# Patient Record
Sex: MALE | Race: WHITE | Employment: OTHER | URBAN - NONMETROPOLITAN AREA
[De-identification: names, ages, dates, MRNs, and addresses within clinical notes are randomized per-mention and may not be internally consistent; named-entity substitution may affect disease eponyms.]

---

## 2024-08-28 ENCOUNTER — OFFICE VISIT (OUTPATIENT)
Age: 74
End: 2024-08-28
Payer: COMMERCIAL

## 2024-08-28 VITALS
HEART RATE: 54 BPM | DIASTOLIC BLOOD PRESSURE: 70 MMHG | SYSTOLIC BLOOD PRESSURE: 130 MMHG | BODY MASS INDEX: 31.64 KG/M2 | WEIGHT: 226 LBS | HEIGHT: 71 IN

## 2024-08-28 DIAGNOSIS — Z79.4 TYPE 2 DIABETES MELLITUS WITH PROLIFERATIVE RETINOPATHY, WITH LONG-TERM CURRENT USE OF INSULIN, MACULAR EDEMA PRESENCE UNSPECIFIED, UNSPECIFIED LATERALITY, UNSPECIFIED PROLIFERATIVE RETINOPATHY* (HCC): Primary | ICD-10-CM

## 2024-08-28 DIAGNOSIS — E11.3599 TYPE 2 DIABETES MELLITUS WITH PROLIFERATIVE RETINOPATHY, WITH LONG-TERM CURRENT USE OF INSULIN, MACULAR EDEMA PRESENCE UNSPECIFIED, UNSPECIFIED LATERALITY, UNSPECIFIED PROLIFERATIVE RETINOPATHY* (HCC): Primary | ICD-10-CM

## 2024-08-28 PROCEDURE — 95251 CONT GLUC MNTR ANALYSIS I&R: CPT | Performed by: INTERNAL MEDICINE

## 2024-08-28 PROCEDURE — 1123F ACP DISCUSS/DSCN MKR DOCD: CPT | Performed by: INTERNAL MEDICINE

## 2024-08-28 PROCEDURE — 99204 OFFICE O/P NEW MOD 45 MIN: CPT | Performed by: INTERNAL MEDICINE

## 2024-08-28 RX ORDER — BUMETANIDE 2 MG/1
2 TABLET ORAL DAILY
COMMUNITY

## 2024-08-28 RX ORDER — HEPARIN SODIUM 1000 [USP'U]/ML
1000 INJECTION, SOLUTION INTRAVENOUS; SUBCUTANEOUS EVERY OTHER DAY
COMMUNITY

## 2024-08-28 RX ORDER — AMLODIPINE BESYLATE 5 MG/1
5 TABLET ORAL DAILY
COMMUNITY

## 2024-08-28 RX ORDER — INSULIN GLARGINE 100 [IU]/ML
INJECTION, SOLUTION SUBCUTANEOUS
Qty: 4 ADJUSTABLE DOSE PRE-FILLED PEN SYRINGE | Refills: 3 | Status: SHIPPED | OUTPATIENT
Start: 2024-08-28

## 2024-08-28 RX ORDER — CYCLOBENZAPRINE HCL 10 MG
10 TABLET ORAL 3 TIMES DAILY PRN
COMMUNITY

## 2024-08-28 RX ORDER — INSULIN ASPART INJECTION 100 [IU]/ML
INJECTION, SOLUTION SUBCUTANEOUS
Qty: 4 ADJUSTABLE DOSE PRE-FILLED PEN SYRINGE | Refills: 3 | Status: SHIPPED | OUTPATIENT
Start: 2024-08-28

## 2024-08-28 NOTE — PATIENT INSTRUCTIONS
Take BASAGLAR 10 units in am + 15 units in the PM.     Take mealtime Fiasp as follows:  Breakfast: 6 units  Lunch: 6 Units  Dinner: 6 units    Plus sliding scale Fiasp as follows:  Below 70, treat for hypoglycemia  , no additional insulin  151-200, 1 units  201-250, 2 units  251-300, 3 units  301-350, 4 units  351-400, 5 units  Above 400, call MD

## 2024-08-28 NOTE — PROGRESS NOTES
status, speech normal, alert and oriented x3, CLAUDIA, cranial nerves 2-12 intact, muscle tone and strength normal and symmetric.  Lymph nodes: There is no cervical, supraclavicular or submental adenopathy.  Musculoskeletal: No scoliosis.  There is no spinal tenderness.  No joint swelling or deformity.  Psychiatry: Alert and oriented ×3.  Making good eye contact.  Affect is normal.  He has some appropriate insight.    Foot Exam:  Abnormal  Decreased pulse senation BL.   Ulcer on left ankle. Neuropathy noted extending to knee.   Right big toe small ulcer noted.      Assessment/Plan:    IDDM2 w/ neuropathy, retinopathy, nephropathy:   A1c 8/7/24: 9.1%.   Current therapy includes Basaglar 25 units AM + Fiasp 5 + 5+ 5+ 10 units.   On Honey CGM.    - Insulin regimen as below.   - Will check BMP.   - Will refer to podiatry.  - In the future, can add GLP1 agonist.      Take BASAGLAR 10 units in am + 15 units in the PM.     Take mealtime  Fiasp  as follows:  Breakfast: 6 units  Lunch: 6 Units  Dinner: 6 units    Plus sliding scale  Fiasp  as follows:  Below 70, treat for hypoglycemia  , no additional insulin  151-200, 1 units  201-250, 2 units  251-300, 3 units  301-350, 4 units  351-400, 5 units  Above 400, call MD      1. Type 2 diabetes mellitus with proliferative retinopathy, with long-term current use of insulin, macular edema presence unspecified, unspecified laterality, unspecified proliferative retinopathy* (HCC)      Orders Placed This Encounter   Procedures    Basic Metabolic Panel     Standing Status:   Future     Standing Expiration Date:   8/28/2025    Hemoglobin A1C     Standing Status:   Future     Standing Expiration Date:   8/28/2025    [facility]     Referral Priority:   Routine     Referral Type:   Eval and Treat     Referral Reason:   Specialty Services Required     Number of Visits Requested:   1       The risks and benefits of my recommendations, as well as other treatment  options were discussed with the patient and daughter today. Questions were answered.  Follow up: 3 months and as needed.         Electronically signed by Reian Hoyos MD 8/28/2024 3:24 PM     **This report has been created using voice recognition software. It may   contain minor errors which are inherent in voice recognition technology.**

## 2024-09-03 ENCOUNTER — TELEPHONE (OUTPATIENT)
Age: 74
End: 2024-09-03

## 2024-09-03 DIAGNOSIS — Z79.4 TYPE 2 DIABETES MELLITUS WITH PROLIFERATIVE RETINOPATHY, WITH LONG-TERM CURRENT USE OF INSULIN, MACULAR EDEMA PRESENCE UNSPECIFIED, UNSPECIFIED LATERALITY, UNSPECIFIED PROLIFERATIVE RETINOPATHY* (HCC): Primary | ICD-10-CM

## 2024-09-03 DIAGNOSIS — E11.3599 TYPE 2 DIABETES MELLITUS WITH PROLIFERATIVE RETINOPATHY, WITH LONG-TERM CURRENT USE OF INSULIN, MACULAR EDEMA PRESENCE UNSPECIFIED, UNSPECIFIED LATERALITY, UNSPECIFIED PROLIFERATIVE RETINOPATHY* (HCC): Primary | ICD-10-CM

## 2024-09-03 RX ORDER — INSULIN GLARGINE 100 [IU]/ML
INJECTION, SOLUTION SUBCUTANEOUS
Qty: 5 ADJUSTABLE DOSE PRE-FILLED PEN SYRINGE | Refills: 3 | Status: SHIPPED | OUTPATIENT
Start: 2024-09-03 | End: 2024-09-03 | Stop reason: SDUPTHER

## 2024-09-03 RX ORDER — INSULIN GLARGINE 100 [IU]/ML
INJECTION, SOLUTION SUBCUTANEOUS
Qty: 5 ADJUSTABLE DOSE PRE-FILLED PEN SYRINGE | Refills: 3 | Status: SHIPPED | OUTPATIENT
Start: 2024-09-03

## 2024-09-03 RX ORDER — INSULIN ASPART INJECTION 100 [IU]/ML
INJECTION, SOLUTION SUBCUTANEOUS
Qty: 5 ADJUSTABLE DOSE PRE-FILLED PEN SYRINGE | Refills: 3 | Status: SHIPPED | OUTPATIENT
Start: 2024-09-03 | End: 2024-09-03 | Stop reason: SDUPTHER

## 2024-09-03 RX ORDER — INSULIN ASPART INJECTION 100 [IU]/ML
INJECTION, SOLUTION SUBCUTANEOUS
Qty: 5 ADJUSTABLE DOSE PRE-FILLED PEN SYRINGE | Refills: 3 | Status: SHIPPED | OUTPATIENT
Start: 2024-09-03

## 2024-09-04 DIAGNOSIS — Z79.4 TYPE 2 DIABETES MELLITUS WITHOUT COMPLICATION, WITH LONG-TERM CURRENT USE OF INSULIN (HCC): Primary | ICD-10-CM

## 2024-09-04 DIAGNOSIS — E11.9 TYPE 2 DIABETES MELLITUS WITHOUT COMPLICATION, WITH LONG-TERM CURRENT USE OF INSULIN (HCC): Primary | ICD-10-CM

## 2024-10-15 ENCOUNTER — OFFICE VISIT (OUTPATIENT)
Dept: INTERNAL MEDICINE CLINIC | Age: 74
End: 2024-10-15
Payer: COMMERCIAL

## 2024-10-15 DIAGNOSIS — Z79.4 TYPE 2 DIABETES MELLITUS WITH OTHER SPECIFIED COMPLICATION, WITH LONG-TERM CURRENT USE OF INSULIN (HCC): Primary | ICD-10-CM

## 2024-10-15 DIAGNOSIS — E11.69 TYPE 2 DIABETES MELLITUS WITH OTHER SPECIFIED COMPLICATION, WITH LONG-TERM CURRENT USE OF INSULIN (HCC): Primary | ICD-10-CM

## 2024-10-15 PROCEDURE — G0109 DIAB MANAGE TRN IND/GROUP: HCPCS | Performed by: REGISTERED NURSE

## 2024-10-15 PROCEDURE — NBSRV NON-BILLABLE SERVICE: Performed by: REGISTERED NURSE

## 2024-10-15 NOTE — PROGRESS NOTES
Patient presented for the [facility]  education class. The content was presented via PowerPoint, lecture and case-based format covering the following concepts: 60mins education.    What is Diabetes?  Define glycosolation  Interpretation of the A1C and goal.  Pancreatic function  Target organs and macro and microvascular complications  Goals for SGM BP goals  Meal clearance   S&S hyper/hypoglycemia and tx   Insulin physiology-basal/bolus  Navigating sick days stress  Relationship between diet, exercise, meds and stress   Utilizing maverick care system at appropriate time  Assuming responsibility in self management  Troubleshooting patterns     Post test score  16 out of 16

## 2024-10-21 ENCOUNTER — HOSPITAL ENCOUNTER
Dept: HOSPITAL 101 - PM | Age: 74
Discharge: HOME | End: 2024-10-21
Payer: COMMERCIAL

## 2024-10-21 DIAGNOSIS — M47.816: ICD-10-CM

## 2024-10-21 DIAGNOSIS — M48.062: Primary | ICD-10-CM

## 2024-10-21 DIAGNOSIS — M53.3: ICD-10-CM

## 2024-10-21 PROCEDURE — G0463 HOSPITAL OUTPT CLINIC VISIT: HCPCS

## 2024-10-21 NOTE — PM.CN
Consult Note: HPI
Data of Consult
Patient: new to practice
Consult date: 10/21/24
Requesting Physician: 
Janette Borja MD

Primary Care Provider: 
Pepper Mendoza NP

Consult Narrative
Reason for consult: low back, bilateral lower extremity pain
Narrative: 
74yom who presents for evaluation. longstanding low back pain history with radiation into bilateral lower extremities. imaging shows multilevel severe stenosis, worst at l3-4. also multilevel facet arthropathy noted. recently evaluated by spine 
surgeon, who did not recommend surgery due to myriad other health issues. has continued in a series of provider directed home exercises >6 weeks, without benefit. has used flexeril with some benefit. denies adverse med side effects.
cc:: 
CC: Janette Borja MD


Review of Systems
ROS  
 Status of ROS 10 or more systems reviewed and unremarkable except as noted in history and below   

[facility]
Medical History (Updated 10/21/24 @ 18:09 by Janette Borja MD)

Osteoarthritis
 ?M19.90 - Unspecified osteoarthritis, unspecified site (ICD-10)
Fistula
 ?L98.8 - Other specified disorders of the skin and subcutaneous tissue (ICD-10)
Trigger finger
 ?M65.30 - Trigger finger, unspecified finger (ICD-10)
Blind
 ?H54.7 - Unspecified visual loss (ICD-10)
Anxiety
 ?F41.9 - Anxiety disorder, unspecified (ICD-10)
Acid reflux
 ?K21.9 - Gastro-esophageal reflux disease without esophagitis (ICD-10)
Diabetes
 ?E11.9 - Type 2 diabetes mellitus without complications (ICD-10)
Kidney stone
 ?N20.0 - Calculus of kidney (ICD-10)
Cirrhosis of liver
 ?K74.60 - Unspecified cirrhosis of liver (ICD-10)
Requires peritoneal dialysis
 ?Z99.2 - Dependence on renal dialysis (ICD-10)
Kidney failure
 ?N19 - Unspecified kidney failure (ICD-10)
COPD (chronic obstructive pulmonary disease)
 ?J44.9 - Chronic obstructive pulmonary disease, unspecified (ICD-10)
Asthma
 ?J45.909 - Unspecified asthma, uncomplicated (ICD-10)
HTN (hypertension)
 ?I10 - Essential (primary) hypertension (ICD-10)
Palpitations
 ?R00.2 - Palpitations (ICD-10)
Heart attack
 ?I21.9 - Acute myocardial infarction, unspecified (ICD-10)
High cholesterol
 ?E78.00 - Pure hypercholesterolemia, unspecified (ICD-10)


Surgical History (Reviewed 10/21/24 @ 18:07 by Janette Borja MD)

Hx of cholecystectomy
 ?Z90.49 - Acquired absence of other specified parts of digestive tract (ICD-10)
History of rhinoplasty
 ?Z98.890 - Other specified postprocedural states (ICD-10)
History of cardiac cath
 ?Z98.890 - Other specified postprocedural states (ICD-10)
History of arthroscopic knee surgery
 ?Z98.890 - Other specified postprocedural states (ICD-10)
History of vasectomy
 ?Z98.52 - Vasectomy status (ICD-10)



Meds
Home Medications and Allergies
Home Medications

?Medication ?Instructions ?Recorded ?Confirmed ?Type
acetaminophen 650 mg 650 mg PO Q12H PRN fever 10/21/24 10/21/24 History
tablet,extended release (Tylenol    
Arthritis Pain)    
amlodipine 5 mg tablet 5 mg PO DAILY 10/21/24 10/21/24 History
bumetanide 2 mg tablet 2 mg PO DAILY 10/21/24 10/21/24 History
cyclobenzaprine 10 mg tablet 10 mg PO Q12H 10/21/24 10/21/24 History
hydroxyzine pamoate 25 mg capsule 25 mg PO TID 10/21/24 10/21/24 History
insulin aspart 6 unit subcut TID 10/21/24 10/21/24 History
(niacinamide)(U-100) 100 unit/mL(3    
mL) subcutaneous pen (Fiasp    
FlexTouch U-100 Insulin)    
insulin glargine 100 unit/mL (3 10 unit subcut QAM 10/21/24 10/21/24 History
mL) subcutaneous pen (Basaglar    
KwikPen U-100 Insulin)    
magnesium oxide 400 mg PO DAILY 10/21/24 10/21/24 History
melatonin 10 mg capsule 10 mg PO DAILY 10/21/24 10/21/24 History
omeprazole 20 mg capsule,delayed 20 mg PO BID 10/21/24 10/21/24 History
release    
sevelamer carbonate 800 mg tablet 800 mg PO TID 10/21/24 10/21/24 History
(Renvela)    


Allergies

Allergy/AdvReac Type Severity Reaction Status Date / Time
thiopental (From Pentothal) Allergy  Agitated Verified 10/21/24 14:54



Exam
Narrative
Exam Narrative: 
Psych-alert and oriented x 3. Attentive and appropriate, constitutionally normal, displays normal mood and affect per situation. There are no obvious deficits in memory, reasoning, or intellect.?
Skin-no obvious rashes, bruising, erythema noted to the patient's area of pain.?
Extremities- extremities are warm with minimal edema and palpable pulses.
Lumbar-tenderness to palpation noted in the lumbar spine and paraspinal musculature. Pain is elicited with flexion, extension, and lateral rotation of the lumbar spine. Range of motion is diminished with these motions. Facet loading maneuvers are 
positive.?
Strength-noted to be unremarkable with the exception of decreased strength rated at 4 out of 5 in bilateral quadriceps femoris, anterior tibialis.
Sensory-no notable sensory deficits in the bilateral lower extremities to touch or pinprick in all dermatomal distributions with the exception to decreased sensation to the bilateral L3, 4, 5 dermatomal distribution
Sacroiliac - bilateral tenderness at PSIS. Positive Steve's bilaterally. Positive thigh thrust bilaterally.
Coordination remains intact.?
Gait remains non-antalgic

Assessment and Plan
Assessment and Plan
(1) Lumbar stenosis with neurogenic claudication: 
(2) Lumbar spondylosis: 
(3) Sacroiliac joint dysfunction of both sides: 

Plan
74yom who presents for evaluation. failed conservative measures, as noted. imaging reviewed, as noted. given symptoms and imaging, prudent to attempt bilateral l3-4 tfesi under fluoroscopic guidance. may even benefit from bilateral sacroiliac joint 
injection under fluoroscopic guidance. he is in agreement. meds reviewed, no changes. follow up after procedure.

## 2024-10-21 NOTE — P.CN_ITS
Consult Note: HPI    
Data of Consult    
Patient: new to practice    
Consult date: 10/21/24    
Requesting Physician:     
Janette Borja MD    
    
Primary Care Provider:     
Pepper Mendoza NP    
    
Consult Narrative    
Reason for consult: low back, bilateral lower extremity pain    
Narrative:     
74yom who presents for evaluation. longstanding low back pain history with   
radiation into bilateral lower extremities. imaging shows multilevel severe   
stenosis, worst at l3-4. also multilevel facet arthropathy noted. recently   
evaluated by spine surgeon, who did not recommend surgery due to myriad other   
health issues. has continued in a series of provider directed home exercises >6   
weeks, without benefit. has used flexeril with some benefit. denies adverse med   
side effects.    
cc::     
CC: Janette Borja MD    
    
    
Review of Systems    
    
    
ROS      
    
 Status of ROS                          10 or more systems reviewed and unremark    
able except as noted in     
history and below       
    
    
[facility]    
Medical History (Updated 10/21/24 @ 18:09 by Janette Borja MD)    
    
Osteoarthritis    
   ?M19.90 - Unspecified osteoarthritis, unspecified site (ICD-10)    
Fistula    
   ?L98.8 - Other specified disorders of the skin and subcutaneous tissue (ICD-  
   10)    
Trigger finger    
   ?M65.30 - Trigger finger, unspecified finger (ICD-10)    
Blind    
   ?H54.7 - Unspecified visual loss (ICD-10)    
Anxiety    
   ?F41.9 - Anxiety disorder, unspecified (ICD-10)    
Acid reflux    
   ?K21.9 - Gastro-esophageal reflux disease without esophagitis (ICD-10)    
Diabetes    
   ?E11.9 - Type 2 diabetes mellitus without complications (ICD-10)    
Kidney stone    
   ?N20.0 - Calculus of kidney (ICD-10)    
Cirrhosis of liver    
   ?K74.60 - Unspecified cirrhosis of liver (ICD-10)    
Requires peritoneal dialysis    
   ?Z99.2 - Dependence on renal dialysis (ICD-10)    
Kidney failure    
   ?N19 - Unspecified kidney failure (ICD-10)    
COPD (chronic obstructive pulmonary disease)    
   ?J44.9 - Chronic obstructive pulmonary disease, unspecified (ICD-10)    
Asthma    
   ?J45.909 - Unspecified asthma, uncomplicated (ICD-10)    
HTN (hypertension)    
   ?I10 - Essential (primary) hypertension (ICD-10)    
Palpitations    
   ?R00.2 - Palpitations (ICD-10)    
Heart attack    
   ?I21.9 - Acute myocardial infarction, unspecified (ICD-10)    
High cholesterol    
   ?E78.00 - Pure hypercholesterolemia, unspecified (ICD-10)    
    
    
Surgical History (Reviewed 10/21/24 @ 18:07 by Janette Borja MD)    
    
Hx of cholecystectomy    
   ?Z90.49 - Acquired absence of other specified parts of digestive tract (ICD-  
   10)    
History of rhinoplasty    
   ?Z98.890 - Other specified postprocedural states (ICD-10)    
History of cardiac cath    
   ?Z98.890 - Other specified postprocedural states (ICD-10)    
History of arthroscopic knee surgery    
   ?Z98.890 - Other specified postprocedural states (ICD-10)    
History of vasectomy    
   ?Z98.52 - Vasectomy status (ICD-10)    
    
    
    
Meds    
Home Medications and Allergies    
                                Home Medications    
    
    
    
?Medication ?Instructions ?Recorded ?Confirmed ?Type    
     
acetaminophen 650 mg 650 mg PO Q12H PRN fever 10/21/24 10/21/24 History    
    
tablet,extended release (Tylenol        
    
Arthritis Pain)        
     
amlodipine 5 mg tablet 5 mg PO DAILY 10/21/24 10/21/24 History    
     
bumetanide 2 mg tablet 2 mg PO DAILY 10/21/24 10/21/24 History    
     
cyclobenzaprine 10 mg tablet 10 mg PO Q12H 10/21/24 10/21/24 History    
     
hydroxyzine pamoate 25 mg capsule 25 mg PO TID 10/21/24 10/21/24 History    
     
insulin aspart 6 unit subcut TID 10/21/24 10/21/24 History    
    
(niacinamide)(U-100) 100 unit/mL(3        
    
mL) subcutaneous pen (Fiasp        
    
FlexTouch U-100 Insulin)        
     
insulin glargine 100 unit/mL (3 10 unit subcut QAM 10/21/24 10/21/24 History    
    
mL) subcutaneous pen (Basaglar        
    
KwikPen U-100 Insulin)        
     
magnesium oxide 400 mg PO DAILY 10/21/24 10/21/24 History    
     
melatonin 10 mg capsule 10 mg PO DAILY 10/21/24 10/21/24 History    
     
omeprazole 20 mg capsule,delayed 20 mg PO BID 10/21/24 10/21/24 History    
    
release        
     
sevelamer carbonate 800 mg tablet 800 mg PO TID 10/21/24 10/21/24 History    
    
(Renvela)        
    
    
    
                                    Allergies    
    
    
    
Allergy/AdvReac Type Severity Reaction Status Date / Time    
     
thiopental (From Pentothal) Allergy  Agitated Verified 10/21/24 14:54    
    
    
    
    
Exam    
Narrative    
Exam Narrative:     
Psych-alert and oriented x 3. Attentive and appropriate, constitutionally   
normal, displays normal mood and affect per situation. There are no obvious   
deficits in memory, reasoning, or intellect.?    
Skin-no obvious rashes, bruising, erythema noted to the patient's area of pain.?    
Extremities- extremities are warm with minimal edema and palpable pulses.    
Lumbar-tenderness to palpation noted in the lumbar spine and paraspinal   
musculature. Pain is elicited with flexion, extension, and lateral rotation of   
the lumbar spine. Range of motion is diminished with these motions. Facet   
loading maneuvers are positive.?    
Strength-noted to be unremarkable with the exception of decreased strength rated  
at 4 out of 5 in bilateral quadriceps femoris, anterior tibialis.    
Sensory-no notable sensory deficits in the bilateral lower extremities to touch   
or pinprick in all dermatomal distributions with the exception to decreased   
sensation to the bilateral L3, 4, 5 dermatomal distribution    
Sacroiliac - bilateral tenderness at PSIS. Positive Steve's bilaterally.   
Positive thigh thrust bilaterally.    
Coordination remains intact.?    
Gait remains non-antalgic    
    
Assessment and Plan    
Assessment and Plan    
(1) Lumbar stenosis with neurogenic claudication:     
(2) Lumbar spondylosis:     
(3) Sacroiliac joint dysfunction of both sides:     
    
Plan    
74yom who presents for evaluation. failed conservative measures, as noted.   
imaging reviewed, as noted. given symptoms and imaging, prudent to attempt   
bilateral l3-4 tfesi under fluoroscopic guidance. may even benefit from   
bilateral sacroiliac joint injection under fluoroscopic guidance. he is in   
agreement. meds reviewed, no changes. follow up after procedure.

## 2024-10-28 ENCOUNTER — HOSPITAL ENCOUNTER (OUTPATIENT)
Dept: HOSPITAL 101 - SURGOUT | Age: 74
Discharge: HOME | End: 2024-10-28
Payer: COMMERCIAL

## 2024-10-28 VITALS
DIASTOLIC BLOOD PRESSURE: 81 MMHG | OXYGEN SATURATION: 100 % | TEMPERATURE: 97.3 F | HEART RATE: 101 BPM | SYSTOLIC BLOOD PRESSURE: 157 MMHG

## 2024-10-28 VITALS
DIASTOLIC BLOOD PRESSURE: 74 MMHG | SYSTOLIC BLOOD PRESSURE: 160 MMHG | OXYGEN SATURATION: 96 % | HEART RATE: 85 BPM | TEMPERATURE: 97.34 F

## 2024-10-28 VITALS
DIASTOLIC BLOOD PRESSURE: 77 MMHG | TEMPERATURE: 97.3 F | SYSTOLIC BLOOD PRESSURE: 169 MMHG | OXYGEN SATURATION: 97 % | HEART RATE: 83 BPM

## 2024-10-28 DIAGNOSIS — M48.062: Primary | ICD-10-CM

## 2024-10-28 PROCEDURE — 64483 NJX AA&/STRD TFRM EPI L/S 1: CPT

## 2024-10-28 RX ADMIN — SODIUM CHLORIDE 1 ML: 9 INJECTION, SOLUTION INTRAMUSCULAR; INTRAVENOUS; SUBCUTANEOUS at 08:41

## 2024-10-28 RX ADMIN — SODIUM CHLORIDE 50 ML: 9 INJECTION, SOLUTION INTRAVENOUS at 07:39

## 2024-10-28 RX ADMIN — BUPIVACAINE HYDROCHLORIDE 1 ML: 2.5 INJECTION, SOLUTION EPIDURAL; INFILTRATION; INTRACAUDAL; PERINEURAL at 08:41

## 2024-10-28 RX ADMIN — IOHEXOL 12 MG: 240 INJECTION, SOLUTION INTRATHECAL; INTRAVASCULAR; INTRAVENOUS; ORAL at 08:42

## 2024-10-28 RX ADMIN — DEXAMETHASONE SODIUM PHOSPHATE 10 MG: 10 INJECTION INTRAMUSCULAR; INTRAVENOUS at 08:41

## 2024-10-28 RX ADMIN — LIDOCAINE HYDROCHLORIDE 1 ML: 20 INJECTION, SOLUTION INFILTRATION; PERINEURAL at 08:42

## 2024-10-28 NOTE — P.ON_ITS
Date of procedure: 10/28/24    
Pre-op diagnosis: Pain due to lumbar stenosis with neurogenic claudication    
Post-op diagnosis: same as pre-op    
Procedure:     
Procedure: Bilateral L3-4 transforaminal epidural steroid injection    
Medications: Bupivacaine 0.25% 2cc, lidocaine 2% 1cc, dexamethasone 10mg    
    
The patient was seen and examined in the preoperative holding area.? Informed   
consent was obtained and placed on the chart.? Patient was brought to the   
medical procedure unit and placed in the prone position where a timeout was   
completed verifying the correct patient, procedure site, position, and planned   
special equipment using sterile aseptic technique.? Under direct fluoroscopic   
visualization a 25-gauge Quincke tipped spinal needle was advanced at level left  
L3-4 to the designated neural foramen where contrast dye was injected to show   
adequate spread.? There was no evidence of vascular or adverse uptake.? Epidural  
spread was appreciated.? The above-mentioned injectate was then placed in a 1.5   
mL aliquot preceded by negative aspiration.? The needle was removed. The same   
procedure, at the same level, was completed on the opposite side. ? Patient was   
taken to the postprocedural recovery area and monitored for an appropriate   
length of time before found suitable for discharge in the accompaniment of a   
responsible adult.     
Anesthesia: MAC    
Surgeon: Janette Borja    
Pathology: none sent    
Condition: stable    
Disposition: no change

## 2024-10-28 NOTE — XMS_ITS
Comprehensive CCD (C-CDA v2.1)  
  
                          Created on: 2024  
  
  
Arun Moon  
External Reference #: CDR,PersonID:[id number]  
: 1950  
Sex: Male  
  
Demographics  
  
  
                                        Address             [address] [place]  [address]  
   
                                        Home Phone          [phone]  
   
                                        Preferred Language  en  
   
                                        Marital Status        
   
                                        Taoist Affiliation Unknown  
   
                                        Race                White  
   
                                        Ethnic Group        Not  or Lati  
no  
  
  
Author  
  
  
                                        Organization        [facility]  
  
  
Care Team Providers  
  
  
                                Care Team Member Name Role            Phone  
   
                                Unavailable     Primary Care Provider Unavailabl  
e  
   
                                [facility] [place] [facility] Primary Care Provider [phone]  
[phone]  
   
                                MD Ronald Sánchez Attending Provider [phone]  
[phone]  
   
                                MD Charlie Matos Attending Provider [phone]  
   
                                [facility] [place] [facility] Primary Care Provider [phone]  
[phone]  
   
                                MD Ronald Sánchez Attending Provider [phone]  
[phone]  
   
                                Ronald Sánchez Unavailable     [phone]  
[phone]  
   
                                Carolee Cuevas Unavailable     [phone]  
   
                                [facility] [place] [facility] Primary Care Provider [phone]  
[phone]  
   
                                MD Ronald Sánchez Attending Provider [phone]  
[phone]  
   
                                LAN Cuevas Attending Provider [phone]  
[phone]  
   
                                [facility] [place] [facility] Primary Care Provider [phone]  
[phone]  
   
                                MD Ronald Sánchez Attending Provider [phone]  
[phone]  
   
                                [facility] [place] [facility] Primary Care Provider [phone]  
[phone]  
   
                                MD Ronald Sánchez Attending Provider [phone]  
[phone]  
   
                                Pepper Mendoza Primary Care Provider [phone]  
   
                                Pepper Mendoza Primary Care Provider [phone]  
   
                                MD Guicho Bronson Attending Provider [phone]  
   
                                Pepper Jhaveri Primary Care Provider   
[phone]  
   
                                BASIA SOMMER  Admitting       Unavailable  
   
                                BASIA SOMMER  Attending       Unavailable  
   
                                FRANCISCO CALABRESE Referring       Unavailab  
le  
   
                                AICHHOLZ, PEPPER J Primary Care    Unavailable  
   
                                ROXANNA SALINAS Consulting      Unavaila  
NELLY Colmenares    Consulting      Unavailable  
   
                                BEVERLY YUSUF Attending       Unavailable  
   
                                AICHHOLZ, PEPPER J Referring       Unavailable  
   
                                AICHHOLZ, PEPPER J Primary Care    Unavailable  
   
                                Aichholz, Pepper J Primary Care Provider [phone]  
   
                                MD Yordy Lewis  Attending Provider [phone]  
   
                                JOSEF CASANOVA Attending       Unavailable  
   
                                AICHHOLZ, PEPPER J Referring       Unavailable  
   
                                AICHHOLZ, PEPPER J Primary Care    Unavailable  
   
                                AICHHOLZ, PEPPER J Referring       Unavailable  
   
                                AICHHOLZ, PEPPER J Primary Care    Unavailable  
   
                                AICHHOLZ, PEPPER J Primary Care    Unavailable  
   
                                FRANCISCO CALABRESE Attending       UnavailFRANCISCO Harley Attending       Unavaila  
FRANCISCO Anderson Referring       Unavaila  
ble  
   
                                AICHHOLZ, PEPPER J Primary Care    Unavailable  
   
                                VALERIA JORDAN  Referring       Unavailable  
   
                                AICHHOLZ, PEPPER J Primary Care    Unavailable  
   
                                AICHHOLZ, PEPPER J Referring       Unavailable  
   
                                AICHHOLZ, PEPPER J Primary Care    Unavailable  
   
                                AICHHOLZ, PEPPER J Referring       Unavailable  
   
                                AICHHOLZ, PEPPER J Primary Care    Unavailable  
   
                                AKOSUA BAIG Attending       Unavailable  
   
                                PALMERHHOLZ, PEPPER J Referring       Unavailable  
   
                                AICHHOLZ, PPEPER J Primary Care    Unavailable  
   
                                AKOSUA BAIG Attending       Unavailable  
   
                                AKOSUA BAIG Referring       Unavailable  
   
                                AICHHOLZ, PEPPER J Primary Care    Unavailable  
   
                                EDWARDO, SHABANA G Referring       Unavailable  
   
                                AICHHOLZ, PEPPER J Primary Care    Unavailable  
   
                                EDWARDO, SHABANA G Referring       Unavailable  
   
                                AICHHOLZ, PEPPER J Primary Care    Unavailable  
   
                                EDWARDO, SHABANA G Referring       Unavailable  
   
                                AICHHOLZ, PEPPER J Primary Care    Unavailable  
   
                                EDWARDO, SHABANA G Admitting       Unavailable  
   
                                EDWARDO, SHABANA G Attending       Unavailable  
   
                                AICHHOLZ, PEPPER J Primary Care    Unavailable  
   
                                CYRIL LEARY Attending       Unavailable  
   
                                AICHHOLZ, PEPPER J Primary Care    Unavailable  
   
                                EDWARDO, SHABANA G Attending       Unavailable  
   
                                EDWARDO, SHABANA G Referring       Unavailable  
   
                                AICHHOLZ, PEPPER J Primary Care    Unavailable  
   
                                YORDY LEWIS     Referring       Unavailable  
   
                                AICHHOLZ, PEPPER J Primary Care    Unavailable  
   
                                AICHHOLZ, PEPPER J Referring       Unavailable  
   
                                AICHHOLZ, PEPPER J Primary Care    Unavailable  
   
                                Aichholz, Pepper J Primary Care Provider [phone]  
   
                                MD Radha Imeliana  Attending Provider [phone]  
   
                                Asaad, Imad     Attending       Unavailable  
   
                                Asaad, Imad     Admitting       Unavailable  
   
                                Pepper Mendoza Primary Care    Unavailable  
   
                                Asaad, Imad     Attending       Unavailable  
   
                                Asaad, Imad     Admitting       Unavailable  
   
                                Pepper Mendoza Primary Care    Unavailable  
   
                                Aichholmanish NP, Pepper Unavailable     [phone]  
   
                                Shoaib Matos MD Unavailable     [phone]  
   
                                Shoaib Matos MD Primary Care Provider [phone]  
   
                                Aicjanusz NP, Pepper Unavailable     [phone]  
   
                                PEPPER MENDOZA. Referring       Unavailable  
   
                                No Family, Physician Primary Care    Unavailable  
   
                                ARCHIE VELA Referring       Unavailable  
   
                                No Family, Physician Primary Care    Unavailable  
   
                                AICHLUCEROZ, PEPPER  Attending       Unavailable  
   
                                RUSHER, NANCY A Attending       Unavailable  
   
                                AICHHOLZ, PEPPER  Attending       Unavailable  
   
                                AICHHOLZ, PEPPER  Attending       Unavailable  
   
                                RUSHER, NANCY A Attending       Unavailable  
   
                                AICHHOLZ, PEPPER  Attending       Unavailable  
   
                                RUSHER, NANCY A Attending       Unavailable  
   
                                RUSHER, NANCY A Attending       Unavailable  
   
                                AICHHOLZ, PEPPER  Attending       Unavailable  
   
                                RUSHER, NANCY A Attending       Unavailable  
   
                                JIM GRIGSBY Attending       Unavailable  
   
                                AICHHOLZ, PEPPER  Referring       Unavailable  
   
                                AICHHOLZ, PEPPER  Attending       Unavailable  
   
                                RUSHER, NANCY A Attending       Unavailable  
   
                                Janette Borja MD Attending         
Unavailable  
  
  
  
Allergies  
  
  
                                                    Allergy   
Classification                          Reported   
Allergen(s)               Allergy Type              Date of   
Onset                     Reaction(s)               Facility  
   
                                                    PHENobarbital  
(1 source)          PHENobarbital       Drug Allergy          
4                                       Unknown   
Reaction,   
violent                                 [facility]  
   
                                                      
(17 sources)                            PHENobarbital;   
Translations:   
[PHENOBARBITAL]           Drug Allergy                
2                                       violent,   
Unknown   
Reaction                                [facility]  
   
                                                      
(4 sources)               sodium pentothal          Propensity to   
adverse   
reactions                                 
2                                       becomes   
violent                                 [facility]  
   
                                                      
(6 sources)                             Thiopental;   
Translations:   
[THIOPENTAL   
SODIUM]                   Drug Allergy                
0                                       Other (See   
Comments)                               [facility]  
   
                                                      
(5 sources)         Thiopental          Drug Allergy          
3                                                   [facility]   
[facility]  
  
  
  
Medications  
Current Medications  
  
  
  
                      Medication Drug Class(es) Dates      Sig (Normalized) Sig   
(Original)  
   
                                                    8 hr acetaminophen   
650 mg extended   
release oral tablet  
(11 sources)                                        Start:   
2023                              take 1950 mg by   
mouth every eight   
hours                                   Acetaminophen   
Active 1950 MG PO   
Q8H 2023 12:00am  
  
  
  
                                Start: 2023                 Acetaminophen   
(Tylenol Arthritis) 650   
mg Tablet Extended Release Active   
1300 MG PO Q8H 2023   
12:00am  
   
                                                    Start: 10-  
End: 2023                                     Acetaminophen (Tylenol) 325   
mg Tablet   
Discontinued 650 MG PO As Directed   
2022 12:00am 2023 11:44am  
   
                                                            take 1 tablet by rayne  
th every   
eight hours as needed                   acetaminophen (TYLENOL ARTHRITIS) 650   
mg 8 hr tablet Take 1 tablet (650 mg   
total) by mouth every 8 (eight) hours   
as needed. 0 Active  
  
  
  
                                        Comment on above:   Take 650 mg by mouth  
 every 8 hours as needed.   
   
                                                    [id number] 200   
actuat albuterol   
0.09 mg/actuat   
metered dose   
inhaler  
(5 sources)                             beta2-Adrenergic   
Agonist                                 Start:   
20                                      take 2 puff(s) by   
inhalation every   
four hours for   
wheezing                                albuterol HFA 90   
mcg/act inhaler   
Inhale 2 puffs   
every 4 (four)   
hours if needed   
for shortness of   
breath or wheezing   
2023 Active  
   
                                                    amLODIPine 10 mg   
oral tablet  
(20 sources)                            Dihydropyridine Calcium   
Channel Blocker                         Start:   
20                                      take 5 mg by mouth   
once daily at   
bedtime                                 Amlodipine Active   
5 MG PO Daily at   
bedtime 2022 1:00am  
  
  
  
                                        Start: 2022   take 1 tablet by rayne  
th in the   
morning                                 amLODIPine (NORVASC) 10 mg tablet   
Indications: Essential hypertension   
Take 1 tablet (10 mg total) by mouth   
in the morning. 90 tablet 3   
2022 Active  
   
                                                            take 1 tablet by rayne  
th in the   
morning                                 amLODIPine (Norvasc) 5 MG tablet   
Take 5 mg by mouth in the morning.   
Active  
   
                                                                amLODIPine Besyl  
ate Active  
  
  
  
                                                    atorvastatin 40 mg   
oral tablet  
(20 sources)                            HMG-CoA   
Reductase   
Inhibitor                               Start: 2021  
End: 2024                         take 1 tablet   
by mouth in   
the morning                             atorvastatin   
(LIPITOR) 40 mg   
tablet Take 1   
tablet (40 mg   
total) by mouth in   
the morning. 0   
2024 Active  
  
  
  
                                                                Atorvastatin Garland  
cium Active  
  
  
  
                                        Comment on above:   Take 1 tablet by rayne  
th.   
   
                                                    B Complex-C-Zn-Folic   
Acid   
(Dialyvite/Zinc)   
tablet  
(5 sources)                                         Start:   
2024                              take 1 tablet by   
mouth at bedtime                        B Complex-C-Zn-Folic   
Acid   
(Dialyvite/Zinc)   
tablet Take 1 tablet   
by mouth at bedtime   
2024 Active  
   
                                                    bumetanide 2 mg oral   
tablet  
(9 sources)               Loop Diuretic             Start:   
2024                              take 1 tablet by   
mouth once daily                        bumetanide (Bumex) 2   
MG tablet Take 2 mg   
by mouth Daily   
2024 Active  
  
  
  
                                        Start: 10-   take 1 tablet by rayne  
th once   
daily                                   bumetanide (BUMEX) 2 mg tablet Take   
1 tablet (2 mg total) by mouth   
daily. 0 10/10/2023 Active  
  
  
  
                                                    cefdinir 300 mg oral   
capsule  
(5 sources)                             Cephalosporin   
Antibacterial                                               cefdinir (Omnicef) 3  
00   
MG capsule Take 300 mg   
by mouth Active  
   
                                                    cetirizine   
hydrochloride 10 mg   
oral tablet  
(5 sources)                             Histamine-1 Receptor   
Antagonist                                          take 1 tablet   
by mouth in   
the morning                             cetirizine (ZyrTEC) 10   
MG tablet Take 10 mg   
by mouth in the   
morning. Active  
   
                                                    cholecalciferol 0.125   
mg oral tablet  
(16 sources)              Vitamin D                 Start:   
  
022                                     take 1 tablet   
by mouth once   
daily                                   Cholecalciferol   
(Vitamin D3) (Vitamin   
D3) 125 mcg (5,000   
unit) Tablet Active   
61530 UNIT PO Daily   
2022   
1:00am  
  
  
  
                                                take 1 capsule by mouth in the m  
orning cholecalciferol (Vitamin D-3) 125 MCG   
(5000   
UT) capsule Take 5,000 Units by mouth in the   
morning. Active  
   
                                                take 99847 mg by mouth once daniela  
y cholecalciferol, vitamin D3, 10 mcg/drop (400  
  
unit/drop) drops Take 10,000 mg by mouth   
daily. 0 Active  
  
  
  
                                                    Continuous Blood Gluc   
 (FreeStyle   
Yudi 2 [place]) device  
(5 sources)                                         Start:   
2023                                          Continuous Blood Gluc   
 (FreeStyle   
Yudi 2 [employer])   
device 1 Device Daily   
as needed 2023   
Active  
   
                                                    Continuous Glucose   
Sensor (FreeStyle   
Yudi 2 Sensor) misc  
(5 sources)                                         Start:   
2024                                          Continuous Glucose   
Sensor (FreeStyle   
Yudi 2 Sensor) misc   
1 each by Other route   
Every 10 (ten) days   
2024 Active  
   
                                                    cyclobenzaprine   
hydrochloride 10 mg   
oral tablet  
(8 sources)                             Muscle   
Relaxant                                Start:   
2024  
End: 2024                         take 1   
tablet by   
mouth once                              cyclobenzaprine   
(Flexeril) 10 MG   
tablet Indications:   
Muscle spasm , Spinal   
stenosis of lumbar   
region with   
neurogenic   
claudication Take 1   
tablet (10 mg) by   
mouth every 12   
(twelve) hours 60   
tablet 2 10/10/2024   
2024 Active  
  
  
  
                                        Start: 2024   take 10 mg by mouth   
every   
twelve hours                            Cyclobenzaprine Active 10 MG PO Every   
12 hours May 14th, 2024 12:00am  
  
  
  
                                                    Dailyvite  
(3 sources)                             Start: 2023   take 800 mg   
by mouth once   
daily at   
bedtime                                 Dailyvite Active   
800 MG PO Daily   
at bedtime 2023   
12:00am with zinc  
   
                                                    Flash Glucose   
Sensor (Freestyle   
Yudi 2 Sensor)   
kit  
(1 source)                      Start: 2024                 Flash Glucose   
Sensor (Freestyle   
Yudi 2 Sensor)   
kit Active EACH   
.ROUTE .MEDSUPPLY   
 12:00am As   
directed  
   
                                                    FREESTYLE YUDI 2   
SENSOR kit  
(2 sources)                     Start: 2023                 FREESTYLE LIBR  
E 2   
SENSOR kit  
   
                                                    furosemide 40 mg   
oral tablet  
(5 sources)         Loop Diuretic                           take 1 tablet   
by mouth once   
daily                                   furosemide   
(Lasix) 40 MG   
tablet Take 1   
tablet by mouth   
Daily Active  
   
                                                    gentamicin 0.001   
mg/mg topical   
ointment  
(6 sources)                     Start: 2024                 gentamicin   
(Garamycin) 0.1 %   
ointment Apply 1   
Application   
topically in the   
morning.   
2024 Active  
   
                                                    1 ml heparin   
sodium, porcine   
1000 unt/ml   
injection  
(6 sources)                             Unfractionated   
Heparin,   
Anti-coagulant                                              heparin 1000   
units/mL   
injection Infuse   
1,000 Units/hr   
into a venous   
catheter every   
other day Active  
  
  
  
                                                                heparin sodium,p  
orcine (heparin, porcine,) 1,000 unit/mL infusion for dialysis  
  
Infuse 1,000 Units/hr into a venous catheter every other day. 0 Active  
  
  
  
                                                    hydrOXYzine pamoate   
25 mg oral capsule  
(20 sources)        Antihistamine       Start: 2024   take 1 capsule   
by mouth every   
eight hours as   
needed for   
anxiety                                 hydrOXYzine pamoate   
(Vistaril) 25 MG   
capsule Take 25 mg   
by mouth every 8   
(eight) hours if   
needed for anxiety   
or itching   
2024 Active  
  
  
  
                                        Start: 2024   take 1 tablet by rayne  
th three   
times daily as needed                   Hydroxyzine Hcl Active 1 TAB PO   
Three times daily May 14th, 2024   
12:00am FreeTextSi tablet as   
needed Orally Once a day; Note:   
Source Status: Taking; Provider:   
Princess Cardenas (NPI:   
[id number])  
   
                                        Start: 2024   take 1 tablet by rayne  
th once   
daily as needed                         Hydroxyzine Hcl Active 1 TAB PO   
Daily May 14th, 2024 12:00am   
FreeTextSi tablet as needed   
Orally Once a day; Note: Source   
Status: Taking; Provider: Princess Cardenas (NPI: [id number])  
   
                                Start: 2023                 hydrOXYzine (V  
ISTARIL) 25 mg capsule   
take 1 capsule by mouth every   
morning then 1 capsule AT NOON then   
1 capsule every evening if needed   
for anxiety up to 90 DAYS 270   
capsule 0 2023 Active  
   
                                                    Start: 2022  
End: 2024                         take 25 mg by mouth three times   
daily                                   Hydroxyzine Pamoate Discontinued 25   
MG PO Three times daily 2022 12:00am May 14th, 2024 11:58am  
   
                                                            take 1 tablet by rayne  
th every   
twenty-four hours                       hydrOXYzine HCl 25 MG 1 tablet as   
needed Orally Once a day Active  
   
                                                                hydrOXYzine HCl   
Active  
  
  
  
                                                    3 ml insulin   
aspart, human   
100 unt/ml pen   
injector  
(6 sources)         Insulin Analog      Start: 2024   inject 6 [IU] by   
subcutaneous   
injection three   
times daily                             Insulin Aspart   
(Niacinamide) (Fiasp   
Flextouch U-100   
Insulin) 100 unit/mL   
(3 mL) insulin pen   
Active 6 UNIT SUBCUT   
Three times daily   
2024   
12:00am  
  
  
  
                                Start: 2024                 insulin aspart  
, with niacinamide, (Fiasp FlexTouch) 100   
UNIT/ML   
injection Indications: Type 2 diabetes mellitus without   
complication, with long-term current use of insulin (CMS/[facility]) 5   
units before meals and 8-10 units at bedtime. For total max dose   
of 25 units daily 23 mL 3 2024 Active  
  
  
  
                                                    3 ml insulin glargine   
100 unt/ml pen injector  
(7 sources)     Insulin Analog  Start: 2024                 insulin glargi  
ne (Basaglar   
KwikPen) 100 UNIT/ML pen   
Indications: Type 2   
diabetes mellitus without   
complication, with   
long-term current use of   
insulin (CMS/[facility] [facility]) 25 units   
daily 27 mL 1 2024   
Active  
  
  
  
                                        Start: 2024   inject 10 [IU] by metcalf  
bcutaneous   
injection once daily at bedtime         Insulin Glargine (Basaglar   
Kwikpen U-100 Insulin) 100   
unit/mL (3 mL) insulin pen   
Active 10 UNIT SUBCUT every day   
in the morning and at bedtime   
May 14th, 2024 12:00am  
  
  
  
                                                    insulin   
glargine,hum.rec.anlog   
(BASAGLAR KWIKPEN U-100   
INSULIN SUBQ)  
(1 source)                                                  inject 10 [IU] by   
subcutaneous   
injection in the   
morning                                 insulin   
glargine,hum.rec.anlog   
(BASAGLAR KWIKPEN U-100   
INSULIN SUBQ) Inject 10   
Units under the skin in the   
morning. 0 Active  
   
                                                    loratadine 10 mg oral tablet  
(7 sources)                                                     loratadine (Clar  
itin) 10 MG   
tablet Take 10 mg by mouth   
if needed for allergies   
Active  
   
                                                    magnesium oxide 400 mg oral   
tablet  
(8 sources)                                         Start:   
10-13-2  
023                                                 magnesium oxide (Mag-Ox) 400  
   
(240 Mg) MG tablet Take 400   
mg by mouth. 10/13/2023   
Active  
   
                                                    melatonin 10 mg oral tablet  
(19 sources)                                        Start:   
  
024                                     take 1 tablet by   
mouth once daily at   
bedtime                                 Melatonin Active 1 TAB PO   
Daily at bedtime May 14th,   
2024 12:00am [employer]   
tablet at bedtime as needed   
Orally Once a day; Note:   
Source Status: Taking;   
Provider: Princess Cardenas (NPI: [id number])  
  
  
  
                                                            take 1 tablet under   
the tongue at   
bedtime                                 Melatonin 1 MG sublingual tablet Place 1  
 mg   
under the tongue at bedtime Active  
   
                                                            take 1 tablet by rayne  
th once daily at   
bedtime as needed                       Melatonin 10 MG 1 tablet at bedtime as   
needed Orally Once a day Active  
   
                                                                Melatonin Active  
  
  
  
                                                    omeprazole 20 mg   
delayed release   
oral tablet  
(20 sources)                            Proton Pump   
Inhibitor                 Start: 2023         take 20 mg by   
mouth twice   
daily                                   Omeprazole Active   
20 MG PO Twice   
daily 2023 12:00am  
  
  
  
                                        Start: 04-   take 20 mg by mouth   
once daily   
at bedtime                              Omeprazole Active 20 MG PO Daily at   
bedtime 2023 12:00am  
   
                                                            take 1 capsule by mo  
uth in the   
morning                                 omeprazole (PriLOSEC) 20 MG DR   
capsule Take 20 mg by mouth in the   
morning. Active  
   
                                                                Omeprazole Activ  
e  
  
  
  
                                        Comment on above:   Take 20 mg by mouth   
once daily.   
   
                                                    ONETOUCH ULTRA2 METER   
misc  
(2 sources)                                         Start:   
  
1                                                   ONETOUCH ULTRA2 METER   
misc  
   
                                                    polyethylene glycol   
3350 [id number] mg powder   
for oral solution  
(1 source)      Osmotic Laxative                                 polyethylene gl  
ycol   
(GLYCOLAX) 17   
gram/dose powder Take   
17 g by mouth in the   
morning. 0 Active  
   
                                                    0.25 mg, 0.5 mg dose   
1.5 ml semaglutide   
1.34 mg/ml pen   
injector  
(2 sources)                                                     semaglutide (OZE  
MPIC)   
0.25 mg or 0.5 mg(2   
mg/1.5 mL) pen   
injector Inject under   
the skin. 0 Active  
   
                                                    sodium bicarb  
(10 sources)                                                    sodium bicarb Ac  
tive  
   
                                                    sodium zirconium   
cyclosilicate 5000 mg   
powder for oral   
suspension  
(5 sources)                                                     Lokelma 5 g pack  
et 5   
g Active  
   
                                                    Super B Complex  
(12 sources)                                                    Super B Complex   
Active  
   
                                                    UNABLE TO FIND  
(2 sources)                                                 take 1 tablet by   
mouth in the   
morning                                 UNABLE TO FIND Take 1   
tablet by mouth in   
the morning.   
DailyVite with Zinc.   
0 Active  
   
                                                    Vitamin B Complex  
(9 sources)                                         Start:   
  
2                                       take 1 tablet by   
mouth once daily                        Vitamin B Complex   
Active 1 TAB PO Daily   
2022   
2:58pm  
  
  
  
                                                    Start: 2022  
End: 2023                         take 1 tablet by mouth once   
daily                                   Vitamin B Complex Discontinued 1 TAB   
PO Daily 2022 1:00am   
2023 11:46am  
   
                                        Start: 2022   take 1 tablet by rayne  
th once   
daily                                   Vitamin B Complex Active 1 TAB PO   
Daily 2022 12:00am  
   
                                        Start: 2022   take 1 tablet by rayne  
th once   
daily                                   Vitamin B Complex Active 1 TAB PO   
Daily 2022 1:00am  
  
  
  
                                                    Vitamin D3  
(12 sources)                                                    Vitamin D3 Activ  
e  
  
  
  
Completed/Discontinued Medications  
  
  
  
                      Medication Drug Class(es) Dates      Sig (Normalized) Sig   
(Original)  
   
                                                    acetaminophen 325 mg   
/ HYDROcodone   
bitartrate 5 mg oral   
tablet  
(3 sources)               Opioid Agonist            Start:   
2023  
End:   
2024                              take 1 tablet by   
mouth every six   
hours                                   Hydrocodone-Acetam  
inophen   
Discontinued 1 TAB   
PO Q6H 28 7 May   
2nd, 2023 May   
14th, 2024 11:58am  
   
                                                    aspirin 81 mg   
delayed release oral   
tablet  
(20 sources)                            Platelet   
Aggregation   
Inhibitor,   
Nonsteroidal   
Anti-inflammatory   
Drug                                    Start:   
2022  
End:   
10-                              take 1 tablet by   
mouth once daily                        Aspirin (Aspirin   
Low-Strength) 81   
mg Tablet,Delayed   
Release (Dr/Ec)   
Discontinued 81 MG   
PO Daily 2022 1:00am   
May 14th, 2024   
11:57am  
  
  
  
                                                                Baby Aspirin Act  
randi  
  
  
  
                                        Comment on above:   [facility]   
   
                                                    benzonatate 200 mg oral   
capsule  
(8 sources)                             Non-narcotic   
Antitussive                             Start:   
2024  
End:   
2024                                          Benzonatate Discontinued   
MG PO May 14th, 2024   
12:00am 2024   
1:48pm  
  
  
  
                                        Start: 2024   take 1 capsule by mo  
uth three   
times daily as needed                   benzonatate (Tessalon) 200 MG   
capsule Take 200 mg by mouth 3   
(three) times a day as needed   
2024 Active  
  
  
  
                                                    chlorthalidone 25 mg   
oral tablet  
(11 sources)                            Thiazide-like   
Diuretic                                Start:   
2021  
End: 2023                         take 25 mg   
by mouth   
once daily                              Chlorthalidone   
Discontinued 25 MG   
PO Daily 2022 1:00am   
2023   
11:45am  
   
                                                    cholecalciferol,   
vitamin D3, (VITAMIN   
D3 ORAL)  
(2 sources)                                                 take 38731   
mg by mouth   
once daily                              cholecalciferol,   
vitamin D3, (VITAMIN   
D3 ORAL) Take 10,000   
mg by mouth once   
daily. 0 Active  
   
                                        Comment on above:   Take 10,000 mg by mo  
uth once daily.   
   
                                                    diphenhydrAMINE   
hydrochloride 50 mg   
oral tablet  
(3 sources)                             Histamine-1   
Receptor   
Antagonist                              Start:   
2023  
End: 2024                         take 50 mg   
by mouth   
once daily   
at bedtime                              Diphenhydramine Hcl   
Discontinued 50 MG   
PO Daily at bedtime   
2023   
12:00am May 14th,   
2024 11:57am  
   
                                                    0.5 ml dulaglutide 3   
mg/ml auto-injector  
(16 sources)                            GLP-1 Receptor   
Agonist                                 Start:   
2022  
End: 2023                                     Dulaglutide   
(Trulicity) 1.5   
mg/0.5 mL pen   
injector   
Discontinued 1.5 MG   
SUBCUT every week   
2022   
1:00am 2023 11:45am takes   
on Sat.  
  
  
  
                                                                Trulicity Active  
  
  
  
                                        Comment on above:   as directed.   
   
                                                    [id number]  
l oil (METANX, ALGAL   
OIL,) 3 mg-35 mg-2 mg   
-90.314 mg cap  
(2 sources)                                         Start:   
2022                                          [id number]-algal   
oil (METANX, ALGAL OIL,)   
3 mg-35 mg-2 mg -90.314   
mg cap See Admin   
Instructions. 0   
2022 Active  
   
                                        Comment on above:   See Admin Instructio  
ns.   
   
                                                    lidocaine 25 mg/ml /   
prilocaine 25 mg/ml   
topical cream  
(3 sources)                             Antiarrhythmic, Amide   
Local Anesthetic                        Start:   
2023  
End:   
2024                                          Lidocaine-Prilocaine   
Discontinued 1 APPLIC   
TOPICAL 3 Times a week   
2023 12:00am   
May 14th, 2024 11:58am   
 with   
dialysis  
   
                                                    Ozempic  
(4 sources)                                                     Ozempic Not-Taki  
ng  
  
  
  
                                                                Ozempic Active  
  
  
  
                                                    Semaglutide  
(3 sources)                                         Start: 2023  
End: 2024                                     Semaglutide (Ozempic) 0.25 m  
g or 0.5 mg (2 mg/3   
mL) pen injector Discontinued 0.5 MG SUBCUT every   
week 2023 12:00am May 14th, 2024   
11:59am   
  
  
  
                                Start: 2023                 Semaglutide (O  
zempic) 0.25 mg or 0.5 mg (2 mg/3 mL) pen   
injector   
Active 0.5 MG SUBCUT every week 2023 12:00am   
  
  
  
  
                                                    sevelamer   
carbonate 800 mg   
oral tablet  
(20 sources)              Phosphate Binder          Start: 2022  
End: 2024                         take 1 tablet   
by mouth three   
times daily at   
mealtime                                Sevelamer Carbonate   
(Renvela) 800 mg   
tablet Discontinued   
800 MG PO Three   
times daily May   
14th, 2024 12:00am   
2024 10:19am must   
administer with a   
meal/food  
  
  
  
                                        Start: 2022   take 2 tablets by mo  
uth three   
times daily                             Sevelamer Carbonate (Renvela) 800   
mg Tablet Active 1600 MG PO Three   
times daily 2022 1:00am  
   
                                                                Renvela Active  
  
  
  
                                        Comment on above:   1 tablet with meals   
   
                                                    sodium bicarbonate 325   
mg oral tablet  
(13 sources)                                        Start:   
2022  
End:   
2023                              take 325 mg by   
mouth twice   
daily                                   Sodium Bicarbonate   
Discontinued 325 MG PO   
Twice daily 2022 1:00am   
2023   
11:46am  
  
  
  
                                                                Sodium Bicarbona  
te 4.2 % as directed Intravenous Active  
  
  
  
                                                    tiZANidine 4 mg   
oral tablet  
(20 sources)                            Central alpha-2   
Adrenergic Agonist                      Start: 2022  
End: 2024                         take 4 mg by   
mouth twice   
daily                                   Tizanidine   
Discontinued 4 MG   
PO Twice daily   
2022   
1:00am May 14th,   
2024 11:59am  
  
  
  
                                                                tiZANidine HCl A  
ctive  
  
  
  
                                        Comment on above:   [facility]   
  
  
  
Problems  
Active Problems  
  
  
                                                    Problem   
Classification  Problem         Date            Documented Date Episodic/Chronic  
   
                                                    Acquired foot   
deformities  
(14 sources)                            Acquired hallux   
valgus; Translations:   
[Hallux valgus   
(acquired),   
unspecified foot]                       Onset:   
2022                Chronic  
   
                                                    Acute and unspecified   
renal failure  
(4 sources)                             Renal failure   
syndrome;   
Translations:   
[Unspecified kidney   
failure]                                10-          Chronic  
   
                                                    Acute myocardial   
infarction  
(5 sources)                             Myocardial   
infarction;   
Translations: [Acute   
myocardial   
infarction,   
unspecified]                            Onset:   
2024                Chronic  
   
                                                    Administrative/social   
admission  
(7 sources)                             Reduced mobility;   
Translations: [Other   
reduced mobility]                       Onset:   
2020                Episodic  
   
                                                    Anxiety disorders  
(5 sources)                             Generalized anxiety   
disorder;   
Translations:   
[Generalized anxiety   
disorder]                               Onset:   
2024                Chronic  
   
                                                    Asthma  
(5 sources)                             Asthma; Translations:   
[Unspecified asthma,   
uncomplicated]                          Onset:   
2024                Chronic  
   
                                                    Blindness and vision   
defects  
(6 sources)                             Legal blindness;   
Translations: [Legal   
blindness, as defined   
in [place]]                                 Onset:   
2024                Chronic  
   
                                                    Chronic kidney   
disease  
(20 sources)                            Chronic kidney   
disease;   
Translations:   
[Chronic kidney   
disease, unspecified]                   Onset:   
2021  
Resolved:   
2022                                          Chronic  
   
                                                    Chronic obstructive   
pulmonary disease and   
bronchiectasis  
(6 sources)                             Chronic obstructive   
pulmonary disease,   
unspecified;   
Translations:   
[Chronic obstructive   
lung disease]                           Onset:   
2024                Chronic  
   
                                                    Complication of   
device; implant or   
graft  
(8 sources)                             Arteriovenous fistula   
thrombosis;   
Translations:   
[Thrombosis due to   
vascular prosthetic   
devices, implants and   
grafts, initial   
encounter]                              Onset:   
2022  
Resolved:   
2022                                          Chronic  
   
                                                    Complication of   
device; implant or   
graft  
(5 sources)                             Other mechanical   
complication of   
surgically created   
arteriovenous   
fistula, initial   
encounter;   
Translations:   
[Mechanical   
complication of   
arteriovenous   
surgical fistula]                       Onset:   
2022  
Resolved:   
2022                                          Episodic  
   
                                                    Coronary   
atherosclerosis and   
other heart disease  
(3 sources)                             History of myocardial   
infarction;   
Translations: [Old   
myocardial   
infarction]                             Onset:   
2021                Chronic  
   
                                                    Diabetes mellitus   
with complications  
(20 sources)                            Secondary diabetes   
mellitus;   
Translations:   
[Diabetes mellitus   
due to underlying   
condition with   
unspecified diabetic   
retinopathy with   
macular edema]                          Onset:   
2020                Chronic  
   
                                                    Diabetes mellitus   
without complication  
(9 sources)                             Insulin treated type   
2 diabetes mellitus;   
Translations: [Type 2   
diabetes mellitus   
without   
complications]                          Onset:   
2024                Chronic  
   
                                                    Disorders of lipid   
metabolism  
(8 sources)                             Mixed hyperlipidemia;   
Translations: [Mixed   
hyperlipidemia]                         Onset:   
01-                10-                Chronic  
   
                                                    Diverticulosis and   
diverticulitis  
(5 sources)                             Diverticular disease;   
Translations:   
[Diverticulosis of   
intestine, part   
unspecified, without   
perforation or   
abscess without   
bleeding]                               Onset:   
2024                Chronic  
   
                                                    Essential   
hypertension  
(10 sources)                            Essential   
hypertension;   
Translations:   
[Essential (primary)   
hypertension]                           Onset:   
2020                Chronic  
   
                                                    Headache; including   
migraine  
(5 sources)                             Migraine;   
Translations:   
[Migraine,   
unspecified, not   
intractable, without   
status migrainosus]                     Onset:   
2024                Chronic  
   
                                                    Immunity disorders  
(5 sources)                             Secondary immune   
deficiency disorder;   
Translations:   
[Immunodeficiency due   
to conditions   
classified elsewhere]                   Onset:   
2024                Chronic  
   
                                                    Mycoses  
(6 sources)                             Onychomycosis due to   
dermatophyte ;   
Translations: [Tinea   
unguium]                                Onset:   
2023                Episodic  
   
                                                    Osteoarthritis  
(9 sources)                             Arthropathy of left   
hip joint;   
Translations:   
[Unilateral primary   
osteoarthritis, left   
hip]                                    Onset:   
2024                Chronic  
   
                                                    Other aftercare  
(1 source)                              Post-discharge   
follow-up;   
Translations:   
[Encounter for   
follow-up examination   
after completed   
treatment for   
conditions other than   
malignant neoplasm]                     2024          Episodic  
   
                                                    Other aftercare  
(7 sources)                             Marijuana user;   
Translations: [Other   
long term (current)   
drug therapy]                           Onset:   
2024                Episodic  
   
                                                    Other aftercare  
(1 source)                              Other long term   
(current) drug   
therapy;   
Translations: [Other   
long term (current)   
drug therapy]                           Onset:   
2024                                          Episodic  
   
                                                    Other aftercare  
(2 sources)                             Long term (current)   
use of insulin;   
Translations: [Long   
term (current) use of   
insulin]                                Onset:   
2024                                          Episodic  
   
                                                    Other aftercare  
(1 source)                              Long-term current use   
of insulin;   
Translations: [Long   
term (current) use of   
insulin]                                10-          Episodic  
   
                                                    Other and ill-defined   
heart disease  
(5 sources)                             Heart disease;   
Translations: [Heart   
disease, unspecified]                   Onset:   
2024                Chronic  
   
                                                    Other circulatory   
disease  
(6 sources)                             Arteriovenous   
fistula;   
Translations:   
[Arteriovenous   
fistula, acquired]                                          Chronic  
   
                                                    Other circulatory   
disease  
(4 sources)                             Arteriovenous   
fistula, acquired                       Onset:   
2022  
Resolved:   
2022                                          Chronic  
   
                                                    Other circulatory   
disease  
(1 source)                              Arteriovenous fistula   
of right upper   
extremity;   
Translations:   
[Arteriovenous   
fistula, acquired]                      2024          Chronic  
   
                                                    Other connective   
tissue disease  
(5 sources)                             Myofascial pain;   
Translations:   
[Myalgia, other site]                   Onset:   
2024                Episodic  
   
                                                    Other liver diseases  
(6 sources)                             Steatosis of liver;   
Translations: [Fatty   
(change of) liver,   
not elsewhere   
classified]                             Onset:   
2024                Chronic  
   
                                                    Other liver diseases  
(9 sources)                             Hepatic fibrosis;   
Translations:   
[Hepatic fibrosis]                      Onset:   
2024                Chronic  
   
                                                    Other liver diseases  
(3 sources)                             Fatty (change of)   
liver, not elsewhere   
classified;   
Translations: [Other   
chronic nonalcoholic   
liver disease]                          Onset:   
2024                Chronic  
   
                                                    Other liver diseases  
(2 sources)                             Elevated liver   
enzymes level;   
Translations:   
[Abnormal levels of   
other serum enzymes]                     2024          Episodic  
   
                                                    Other lower   
respiratory disease  
(1 source)                              H/O: pneumonia;   
Translations:   
[Personal history of   
pneumonia   
(recurrent)]                            2024          Episodic  
   
                                                    Other nervous system   
disorders  
(2 sources)                             Mononeuropathy of   
lower limb;   
Translations:   
[Unspecified   
mononeuropathy of   
unspecified lower   
limb]                                   Onset:   
2022                Chronic  
   
                                                    Other nervous system   
disorders  
(1 source)                              Other chronic pain;   
Translations: [Other   
chronic pain]                           Onset:   
2024                                          Chronic  
   
                                                    Other nervous system   
disorders  
(5 sources)                             Chronic pain   
syndrome;   
Translations:   
[Chronic pain   
syndrome]                               Onset:   
2024                Chronic  
   
                                                    Other nervous system   
disorders  
(3 sources)                             Acute postoperative   
pain; Translations:   
[Other acute   
postprocedural pain]                     2023          Episodic  
   
                                                    Other non-traumatic   
joint disorders  
(7 sources)                             Hip pain;   
Translations: [Pain   
in right hip]                           Onset:   
10-                10-                Episodic  
   
                                                    Other nutritional;   
endocrine; and   
metabolic disorders  
(2 sources)                             Body mass index 30+ -   
obesity;   
Translations:   
[Obesity,   
unspecified]                            Onset:   
2021                Chronic  
   
                                                    Other skin disorders  
(6 sources)                             Dystrophia unguium;   
Translations: [Nail   
dystrophy]                              Onset:   
2023                Episodic  
   
                                                    Peripheral and   
visceral   
atherosclerosis  
(5 sources)                             Atherosclerosis of   
aorta; Translations:   
[Atherosclerosis of   
aorta]                                  Onset:   
2024                Chronic  
   
                                                    Residual codes;   
unclassified  
(6 sources)                             Past history of   
procedure;   
Translations: [Other   
specified   
postprocedural   
states]                                 Onset:   
2024                Episodic  
   
                                                    Spondylosis;   
intervertebral disc   
disorders; other back   
problems  
(7 sources)                             Degeneration of   
lumbar intervertebral   
disc; Translations:   
[Degeneration of   
intervertebral disc   
of lumbar region,   
unspecified whether   
pain present]                           Onset:   
10-                10-                Chronic  
   
                                                    Spondylosis;   
intervertebral disc   
disorders; other back   
problems  
(20 sources)                            Radiculopathy,   
thoracic region;   
Translations:   
[Radiculopathy,   
lumbar region]                          Onset:   
2024                Episodic  
   
                                                    Substance-related   
disorders  
(1 source)                              Light cigarette   
smoker; Translations:   
[Nicotine dependence,   
cigarettes,   
uncomplicated]                          2024          Chronic  
   
                                                    Unclassified  
(2 sources)                             Elevation of levels   
of liver transaminase   
levels; Translations:   
[Elevation of levels   
of liver transaminase   
levels]                                 Onset:   
2024                                            
   
                                                    Unclassified  
(1 source)                Rapid Heart Rate          Onset:   
03-                                            
   
                                                    Unclassified  
(1 source)                              rapid heart rate,   
vomiting, flank pain                    Onset:   
03-                                            
   
                                                    Unclassified  
(1 source)                              Hepatic fibrosis,   
unspecified;   
Translations:   
[Hepatic fibrosis,   
unspecified]                            Onset:   
2024                                            
  
  
Past or Other Problems  
  
  
                                                    Problem   
Classification  Problem         Date            Documented Date Episodic/Chronic  
   
                                                    Biliary tract disease  
(5 sources)                             Gallstone;   
Translations:   
[Calculus of   
gallbladder without   
cholecystitis   
without obstruction]                    Onset:   
2024  
Resolved:   
2024                Episodic  
   
                                                    Calculus of urinary   
tract  
(2 sources)                             Kidney stone;   
Translations:   
[Calculus of kidney]                    Onset:   
2021                Episodic  
   
                                                    Cardiac dysrhythmias  
(1 source)                              Tachycardia,   
unspecified;   
Translations:   
[Tachycardia,   
unspecified]                            Onset:   
03-                                          Episodic  
   
                                                    Fluid and electrolyte   
disorders  
(2 sources)                             Hyperkalemia;   
Translations:   
[Hyperkalemia]                          Onset:   
2022                Episodic  
   
                                                    Genitourinary   
symptoms and   
ill-defined   
conditions  
(16 sources)                            Albuminuria ;   
Translations:   
[Proteinuria,   
unspecified]                            Onset:   
2022  
Resolved:   
2024                Episodic  
   
                                                    Malaise and fatigue  
(5 sources)                             Asthenia;   
Translations:   
[Weakness]                              Onset:   
2024                Episodic  
   
                                                    Mood disorders  
(7 sources)               Mood disorders            Onset:   
2022  
Resolved:   
2024                  
   
                                                    Other and unspecified   
benign neoplasm  
(5 sources)                             Polyp of colon;   
Translations: [Polyp   
of colon]                               Onset:   
2024                Episodic  
   
                                                    Other connective   
tissue disease  
(5 sources)                             Pain in toe;   
Translations: [Pain   
in unspecified   
toe(s)]                                 Onset:   
2023                Episodic  
   
                                                    Other connective   
tissue disease  
(6 sources)                             Spasm; Translations:   
[Other muscle spasm]                    Onset:   
2023                Episodic  
   
                                                    Other liver diseases  
(4 sources)                             Abnormal levels of   
other serum enzymes;   
Translations: [Other   
nonspecific abnormal   
serum enzyme levels]                    Onset:   
2024                Episodic  
   
                                                    Other liver diseases  
(5 sources)                             Alkaline phosphatase   
raised;   
Translations:   
[Abnormal levels of   
other serum enzymes]                    Onset:   
2024                Episodic  
   
                                                    Other lower   
respiratory disease  
(1 source)                              Shortness of breath;   
Translations:   
[Shortness of   
breath]                                 Onset:   
03-                                          Episodic  
   
                                                    Other nutritional;   
endocrine; and   
metabolic disorders  
(5 sources)                             Overweight in   
adulthood with body   
mass index of 25 or   
more but less than   
30; Translations:   
[Body mass index   
(BMI) 29.0-29.9,   
adult]                                  Onset:   
2024                Episodic  
   
                                                    Other screening for   
suspected conditions   
(not mental disorders   
or infectious   
disease)  
(11 sources)                            Encounter for   
screening for   
malignant neoplasm   
of prostate;   
Translations:   
[Patient encounter   
status]                                 Onset:   
2024                Episodic  
   
                                                    Pneumonia (except   
that caused by   
tuberculosis or   
sexually transmitted   
disease)  
(9 sources)                             Pneumonia,   
unspecified   
organism;   
Translations:   
[Pneumonia]                             Onset:   
03-                03-                Episodic  
   
                                                    Residual codes;   
unclassified  
(7 sources)                             Tobacco user;   
Translations:   
[Tobacco use]                           Onset:   
03-                10-                Episodic  
  
  
  
Results  
  
  
                          Test Name    Value        Interpretation Reference   
Range                                   Facility  
   
                                                    Activated partial thrombopla  
stin time (aPTT) in platelet poor plasma by   
coagulation   
aOrdered By: Yordy Lewis on 2024   
   
                      aPTT Coag (PPP) [Time] 31.5 s                25.1-36.5  [facility]  
   
                                        Comment on above:   A hematocrit value g  
reater than 55% may lead to inaccurate   
results in coagulation testing. Patients having hematocrit   
values >55% require a special collection tube for coagulation   
studies. Please contact the laboratory at [phone] for   
redraw instructions.   
   
                                                    Coagulation Profileon 2024   
   
                      aPTT Coag (Bld) [Time] 31.5 s     Normal     25.1-36.5  Th  
e   
[facility]   
Physician   
Group  
   
                                        Comment on above:   Order Comment: STAT   
FOR BX   
   
                                                            Result Comment: A he  
matocrit value greater than 55% may lead to   
inaccurate  
results in coagulation testing. Patients having hematocrit  
values >55% require a special collection tube for  
coagulation studies.  
Please contact the laboratory at [phone] for redraw  
instructions.  
PERFORMED BY:  
[place] [id number] [phone]  
PATHOLOGIST MEDICAL DIRECTOR  
PAUL RODRIGUEZ M.D.   
   
                                                            Performed By: #### P  
LT, PP ####  
[facility]  
[address] [place], [place] [id number] [facility]   
   
                                                    INR in Platelet poor plasma   
by Coagulation assayOrdered By: Yordy Lewis on   
2024   
   
                                                    INR Coag (PPP)   
[Relative time] 0.9 {INR}       Normal                          [facility]  
   
                                        Comment on above:   INR Therapeutic Rang  
e A) Pre- and Peroperative OAT started two  
  
weeks before surgery. NOT HIP SURGERY: 1.5 - 2.5 HIP SURGERY: 2   
- 3B) Primary and secondary prevention of venous THROMBOSIS: 2   
- 3C) Active venous thrombosis, pulmonary embolismand   
prevention of recurrent venous thrombosis: 2 - 3D) Prevention   
of arterial thromboembolismincluding patients with mechanical   
heart valves: 3 - 4.5   
   
                                                            Order Comment: STAT   
FOR BX   
   
                                                            Result Comment: INR   
Therapeutic Range  
A) Pre- and Peroperative OAT started two weeks before  
surgery. NOT HIP SURGERY: 1.5 - 2.5  
HIP SURGERY: 2 - 3  
B) Primary and secondary prevention of venous  
THROMBOSIS: 2 - 3  
C) Active venous thrombosis, pulmonary embolism  
and prevention of recurrent venous thrombosis: 2 - 3  
D) Prevention of arterial thromboembolism  
including patients with mechanical heart valves: 3 - 4.5   
   
                                                            Performed By: #### P  
LT, PP ####  
[facility]  
[address] [place], [place] [address] [place]   
   
                                                    Julio 2024   
   
                                        L                   Specimen: [id number]   
Received:    
Status: NIRAJ Leah Num:   
[id number]  
Spec Type: Surgical Subm   
Dr: Charlie Bermudez DO  
  
Tissues: A Liver - Needle   
Biopsy (RANDOM LIVER BX)  
Procedures: HE/2,   
Gross/Micro L5  
  
  
Age/  
Patient Birth Sex Location   
Account Attending   
Physician  
  
  
Arun Moon 74/M    
[id number] Yordy Lewis MD  
  
  
  
SPEC NUM: [id number] RECD:   
 STATUS: NIRAJ BALES NUM: [id number]  
[place]:  [facility]   
DR: Charlie Bermudez DO  
  
ENTERED:    
[facility] DR: Yordy Lewis MD  
  
SPEC TYPE: Surgical DEPT:   
S  
ENTERED BY: [id number] RECV   
BY: [id number]  
  
ORDERED: HE/2, Gross/Micro   
L5  
ORDERED: HE/2, Gross/Micro   
L5  
  
Pathological Diagnosis  
  
Random liver biopsies:  
  
-3 tan hepatic core   
segments for gross   
processing only. Pending   
further consultation  
services in reference   
laboratory.  
  
Clinical Information  
  
Elevated liver enzymes,   
hepatic fibrosis, fatty   
liver, hepatic steatosis  
  
Gross Description  
  
The specimen was received   
in formalin with the   
patient's name and  random   
liver biopsy  and  
consists of 3 tan needle   
core biopsies ranging in   
length from 1.3 to 1.6 cm   
each with an  
average diameter of 0.1   
cm. The specimen is   
entirely submitted in   
cassette A1.  
  
CPT Codes  
  
67177  
--------------------------  
--------------  
  
  
  
  
  
  
--------------------------  
--------------  
Specimen: [id number]   
Received:    
Status: NIRAJ Bales Num:   
[id number]  
Spec Type: Surgical Subm   
Dr: Charlie Bermudez DO  
  
Tissues: A Liver - Needle   
Biopsy (RANDOM LIVER BX)  
Procedures: HE/2,   
Gross/Micro L5  
--------------------------  
--------------  
Patient: Arun Moon   
[id number] (Continued)  
--------------------------  
--------------  
  
  
Signed   
__________(signature on   
file)___________ Chin-Moses Medrano MD 10/11/24 1032 Normal                                  The   
[facility]   
Physician   
Group  
   
                                                    Platelets [#/volume] in [place]  
d by Automated countOrdered By: Yordy Lewis on   
2024   
   
                                                    Platelets (Bld)   
[#/Vol]         243 10*3/uL     Normal          150-450         [facility]  
   
                                        Comment on above:   Order Comment: STAT   
FOR BX   
   
                                                            Result Comment: PERF  
ORMED BY:  
[place] [address]  
[phone]  
PATHOLOGIST MEDICAL DIRECTOR  
PAUL RODRIGUEZ M.D.   
   
                                                            Performed By: #### P  
LT, PP ####  
[address]   
   
                                                    Prothrombin time (PT)Ordered  
 By: Imad Radha on 2024   
   
                      PT Coag (PPP) [Time] 10.8 s     Normal     9.0-12.9   [facility]  
   
                                        Comment on above:   A hematocrit value g  
reater than 55% may lead to inaccurate   
results in coagulation testing. Patients having hematocrit   
values >55% require a special collection tube for coagulation   
studies. Please contact the laboratory at [phone] for   
redraw instructions.   
   
                                                            Order Comment: STAT   
FOR BX   
   
                                                            Result Comment: A he  
matocrit value greater than 55% may lead to   
inaccurate  
results in coagulation testing. Patients having hematocrit  
values >55% require a special collection tube for  
coagulation studies.  
Please contact the laboratory at [phone] for redraw  
instructions.   
   
                                                            Performed By: #### P  
LT, PP ####  
[place] [id number] [facility]   
   
                                                    US guide needle placementon   
2024   
   
                                                    US guide needle   
placement                               [facility] [place]  
[address] [place] [address]  
  
Ultrasound Report  
Signed  
  
Patient: Arun Moon   
MR#: [id number]  
[id number]  
: 1950   
Acct:[id number]  
  
Age/Sex: 74 / M ADM Date:   
24  
  
Loc:  Room: Type: [facility]  
Attending Dr: Yordy Lewis MD  
  
Ordering Provider: Yordy Lewis MD  
Date of Service: 24  
Accession #: ([id number])   
US/US needle biopsy:   
K74.00 - Hepatic fibrosis,   
unspecified  
([id number]) US/US guide   
needle placement: ,  
  
  
Copies to: Yordy Lewis MD  
  
  
Ultrasound-guided Random   
Liver Biopsy  
  
HISTORY: Hepatic steatosis  
  
The biopsy device:   
18-gauge by 10 cm Bard   
biopsy gun utilized.  
  
Core samples: 3 core   
samples were obtained.  
  
Informed consent was   
obtained discussing the   
procedure and risks.   
Patient agreed. The skin   
entry  
site was localized with   
ultrasound. Skin entry   
prepped and draped in   
sterile fashion with local  
lidocaine administered.   
The biopsy device was   
administered into the   
liver with ultrasound   
guidance.  
Adequate core biopsy   
samples obtained. Samples   
sent to pathology for   
further assessment.   
Patient  
states no immediate   
complications. No active   
bleeding identified with   
ultrasound.  
  
  
  
ORDER #: [id number] US/US   
needle biopsy  
IMPRESSION: Successful   
ultrasound-guided random   
liver biopsy.  
  
  
Ultrasound the liver   
obtained 1 hour post   
biopsy.  
  
No hematoma or active   
bleeding identified.  
  
IMPRESSION: No active   
bleeding or hematoma.  
  
Impression dictated by:   
Charlie Bermudez M.D.2024 2:48 PM  
  
  
Dictation Location:   
[place] [address]  
  
Tech: Gabriela Deniz  
  
Transcribed By: MICHELLE   
24 1448  
Dictated By: Charlie Bermudez DO 24  
  
Signed By:  
24 1448       Normal                                  The   
[facility]   
Physician   
Group  
   
                                                    GLUCOSEon 2024   
   
                      Glucose [Mass/Vol] 185 mg/dL  High       65-99      [facility]  
[facility]  
   
                                        Comment on above:   Performed By: #### C  
MARY, 2857-1, CMP, 12426-8 ####  
[facility] ([id number])  
[address]  
[place] [address]   
   
                                                    HGB A1C (GLYCO-HGB)on 2024   
   
                      Glucose [Mass/Vol] 214 mg/dL  Normal                [facility]  
   
                                        Comment on above:   Performed By: #### RAPHAEL HERNANDEZ, 2857-1, CMP, 59776-4 ####  
[facility] ([id number])  
[address]  
[place] [address]   
   
                                                    HbA1c (Bld) [Mass   
fraction]       9.1 %           High            4.4-5.6         [facility]  
   
                                        Comment on above:   Result Comment: NOTE  
ADA Guidelines  
Result HgbA1c  
------------ ---------------  
Normal : less than 5.7 %  
Prediabetes : 5.7 % to 6.4 %  
Diabetes : > 6.4 %  
Use with caution in patients with abnormal hemoglobin variants   
as  
the half-life of red blood cells and in vivo glycation rates   
are  
affected.   
   
                                                            Performed By: #### RAPHAEL HERNANDEZ, 2857-1, CMP, 32042-1 ####  
[facility] ([id number])  
[address], [address]  
[place] [address]   
   
                                                    Lipid 1996 panelon   
4   
   
                      Cholesterol [Mass/Vol] 269 mg/dL  High       150-200    Pr  
[facility]  
   
                                        Comment on above:   Performed By: #### RAPHAEL HERNANDEZ, 2857-1, CMP, 47250-2 ####  
[facility] ([id number])  
[address], [address]  
[place] [address]   
   
                                                    Cholesterol in HDL   
[Mass/Vol]      47 mg/dL        Normal          >39             [facility]  
   
                                        Comment on above:   Result Comment:  
HDL <40 mg/dL - High Risk  
HDL > or = 40mg/dL- Desirable  
HDL >60 mg/dL - Negative Risk  
---------------------------------------------   
   
                                                            Performed By: #### C  
MARY, 2857-1, CMP, 67336-8 ####  
[facility] ([id number])  
[address], [address]  
[place] [address]   
   
                                                    Cholesterol in LDL   
[Mass/Vol]      148 mg/dL       High            <130            [facility]  
   
                                        Comment on above:   Result Comment:  
LDL <100 mg/dL - Desirable  
LDL >160 mg/dL - High Risk  
---------------------------------------------   
   
                                                            Performed By: #### C  
BCA, 2857-1, CMP, 40253-9 ####  
[facility] ([id number])  
[address], [address]  
[place] [address]   
   
                                                    Cholesterol in VLDL   
[Mass/Vol]      74 mg/dL        High            0-30            [facility]  
   
                                        Comment on above:   Performed By: #### C  
MARY, 2857-1, CMP, 07630-3 ####  
[facility] ([id number])  
[address], [address]  
[place] [address]   
   
                      CHOLESTEROL:HDL 5.7        High       1.0-5.0    [facility]  
   
                                        Comment on above:   Performed By: #### RAPHAEL HERNANDEZ, 2857-1, CMP, 35419-6 ####  
[facility] ([id number])  
[address], [address]  
[place] [address]   
   
                                                    Triglyceride   
[Mass/Vol]      368 mg/dL       High                      [facility]  
   
                                        Comment on above:   Performed By: #### RAPHAEL  
BCA, 2857-1, CMP, 04576-8 ####  
[facility] ([id number])  
[address], [address]  
[place] [address]   
   
                                                    POTASSIUMon 2024   
   
                      Potassium [Moles/Vol] 3.6 mmol/L Normal     3.5-5.0    [facility]  
   
                                        Comment on above:   Performed By: #### C  
BCA, 2857-1, CMP, 05892-0 ####  
[facility] ([id number])  
[address], [address]  
[place] [address]   
   
                                                    BASIC METABOLIC PANLon    
   
                      Anion gap [Moles/Vol] 16 mmol/L  High       5-15       [facility]  
   
                                        Comment on above:   Performed By: #### C  
BCA, 2857-1, CMP, 64836-3 ####  
[facility] ([id number])  
[address], [address]  
[place] [address]   
   
                      Calcium [Mass/Vol] 9.5 mg/dL  Normal     8.5-10.5   [facility]  
   
                                        Comment on above:   Performed By: #### C  
BCA, 2857-1, CMP, 73270-1 ####  
[facility] ([id number])  
[address], [address]  
[place] [address]   
   
                      Chloride [Moles/Vol] 99 mmol/L  Normal          [facility]  
   
                                        Comment on above:   Performed By: #### C  
BCA, 2857-1, CMP, 69427-8 ####  
[facility] ([id number])  
[address], [address]  
[place] [address]   
   
                      CO2 [Moles/Vol] 24 mmol/L  Normal     22-32      [facility]  
   
                                        Comment on above:   Performed By: #### C  
BCA, 2857-1, CMP, 83934-5 ####  
[facility] ([id number])  
[address], [address]  
[place] [address]   
   
                      Creatinine [Mass/Vol] 5.56 mg/dL High       0.60-1.30  [facility]  
   
                                        Comment on above:   Result Comment: METH  
OD TRACEABLE TO IDMS STANDARD   
   
                                                            Performed By: #### C  
BCA, 2857-1, CMP, 94703-4 ####  
[facility] ([id number])  
[address], [address]  
[place] [address]   
   
                                                    GFR/1.73 sq   
M.predicted among   
non-blacks MDRD   
(S/P/Bld) [Vol   
rate/Area]      10 mL/min/{1.73_m2} Low             >59             [facility]  
   
                                        Comment on above:   Result Comment:  
Reported eGFR is based on the  
CKD-EPI  equation that does  
not use a race coefficient.   
   
                                                            Performed By: #### C  
BCA, 2857-1, CMP, 13076-3 ####  
[facility] ([id number])  
[address], [facility] [address]  
[place] [address]   
   
                      Glucose [Mass/Vol] 175 mg/dL  High       65-99      [facility]  
   
                                        Comment on above:   Performed By: #### RAPHAEL  
BCA, 2857-1, CMP, 24483-4 ####  
[facility] ([id number])  
[address] [address] [facility] [address]  
[place] [address]   
   
                      Potassium [Moles/Vol] 3.6 mmol/L Normal     3.5-5.0    [facility]  
   
                                        Comment on above:   Performed By: #### RAPHAEL  
BCA, 2857-1, CMP, 67151-7 ####  
[facility] ([id number])  
[address] [address] [facility] [address]  
[place] [address]   
   
                      Sodium [Moles/Vol] 139 mmol/L Normal     134-146    [facility]  
   
                                        Comment on above:   Performed By: #### RAPHAEL  
BCA, 2857-1, CMP, 51957-9 ####  
[facility] ([id number])  
[address] [address] [facility] [address]  
[place] [address]   
   
                                                    Urea nitrogen   
[Mass/Vol]      55 mg/dL        High            5-27            [facility]  
   
                                        Comment on above:   Performed By: #### RAPHAEL  
BCA, 2857-1, CMP, 71388-8 ####  
[facility] ([id number])  
[address] [address] [facility] [address]  
[place] [address]   
   
                                                    CBC AND AUTO DIFFon 20  
24   
   
                      ABSOLUTE BASOPHIL 0.2 X10E9/L Normal     0.0-0.2    [facility]  
   
                                        Comment on above:   Performed By: #### C  
BCA, 2857-1, CMP, 66204-0 ####  
[facility] ([id number])  
[address], [address]  
[place] [address]   
   
                      ABSOLUTE NEUTROPHIL 7.3 X10E9/L High       1.5-6.6    [facility]  
   
                                        Comment on above:   Performed By: #### RAPHAEL  
BCA, 2857-1, CMP, 94877-0 ####  
[facility] ([id number])  
[address], [address]  
[place] [address]   
   
                                                    Basophils/100 WBC   
(Bld)           2.2 %           Normal                          [facility]  
   
                                        Comment on above:   Performed By: #### C  
BCA, 2857-1, CMP, 43922-2 ####  
[facility] ([id number])  
[address], [address]  
[place] [address]   
   
                                                    Eosinophils (Bld)   
[#/Vol]         1.3 10*3/uL     High            0.0-0.4         [facility]  
   
                                        Comment on above:   Performed By: #### C  
BCA, 2857-1, CMP, 96992-6 ####  
[facility] ([id number])  
[address], [address]  
[place] [address]   
   
                                                    Eosinophils/100 WBC   
(Bld)           12.1 %          Normal                          [facility]  
   
                                        Comment on above:   Performed By: #### RAPHAEL  
BCA, 2857-1, CMP, 78374-0 ####  
[facility] ([id number])  
[address], [address]  
[place] [address]   
   
                                                    Erythrocyte   
distribution width   
(RBC) [Ratio]   13.6 %          Normal          11.5-15.0       [facility]  
   
                                        Comment on above:   Performed By: #### C  
BCA, 2857-1, CMP, 85443-6 ####  
[facility] ([id number])  
[address], [address]  
[place] [address]   
   
                                                    Hematocrit (Bld)   
[Volume fraction] 35.6 %          Low             39-49           [facility]  
   
                                        Comment on above:   Performed By: #### C  
BCA, 2857-1, CMP, 79679-7 ####  
[facility] ([id number])  
[address], [address]  
[place] [address]   
   
                                                    Hemoglobin (Bld)   
[Mass/Vol]      12.1 g/dL       Low             13.0-17.0       [facility]  
   
                                        Comment on above:   Performed By: #### RAPHAEL  
BCA, 2857-1, CMP, 29103-6 ####  
[facility] ([id number])  
[address], [address]  
[place] [address]   
   
                                                    Lymphocytes (Bld)   
[#/Vol]         1.3 10*3/uL     Normal          1.0-3.5         [facility]  
   
                                        Comment on above:   Performed By: #### RAPHAEL  
BCA, 2857-1, CMP, 01322-5 ####  
[facility] ([id number])  
[address], [address]  
[place] [address]   
   
                                                    Lymphocytes/100 WBC   
(Bld)           11.9 %          Normal                          [facility]  
   
                                        Comment on above:   Performed By: #### RAPHAEL  
BCA, 2857-1, CMP, 02991-5 ####  
[facility] ([id number])  
[address], [address]  
[place] [address]   
   
                                                    MCH (RBC) [Entitic   
mass]           32.4 pg         Normal          27-34           [facility]  
   
                                        Comment on above:   Performed By: #### C  
BCA, 2857-1, CMP, 52767-2 ####  
[facility] ([id number])  
[address], [address]  
[place] [address]   
   
                      MCHC (RBC) [Mass/Vol] 33.9 g/dL  Normal     32-36      [facility]  
   
                                        Comment on above:   Performed By: #### C  
BCA, 2857-1, CMP, 49951-1 ####  
[facility] ([id number])  
[address], [address]  
[place] [address]   
   
                                                    MCV (RBC) [Entitic   
vol]            95 fL           Normal                    [facility]  
   
                                        Comment on above:   Performed By: #### RAPHAEL  
BCA, 2857-1, CMP, 12731-6 ####  
[facility] ([id number])  
[address], [address]  
[place] [address]   
   
                                                    Monocytes (Bld)   
[#/Vol]         0.7 10*3/uL     Normal          0-0.9           [facility]  
   
                                        Comment on above:   Performed By: #### RAPHAEL  
BCA, 2857-1, CMP, 88551-6 ####  
[facility] ([id number])  
[address], [facility] [address]  
[place] [address]   
   
                                                    Monocytes/100 WBC   
(Bld)           6.2 %           Normal                          [facility]  
   
                                        Comment on above:   Performed By: #### RAPHAEL  
BCA, 2857-1, CMP, 37831-8 ####  
[facility] ([id number])  
[address], [address]  
[place] [address]   
   
                                                    Neutrophils/100 WBC   
(Bld)           67.6 %          Normal                          [facility]  
   
                                        Comment on above:   Performed By: #### RAPHAEL  
BCA, 2857-1, CMP, 58310-1 ####  
[facility] ([id number])  
[address], [address]  
[place] [address]   
   
                                                    Platelet mean volume   
(Bld) [Entitic vol] 9.7 fL          Normal          7-12            [facility]  
   
                                        Comment on above:   Performed By: #### RAPHAEL  
BCA, 2857-1, CMP, 24327-0 ####  
[facility] ([id number])  
[address], [address]  
[place] [address]   
   
                                                    Platelets (Bld)   
[#/Vol]         229 10*3/uL     Normal          150-450         [facility]  
   
                                        Comment on above:   Performed By: #### RAPHAEL  
BCA, 2857-1, CMP, 88582-6 ####  
[facility] ([id number])  
[address], [address]  
[place] [address]   
   
                      RBC COUNT  3.73 X10E12/L Low        4.10-5.70  [facility]  
   
                                        Comment on above:   Performed By: #### C  
BCA, 2857-1, CMP, 75003-4 ####  
[facility] ([id number])  
[address], [address]  
[place] [address]   
   
                      WBC (Bld) [#/Vol] 10.8 10*3/uL Normal     4.0-11.0   [facility]  
   
                                        Comment on above:   Performed By: #### C  
BCA, 2857-1, CMP, 90897-8 ####  
[facility] ([id number])  
[address], [facility] [address]  
[place] [address]   
   
                                                    LIVER PANELon 2024   
   
                      Albumin [Mass/Vol] 4.1 g/dL   Normal     3.2-5.3    [facility]  
   
                                        Comment on above:   Performed By: #### C  
BCA, 2857-1, CMP, 76089-6 ####  
[facility] ([id number])  
[address], [address]  
[place] [address]   
   
                                                    ALP [Catalytic   
activity/Vol]   161 U/L         High                      [facility]  
   
                                        Comment on above:   Performed By: #### C  
BCA, 2857-1, CMP, 16203-3 ####  
[facility] ([id number])  
[address], [address]  
[place] [address]   
   
                                                    ALT [Catalytic   
activity/Vol]   68 U/L          High            0-40            [facility]  
   
                                        Comment on above:   Performed By: #### C  
BCA, 2857-1, CMP, 00865-6 ####  
[facility] ([id number])  
[address], [address]  
[place] [address]   
   
                                                    AST [Catalytic   
activity/Vol]   46 U/L          High            0-41            [facility]  
   
                                        Comment on above:   Performed By: #### C  
BCA, 2857-1, CMP, 83378-4 ####  
[facility] ([id number])  
[address], [address]  
[place] [address]   
   
                      Bilirubin [Mass/Vol] 0.5 mg/dL  Normal     0.3-1.2    [facility]  
   
                                        Comment on above:   Performed By: #### C  
MARY, 2857-1, CMP, 77900-6 ####  
[facility] ([id number])  
[address], [address]  
[place] [address]   
   
                                                    Bilirubin.direct   
[Mass/Vol]      0.2 mg/dL       Normal          0.0-0.4         [facility]  
   
                                        Comment on above:   Performed By: #### C  
MARY, 2857-1, CMP, 47123-1 ####  
[facility] ([id number])  
[address], [address]  
[place] [address]   
   
                      Protein [Mass/Vol] 7.5 g/dL   Normal     6.0-8.0    [facility]  
   
                                        Comment on above:   Performed By: #### RPAHAEL HERNANDEZ, 2857-1, CMP, 68123-6 ####  
[facility] ([id number])  
[address], [address]  
[place] [address]   
   
                                                    PROTIME AND INRon 2024  
   
   
                                                    INR Coag (PPP)   
[Relative time] 0.9 {INR}       Normal          0.8-1.1         [facility]  
   
                                        Comment on above:   Performed By: #### C  
MARY, 2857-1, CMP, 61293-1 ####  
[facility] ([id number])  
[address], [address]  
[place] [address]   
   
                      PT Coag (PPP) [Time] 10.4 s     Normal     9.8-13.2   [facility]  
   
                                        Comment on above:   Result Comment: NEW   
REFERENCE RANGE   
   
                                                            Performed By: #### C  
MARY, 2857-1, CMP, 38950-8 ####  
[facility] ([id number])  
[address], [address]  
[place] [address]   
   
                                                    URINALYSISon 2024   
   
                      Bilirubin Ql (U) Negative   Normal     NEG        [facility]  
   
                                        Comment on above:   Performed By: #### C  
BCA, 2857-1, CMP, 54006-7 ####  
[facility] ([id number])  
[address], [address]  
[place] [address]   
   
                      BLOOD/HGB  Small      Abnormal   NEG        [facility]  
   
                                        Comment on above:   Performed By: #### C  
BCA, 2857-1, CMP, 21202-7 ####  
[facility] ([id number])  
[address], [address]  
[place] [address]   
   
                      Color (U)  YELLOW     Normal     YELLOW     [facility]  
   
                                        Comment on above:   Performed By: #### C  
BCA, 2857-1, CMP, 58382-2 ####  
[facility] ([id number])  
[address], [address]  
[place] [address]   
   
                      Glucose Ql (U) 500 mg/dL  Abnormal   NEG        [facility]  
   
                                        Comment on above:   Performed By: #### RAPHAEL  
BCA, 2857-1, CMP, 18273-2 ####  
[facility] ([id number])  
[address], [address]  
[place] [address]   
   
                                                    Hyaline casts LM Ql   
(Urine sed)     4 /lpf          High            0-2             [facility]  
   
                                        Comment on above:   Performed By: #### RAPHAEL  
BCA, 2857-1, CMP, 77065-6 ####  
[facility] ([id number])  
[address], [address]  
[place] [address]   
   
                      Ketones Ql (U) Negative   Normal     NEG        [facility]  
   
                                        Comment on above:   Performed By: #### C  
BCA, 2857-1, CMP, 89053-0 ####  
[facility] ([id number])  
[address], [address]  
[place] [address]   
   
                                                    Leukocyte esterase   
Test strip Ql (U) Negative        Normal          NEG             [facility]  
   
                                        Comment on above:   Performed By: #### C  
BCA, 2857-1, CMP, 95667-8 ####  
[facility] ([id number])  
[address], [address]  
[place] [address]   
   
                      MUCOUS     PRESENT    Abnormal   NONE       [facility]  
   
                                        Comment on above:   Performed By: #### C  
MARY, 2857-1, CMP, 67668-4 ####  
[facility] ([id number])  
[address], [address]  
[place] [address]   
   
                      Nitrite Ql (U) Negative   Normal     NEG        [facility]  
   
                                        Comment on above:   Performed By: #### C  
MARY, 2857-1, CMP, 18302-8 ####  
[facility] ([id number])  
[address], [address]  
[place] [address]   
   
                      pH (U)     6.0 [pH]   Normal     5.0-8.5    [facility]  
   
                                        Comment on above:   Performed By: #### C  
MARY, 2857-1, CMP, 04682-2 ####  
[facility] ([id number])  
[address], [address]  
[place] [address]   
   
                      Protein Ql (U) 300 mg/dL  Abnormal   NEG        [facility]  
   
                                        Comment on above:   Performed By: #### RAPHAEL HERNANDEZ, 2857-1, CMP, 91634-9 ####  
[facility] ([id number])  
[address], [address]  
[place] [address]   
   
                      R.B.CELLS  <1         Normal     0-5        [facility]  
   
                                        Comment on above:   Performed By: #### C  
MARY, 2857-1, CMP, 45763-4 ####  
[facility] ([id number])  
[address], [address]  
[place] [address]   
   
                                                    Specific gravity (U)   
[Rel density]   1.022           Normal          1.003-1.035     [facility]  
   
                                        Comment on above:   Performed By: #### C  
MARY, 2857-1, CMP, 80436-9 ####  
[facility] ([id number])  
[address]  
[place] [address]   
   
                      SQUAMOUS EPITHELIUM <1         Normal     0-5        [facility]  
   
                                        Comment on above:   Performed By: #### C  
BCA, 2857-1, CMP, 46420-3 ####  
[facility] ([id number])  
[address] [address]   
   
                      TURBIDITY  CLEAR      Normal     CLEAR      [facility]  
   
                                        Comment on above:   Performed By: #### C  
BCA, 2857-1, CMP, 95594-1 ####  
[facility] ([id number])  
[address] [address]   
   
                                                    Urobilinogen (U)   
[Mass/Vol]      mg/dL           Normal          <1.1            [facility]  
   
                                        Comment on above:   Performed By: #### RAPHAEL  
BCA, 2857-1, CMP, 41202-7 ####  
[facility] ([id number])  
[address] [address]   
   
                      W.B.CELLS  4 /hpf     Normal     0-5        [facility]  
   
                                        Comment on above:   Performed By: #### RAPHAEL  
BCA, 2857-1, CMP, 80763-7 ####  
[facility] ([id number])  
[address] [address]   
   
                                                    URINE CULTUREon 2024   
   
                                                    Bacteria identified Cx   
Nom (U)                                 CULTURE RESULTS  
NO GROWTH AT <1000 CFU/mL Normal                                  [facility]  
   
                                        Comment on above:   Performed By: #### RAPHAEL  
BCA, 2857-1, CMP, 48215-8 ####  
[facility] ([id number])  
[address] [address]   
   
                                                    XR CHEST 2 VWSon 2024   
   
                                        XR CHEST 2 VWS      XR CHEST 2 VWS  
Chest 2 views  
History: Anesthesia   
clearance. Preadmission   
testing. Preop   
examination; Hypertension,   
unspecified type; Type 2   
diabetes mellitus with   
other specified   
complication, with   
long-term current use of   
insulin (CMS-HCC);   
Personal history of MI   
(myocardial infarction);   
Stage 4 chronic kidney   
disease (CMS-HCC)  
Comparison: 3/15/2024  
Findings:  
Chest 2 views.  
Stable cardiomediastinal   
silhouette. No focal   
opacity, effusion or   
pneumothorax. Degenerative   
changes of the thoracic   
spine.  
Impression:  
No evident acute   
cardiopulmonary process.  
Workstation:[id number]  
Finalized by Francisco Verma MD on 2024 3:52   
PM                  Normal                                  [facility]  
   
                                                    aPTT Coag (PPP) [Time]on    
   
                      aPTT Coag (Bld) [Time] 37 s       Normal     26-37      Pr  
[facility]  
   
                                        Comment on above:   Result Comment: NEW   
REFERENCE RANGE   
   
                                                            Performed By: #### C  
BCA, 2857-1, CMP, 89221-9 ####  
[facility] LAB ([id number])  
[address], [address]  
[place] [address]   
   
                                                    CT ABDOMEN AND PELVIS WO CON  
Ton 2024   
   
                                                    CT ABDOMEN AND PELVIS   
WO CONT                                 CT ABDOMEN AND PELVIS WO   
CONT  
*ADDENDUM*Addendum: This   
addendum is made as per   
referring service request.   
Small lesion lower pole   
left kidney shows simple   
attenuation and is   
unchanged in size since   
2023 consistent with   
small benign cyst. Small   
upper pole lesion also   
noted unchanged with   
simple attenuation. No   
further follow-up   
warranted.  
Workstation:[id number]  
Finalized by Zee Sanchez MD on 2024   
2:08 PM             Normal                                  [facility]  
   
                                                    MR LUMBAR SPINE WO CONTon    
   
                                                    MR LUMBAR SPINE WO   
CONT                                    MR LUMBAR SPINE WO CONT  
History: Lumbar   
radiculopathy  
Exam/Technique:   
Multiplanar multisequence   
images of the lumbar spine   
obtained without IV dye.  
Comparison: CT abdomen   
pelvis 3/15/2024  
Findings: There is focal   
upper endplate defect at   
the posterior aspect of L3   
most consistent with small   
Schmorl's node with   
minimal edema. The   
remaining vertebral body   
heights are well-preserved   
with no gross acute   
osseous injury. There is   
subtle grade 1   
anterolisthesis of L4 on   
L5  
Conus medullaris is of   
adequate configuration   
with the tip at L1-L2   
level.  
At T11-T12, there is mild   
degenerative disc disease   
with mild loss of disc   
height and diffuse disc   
bulge. There is mild   
spinal canal stenosis with   
mild effacement of the   
ventral nerve roots.   
Neuroforamina are patent   
bilaterally.  
At T12-L1, there is   
moderate degenerative disc   
disease with moderate loss   
of disc height and diffuse   
disc bulge. There is no   
significant spinal canal   
stenosis or neural   
foraminal narrowing.  
At L1-L2, intervertebral   
disc is well-preserved.   
There is no spinal canal   
stenosis or neural   
foraminal narrowing.  
At L2-L3, there is mild   
diffuse disc bulge with   
mild bilateral neural   
foraminal narrowing.  
There is moderate   
bilateral facet joint   
disease with mild joint   
effusion.  
At L3-L4, there is severe   
spinal canal stenosis   
secondary to diffuse disc   
bulge with posterior   
annular tear. There is   
severe ligamentum flavum   
hypertrophy and bilateral   
facet joint disease with   
bilateral effusion. There   
is moderate bilateral   
neural foraminal narrowing   
with effacement of the   
exiting nerve roots.  
At L4-L5, there is grade 1   
anterolisthesis of L4 on   
L5.  
There is moderate spinal   
canal stenosis secondary   
to diffuse disc bulge with   
moderate loss of disc   
height and severe   
left-sided neural   
foraminal narrowing. There   
is moderate right-sided   
neural foraminal   
narrowing.  
There are severe bilateral   
facet joint disease left   
worse than right with   
bilateral pleural   
effusion.  
At L5-S1, there is left   
subarticular disc   
protrusion effacing the   
left S1 nerve roots.   
Neuroforamina are grossly   
patent. There is mild   
bilateral facet joint   
disease.  
IMPRESSION: There are   
multilevel disc and facet   
joint disease. Findings   
are worse at L3-L4 with   
severe spinal canal   
stenosis and posterior   
annular tear.  
At L4-L5 there is severe   
left-sided neural   
foraminal narrowing and   
moderate right-sided   
neural foraminal   
narrowing.  
At L5-S1 there is left   
subarticular disc   
protrusion with   
significant effacement of   
the left S1 nerve root.  
There are multilevel facet   
joint disease with joint   
effusion worse at L3-L4   
and L4-L5.  
Workstation:[id number]  
Finalized by Sherry Alexander MD on 2024   
10:11 AM            Normal                                  [facility]   
[facility]  
   
                                                    JAMIE Antinuclear Antibodieson  
 2024   
   
                                Antinuclear Abs, IFA Positive        Critically   
abnormal                  .                         The   
[facility]   
Physician   
Group  
   
                                        Comment on above:   Result Comment: Nega  
tive <1:80  
Borderline 1:80  
Positive >1:80   
   
                                                            Performed By: #### C  
ERULOP, HAAB, L-K MICRO, HBCAB, HCBIGM,   
JAMIE, SMAB, ALPHA PHEN, MITOM2, HBSAG, HEMOCHROM, HAABT, IGG,   
HBSAB, HCV RX PCR ####[facility] Acct#[id number],#### RALPH   
####[facility] [address] [place]   
[address] [place]   
   
                      Midbody Pattern 1:640      High       .          The   
[facility]   
Physician   
Group  
   
                                        Comment on above:   Result Comment: ICAP  
 nomenclature: [id number]   
   
                                                            Performed By: #### C  
ERULOP, HAAB, L-K MICRO, HBCAB, HCBIGM,   
JAMIE, SMAB, ALPHA PHEN, MITOM2, HBSAG, HEMOCHROM, HAABT, IGG,   
HBSAB, HCV RX PCR ####[facility] Acct#[id number],#### RALPH   
####[facility] [id number]   
   
                      Note 1                Normal     .          The   
[facility]   
Physician   
Group  
   
                                        Comment on above:   Result Comment: Cici joyce Potential Disease Association  
------------- ---------------------------------------------  
Homogeneous Systemic Lupus Erythematosus, Drug Induced  
Systemic Lupus Erythematosus, Chronic  
Autoimmune hepatitis, Juvenile Idiopathic  
Arthritis  
------------- ---------------------------------------------  
Speckled Sjogren Syndrome, Systemic Lupus  
Erythematosus, Subacute Cutaneous Lupus,  
 Lupus, Congenital Heart Block,  
Mixed Connective Tissue Disease,  
Scleroderma-diffuse, Scleroderma-Autoimmune  
Myositis Overlap Syndrome, Systemic Lupus  
Lkjxqdxfeugtm-Yzuftxeyzcm-Cmiiwhvjyc  
Myositis Overlap Syndrome, Systemic  
Autoimmune Rheumatic Disease,  
Undifferentiated Connective Tissue Disease  
------------- ---------------------------------------------  
Nucleolar Systemic Sclerosis, Scleroderma-Autoimmune  
Myositis Overlap Syndrome, Sjogren  
Syndrome, Raynaud phenomenon, Pulmonary  
Arterial Hypertension, Systemic Autoimmune  
Rheumatic Disease, Cancer  
------------- ---------------------------------------------  
Centromere Scleroderma-CREST, Limited Cutaneous SSc,  
Raynaud's Phenomenon, Primary Biliary  
Cholangitis  
------------- ---------------------------------------------  
Nuclear Dot Primary Biliary Cholangitis  
------------- ---------------------------------------------  
Nuclear Primary Biliary Cholangitis, Autoimmune  
Membrane Hepatitis/Liver disease, Systemic Autoimmune  
Rheumatic Disease, Autoimmune Cytopenias,  
Linear Scleroderma, Antiphospholipid Syndrome  
------------- ---------------------------------------------  
Performed at: [facility] [address] [id number]  
: Corky Newsome PhD, Phone: [phone]   
   
                                                            Performed By: #### C  
ERULOP, HAAB, L-K MICRO, HBCAB, HCBIGM,   
JAMIE, SMAB, ALPHA PHEN, MITOM2, HBSAG, HEMOCHROM, HAABT, IGG,   
HBSAB, HCV RX PCR ####[facility] Acct#[id number],#### RALPH   
####[address]   
   
                      Speckled Pattern 1:160      High       .          The   
[facility]   
Physician   
Group  
   
                                        Comment on above:   Result Comment: ICAP  
 nomenclature: [id number],[id number]   
   
                                                            Performed By: #### C  
ERULOP, HAAB, L-K MICRO, HBCAB, HCBIGM,   
JAMIE, SMAB, ALPHA PHEN, MITOM2, HBSAG, HEMOCHROM, HAABT, IGG,   
HBSAB, HCV RX PCR ####[facility] Acct#[id number],#### RALPH   
####[address]   
   
                                                    Spindle Apparatus   
Pattern         1:640           High            .               The   
[facility]   
Physician   
Group  
   
                                        Comment on above:   Result Comment: ICAP  
 nomenclature: [id number]   
   
                                                            Performed By: #### C  
ERULOP, HAAB, L-K MICRO, HBCAB, HCBIGM,   
JAMIE, SMAB, ALPHA PHEN, MITOM2, HBSAG, HEMOCHROM, HAABT, IGG,   
HBSAB, HCV RX PCR ####[facility] Acct#[id number],#### RALPH   
####[place] [address] [address]   
   
                                                    Alpha-1-Antitrypsin Phenotyp  
acacia 2024   
   
                      Alpha 1 Anti-Trypsin 163 mg/dL  Normal     101-187    The   
[facility]   
Physician   
Group  
   
                                        Comment on above:   Performed By: #### C  
ERULOP, HAAB, L-K MICRO, HBCAB, HCBIGM,   
JAMIE, SMAB, ALPHA PHEN, MITOM2, HBSAG, HEMOCHROM, HAABT, IGG,   
HBSAB, HCV RX PCR ####[facility] Acct#[id number],#### RALPH   
####[address]   
   
                      Phenotype (P1) MM         Normal     .          The   
[facility]   
Physician   
Group  
   
                                        Comment on above:   Result Comment: Phen  
otype Population A-1-AT Concentration*  
Incidence % % of MM (Typical Range)  
MM 86.5% 100% (96 - 189)  
MS 8.0% 86% (83 - 161)  
MZ 3.9% 61% (60 - 111)  
FM 0.4% 100% (93 - 191)  
SZ 0.3% 41% (42 - 75)  
SS 0.1% 64% (62 - 119)  
ZZ 0.05% 19% (16 - 38)  
FS 0.05% 70% (70 - 128)  
FZ Unknown 46% (44 - 88)  
FF Unknown Unknown  
*A-1-AT concentration in the homozygous MM phenotype  
is taken as the reference normal. Percent deficiency  
in each phenotype is reported relative to this  
reference. Ranges used to confirm phenotype.  
Performed at: [address] [id number]  
: Corky Newsome PhD, Phone: [phone]  
Performed at: [address] [id number]  
: Rossana Thompson MD, Phone: [phone]   
   
                                                            Performed By: #### C  
ERULOP, HAAB, L-K MICRO, HBCAB, HCBIGM,   
JAMIE, SMAB, ALPHA PHEN, MITOM2, HBSAG, HEMOCHROM, HAABT, IGG,   
HBSAB, HCV RX PCR ####[facility] Acct#[id number],#### RALPH   
####[facility]   
   
                                                    Ceruloplasminon 2024   
   
                      Ceruloplasmin 23.1 mg/dL Normal     16.0-31.0  The   
[facility]   
Physician   
Group  
   
                                        Comment on above:   Result Comment: Perf  
ormed at: [facility] [id number]  
: Corky Newsome PhD, Phone: [phone]  
PERFORMED BY:  
[place] [address]  
[phone]  
PATHOLOGIST MEDICAL DIRECTOR  
PAUL RODRIGUEZ M.D.   
   
                                                            Performed By: #### C  
ERULOP, HAAB, L-K MICRO, HBCAB, HCBIGM,   
JAMIE, SMAB, ALPHA PHEN, MITOM2, HBSAG, HEMOCHROM, HAABT, IGG,   
HBSAB, HCV RX PCR ####  
[facility] Acct#[id number]  
,  
#### RALPH ####  
[address]   
   
                                                    Ferritin [Mass/volume] in Se  
rum or PlasmaOrdered By: Yordy Lewis on 2024   
   
                      Ferritin [Mass/Vol] 572.1 ng/mL High       23.9-336.2 [facility]  
   
                                        Comment on above:   Result Comment: PERF  
ORMED BY:  
[place] [address]  
[phone]  
PATHOLOGIST MEDICAL DIRECTOR  
PAUL RODRIGUEZ M.D.   
   
                                                            Performed By: #### C  
ERULOP, HAAB, L-K MICRO, HBCAB, HCBIGM,   
JAMIE, SMAB, ALPHA PHEN, MITOM2, HBSAG, HEMOCHROM, HAABT, IGG,   
HBSAB, HCV RX PCR ####  
[facility] Acct#[id number]  
,  
#### RALPH ####  
[address]   
   
                                                    Hep C Ab wRfx to Qnt PCRon 0  
2024   
   
                                                    Hepatitis C Virus   
Antibody            Non-Reactive        Normal              Non   
Reactive                                The   
[facility]   
Physician   
Group  
   
                                        Comment on above:   Performed By: #### C  
ERULOP, HAAB, L-K MICRO, HBCAB, HCBIGM,   
JAMIE, SMAB, ALPHA PHEN, MITOM2, HBSAG, HEMOCHROM, HAABT, IGG,   
HBSAB, HCV RX PCR ####  
[facility] Acct#[id number]  
,  
#### RALPH ####  
[address]   
   
                                                    Interpretation   
Hepatitis C                     Normal          .               The   
[facility]   
Physician   
Group  
   
                                        Comment on above:   Result Comment: Not   
infected with HCV unless early or acute   
infection is  
suspected (which may be delayed in an immunocompromised  
individual), or other evidence exists to indicate HCV  
infection.   
   
                                                            Performed By: #### C  
ERULOP, HAAB, L-K MICRO, HBCAB, HCBIGM,   
JAMIE, SMAB, ALPHA PHEN, MITOM2, HBSAG, HEMOCHROM, HAABT, IGG,   
HBSAB, HCV RX PCR ####  
[facility] Acct#[id number]  
,  
#### RALPH ####  
[address]   
   
                                                    Hepatitis A Antibody IgMon 0  
2024   
   
                                                    Hepatitis A Antibody   
IgM             Negative        Normal          Negative        The   
[facility]   
Physician   
Group  
   
                                        Comment on above:   Performed By: #### C  
ERULOP, HAAB, L-K MICRO, HBCAB, HCBIGM,   
JAMIE, SMAB, ALPHA PHEN, MITOM2, HBSAG, HEMOCHROM, HAABT, IGG,   
HBSAB, HCV RX PCR ####  
[facility] Acct#[id number]  
,  
#### RALPH ####  
[address]   
   
                                                    Hepatitis A Antibody Totalon  
 2024   
   
                                                    Hepatitis A Antibody   
Total           Negative        Normal          Negative        The   
[facility]   
Physician   
Group  
   
                                        Comment on above:   Result Comment: Comm  
ent: The HAV total antibody assay detects   
both IgG and  
IgM but does not differentiate between them. A negative  
result suggests susceptibility to infection. A positive  
result could be due to vaccination, previously resolved  
infection or active infection. Testing for HAV IgM should  
be performed if active HAV infection is suspected. [facility]  
offers profiles that will automatically reflex positive HAV  
total antibody results to IgM (e.g., panel #[id number] HAV  
Antibody w/ Rfx).   
   
                                                            Performed By: #### C  
ERULOP, HAAB, L-K MICRO, HBCAB, HCBIGM,   
JAMIE, SMAB, ALPHA PHEN, MITOM2, HBSAG, HEMOCHROM, HAABT, IGG,   
HBSAB, HCV RX PCR ####[facility] Acct#[id number],#### RALPH   
####[facility]   
   
                                                    Hepatitis B Core Antibodyon   
2024   
   
                                                    Hepatitis B Core   
Antibody        Negative        Normal          Negative        The   
[facility]   
Physician   
Group  
   
                                        Comment on above:   Performed By: #### C  
ERULOP, HAAB, L-K MICRO, HBCAB, HCBIGM,   
JAMIE, SMAB, ALPHA PHEN, MITOM2, HBSAG, HEMOCHROM, HAABT, IGG,   
HBSAB, HCV RX PCR ####[facility] Acct#[id number],#### RALPH   
####[facility] [place] [address]   
   
                                                    Hepatitis B Core Antibody Ig  
Mon 2024   
   
                                                    Hepatitis B Core   
Antibody IgM    Negative        Normal          Negative        The   
[facility]   
Physician   
Group  
   
                                        Comment on above:   Result Comment: Perf  
ormed at:  - Lab[address] [id number]  
: Corky Newsome PhD, Phone: [phone]   
   
                                                            Performed By: #### C  
ERULOP, HAAB, L-K MICRO, HBCAB, HCBIGM,   
JAMIE, SMAB, ALPHA PHEN, MITOM2, HBSAG, HEMOCHROM, HAABT, IGG,   
HBSAB, HCV RX PCR ####  
[facility] Acct#[id number]  
,  
#### RALPH ####  
[address]   
   
                                                    Hepatitis B Surface Antibody  
on 2024   
   
                                                    Hepatitis B Surface   
Antibody        Non-Reactive    Normal          .               The   
[facility]   
Physician   
Group  
   
                                        Comment on above:   Result Comment: Non   
Reactive: Inconsistent with immunity,  
less than 10 mIU/mL  
Reactive: Consistent with immunity,  
greater than 9.9 mIU/mL   
   
                                                            Performed By: #### C  
ERULOP, HAAB, L-K MICRO, HBCAB, HCBIGM,   
JAMIE, SMAB, ALPHA PHEN, MITOM2, HBSAG, HEMOCHROM, HAABT, IGG,   
HBSAB, HCV RX PCR ####[facility] Acct#[id number],#### RALPH   
####[place] [address] [place] [address] [facility]   
   
                                                    Hepatitis B Surface Antigeno  
n 2024   
   
                      HBsAg Screen Negative   Normal     Negative   The   
[facility]   
Physician   
Group  
   
                                        Comment on above:   Result Comment: PERF  
ORMED BY:  
[facility]  
[address]  
[place] [address]  
[phone]  
PATHOLOGIST MEDICAL DIRECTOR  
PAUL RODRIGUEZ M.D.   
   
                                                            Performed By: #### C  
ERULOP, HAAB, L-K MICRO, HBCAB, HCBIGM,   
JAMIE, SMAB, ALPHA PHEN, MITOM2, HBSAG, HEMOCHROM, HAABT, IGG,   
HBSAB, HCV RX PCR ####[facility] Acct#[id number],#### RALPH   
####[address]   
   
                                                    Hereditary Hemochromatosis,D  
NAon 2024   
   
                                                    Hereditary   
Hemochromatosis                 Normal          .               The   
[facility]   
Physician   
Group  
   
                                        Comment on above:   Result Comment: Resu  
lts:  
c.845G>A (p.Tmo620Zlr) - Not Detected  
c.187C>G (p.Yzc52Way) - Detected, heterozygous  
c.193A>T (p.Ykh63Eqo) - Not Detected  
Not associated with increased risk to develop clinical  
symptoms of Hereditary Hemochromatosis. In symptomatic  
individuals, other causes of iron overload should be  
evaluated. See Additional Information and Comments.  
Additional Clinical Information:  
Hereditary hemochromatosis (HFE related) is an autosomal  
recessive iron storage disorder. Patients may have a  
genetic diagnosis of hereditary hemochromatosis and never  
show clinical symptoms. Clinical symptoms typically appear  
between 40 to 60 years in males and after menopause in  
females. Signs and symptoms may include organ damage,  
primarily in the liver, risk for hepatocellular  
carcinoma, diabetes, and heart disease due to iron  
accumulation. Life expectancy may be decreased in  
individuals who develop cirrhosis. Treatment for  
clinically symptomatic individuals may include  
therapeutic phlebotomy. Liver transplant may be used to  
treat end stage liver failure. For preventive care,  
monitoring for iron overload is recommended for patients  
who are homozygous for c.845G>A (p.Yba790Hai) and have yet  
to experience clinical symptoms.  
Comments:  
The most common HFE variants associated with hereditary  
hemochromatosis are c.845G>A (p.Wri784Vka), c.187C>G  
(p.Pfd67Fgg), c.193A>T (p.Dtf04Xiy). While patients  
homozygous for c.845G>A (p.Fac252Xly) are the most likely  
to present clinical symptoms, less than 10% develop  
clinically significant iron overload with tissue and organ  
damage.  
Genetic counseling is recommended to discuss the potential  
clinical implications of positive results, as well as  
recommendations for testing family members.  
Genetic Coordinators are available for health care  
providers to discuss results at [phone]  
Test Details:  
Three variants analyzed:  
c.845G>A (p.Alh587Ckf), commonly referred to as C282Y  
c.187C>G (p.Usp41Qrw), commonly referred to as H63D  
c.193A>T (p.Iws77Btq), commonly referred to as S65C  
Methods/Limitations:  
DNA Analysis of the HFE gene (NM_000410.4) was performed  
by PCR amplification followed by restriction enzyme  
digestion analyses. Results must be combined with clinical  
information for the most accurate interpretation. Molecular-  
based testing is highly accurate, but as in any laboratory  
test, diagnostic errors may occur. False positive or false  
negative results may occur for reasons that include genetic  
variants, blood transfusions, bone marrow transplantation,  
somatic or tissue-specific mosaicism, mislabeled samples,  
or erroneous representation of family relationships.  
This test was developed and its performance  
characteristics determined by [employer]. It has not been  
cleared or approved by the Food and Drug Administration.  
References:  
Charles BR, [place] PC, Nick KV, Deepak LW, Félix AS;  
American Association for the Study of Liver Diseases.  
Diagnosis and management of hemochromatosis: 2011 practice  
guideline by the American Association for the Study of  
Liver Diseases. Hepatology. 2011 Jul;54(1):328-43. doi:  
10.1002/hep.95159. PMID: 25570519; PMCID: BDG3109850.  
Mundo G, Geovani P, Naomi IBRAHIM, Manoj H, Flor O,  
Christiano S, Ab I, Julio M, Isma CHOU. [facility] best practice  
guidelines for the molecular genetic diagnosis of  
hereditary hemochromatosis (HH). Eur J Hum Leighann. 2016  
Apr;24(4):479-19. doi: 10.1038/ejhg.2015.128. Epub 2015. PMID: 21761717; PMCID: PPS9069480.   
   
                                                            Performed By: #### C  
ERULOP, HAAB, L-K MICRO, HBCAB, HCBIGM,   
JAMIE, SMAB, ALPHA PHEN, MITOM2, HBSAG, HEMOCHROM, HAABT, IGG,   
HBSAB, HCV RX PCR ####[facility] Acct#[id number],#### RALHP   
####[place] [address] [address]   
   
                      Reviewed by:            Normal     .          The   
[facility]   
Physician   
Group  
   
                                        Comment on above:   Result Comment: Leslee Pedro, Ph.D., FACMG  
Performed at: [facility] [id number]  
: Zhane Whipple [facility], Phone: [phone]  
PERFORMED BY:  
[place] [address]  
[phone]  
PATHOLOGIST MEDICAL DIRECTOR  
PAUL RODRIGUEZ M.D.   
   
                                                            Performed By: #### C  
ERULOP, HAAB, L-K MICRO, HBCAB, HCBIGM,   
JAMIE, SMAB, ALPHA PHEN, MITOM2, HBSAG, HEMOCHROM, HAABT, IGG,   
HBSAB, HCV RX PCR ####[facility] Acct#[id number],#### RALPH   
####[address]   
   
                                                    Immunoglobulin Bry   
4   
   
                      Immunoglobulin G 838 mg/dL  Normal     [id number]   The   
[facility]   
Physician   
Group  
   
                                        Comment on above:   Result Comment: Perf  
ormed at:  - [facility] [id number]  
: Corky Newsome PhD, Phone: [phone]   
   
                                                            Performed By: #### C  
ERULOP, HAAB, L-K MICRO, HBCAB, HCBIGM,   
JAMIE, SMAB, ALPHA PHEN, MITOM2, HBSAG, HEMOCHROM, HAABT, IGG,   
HBSAB, HCV RX PCR ####[facility]ct#[id number],#### RALPH   
####[address]   
   
                                                    Liver-Kidney Microsomal Abon  
 2024   
   
                                                    Liver-Kidney   
Microsomal Ab   3.5             Normal          0.0-20.0        The   
[facility]   
Physician   
Group  
   
                                        Comment on above:   Result Comment: Nega  
tive 0.0 - 20.0  
Equivocal 20.1 - 24.9  
Positive >24.9  
LKM type 1 antibodies are detected in patients with  
autoimmune hepatitis type 2 and in up to 8% of  
patients with chronic HCV infection.  
Performed at: [address] [id number]  
: Corky Newsome PhD, Phone: [phone]   
   
                                                            Performed By: #### C  
ERULOP, HAAB, L-K MICRO, HBCAB, HCBIGM,   
JAMIE, SMAB, ALPHA PHEN, MITOM2, HBSAG, HEMOCHROM, HAABT, IGG,   
HBSAB, HCV RX PCR ####[facility] Acct#[id number],#### RALPH   
####[address]   
   
                                                    Mitochondrial (M2) Antibodyo  
n 2024   
   
                                                    Mitochondrial (M2)   
Antibody        <20.0           Normal          0.0-20.0        The   
[facility]   
Physician   
Group  
   
                                        Comment on above:   Result Comment: Nega  
tive 0.0 - 20.0  
Equivocal 20.1 - 24.9  
Positive >24.9  
Mitochondrial (M2) Antibodies are found in 90-96% of  
patients with primary biliary cirrhosis.  
Performed at: [address] [id number]  
: Corky Newsome PhD, Phone: [phone]   
   
                                                            Performed By: #### C  
ERULOP, HAAB, L-K MICRO, HBCAB, HCBIGM,   
JAMIE, SMAB, ALPHA PHEN, MITOM2, HBSAG, HEMOCHROM, HAABT, IGG,   
HBSAB, HCV RX PCR ####[facility] Acct#[id number],#### RALPH   
####[place] [address] [place] [id number] [facility]   
   
                                                    Smooth Muscle Antibodyon    
   
                      Smooth Muscle Antibody 24         High       0-19       [facility]  
[facility]   
Physician   
Group  
   
                                        Comment on above:   Result Comment: Nega  
tive 0 - 19  
Weak positive 20 - 30  
Moderate to strong positive >30  
Actin Antibodies are found in 52-85% of patients with  
autoimmune hepatitis or chronic active hepatitis and  
in 22% of patients with primary biliary cirrhosis.   
   
                                                            Performed By: #### C  
ERULOP, HAAB, L-K MICRO, HBCAB, HCBIGM,   
JAMIE, SMAB, ALPHA PHEN, MITOM2, HBSAG, HEMOCHROM, HAABT, IGG,   
HBSAB, HCV RX PCR ####[facility] Acct#[id number],#### RALPH   
####[place] [address] [place] [id number] [facility]   
   
                                                    US liveron 2024   
   
                                         liver            [facility] [place] [address]  
  
Ultrasound Report  
Signed  
  
Patient: Arun Moon   
MR#: [id number]  
[id number]  
: 1950   
Acct:[id number]  
  
Age/Sex: 74 / M ADM Date:   
24  
  
Loc:  Room: Type: [facility]  
Attending Dr: Yordy Lewis MD  
  
Ordering Provider: Yordy Lewis MD  
Date of Service: 24  
Accession #: ([id number])   
US/US liver: R74.8 -   
Abnormal levels of other   
serum enzymes  
  
  
Copies to: Yordy Lewis MD  
  
  
LIMITED ABDOMINAL   
ULTRASOUND:  
  
CLINICAL HISTORY: Elevated   
LFTs.  
  
COMPARISON: None  
  
TECHNIQUE: Grayscale and   
color Doppler images of   
the right upper quadrant   
organs were obtained.  
  
FINDINGS:  
  
Pancreas: Visualized   
portions appear   
unremarkable.  
  
Liver: Fatty infiltration.  
  
Gallbladder: Removed.  
  
CBD: 7 mm.  
  
  
ORDER #: [id number] US/US   
liver  
IMPRESSION:  
  
FATTY INFILTRATION OF THE   
LIVER. NO ACUTE PROCESS IS   
SEEN..  
  
Impression dictated by:   
Jordi Mcdowell Jr.,   
D.O.2024 12:31 PM  
  
  
Dictation Location:   
[place] [address]  
  
Tech: Gabriela Guaman  
  
Transcribed By: MICHELLE   
24 1231  
Dictated By: Jordi Mcdowell Jr, DO 24   
1231  
  
Signed By:  
24 1231       Normal                                  The   
[facility]   
Physician   
Group  
   
                                                    HGB A1C (GLYCO-HGB)on 2024   
   
                      Glucose [Mass/Vol] 186 mg/dL  Normal                [facility]  
   
                                        Comment on above:   Performed By: #### C  
BCA, 2857-1, CMP, 71744-9 ####  
[facility] ([id number])  
[address]  
[place] [address]   
   
                                                    HbA1c (Bld) [Mass   
fraction]       8.1 %           High            4.4-5.6         [facility]  
   
                                        Comment on above:   Result Comment: NOTE  
ADA Guidelines  
Result HgbA1c  
------------ ---------------  
Normal : less than 5.7 %  
Prediabetes : 5.7 % to 6.4 %  
Diabetes : > 6.4 %  
Use with caution in patients with abnormal hemoglobin variants   
as  
the half-life of red blood cells and in vivo glycation rates   
are  
affected.   
   
                                                            Performed By: #### RAPHAEL  
BCA, 2857-1, CMP, 33985-3 ####  
[facility] ([id number])  
[address] [address]   
   
                                                    MICROALBUMIN - ALBUMIN:CREAT  
ININE URINE RATIOon 2024   
   
                      ALB/CREAT RATIO 698.1 mg/g creat High       0.0-30.0   [facility]  
   
                                        Comment on above:   Performed By: #### RAPHAEL  
BCA, 2857-1, CMP, 05205-1 ####  
[facility] ([id number])  
[address]  
[place] [address]   
   
                                                    Albumin DL <= 20 mg/L   
(U) [Mass/Vol]  94.1 mg/dL      High            0.0-1.9         [facility]  
   
                                        Comment on above:   Performed By: #### RAPHAEL  
BCA, 2857-1, CMP, 94334-2 ####  
[facility] ([id number])  
[address]  
[place] [address]   
   
                      URINE CREAT 134.79 mg/dL Normal                [facility]  
   
                                        Comment on above:   Performed By: #### C  
BCA, 2857-1, CMP, 74911-2 ####  
[facility] ([id number])  
[address], [address]  
[place] [address]   
   
                                                    URINALYSISon 2024   
   
                      Bilirubin Ql (U) Negative   Normal     NEG        [facility]  
   
                                        Comment on above:   Performed By: #### C  
BCA, 2857-1, CMP, 72800-3 ####  
[facility] ([id number])  
[address], [address]  
[place] [address]   
   
                      BLOOD/HGB  Small      Abnormal   NEG        [facility]  
   
                                        Comment on above:   Performed By: #### RAPHAEL HERNANDEZ, 2857-1, CMP, 07115-9 ####  
[facility] ([id number])  
[address], [address]  
[place] [address]   
   
                      Color (U)  YELLOW     Normal     YELLOW     [facility]  
   
                                        Comment on above:   Performed By: #### RAPHAEL  
BCA, 2857-1, CMP, 02693-2 ####  
[facility] ([id number])  
[address], [address]  
[place] [address]   
   
                      Glucose Ql (U) 300 mg/dL  Abnormal   NEG        [facility]  
   
                                        Comment on above:   Performed By: #### RAPHAEL  
BCA, 2857-1, CMP, 94442-8 ####  
[facility] ([id number])  
[address], [address]  
[place] [address]   
   
                      GRANULAR CASTS 1 /lpf     High       0          [facility]  
   
                                        Comment on above:   Performed By: #### RAPHAEL  
BCA, 2857-1, CMP, 52030-8 ####  
[facility] ([id number])  
[address], [address]  
[place] [address]   
   
                                                    Hyaline casts LM Ql   
(Urine sed)     9 /lpf          High            0-2             [facility]  
   
                                        Comment on above:   Performed By: #### RAPHAEL  
BCA, 2857-1, CMP, 19976-0 ####  
[facility] ([id number])  
[address], [address]  
[place] [address]   
   
                      Ketones Ql (U) Negative   Normal     NEG        [facility]  
   
                                        Comment on above:   Performed By: #### C  
BCA, 2857-1, CMP, 73888-0 ####  
[facility] ([id number])  
[address], [address]  
[place] [address]   
   
                                                    Leukocyte esterase   
Test strip Ql (U) Negative        Normal          NEG             [facility]  
   
                                        Comment on above:   Performed By: #### C  
BCA, 2857-1, CMP, 21449-2 ####  
[facility] ([id number])  
[address], [address]  
[place] [address]   
   
                      MUCOUS     PRESENT    Abnormal   NONE       [facility]  
   
                                        Comment on above:   Performed By: #### C  
BCA, 2857-1, CMP, 65300-6 ####  
[facility] ([id number])  
[address], [address]  
[place] [address]   
   
                      Nitrite Ql (U) Negative   Normal     NEG        [facility]  
   
                                        Comment on above:   Performed By: #### C  
BCA, 2857-1, CMP, 98321-5 ####  
[facility] ([id number])  
[address], [address]  
[place] [address]   
   
                      pH (U)     6.0 [pH]   Normal     5.0-8.5    [facility]  
   
                                        Comment on above:   Performed By: #### C  
BCA, 2857-1, CMP, 44929-9 ####  
[facility] ([id number])  
[address], [address]  
[place] [address]   
   
                      Protein Ql (U) 300 mg/dL  Abnormal   NEG        [facility]  
   
                                        Comment on above:   Performed By: #### C  
BCA, 2857-1, CMP, 11353-1 ####  
[facility] ([id number])  
[address], [address]  
[place] [address]   
   
                      R.B.CELLS  25 /hpf    High       0-5        [facility]  
   
                                        Comment on above:   Performed By: #### RAPHAEL HERNANDEZ, 2857-1, CMP, 15361-1 ####  
[facility] ([id number])  
[address], [address]  
[place] [address]   
   
                                                    Specific gravity (U)   
[Rel density]   1.021           Normal          1.003-1.035     [facility]  
   
                                        Comment on above:   Performed By: #### RAPHAEL HERNANDEZ, 2857-1, CMP, 87948-6 ####  
[facility] ([id number])  
[address], [address]  
[place] [address]   
   
                      SQUAMOUS EPITHELIUM 1 /hpf     Normal     0-5        [facility]  
   
                                        Comment on above:   Performed By: #### RAPHAEL HERNANDEZ, 2857-1, CMP, 34248-1 ####  
[facility] ([id number])  
[address], [address]  
[place] [address]   
   
                      TURBIDITY  CLEAR      Normal     CLEAR      [facility]  
   
                                        Comment on above:   Performed By: #### RAPHAEL HERNANDEZ, 2857-1, CMP, 12365-5 ####  
[facility] ([id number])  
[address], [address]  
[place] [address]   
   
                                                    Urobilinogen (U)   
[Mass/Vol]      mg/dL           Normal          <1.1            [facility]  
   
                                        Comment on above:   Performed By: #### RAPHAEL HERNANDEZ, 2857-1, CMP, 30668-4 ####  
[facility] ([id number])  
[address], [address]  
[place] [address]   
   
                      W.B.CELLS  1 /hpf     Normal     0-5        [facility]  
   
                                        Comment on above:   Performed By: #### RAPHAEL HERNANDEZ, 2857-1, CMP, 06469-1 ####  
[facility] ([id number])  
[address], [address]  
[place] [address]   
   
                                                    US ABDOMEN LMTDon 2024  
   
   
                                        US ABDOMEN LMTD     US ABDOMEN LMTD  
HISTORY: A 74-year-old   
male with the history of   
the elevated serum   
alkaline phosphatase.  
TECHNIQUE: Multiple   
real-time images of the   
right upper abdomen are   
obtained. The color   
Doppler study is   
performed.  
COMPARISON: Comparison is   
made with the CT scan of   
the abdomen and pelvis of   
3/15/2024 and the upper   
abdominal ultrasound   
examination of 2023.  
FINDINGS: Liver appears   
irregular in contour.   
There is increase   
echogenicity in liver   
suggestive of fatty   
infiltration. No focal   
mass is identified. Liver   
measures 17.6 cm in   
vertical length.  
There is history of   
cholecystectomy.. The   
common bile duct is normal   
and measures 5.7 mm in   
diameter. No intrahepatic   
biliary ductal dilatation   
is identified. There is a   
negative sonographic   
Casanova's sign.  
The visualized pancreas   
appears normal.  
There is no evidence of   
free fluid in the upper   
abdomen.  
The color Doppler study   
reveals normal hepatopedal   
flow in the main portal   
vein.  
IMPRESSION:  
1. Fatty infiltration of   
the liver without evidence   
of focal mass.  
2. Status post   
cholecystectomy. No   
evidence of biliary ductal   
dilatation.  
3. Visualized pancreas is   
unremarkable.  
4. There is a normal   
directional flow in main   
portal vein.  
Workstation:[id number]  
Finalized by Anthony Chapman MD on 2024 8:54 AM Normal                                  [facility]  
   
                                                    COMPREHENSIVE METABOLIC PANE  
Julio 2024   
   
                      Albumin [Mass/Vol] 3.9 g/dL   Normal     3.2-5.3    [facility]  
   
                                        Comment on above:   Performed By: #### C  
BCA, 2857-1, CMP, 95381-4 ####  
[facility] ([id number])  
[address], [address]  
[place] [address]   
   
                                                    ALP [Catalytic   
activity/Vol]   414 U/L         High                      [facility]  
   
                                        Comment on above:   Performed By: #### C  
BCA, 2857-1, CMP, 67116-5 ####  
[facility] ([id number])  
[address], [address]  
[place] [address]   
   
                                                    ALT [Catalytic   
activity/Vol]   322 U/L         High            0-40            [facility]  
   
                                        Comment on above:   Performed By: #### C  
BCA, 2857-1, CMP, 20613-7 ####  
[facility] ([id number])  
[address], [address]  
[place] [address]   
   
                      Anion gap [Moles/Vol] 15 mmol/L  Normal     5-15       [facility]  
   
                                        Comment on above:   Performed By: #### C  
BCA, 2857-1, CMP, 84865-0 ####  
[facility] ([id number])  
[address], [address]  
[place] [address]   
   
                                                    AST [Catalytic   
activity/Vol]   194 U/L         High            0-41            [facility]  
   
                                        Comment on above:   Performed By: #### C  
BCA, 2857-1, CMP, 22378-9 ####  
[facility] ([id number])  
[address], [address]  
[place] [address]   
   
                      Bilirubin [Mass/Vol] 0.5 mg/dL  Normal     0.3-1.2    [facility]  
   
                                        Comment on above:   Performed By: #### C  
BCA, 2857-1, CMP, 43033-3 ####  
[facility] ([id number])  
[address], [address]  
[place] [address]   
   
                      Calcium [Mass/Vol] 9.2 mg/dL  Normal     8.5-10.5   [facility]  
   
                                        Comment on above:   Performed By: #### C  
BCA, 2857-1, CMP, 06137-4 ####  
[facility] ([id number])  
[address], [address]  
[place] [address]   
   
                      Chloride [Moles/Vol] 96 mmol/L  Low             [facility]  
   
                                        Comment on above:   Performed By: #### C  
BCA, 2857-1, CMP, 97887-4 ####  
[facility] ([id number])  
[address], [address]  
[place] [address]   
   
                      CO2 [Moles/Vol] 25 mmol/L  Normal     22-32      [facility]  
   
                                        Comment on above:   Performed By: #### C  
BCA, 2857-1, CMP, 30844-9 ####  
[facility] ([id number])  
[address]  
[place] [address]   
   
                      Creatinine [Mass/Vol] 5.44 mg/dL High       0.60-1.30  [facility]  
   
                                        Comment on above:   Result Comment: METH  
OD TRACEABLE TO IDMS STANDARD   
   
                                                            Performed By: #### C  
BCA, 2857-1, CMP, 80602-3 ####  
[facility] ([id number])  
[address], [address]  
[place] [address]   
   
                                                    GFR/1.73 sq   
M.predicted among   
non-blacks MDRD   
(S/P/Bld) [Vol   
rate/Area]      10 mL/min/{1.73_m2} Low             >59             [facility]  
   
                                        Comment on above:   Result Comment:  
Reported eGFR is based on the  
CKD-EPI  equation that does  
not use a race coefficient.   
   
                                                            Performed By: #### C  
BCA, 2857-1, CMP, 00904-8 ####  
[facility] ([id number])  
[address]  
[place] [address]   
   
                      Glucose [Mass/Vol] 204 mg/dL  High       65-99      [facility]  
   
                                        Comment on above:   Performed By: #### C  
BCA, 2857-1, CMP, 22188-4 ####  
[facility] ([id number])  
[address]  
[place] [address]   
   
                      Potassium [Moles/Vol] 4.1 mmol/L Normal     3.5-5.0    [facility]  
   
                                        Comment on above:   Performed By: #### C  
BCA, 2857-1, CMP, 34560-6 ####  
[facility] ([id number])  
[address]  
[place] [address]   
   
                      Protein [Mass/Vol] 7.6 g/dL   Normal     6.0-8.0    [facility]  
   
                                        Comment on above:   Performed By: #### C  
BCA, 2857-1, CMP, 41136-0 ####  
[facility] ([id number])  
[address]  
[place] [address]   
   
                      Sodium [Moles/Vol] 136 mmol/L Normal     134-146    [facility]  
   
                                        Comment on above:   Performed By: #### C  
BCA, 2857-1, CMP, 24606-0 ####  
[facility] ([id number])  
[address]  
[place] [address]   
   
                                                    Urea nitrogen   
[Mass/Vol]      69 mg/dL        High            5-27            [facility]  
   
                                        Comment on above:   Performed By: #### C  
BCA, 2857-1, CMP, 56260-1 ####  
[facility] ([id number])  
[address]  
[place] [address]   
   
                                                    CBC AND AUTO DIFFon 20  
24   
   
                      ABSOLUTE BASOPHIL 0.0 X10E9/L Normal     0.0-0.2    [facility]  
   
                                        Comment on above:   Performed By: #### C  
BCA, 13105-3, CMP, 07905-9, 79497-3 ####  
[facility] ([id number])  
[address]  
[place] [address]   
   
                      ABSOLUTE NEUTROPHIL 10.4 X10E9/L High       1.5-6.6    [facility]  
   
                                        Comment on above:   Performed By: #### C  
BCA, 86458-0, CMP, 86511-8, 38222-3 ####  
[facility] ([id number])  
[address]  
[place] [address]   
   
                                                    Basophils/100 WBC   
(Bld)           0.3 %           Normal                          [facility]  
   
                                        Comment on above:   Performed By: #### C  
BCA, 69656-2, CMP, 85020-9, 80391-3 ####  
[facility] ([id number])  
[address] [place] [address]   
   
                                                    Eosinophils (Bld)   
[#/Vol]         0.5 10*3/uL     High            0.0-0.4         [facility]  
   
                                        Comment on above:   Performed By: #### C  
BCA, 38742-2, CMP, 93392-3, 71021-4 ####  
[facility] ([id number])  
[address] [place] [address]   
   
                                                    Eosinophils/100 WBC   
(Bld)           4.2 %           Normal                          [facility]  
   
                                        Comment on above:   Performed By: #### C  
BCA, 99067-9, CMP, 70255-5, 92024-1 ####  
[facility] ([id number])  
[address]  
[place] [address]   
   
                                                    Erythrocyte   
distribution width   
(RBC) [Ratio]   14.7 %          Normal          11.5-15.0       [facility]  
   
                                        Comment on above:   Performed By: #### C  
BCA, 13242-6, CMP, 23516-3, 59397-5 ####  
[facility] ([id number])  
[address]  
[place] [address]   
   
                                                    Hematocrit (Bld)   
[Volume fraction] 25.8 %          Low             39-49           [facility]  
   
                                        Comment on above:   Performed By: #### RAPHAEL  
BCA, 98166-2, CMP, 56345-2, 89630-8 ####  
[facility] ([id number])  
[address]  
[place] [address]   
   
                                                    Hemoglobin (Bld)   
[Mass/Vol]      8.7 g/dL        Low             13.0-17.0       [facility]  
   
                                        Comment on above:   Performed By: #### RAPHAEL  
BCA, 90080-0, CMP, 04723-4, 36447-1 ####  
[facility] ([id number])  
[address]  
[place] [address]   
   
                                                    Lymphocytes (Bld)   
[#/Vol]         1.1 10*3/uL     Normal          1.0-3.5         [facility]  
   
                                        Comment on above:   Performed By: #### C  
BCA, 01265-0, CMP, 27492-7, 93042-4 ####  
[facility] ([id number])  
[address]  
[place] [address]   
   
                                                    Lymphocytes/100 WBC   
(Bld)           8.3 %           Normal                          [facility]  
   
                                        Comment on above:   Performed By: #### RAPHAEL  
BCA, 58845-5, CMP, 04658-1, 45980-8 ####  
[facility] ([id number])  
[address]  
[place] [address]   
   
                                                    MCH (RBC) [Entitic   
mass]           31.6 pg         Normal          27-34           [facility]  
   
                                        Comment on above:   Performed By: #### C  
MARY, 82928-5, CMP, 17253-5, 51809-1 ####  
[facility] ([id number])  
[address]  
[place] [address]   
   
                      MCHC (RBC) [Mass/Vol] 33.9 g/dL  Normal     32-36      [facility]  
   
                                        Comment on above:   Performed By: #### RAPHAEL HERNANDEZ, 85469-1, CMP, 10202-4, 23416-3 ####  
[facility] ([id number])  
[address]  
[place] [address]   
   
                                                    MCV (RBC) [Entitic   
vol]            93 fL           Normal                    [facility]  
   
                                        Comment on above:   Performed By: #### RAPHAEL HERNANDEZ, 93209-7, CMP, 69381-6, 28535-6 ####  
[facility] ([id number])  
[address]  
[place] [address]   
   
                                                    Monocytes (Bld)   
[#/Vol]         0.8 10*3/uL     Normal          0-0.9           [facility]  
   
                                        Comment on above:   Performed By: #### RAPHAEL HERNANDEZ, 93088-0, CMP, 95168-4, 47115-1 ####  
[facility] ([id number])  
[address]  
[place] [address]   
   
                                                    Monocytes/100 WBC   
(Bld)           6.2 %           Normal                          [facility]  
   
                                        Comment on above:   Performed By: #### RAPHAEL HERNANDEZ, 87286-5, CMP, 28025-3, 85260-2 ####  
[facility] ([id number])  
[address]  
[place] [address]   
   
                                                    Neutrophils/100 WBC   
(Bld)           81.0 %          Normal                          [facility]  
   
                                        Comment on above:   Performed By: #### RAPHAEL HERNANDEZ, 69233-0, CMP, 53875-9, 49676-8 ####  
[facility] ([id number])  
[address]  
[place] [address]   
   
                                                    Platelet mean volume   
(Bld) [Entitic vol] 8.6 fL          Normal          7-12            [facility]  
   
                                        Comment on above:   Performed By: #### C  
BCA, 84703-6, CMP, 43177-2, 12772-0 ####  
[facility] ([id number])  
[address]  
[place] [address]   
   
                                                    Platelets (Bld)   
[#/Vol]         186 10*3/uL     Normal          150-450         [facility]  
   
                                        Comment on above:   Performed By: #### C  
BCA, 90407-5, CMP, 63727-1, 40063-6 ####  
[facility] ([id number])  
[address]  
[place] [address]   
   
                      RBC COUNT  2.76 X10E12/L Low        4.10-5.70  [facility]  
   
                                        Comment on above:   Performed By: #### C  
BCA, 41840-8, CMP, 92245-3, 29757-8 ####  
[facility] ([id number])  
[address]  
[place] [address]   
   
                      WBC (Bld) [#/Vol] 12.9 10*3/uL High       4.0-11.0   [facility]  
   
                                        Comment on above:   Performed By: #### C  
BCA, 54589-5, CMP, 23913-0, 99926-6 ####  
[facility] ([id number])  
[address]  
[place] [address]   
   
                                                    COMPREHENSIVE METABOLIC PANE  
Julio 2024   
   
                      Albumin [Mass/Vol] 2.6 g/dL   Low        3.2-5.3    [facility]  
   
                                        Comment on above:   Performed By: #### C  
BCA, 18410-6, CMP, 05658-5, 74537-5 ####  
[facility] ([id number])  
[address]  
[place] [address]   
   
                                                    ALP [Catalytic   
activity/Vol]   222 U/L         High                      [facility]  
   
                                        Comment on above:   Performed By: #### C  
BCA, 08911-6, CMP, 59841-3, 20643-9 ####  
[facility] ([id number])  
[address]  
[place] [address]   
   
                                                    ALT [Catalytic   
activity/Vol]   106 U/L         High            0-40            [facility]  
   
                                        Comment on above:   Performed By: #### C  
BCA, 13225-3, CMP, 23037-4, 70128-7 ####  
[facility] ([id number])  
[address]  
[place] [address]   
   
                      Anion gap [Moles/Vol] 14 mmol/L  Normal     5-15       [facility]  
   
                                        Comment on above:   Performed By: #### C  
BCA, 00204-9, CMP, 12873-0, 02116-3 ####  
[facility] ([id number])  
[address] [place]  
[place] [address]   
   
                                                    AST [Catalytic   
activity/Vol]   77 U/L          High            0-41            [facility]  
   
                                        Comment on above:   Performed By: #### C  
BCA, 62328-0, CMP, 97048-3, 77404-0 ####  
[facility] ([id number])  
[address]  
[place] [address]   
   
                      Bilirubin [Mass/Vol] 0.8 mg/dL  Normal     0.3-1.2    [facility]  
   
                                        Comment on above:   Performed By: #### C  
BCA, 61128-3, CMP, 21072-0, 52608-8 ####  
[facility] ([id number])  
[address]  
[place] [address]   
   
                      Calcium [Mass/Vol] 8.3 mg/dL  Low        8.5-10.5   [facility]  
   
                                        Comment on above:   Performed By: #### C  
BCA, 50557-6, CMP, 95073-1, 28569-7 ####  
[facility] ([id number])  
[address]  
[place] [address]   
   
                      Chloride [Moles/Vol] 99 mmol/L  Normal          [facility]  
   
                                        Comment on above:   Performed By: #### C  
BCA, 76909-0, CMP, 73364-4, 39637-4 ####  
[facility] ([id number])  
[address]  
[place] [address]   
   
                      CO2 [Moles/Vol] 22 mmol/L  Normal     22-32      [facility]  
   
                                        Comment on above:   Performed By: #### C  
BCA, 80760-5, CMP, 10065-8, 53156-5 ####  
[facility] ([id number])  
[address]  
[place] [address]   
   
                      Creatinine [Mass/Vol] 4.55 mg/dL High       0.70-1.20  [facility]  
   
                                        Comment on above:   Result Comment: METH  
OD TRACEABLE TO IDMS STANDARD   
   
                                                            Performed By: #### C  
BCA, 51718-0, CMP, 72774-3, 27909-8 ####  
[facility] ([id number])  
[address]  
[place] [address]   
   
                                                    GFR/1.73 sq   
M.predicted among   
non-blacks MDRD   
(S/P/Bld) [Vol   
rate/Area]      13 mL/min/{1.73_m2} Low             >59             [facility]  
   
                                        Comment on above:   Result Comment:  
Reported eGFR is based on the  
CKD-EPI  equation that does  
not use a race coefficient.   
   
                                                            Performed By: #### C  
BCA, 34457-6, CMP, 78625-5, 44797-6 ####  
[facility] ([id number])  
[address]  
[place] [address]   
   
                      Glucose [Mass/Vol] 194 mg/dL  High       65-99      [facility]  
   
                                        Comment on above:   Performed By: #### C  
BCA, 60898-5, CMP, 59037-4, 45679-1 ####  
[facility] ([id number])  
[address] [place] [address]   
   
                      Potassium [Moles/Vol] 3.6 mmol/L Normal     3.5-5.0    [facility]  
   
                                        Comment on above:   Performed By: #### C  
BCA, 37715-4, CMP, 05585-8, 77041-6 ####  
[facility] ([id number])  
[address] [place] [address]   
   
                      Protein [Mass/Vol] 6.5 g/dL   Normal     6.0-8.0    [facility]  
   
                                        Comment on above:   Performed By: #### C  
BCA, 00163-4, CMP, 80187-9, 54746-4 ####  
[facility] ([id number])  
[address]  
[place] [address]   
   
                      Sodium [Moles/Vol] 135 mmol/L Normal     134-146    [facility]  
   
                                        Comment on above:   Performed By: #### C  
MARY, 64183-4, CMP, 32655-3, 90562-6 ####  
[facility] ([id number])  
[address]  
[place] [address]   
   
                                                    Urea nitrogen   
[Mass/Vol]      65 mg/dL        High            5-27            [facility]  
   
                                        Comment on above:   Performed By: #### C  
MARY, 81966-5, [facility], 15954-4, 55206-1 ####  
[facility] ([id number])  
[address]  
[place] [address]   
   
                                                    Glucose Glucometer (BldC) [M  
ass/Vol]on 2024   
   
                      Glucose [Mass/Vol] 322 mg/dL  High       65-99      [facility]  
   
                      Glucose [Mass/Vol] 177 mg/dL  High       65-99      [facility]  
   
                                                    MAGNESIUMon 2024   
   
                      Magnesium [Mass/Vol] 2.1 mg/dL  Normal     1.8-2.6    [facility]  
   
                                        Comment on above:   Performed By: #### C  
MARY, 23161-6, [facility], , 44451-7 ####  
[facility] ([id number])  
[address]  
[place] [address]   
   
                                                    POTASSIUMon 2024   
   
                      Potassium [Moles/Vol] 3.8 mmol/L Normal     3.5-5.0    [facility]  
   
                                        Comment on above:   Performed By: #### C  
MARY, 23170-8, [facility], 13991-6, 16104-1 ####  
[facility] ([id number])  
[address]  
[place] [address]   
   
                      Potassium [Moles/Vol] 3.3 mmol/L Low        3.5-5.0    [facility]  
   
                                        Comment on above:   Performed By: #### C  
MARY, 88364-6, [facility], 74529-0, 50575-7 ####  
[facility] ([id number])  
[address]  
[place] [address]   
   
                                                    Procalcitonin IA [Mass/Vol]o  
n 2024   
   
                      PROCALCITONIN 1.48 ng/mL High       <0.05      [facility]  
   
                                        Comment on above:   Result Comment: NOTE  
<0.50 ng/mL - Low risk of severe sepsis and/or septic shock.  
<2.00 ng/mL - Recommend retesting within 6-24 hours.  
>2.00 ng/mL - High risk of sepsis and/or septic shock.   
   
                                                            Performed By: #### C  
MARY, 40016-7, CMP, 97347-1, 82673-7 ####  
[facility] ([id number])  
[address]  
[place] [address]   
   
                                                    BF CELL CT AND DIFFon 2024   
   
                                        BODY FLUID COMMENT  ----------Interpreta  
tion--  
------              Normal                                  [facility]  
   
                                        Comment on above:   Result Comment: Refe  
rence values for this fluid type are  
undefined, as fluid accumulation is  
considered abnormal.   
   
                                                            Performed By: #### C  
MARY, 06818-3, CMP, 46934-5, 31418-4 ####  
[facility] ([id number])  
[address]  
[place] [address]   
   
                      FLUID CLARITY CLEAR      Normal                [facility]  
   
                                        Comment on above:   Performed By: #### C  
MARY, 18954-5, [facility], 75692-7, 17296-4 ####  
[facility] ([id number])  
[address] [place] [address]   
   
                      FLUID COLOR COLORLESS  Normal                [facility]  
   
                                        Comment on above:   Performed By: #### C  
MARY, 29358-3, CMP, 64511-6, 74284-3 ####  
[facility] ([id number])  
[address] [place] [address]   
   
                      FLUID LYMPHOCYTE 10 %       Normal                [facility]  
   
                                        Comment on above:   Performed By: #### C  
MARY, 95926-5, CMP, 73150-8, 39003-9 ####  
[facility] ([id number])  
[address] [place] [address]   
   
                      FLUID NEUTROPHILS 59 %       Normal                [facility]  
   
                                        Comment on above:   Performed By: #### C  
BCA, 94434-8, CMP, 29043-7, 60575-0 ####  
[facility] ([id number])  
[address]  
[place] [address]   
   
                      FLUID RBC CT 1 /uL      Normal                [facility]  
   
                                        Comment on above:   Performed By: #### C  
BCA, 80285-8, CMP, 49961-8, 06731-8 ####  
[facility] ([id number])  
[address]  
[place] [address]   
   
                      FLUID SPECIMEN TYPE PERITONEAL FLUID Normal                 
 [facility]  
   
                                        Comment on above:   Performed By: #### C  
BCA, 21253-8, CMP, 33411-6, 00061-4 ####  
[facility] ([id number])  
[address]  
[place] [address]   
   
                      MACROPHAGES 31 %       Normal                [facility]  
   
                                        Comment on above:   Performed By: #### C  
BCA, 24203-8, CMP, 17840-2, 95738-0 ####  
[facility] ([id number])  
[address]  
[place] [address]   
   
                      NUCLEATED CELL CT 28 /uL     Normal                [facility]  
   
                                        Comment on above:   Performed By: #### C  
BCA, 57203-8, CMP, 68676-1, 54448-1 ####  
[facility] ([id number])  
[address]  
[place] [address]   
   
                                                    BLOOD CULTUREon 2024   
   
                                                    Bacteria identified   
Aer cx Nom (Bld)                        CULTURE RESULTS  
NO GROWTH 5 DAYS    Normal                                  [facility]  
   
                                                    Bacteria identified   
Aer cx Nom (Bld)                        CULTURE RESULTS  
NO GROWTH 5 DAYS    Normal                                  [facility]  
   
                                                    CBC AND AUTO DIFFon 20  
24   
   
                      ABSOLUTE BASOPHIL 0.1 X10E9/L Normal     0.0-0.2    [facility]  
   
                                        Comment on above:   Performed By: #### C  
BCA, 98291-9, CMP, 47930-6, 68931-4 ####  
[facility] ([id number])  
[address]  
[place] [address]   
   
                      ABSOLUTE NEUTROPHIL 14.9 X10E9/L High       1.5-6.6    Pro  
[facility]  
   
                                        Comment on above:   Performed By: #### C  
BCA, 58331-0, CMP, 65208-7, 54200-0 ####  
[facility] ([id number])  
[address]  
[place] [address]   
   
                                                    Basophils/100 WBC   
(Bld)           0.4 %           Normal                          [facility]  
   
                                        Comment on above:   Performed By: #### C  
BCA, 74584-9, CMP, 14111-9, 66215-9 ####  
[facility] ([id number])  
[address]  
[place] [address]   
   
                                                    Eosinophils (Bld)   
[#/Vol]         0.4 10*3/uL     Normal          0.0-0.4         [facility]  
   
                                        Comment on above:   Performed By: #### C  
BCA, 84942-2, CMP, 29631-8, 01486-4 ####  
[facility] ([id number])  
[address]  
[place] [address]   
   
                                                    Eosinophils/100 WBC   
(Bld)           2.5 %           Normal                          [facility]  
   
                                        Comment on above:   Performed By: #### C  
BCA, 78260-0, CMP, 67521-7, 32355-4 ####  
[facility] ([id number])  
[address]  
[place] [address]   
   
                                                    Erythrocyte   
distribution width   
(RBC) [Ratio]   14.9 %          Normal          11.5-15.0       [facility]  
   
                                        Comment on above:   Performed By: #### C  
BCA, 31745-4, CMP, 70782-3, 04008-5 ####  
[facility] ([id number])  
[address]  
[place] [address]   
   
                                                    Hematocrit (Bld)   
[Volume fraction] 26.5 %          Low             39-49           [facility]  
   
                                        Comment on above:   Performed By: #### C  
BCA, 91323-2, CMP, 06518-6, 04004-7 ####  
[facility] ([id number])  
[address]  
[place] [address]   
   
                                                    Hemoglobin (Bld)   
[Mass/Vol]      9.0 g/dL        Low             13.0-17.0       [facility]  
   
                                        Comment on above:   Performed By: #### C  
BCA, 20703-0, CMP, 37060-0, 37248-7 ####  
[facility] ([id number])  
[address]  
[place] [address]   
   
                                                    Lymphocytes (Bld)   
[#/Vol]         0.9 10*3/uL     Low             1.0-3.5         [facility]  
   
                                        Comment on above:   Performed By: #### C  
BCA, 86690-1, CMP, 34548-9, 86514-9 ####  
[facility] ([id number])  
[address]  
[place] [address]   
   
                                                    Lymphocytes/100 WBC   
(Bld)           5.3 %           Normal                          [facility]  
   
                                        Comment on above:   Performed By: #### C  
BCA, 17068-4, CMP, 98665-7, 75234-3 ####  
[facility] ([id number])  
[address]  
[place] [address]   
   
                                                    MCH (RBC) [Entitic   
mass]           31.5 pg         Normal          27-34           [facility]  
   
                                        Comment on above:   Performed By: #### C  
BCA, 78272-3, CMP, 67488-2, 80681-7 ####  
[facility] ([id number])  
[address]  
[place] [address]   
   
                      MCHC (RBC) [Mass/Vol] 34.0 g/dL  Normal     32-36      [facility]  
   
                                        Comment on above:   Performed By: #### C  
BCA, 36366-1, CMP, 98603-4, 08903-5 ####  
[facility] ([id number])  
[address]  
[place] [address]   
   
                                                    MCV (RBC) [Entitic   
vol]            93 fL           Normal                    [facility]  
   
                                        Comment on above:   Performed By: #### C  
BCA, 96390-0, CMP, 67551-5, 10606-3 ####  
[facility] ([id number])  
[address]  
[place] [address]   
   
                                                    Monocytes (Bld)   
[#/Vol]         1.1 10*3/uL     High            0-0.9           [facility]  
   
                                        Comment on above:   Performed By: #### C  
BCA, 73472-4, CMP, 77239-2, 17118-1 ####  
[facility] ([id number])  
[address] [place]  
[place] [address]   
   
                                                    Monocytes/100 WBC   
(Bld)           6.2 %           Normal                          [facility]  
   
                                        Comment on above:   Performed By: #### C  
BCA, 68537-5, CMP, 28425-1, 45453-0 ####  
[facility] ([id number])  
[address]  
[place] [address]   
   
                                                    Neutrophils/100 WBC   
(Bld)           85.6 %          Normal                          [facility]  
   
                                        Comment on above:   Performed By: #### C  
BCA, 40461-9, CMP, 51063-5, 53662-4 ####  
[facility] ([id number])  
[address]  
[place] [address]   
   
                                                    Platelet mean volume   
(Bld) [Entitic vol] 8.4 fL          Normal          7-12            [facility]  
   
                                        Comment on above:   Performed By: #### C  
BCA, 10321-9, CMP, 27050-7, 44659-7 ####  
[facility] ([id number])  
[address]  
[place] [address]   
   
                                                    Platelets (Bld)   
[#/Vol]         170 10*3/uL     Normal          150-450         [facility]  
   
                                        Comment on above:   Performed By: #### C  
BCA, 10827-1, CMP, 74524-2, 84576-6 ####  
[facility] ([id number])  
[address]  
[place] [address]   
   
                      RBC COUNT  2.86 X10E12/L Low        4.10-5.70  [facility]  
   
                                        Comment on above:   Performed By: #### C  
BCA, 77828-0, CMP, 65610-6, 69189-9 ####  
[facility] ([id number])  
[address]  
[place] [address]   
   
                      WBC (Bld) [#/Vol] 17.4 10*3/uL High       4.0-11.0   [facility]  
   
                                        Comment on above:   Performed By: #### C  
BCA, 68837-4, CMP, 06102-0, 77185-1 ####  
[facility] ([id number])  
[address]  
[place] [address]   
   
                                                    COMPREHENSIVE METABOLIC PANE  
Julio 2024   
   
                      Albumin [Mass/Vol] 2.7 g/dL   Low        3.2-5.3    [facility]  
   
                                        Comment on above:   Performed By: #### C  
BCA, 73988-6, CMP, 97645-1, 27209-3 ####  
[facility] ([id number])  
[address]  
[place] [address]   
   
                                                    ALP [Catalytic   
activity/Vol]   186 U/L         High                      [facility]  
   
                                        Comment on above:   Performed By: #### C  
BCA, 22933-3, CMP, 01832-9, 74287-8 ####  
[facility] ([id number])  
[address]  
[place] [address]   
   
                                                    ALT [Catalytic   
activity/Vol]   67 U/L          High            0-40            [facility]  
   
                                        Comment on above:   Performed By: #### C  
BCA, 15950-8, CMP, 23070-4, 59642-7 ####  
[facility] ([id number])  
[address]  
[place] [address]   
   
                      Anion gap [Moles/Vol] 13 mmol/L  Normal     5-15       [facility]  
   
                                        Comment on above:   Performed By: #### C  
BCA, 98294-9, CMP, 31129-2, 42494-7 ####  
[facility] ([id number])  
[address]  
[place] [address]   
   
                                                    AST [Catalytic   
activity/Vol]   35 U/L          Normal          0-41            [facility]  
   
                                        Comment on above:   Performed By: #### C  
BCA, 61701-1, CMP, 60440-5, 74615-3 ####  
[facility] ([id number])  
[address] [place] [address]   
   
                      Bilirubin [Mass/Vol] 0.9 mg/dL  Normal     0.3-1.2    [facility]  
   
                                        Comment on above:   Performed By: #### C  
BCA, 67523-0, CMP, 97052-6, 79286-6 ####  
[facility] ([id number])  
[address]  
[place] [address]   
   
                      Calcium [Mass/Vol] 8.4 mg/dL  Low        8.5-10.5   [facility]  
   
                                        Comment on above:   Performed By: #### C  
BCA, 30991-0, CMP, 09636-8, 27908-0 ####  
[facility] ([id number])  
[address] [place] [address]   
   
                      Chloride [Moles/Vol] 96 mmol/L  Low             [facility]  
   
                                        Comment on above:   Performed By: #### C  
BCA, 31699-0, [facility], 17433-7, 08083-8 ####  
[facility] ([id number])  
[address] [place] [address]   
   
                      CO2 [Moles/Vol] 22 mmol/L  Normal     22-32      [facility]  
   
                                        Comment on above:   Performed By: #### C  
BCA, 83622-5, [facility], 34794-7, 22503-5 ####  
[facility] ([id number])  
[address]  
[place] [address]   
   
                      Creatinine [Mass/Vol] 5.22 mg/dL High       0.70-1.20  [facility]  
   
                                        Comment on above:   Result Comment: METH  
OD TRACEABLE TO IDMS STANDARD   
   
                                                            Performed By: #### C  
BCA, 72678-4, [facility], 63646-1, 42646-3 ####  
[facility] ([id number])  
[address]  
[place] [address]   
   
                                                    GFR/1.73 sq   
M.predicted among   
non-blacks MDRD   
(S/P/Bld) [Vol   
rate/Area]      11 mL/min/{1.73_m2} Low             >59             [facility]  
   
                                        Comment on above:   Result Comment:  
Reported eGFR is based on the  
CKD-EPI  equation that does  
not use a race coefficient.   
   
                                                            Performed By: #### C  
BCA, 67203-2, CMP, 45955-8, 68435-3 ####  
[facility] ([id number])  
[address] [place] [address]   
   
                      Glucose [Mass/Vol] 207 mg/dL  High       65-99      [facility]  
   
                                        Comment on above:   Performed By: #### C  
BCA, 96483-8, CMP, 50053-8, 56111-1 ####  
[facility] ([id number])  
[address]  
[place] [address]   
   
                      Potassium [Moles/Vol] 2.9 mmol/L Low        3.5-5.0    [facility]  
   
                                        Comment on above:   Performed By: #### C  
BCA, 65512-8, CMP, 13617-3, 43251-9 ####  
[facility] ([id number])  
[address]  
[place] [address]   
   
                      Protein [Mass/Vol] 6.7 g/dL   Normal     6.0-8.0    [facility]  
   
                                        Comment on above:   Performed By: #### C  
BCA, 80630-2, CMP, 41274-1, 28450-7 ####  
[facility] ([id number])  
[address]  
[place] [address]   
   
                      Sodium [Moles/Vol] 131 mmol/L Low        134-146    [facility]  
   
                                        Comment on above:   Performed By: #### C  
BCA, 40688-9, CMP, 42108-2, 96357-2 ####  
[facility] ([id number])  
[address]  
[place] [address]   
   
                                                    Urea nitrogen   
[Mass/Vol]      69 mg/dL        High            5-27            [facility]  
   
                                        Comment on above:   Performed By: #### C  
BCA, 35589-1, CMP, 54448-8, 16331-8 ####  
[facility] ([id number])  
[address]  
[place] [address]   
   
                                                    Glucose Glucometer (BldC) [M  
ass/Vol]on 2024   
   
                      Glucose [Mass/Vol] 255 mg/dL  High       65-99      [facility]  
[facility]  
   
                      Glucose [Mass/Vol] 272 mg/dL  High       65-99      [facility]  
[facility]  
   
                      Glucose [Mass/Vol] 273 mg/dL  High       65-99      [facility]  
[facility]  
   
                      Glucose [Mass/Vol] 169 mg/dL  High       65-99      [facility]  
[facility]  
   
                                                    MAGNESIUMon 2024   
   
                      Magnesium [Mass/Vol] 2.1 mg/dL  Normal     1.8-2.6    [facility]  
   
                                        Comment on above:   Performed By: #### C  
BCA, 59580-1, CMP, 42241-2, 03715-6 ####  
[facility] ([id number])  
[address]  
[place] [address]   
   
                                                    POTASSIUMon 2024   
   
                      Potassium [Moles/Vol] 3.5 mmol/L Normal     3.5-5.0    [facility]  
[facility]  
   
                                        Comment on above:   Performed By: #### C  
BCA, 55078-7, CMP, 70545-8, 80656-8 ####  
[facility] ([id number])  
[address]  
[place] [address]   
   
                                                    BF CELL CT AND DIFFon 2024   
   
                                        BODY FLUID COMMENT  ----------Interpreta  
tion--  
------              Normal                                  [facility]  
   
                                        Comment on above:   Result Comment: Refe  
rence values for this fluid type are  
undefined, as fluid accumulation is  
considered abnormal.   
   
                                                            Performed By: #### B  
FCT ####  
[facility] ([id number])  
[address]  
[place] [address]  
[facility] ([id number])  
[address]  
[place] [address]   
   
                      FLUID CLARITY CLEAR      Normal                [facility]  
   
                                        Comment on above:   Performed By: #### B  
FCT ####  
[facility] ([id number])  
[address]  
[place] [address]  
[facility] ([id number])  
[address]  
[place] [address]   
   
                      FLUID COLOR LIGHT STRAW Normal                [facility]  
   
                                        Comment on above:   Performed By: #### B  
FCT ####  
[facility] ([id number])  
[address]  
[place] [address]  
[facility] ([id number])  
[address]  
[place] [address]   
   
                      FLUID LYMPHOCYTE 10 %       Normal                [facility]  
   
                                        Comment on above:   Performed By: #### B  
FCT ####  
[facility] ([id number])  
[address]  
[place] [address]  
[facility] ([id number])  
[address]  
[place] [address]   
   
                      FLUID MESOTHELIAL 1 %        Normal                [facility]  
   
                                        Comment on above:   Performed By: #### B  
FCT ####  
[facility] ([id number])  
[address]  
[place] [address]  
[facility] ([id number])  
[address]  
[place] [address]   
   
                      FLUID NEUTROPHILS 52 %       Normal                [facility]  
   
                                        Comment on above:   Performed By: #### B  
FCT ####  
[facility] ([id number])  
[address]  
[place] [address]  
[facility] ([id number])  
[address]  
[place] [address]   
   
                      FLUID RBC CT 9 /uL      Normal                [facility]  
   
                                        Comment on above:   Performed By: #### B  
FCT ####  
[facility] ([id number])  
[address]  
[place] [address]  
[facility] ([id number])  
[address]  
[place] [address]   
   
                      FLUID SPECIMEN TYPE PERITONEAL FLUID Normal                 
 [facility]  
   
                                        Comment on above:   Performed By: #### B  
FCT ####  
[facility] ([id number])  
[address]  
[place] [address]  
[facility] ([id number])  
[address]  
[place] [address]   
   
                      MACROPHAGES 37 %       Normal                [facility]  
   
                                        Comment on above:   Performed By: #### B  
FCT ####  
[facility] ([id number])  
[address]  
[place] [address]  
[facility] ([id number])  
[address]  
[place] [address]   
   
                      NUCLEATED CELL CT 91 /uL     Normal                [facility]  
   
                                        Comment on above:   Performed By: #### B  
FCT ####  
[facility] ([id number])  
[address]  
[place] [address]  
[facility] ([id number])  
[address]  
[place] [address]   
   
                                                    CBC AND AUTO DIFFon 20  
24   
   
                      ABSOLUTE BASOPHIL 0.1 X10E9/L Normal     0.0-0.2    [facility]  
   
                                        Comment on above:   Performed By: #### C  
BCA, 47618-9, CMP, 72621-7, 47029-8 ####  
[facility] ([id number])  
[address]  
[place] [address]   
   
                      ABSOLUTE NEUTROPHIL 16.7 X10E9/L High       1.5-6.6    [facility]  
   
                                        Comment on above:   Performed By: #### RAPHAEL  
BCA, 96108-6, CMP, 85614-7, 12331-4 ####  
[facility] ([id number])  
[address]  
[place] [address]   
   
                                                    Basophils/100 WBC   
(Bld)           0.3 %           Normal                          [facility]  
   
                                        Comment on above:   Performed By: #### RAPHAEL  
BCA, 76959-3, CMP, 54981-6, 63067-3 ####  
[facility] ([id number])  
[address]  
[place] [address]   
   
                                                    Eosinophils (Bld)   
[#/Vol]         0.3 10*3/uL     Normal          0.0-0.4         [facility]  
   
                                        Comment on above:   Performed By: #### RAPHAEL  
BCA, 49981-4, CMP, 53698-8, 49695-8 ####  
[facility] ([id number])  
[address]  
[place] [address]   
   
                                                    Eosinophils/100 WBC   
(Bld)           1.4 %           Normal                          [facility]  
   
                                        Comment on above:   Performed By: #### C  
BCA, 15484-1, CMP, 06571-0, 87815-8 ####  
[facility] ([id number])  
[address]  
[place] [address]   
   
                                                    Erythrocyte   
distribution width   
(RBC) [Ratio]   15.4 %          High            11.5-15.0       [facility]  
   
                                        Comment on above:   Performed By: #### C  
BCA, 72010-0, CMP, 95674-5, 19939-3 ####  
[facility] ([id number])  
[address]  
[place] [address]   
   
                                                    Hematocrit (Bld)   
[Volume fraction] 26.7 %          Low             39-49           [facility]  
   
                                        Comment on above:   Performed By: #### C  
BCA, 42131-1, CMP, 24746-3, 95883-1 ####  
[facility] ([id number])  
[address]  
[place] [address]   
   
                                                    Hemoglobin (Bld)   
[Mass/Vol]      9.3 g/dL        Low             13.0-17.0       [facility]  
   
                                        Comment on above:   Performed By: #### C  
BCA, 94900-4, CMP, 31148-4, 37529-6 ####  
[facility] ([id number])  
[address]  
[place] [address]   
   
                                                    Lymphocytes (Bld)   
[#/Vol]         1.1 10*3/uL     Normal          1.0-3.5         [facility]  
   
                                        Comment on above:   Performed By: #### C  
BCA, 81425-3, CMP, 06741-2, 61519-6 ####  
[facility] ([id number])  
[address]  
[place] [address]   
   
                                                    Lymphocytes/100 WBC   
(Bld)           5.7 %           Normal                          [facility]  
   
                                        Comment on above:   Performed By: #### C  
BCA, 52806-4, CMP, 49064-8, 85784-6 ####  
[facility] ([id number])  
[address]  
[place] [address]   
   
                                                    MCH (RBC) [Entitic   
mass]           32.5 pg         Normal          27-34           [facility]  
   
                                        Comment on above:   Performed By: #### C  
BCA, 50361-8, CMP, 55719-7, 29794-1 ####  
[facility] ([id number])  
[address]  
[place] [address]   
   
                      MCHC (RBC) [Mass/Vol] 35.0 g/dL  Normal     32-36      [facility]  
   
                                        Comment on above:   Performed By: #### C  
BCA, 78948-6, CMP, 84786-8, 68326-2 ####  
[facility] ([id number])  
[address]  
[place] [address]   
   
                                                    MCV (RBC) [Entitic   
vol]            93 fL           Normal                    [facility]  
   
                                        Comment on above:   Performed By: #### C  
BCA, 49735-6, CMP, 33511-2, 75202-4 ####  
[facility] ([id number])  
[address]  
[place] [address]   
   
                                                    Monocytes (Bld)   
[#/Vol]         1.1 10*3/uL     High            0-0.9           [facility]  
   
                                        Comment on above:   Performed By: #### C  
BCA, 33675-8, CMP, 11546-6, 32473-0 ####  
[facility] ([id number])  
[address]  
[place] [address]   
   
                                                    Monocytes/100 WBC   
(Bld)           5.9 %           Normal                          [facility]  
   
                                        Comment on above:   Performed By: #### RAPHAEL  
BCA, 81535-1, CMP, 66024-7, 90587-6 ####  
[facility] ([id number])  
[address]  
[place] [address]   
   
                                                    Neutrophils/100 WBC   
(Bld)           86.7 %          Normal                          [facility]  
   
                                        Comment on above:   Performed By: #### C  
BCA, 28778-2, CMP, 80312-5, 72860-6 ####  
[facility] ([id number])  
[address]  
[place] [address]   
   
                                                    Platelet mean volume   
(Bld) [Entitic vol] 8.4 fL          Normal          7-12            [facility]  
   
                                        Comment on above:   Performed By: #### C  
BCA, 27206-3, CMP, 50812-0, 48287-4 ####  
[facility] ([id number])  
[address] [place] [address]   
   
                                                    Platelets (Bld)   
[#/Vol]         197 10*3/uL     Normal          150-450         [facility]  
   
                                        Comment on above:   Performed By: #### C  
BCA, 14988-7, CMP, 60388-7, 09164-5 ####  
[facility] ([id number])  
[address]  
[place] [address]   
   
                      RBC COUNT  2.87 X10E12/L Low        4.10-5.70  [facility]  
   
                                        Comment on above:   Performed By: #### C  
BCA, 59365-4, CMP, 30111-9, 14758-1 ####  
[facility] ([id number])  
[address]  
[place] [address]   
   
                      WBC (Bld) [#/Vol] 19.3 10*3/uL High       4.0-11.0   [facility]  
   
                                        Comment on above:   Performed By: #### C  
BCA, 13275-6, CMP, 10895-4, 96353-5 ####  
[facility] ([id number])  
[address]  
[place] [address]   
   
                                                    COMPREHENSIVE METABOLIC PANE  
Julio 2024   
   
                      Albumin [Mass/Vol] 3.3 g/dL   Normal     3.2-5.3    [facility]  
   
                                        Comment on above:   Performed By: #### C  
BCA, 14079-9, CMP, 39098-2, 40810-5 ####  
[facility] ([id number])  
[address]  
[place] [address]   
   
                                                    ALP [Catalytic   
activity/Vol]   192 U/L         High                      [facility]  
   
                                        Comment on above:   Performed By: #### C  
BCA, 62934-8, CMP, 93345-3, 27766-7 ####  
[facility] ([id number])  
[address]  
[place] [address]   
   
                                                    ALT [Catalytic   
activity/Vol]   83 U/L          High            0-40            [facility]  
   
                                        Comment on above:   Performed By: #### C  
BCA, 94814-1, CMP, 22254-7, 06656-5 ####  
[facility] ([id number])  
[address]  
[place] [address]   
   
                      Anion gap [Moles/Vol] 14 mmol/L  Normal     5-15       [facility]  
   
                                        Comment on above:   Performed By: #### C  
BCA, 84864-4, CMP, 38801-5, 32814-9 ####  
[facility] ([id number])  
[address]  
[place] [address]   
   
                                                    AST [Catalytic   
activity/Vol]   43 U/L          High            0-41            [facility]  
   
                                        Comment on above:   Performed By: #### C  
BCA, 77349-7, CMP, 64460-1, 86898-4 ####  
[facility] ([id number])  
[address] [place] [address]   
   
                      Bilirubin [Mass/Vol] 1.1 mg/dL  Normal     0.3-1.2    [facility]  
   
                                        Comment on above:   Performed By: #### C  
BCA, 16204-3, CMP, 34096-2, 88411-0 ####  
[facility] ([id number])  
[address]  
[place] [address]   
   
                      Calcium [Mass/Vol] 8.6 mg/dL  Normal     8.5-10.5   [facility]  
   
                                        Comment on above:   Performed By: #### C  
BCA, 55866-7, CMP, 67833-3, 45571-9 ####  
[facility] ([id number])  
[address]  
[place] [address]   
   
                      Chloride [Moles/Vol] 97 mmol/L  Low             [facility]  
   
                                        Comment on above:   Performed By: #### C  
BCA, 21414-2, CMP, 07082-1, 36459-1 ####  
[facility] ([id number])  
[address] [place] [address]   
   
                      CO2 [Moles/Vol] 21 mmol/L  Low        22-32      [facility]  
   
                                        Comment on above:   Performed By: #### C  
BCA, 70827-2, CMP, 70983-3, 26475-7 ####  
[facility] ([id number])  
[address]  
[place] [address]   
   
                      Creatinine [Mass/Vol] 5.64 mg/dL High       0.70-1.20  [facility]  
   
                                        Comment on above:   Result Comment: METH  
OD TRACEABLE TO IDMS STANDARD   
   
                                                            Performed By: #### C  
BCA, 76970-4, CMP, 16927-9, 06885-4 ####  
[facility] ([id number])  
[address] [place] [address]   
   
                                                    GFR/1.73 sq   
M.predicted among   
non-blacks MDRD   
(S/P/Bld) [Vol   
rate/Area]      10 mL/min/{1.73_m2} Low             >59             [facility]  
   
                                        Comment on above:   Result Comment:  
Reported eGFR is based on the  
CKD-EPI  equation that does  
not use a race coefficient.   
   
                                                            Performed By: #### C  
MARY, 36308-4, CMP, 25524-8, 13393-8 ####  
[facility] ([id number])  
[address]  
[place] [address]   
   
                      Glucose [Mass/Vol] 182 mg/dL  High       65-99      [facility]  
   
                                        Comment on above:   Performed By: #### C  
BCA, 27584-1, CMP, 25056-2, 57559-0 ####  
[facility] ([id number])  
[address] [place] [address]   
   
                      Potassium [Moles/Vol] 3.5 mmol/L Normal     3.5-5.0    [facility]  
   
                                        Comment on above:   Performed By: #### C  
MARY, 72569-2, [facility], 32997-1, 98693-6 ####  
[facility] ([id number])  
[address] [place] [address]   
   
                      Protein [Mass/Vol] 7.1 g/dL   Normal     6.0-8.0    [facility]  
   
                                        Comment on above:   Performed By: #### C  
MARY, 67502-7, [facility], 87739-9, 40083-8 ####  
[facility] ([id number])  
[address] [place] [address]   
   
                      Sodium [Moles/Vol] 132 mmol/L Low        134-146    [facility]  
   
                                        Comment on above:   Performed By: #### C  
BCA, 14287-3, CMP, 53406-8, 63626-9 ####  
[facility] ([id number])  
[address] [place] [address]   
   
                                                    Urea nitrogen   
[Mass/Vol]      78 mg/dL        High            5-27            [facility]  
   
                                        Comment on above:   Performed By: #### C  
BCA, 56144-8, [facility], 20448-9, 62575-4 ####  
[facility] ([id number])  
[address]  
[place] [address]   
   
                                                    FLUID CULTUREon 2024   
   
                                                    Bacteria identified   
Aer cx Nom (Body fld)                   GRAM STAIN  
WHITE BLOOD CELLS PRESENT  
NO ORGANISMS SEEN  
ON CONCENTRATED SMEAR  
  
CULTURE RESULTS  
NO GROWTH 5 DAYS    Normal                                  [facility]  
   
                                        Comment on above:   Performed By: #### C  
MARY, 17715-7, [facility], 11945-3, 45537-4 ####  
[facility] ([id number])  
[address]  
[place] [address]   
   
                                                    Glucose Glucometer (BldC) [M  
ass/Vol]on 2024   
   
                      Glucose [Mass/Vol] 225 mg/dL  High       65-99      [facility]  
   
                      Glucose [Mass/Vol] 263 mg/dL  High       65-99      [facility]  
   
                      Glucose [Mass/Vol] 317 mg/dL  High       65-99      [facility]  
   
                      Glucose [Mass/Vol] 198 mg/dL  High       65-99      [facility]  
   
                      Glucose [Mass/Vol] 183 mg/dL  High       65-99      [facility]  
   
                                                    Lactate (P dara) [Moles/Vol]o  
n 2024   
   
                      LACTATE W/REFLEX 1.1 mmol/L Normal     0.4-2.0    [facility]  
   
                                        Comment on above:   Result Comment:  
Result did not trigger repeat Lactate,  
re-order if needed.   
   
                                                            Performed By: #### C  
MARY, 32852-2, [facility], 91057-5, 80524-2 ####  
[facility] ([id number])  
[address] [place] [address]   
   
                                                    MAGNESIUMon 2024   
   
                      Magnesium [Mass/Vol] 2.3 mg/dL  Normal     1.8-2.6    [facility]  
   
                                        Comment on above:   Performed By: #### C  
MARY, 23556-6, [facility], 29249-5, 67492-4 ####  
[facility] ([id number])  
[address] [place] [address]   
   
                                                    Procalcitonin IA [Mass/Vol]o  
n 2024   
   
                      PROCALCITONIN 2.68 ng/mL High       <0.05      [facility]  
   
                                        Comment on above:   Result Comment: NOTE  
<0.50 ng/mL - Low risk of severe sepsis and/or septic shock.  
<2.00 ng/mL - Recommend retesting within 6-24 hours.  
>2.00 ng/mL - High risk of sepsis and/or septic shock.   
   
                                                            Performed By: #### C  
BCA, 85801-0, CMP, 78058-1, 53995-0 ####  
[facility] ([id number])  
[address] [place] [address]   
   
                                                    CBC AND AUTO DIFFon 03-15-20  
24   
   
                      ABSOLUTE BASOPHIL 0.1 X10E9/L Normal     0.0-0.2    [facility]  
   
                                        Comment on above:   Performed By: #### C  
BCA, CMP, 3040-3, 47973-0, 1988, 63903-  
5,   
13067-0, 94486-0, 23887-8 ####  
[facility] ([id number])  
[address] [place] [address]   
   
                      ABSOLUTE NEUTROPHIL 18.6 X10E9/L High       1.5-6.6    [facility]  
   
                                        Comment on above:   Performed By: #### C  
BCA, CMP, 3040-3, 45993-6, 1988, 80949-  
5,   
43264-5, 66975-5, 10797-6 ####  
[facility] ([id number])  
[address] [place] [address]   
   
                                                    Basophils/100 WBC   
(Bld)           0.5 %           Normal                          [facility]  
   
                                        Comment on above:   Performed By: #### C  
BCA, CMP, 3040-3, 45985-6, 1988, 46320-  
5,   
60601-4, 24178-5, 59728-3 ####  
[facility] ([id number])  
[address] [place] [address]   
   
                                                    Eosinophils (Bld)   
[#/Vol]         0.0 10*3/uL     Normal          0.0-0.4         [facility]  
   
                                        Comment on above:   Performed By: #### C  
BCA, CMP, 3040-3, 02359-3, 1988, 54903-  
5,   
02292-6, 80323-9, 35982-4 ####  
[facility] ([id number])  
[address] [place] [address]   
   
                                                    Eosinophils/100 WBC   
(Bld)           0.2 %           Normal                          [facility]  
   
                                        Comment on above:   Performed By: #### C  
BCA, CMP, 3040-3, , 1988, 81844-  
5,   
60154-3, 07307-5, 42517-1 ####  
[facility] ([id number])  
[address] [place] [address]   
   
                                                    Erythrocyte   
distribution width   
(RBC) [Ratio]   14.8 %          Normal          11.5-15.0       [facility]  
   
                                        Comment on above:   Performed By: #### C  
BCA, CMP, 3040-3, , 1988, 45336-  
5,   
48814-1, 88047-5, 22182-1 ####  
[facility] ([id number])  
[address] [place] [address]   
   
                                                    Hematocrit (Bld)   
[Volume fraction] 29.4 %          Low             39-49           [facility]  
   
                                        Comment on above:   Performed By: #### C  
BCA, CMP, 3040-3, , 1988, 38745-  
5,   
47127-3, 76812-0, 47336-4 ####  
[facility] ([id number])  
[address] [place] [address]   
   
                                                    Hemoglobin (Bld)   
[Mass/Vol]      10.2 g/dL       Low             13.0-17.0       [facility]  
   
                                        Comment on above:   Performed By: #### C  
BCA, CMP, 3040-3, , 1988, 43184-  
5,   
83584-6, 37942-0, 07299-7 ####  
[facility] ([id number])  
[address] [place] [address]   
   
                                                    Lymphocytes (Bld)   
[#/Vol]         0.7 10*3/uL     Low             1.0-3.5         [facility]  
   
                                        Comment on above:   Performed By: #### C  
BCA, CMP, 3040-3, , 1988, 75502-  
5,   
91034-6, 46957-8, 48302-0 ####  
[facility] ([id number])  
[address] [place] [address]   
   
                                                    Lymphocytes/100 WBC   
(Bld)           3.3 %           Normal                          [facility]  
   
                                        Comment on above:   Performed By: #### C  
BCA, CMP, 0-3, , 1988, 06814-  
5,   
63230-0, 14219-6, 76898-6 ####  
[facility] ([id number])  
[address] [place] [address]   
   
                                                    MCH (RBC) [Entitic   
mass]           31.8 pg         Normal          27-34           [facility]  
   
                                        Comment on above:   Performed By: #### C  
BCA, CMP, 3040-3, , 1988, -  
5,   
40083-8, 34213-8, 38757-6 ####  
[facility] ([id number])  
[address] [place] [address]   
   
                      MCHC (RBC) [Mass/Vol] 34.6 g/dL  Normal     32-36      [facility]  
   
                                        Comment on above:   Performed By: #### C  
BCA, CMP, 3040-3, , 1988, 31665-  
5,   
03612-9, 28736-5, 65656-3 ####  
[facility] ([id number])  
[address] [place] [address]   
   
                                                    MCV (RBC) [Entitic   
vol]            92 fL           Normal                    [facility]  
   
                                        Comment on above:   Performed By: #### C  
BCA, CMP, 3040-3, , 1988, 44580-  
5,   
20805-2, 97750-5, 64162-9 ####  
[facility] ([id number])  
[address] [place] [address]   
   
                                                    Monocytes (Bld)   
[#/Vol]         1.3 10*3/uL     High            0-0.9           [facility]  
   
                                        Comment on above:   Performed By: #### C  
BCA, CMP, -3, , 1988, -  
,   
48078-8, 35612-6, 41295-6 ####  
[facility] ([id number])  
[address] [place] [address]   
   
                                                    Monocytes/100 WBC   
(Bld)           6.1 %           Normal                          [facility]  
   
                                        Comment on above:   Performed By: #### C  
BCA, [facility], -3, , 1988, -  
,   
87120-4, 55203-3, 46444-9 ####  
[facility] ([id number])  
[address] [place] [address]   
   
                                                    Neutrophils/100 WBC   
(Bld)           89.9 %          Normal                          [facility]  
   
                                        Comment on above:   Performed By: #### C  
BCA, CMP, -3, , 1988, -  
,   
23842-5, 44993-3, 58019-4 ####  
[facility] ([id number])  
[address] [place] [address]   
   
                                                    Platelet mean volume   
(Bld) [Entitic vol] 8.6 fL          Normal          7-12            [facility]  
   
                                        Comment on above:   Performed By: #### C  
BCA, CMP, -3, , 1988, -  
,   
66854-1, 88443-3, 26293-8 ####  
[facility] ([id number])  
[address] [place] [address]   
   
                                                    Platelets (Bld)   
[#/Vol]         257 10*3/uL     Normal          150-450         [facility]  
   
                                        Comment on above:   Performed By: #### C  
BCA, CMP, 3040-3, 42793-2, 1988, 53834-  
5,   
40390-6, 48461-0, 45909-1 ####  
[facility] ([id number])  
[address] [place] [address]   
   
                      RBC COUNT  3.21 X10E12/L Low        4.10-5.70  [facility]  
   
                                        Comment on above:   Performed By: #### C  
BCA, CMP, 3040-3, , 1988, 18343-  
5,   
48700-9, 51376-6, 95981-4 ####  
[facility] ([id number])  
[address] [place] [address]   
   
                      WBC (Bld) [#/Vol] 20.7 10*3/uL High       4.0-11.0   [facility]  
   
                                        Comment on above:   Performed By: #### C  
BCA, CMP, 3040-3, , 1988, 20295-  
5,   
56428-6, 02054-9, 28413-5 ####  
[facility] ([id number])  
[address] [place] [address] [place] [address]   
   
                                                    COMPREHENSIVE METABOLIC PANE  
Julio 03-   
   
                      Albumin [Mass/Vol] 4.0 g/dL   Normal     3.2-5.3    [facility]  
   
                                        Comment on above:   Performed By: #### C  
BCA, CMP, 3040-3, , 1988, 38684-  
5,   
94045-8, 21873-2, 88063-2 ####  
[facility] ([id number])  
[address] [place] [address]   
   
                                                    ALP [Catalytic   
activity/Vol]   233 U/L         High                      [facility]  
   
                                        Comment on above:   Performed By: #### C  
BCA, CMP, 3040-3, , 1988, 14037-  
5,   
76103-4, 96114-5, 96940-0 ####  
[facility] ([id number])  
[address] [place] [address]   
   
                                                    ALT [Catalytic   
activity/Vol]   121 U/L         High            0-40            [facility]  
   
                                        Comment on above:   Performed By: #### C  
BCA, CMP, 3040-3, 31603-5, 1988, 76630-  
5,   
26885-2, 35678-3, 71045-9 ####  
[facility] ([id number])  
[address] [place] [address]   
   
                      Anion gap [Moles/Vol] 13 mmol/L  Normal     5-15       [facility]  
   
                                        Comment on above:   Performed By: #### C  
BCA, CMP, 3040-3, , 1988, -  
5,   
10859-8, 59499-5, 40321-7 ####  
[facility] ([id number])  
[address] [place] [address]   
   
                                                    AST [Catalytic   
activity/Vol]   79 U/L          High            0-41            [facility]  
   
                                        Comment on above:   Performed By: #### C  
BCA, CMP, 3040-3, , 1988, 79188-  
5,   
77470-0, 06519-1, 90662-9 ####  
[facility] ([id number])  
[address] [place] [address]   
   
                      Bilirubin [Mass/Vol] 1.0 mg/dL  Normal     0.3-1.2    [facility]  
   
                                        Comment on above:   Performed By: #### C  
BCA, CMP, 3040-3, , 1988, 46813-  
5,   
73381-7, 74220-7, 78066-7 ####  
[facility] ([id number])  
[address] [place] [address]   
   
                      Calcium [Mass/Vol] 9.0 mg/dL  Normal     8.5-10.5   [facility]  
   
                                        Comment on above:   Performed By: #### C  
BCA, CMP, 3040-3, 17065-7, 1988, 60393-  
5,   
57117-4, 09563-8, 26687-5 ####  
[facility] ([id number])  
[address] [place] [address]   
   
                      Chloride [Moles/Vol] 96 mmol/L  Low             [facility]  
   
                                        Comment on above:   Performed By: #### C  
BCA, CMP, 3040-3, , 1988, 30076-  
5,   
48119-8, 47969-6, 00593-9 ####  
[facility] ([id number])  
[address] [place] [address]   
   
                      CO2 [Moles/Vol] 20 mmol/L  Low        22-32      [facility]  
   
                                        Comment on above:   Performed By: #### C  
BCA, CMP, -3, , 1988, 16736-  
5,   
28610-5, 14588-3, 61337-3 ####  
[facility] ([id number])  
[address] [place] [address]   
   
                      Creatinine [Mass/Vol] 5.58 mg/dL High       0.70-1.20  [facility]  
   
                                        Comment on above:   Result Comment: METH  
OD TRACEABLE TO IDMS STANDARD   
   
                                                            Performed By: #### C  
BCA, CMP, -3, , 1988, 96308-0,   
80536-8, 63332-1, 80846-3 ####  
[facility] ([id number])  
[address] [place] [address]   
   
                                                    GFR/1.73 sq   
M.predicted among   
non-blacks MDRD   
(S/P/Bld) [Vol   
rate/Area]      10 mL/min/{1.73_m2} Low             >59             [facility]  
   
                                        Comment on above:   Result Comment:  
Reported eGFR is based on the  
CKD-EPI  equation that does  
not use a race coefficient.   
   
                                                            Performed By: #### C  
BCA, CMP, 3040-3, , 1988, 57860-8,   
14842-5, 31804-4, 60689-9 ####  
[facility] ([id number])  
[address] [place] [address]   
   
                      Glucose [Mass/Vol] 265 mg/dL  High       65-99      [facility]  
[facility]  
   
                                        Comment on above:   Performed By: #### C  
BCA, CMP, -3, , 1988, 70068-  
5,   
57871-8, 72984-1, 24813-5 ####  
[facility] ([id number])  
[address] [place] [address]   
   
                      Potassium [Moles/Vol] 3.7 mmol/L Normal     3.5-5.0    [facility]  
   
                                        Comment on above:   Performed By: #### C  
BCA, CMP, -3, , 1988, -  
5,   
97644-2, 73781-7, 04466-6 ####  
[facility] ([id number])  
[address] [place] [address]   
   
                      Protein [Mass/Vol] 8.1 g/dL   High       6.0-8.0    [facility]  
   
                                        Comment on above:   Performed By: #### C  
BCA, CMP, -3, , 1988, -  
5,   
47933-8, 92908-5, 69045-7 ####  
[facility] ([id number])  
[address] [place] [address]   
   
                      Sodium [Moles/Vol] 129 mmol/L Low        134-146    [facility]  
   
                                        Comment on above:   Performed By: #### C  
BCA, CMP, 3040-3, , 1988, 08980-  
5,   
61793-9, 88259-9, 06188-3 ####  
[facility] ([id number])  
[address] [place] [address] [place] [address]   
   
                                                    Urea nitrogen   
[Mass/Vol]      75 mg/dL        High            5-27            [facility]  
   
                                        Comment on above:   Performed By: #### C  
MARY, KRISTIAN, 3040-3, 55978-1, -5, 63733-  
5,   
29259-1, 44055-3, 56019-3 ####  
[facility] ([id number])  
[address] [place] [address], FIRST [place]OR  
[place] [address]   
   
                                                    CRP [Mass/Vol]on 03-   
   
                      C REACTIVE PROTEIN 6.4 mg/dL  High       0.000-0.744 [facility]  
   
                                        Comment on above:   Performed By: #### C  
MARY, 285-1, KRISTIAN, 21876-7 ####  
[facility] ([id number])  
[address]  
[place] [address]   
   
                                                    Fibrin D-dimer DDU (PPP) [Ma  
ss/Vol]on 03-   
   
                      D DIMER    252 ng/mL DDU Normal     <255       [facility]  
   
                                        Comment on above:   Result Comment:  
Results <255 ng/mL DDU: The presence of a  
VTE can safely be excluded with a negative  
D-Dimer result and Wells score. A negative  
result doesn't exclude the possibility of DIC.  
The test be repeated along with other  
diagnostic tests if the patient's symptoms  
persist or worsen.  
[email or web address]  
[email or web address]   
   
                                                            Performed By: #### C  
MARY, -1, KRISTIAN, 21934-9 ####  
[facility] ([id number])  
[address], [address]  
[place] [address]   
   
                                                    Glucose Glucometer (BldC) [M  
ass/Vol]on 03-   
   
                      Glucose [Mass/Vol] 280 mg/dL  High       65-99      [facility]  
   
                                                    LIPASEon 03-   
   
                                                    Lipase [Catalytic   
activity/Vol]   66 U/L          High            17-40           [facility]  
   
                                        Comment on above:   Performed By: #### C  
MARY, 2857-1, KRISTIAN, 70884-6 ####  
[facility] ([id number])  
[address], [address]  
[place] [address]   
   
                                                    Lactate (P dara) [Moles/Vol]o  
n 03-   
   
                      Lactate [Moles/Vol] 2.4 mmol/L High       0.4-2.0    [facility]  
   
                                        Comment on above:   Performed By: #### RAPHAEL HERNANDEZ, 2857-1, KRISTIAN, 37738-7 ####  
[facility] ([id number])  
[address], [address]  
[place] [address]   
   
                      LACTATE W/REFLEX 2.7 mmol/L High       0.4-2.0    [facility]  
   
                                        Comment on above:   Performed By: #### RAPHAEL HERNANDEZ, 2857-1, KRISTIAN, 49022-9 ####  
[facility] ([id number])  
[address], [address]  
[place] [address]   
   
                                                    MAGNESIUMon 03-   
   
                      Magnesium [Mass/Vol] 2.1 mg/dL  Normal     1.8-2.6    [facility]  
   
                                        Comment on above:   Performed By: #### RAPHAEL HERNANDEZ, 2857-1, KRISTIAN, 01899-2 ####  
[facility] ([id number])  
[address], [address]  
[place] [address]   
   
                                                    Natriuretic peptide B [Mass/  
Vol]on 03-   
   
                                                    Natriuretic peptide B   
(Bld) [Mass/Vol] 116 pg/mL       High            <100.0          [facility]  
   
                                        Comment on above:   Performed By: #### RAPHAEL HERNANDEZ, 2857-1, KRISTIAN, 82620-4 ####  
[facility] ([id number])  
[address], [address]  
[place] [address]   
   
                                                    Procalcitonin IA [Mass/Vol]o  
n 03-   
   
                      PROCALCITONIN 1.33 ng/mL High       <0.05      [facility]  
   
                                        Comment on above:   Result Comment: NOTE  
<0.50 ng/mL - Low risk of severe sepsis and/or septic shock.  
<2.00 ng/mL - Recommend retesting within 6-24 hours.  
>2.00 ng/mL - High risk of sepsis and/or septic shock.   
   
                                                            Performed By: #### C  
BCA, 2857-1, [facility], 47334-4 ####  
[facility] ([id number])  
[address], [address]  
[place] [address]   
   
                                                    SARS/FLU A+B/RSV by NAAT/Mol  
ecularon 03-   
   
                                                    SARS/FLU A+B/RSV by   
NAAT/Molecular                          FLU A PCR  
Negative (qualifier value)  
FLU B PCR  
Negative (qualifier value)  
RSV by PCR  
Negative (qualifier value)  
SARS CoV 2  
Not detected (qualifier   
value)  
NOTE  
The Xpert Xpress   
SARS-CoV-2/Flu/RSV Plus   
test is a rapid,   
multiplexed  
real-time RT-PCR test   
intended for the   
simultaneous qualitative  
detection and   
differentiation of   
SARS-CoV-2, influenza A,   
influenza B  
and respiratory syncytial   
virus (RSV) viral RNA from   
individuals  
suspected of respiratory   
viral infection consistent   
with COVID-19 by  
their healthcare provider.   
This test has not been   
validated in  
asymptomatic patients. The   
Xpert Xpress SARS-CoV-2   
test is intended  
for use by qualified and   
trained operators who are   
performing tests  
using either Gene[employer] DX   
or [employer]   
systems and is  
limited to laboratories   
that meet the CLIA   
requirements to perform  
high and moderate   
complexity tests.  
The Xpert Xpress   
SARS-CoV-2/Flu/RSV Plus is   
only for use under the  
Food and Drug   
Administration's Emergency   
Use Authorization.  
Results are for the   
simultaneous detection and   
differentiation of  
SARS-CoV-2, influenza A,   
influenza B and RSV   
nucleic acids in  
clinical specimens.   
SARS-CoV-2, influenza A,   
influenza B and RSV RNA  
identified by this test   
are generally detectable   
in upper respiratory  
samples during the acute   
phase of infection.   
Positive results are  
indicative of the presence   
of the identified virus,   
but do not rule  
out bacterial infection or   
co-infection with other   
pathogens not  
detected by this test.  
Clinical correlation with   
patient history and other   
diagnostic  
information is necessary   
to determine patient   
infection status. The  
agent detected may not be   
the definite cause of   
disease.  
Negative results do not   
preclude SARS-CoV-2,   
influenza A, influenza B  
and RSV infection and   
should not be used as the   
sole basis for  
treatment or other patient   
management decisions.   
Negative results  
must be combined with   
clinical observations,   
patient history and  
epidemiological   
information. An Invalid   
result may occur with  
specimen-associated   
inhibition unable to be   
resolved with specimen  
repeat.  
Fact Sheet for Healthcare   
Providers:  
https://www.fda.gov/media/  
007694/download  
Fact Sheet for Patients:  
https://www.fda.gov/media/  
062737/download     Normal                                  [facility]  
   
                                        Comment on above:   Performed By: #### C  
MARY, 2857-1, CMP, 66870-9 ####  
[facility] ([id number])  
[address], [address]  
[place] [address]   
   
                                                    TROPONIN Ion 03-   
   
                                                    Troponin I.cardiac   
[Mass/Vol]      0.03 ng/mL      Normal          0.00-0.04       [facility]  
   
                                        Comment on above:   Performed By: #### RAPHAEL HERNANDEZ, 2857-1, CMP, 80216-1 ####  
[facility] ([id number])  
[address], [address]  
[place] [address]   
   
                                                    Troponin I.cardiac   
[Mass/Vol]      0.05 ng/mL      High            0.00-0.04       [facility]  
   
                                        Comment on above:   Result Comment:  
Concentrations greater than or equal  
to 0.05 ng/ml are considered elevated.  
Elevations of Troponin may be due to causes  
other than myocardial ischemia. Recommend  
serial Troponin testing be performed.  
---------------------------------------------   
   
                                                            Performed By: #### C  
MARY, 2857-1, CMP, 17211-0 ####  
[facility] ([id number])  
[address], [address]  
[place] [address]   
   
                                                    URN MACROSCOPIC NURon 03-15-  
2024   
   
                      BILIRUBIN SARAH Negative   Normal     NEG        [facility]  
   
                                        Comment on above:   Performed By: #### RAPHAEL  
BCA, 2857-1, CMP, 62494-5 ####  
[facility] ([id number])  
[address], [address]  
[place] [address]   
   
                      BLOOD/HGB SARAH MODERATE   Abnormal   NEG        [facility]  
   
                                        Comment on above:   Performed By: #### C  
BCA, 2857-1, CMP, 54464-6 ####  
[facility] ([id number])  
[address], [address]  
[place] [address]   
   
                      GLUCOSE SARAH 500 mg/dL  Abnormal   NEG        [facility]  
   
                                        Comment on above:   Performed By: #### C  
BCA, 2857-1, CMP, 81371-8 ####  
[facility] ([id number])  
[address], [address]  
[place] [address]   
   
                      KETONES SARAH Negative   Normal     NEG        [facility]  
   
                                        Comment on above:   Performed By: #### C  
BCA, 2857-1, CMP, 14790-4 ####  
[facility] ([id number])  
[address], [address]  
[place] [address]   
   
                      LEUKOCYTE ESTERASE SARAH Small      Abnormal   NEG        [facility]  
[facility]   
[facility]  
   
                                        Comment on above:   Performed By: #### C  
BCA, 2857-1, CMP, 45709-6 ####  
[facility] ([id number])  
[address], [address]  
[place] [address]   
   
                      NITRITE SARAH Negative   Normal     NEG        [facility]  
   
                                        Comment on above:   Performed By: #### C  
BCA, 2857-1, CMP, 08403-9 ####  
[facility] ([id number])  
[address], [address]  
[place] [address]   
   
                      PH SARAH     5.5        Normal     5.0-8.5    [facility]  
   
                                        Comment on above:   Performed By: #### C  
BCA, 2857-1, CMP, 78580-3 ####  
[facility] ([id number])  
[address], [address]  
[place] [address]   
   
                      PROTEIN SARAH >=300      Abnormal   NEG        [facility]  
   
                                        Comment on above:   Performed By: #### C  
BCA, 2857-1, CMP, 87092-4 ####  
[facility] ([id number])  
[address], [address]  
[place] [address]   
   
                      SPECIFIC GRAVITY SARAH 1.020      Normal     1.003-1.035 [facility]  
   
                                        Comment on above:   Performed By: #### C  
BCA, 2857-1, CMP, 20671-8 ####  
[facility] ([id number])  
[address], [address]  
[place] [address]   
   
                      UROBILINOGEN SARAH 0.2 eu/dL  Normal     <1.1       [facility]  
   
                                        Comment on above:   Performed By: #### C  
BCA, 2857-1, CMP, 66874-5 ####  
[facility] ([id number])  
[address], [address]  
[place] [address]   
   
                                                    GGTon 2024   
   
                                                    Gamma glutamyl   
transferase [Catalytic   
activity/Vol]   367 U/L         High            9-64            [facility]  
   
                                        Comment on above:   Performed By: #### 2  
324-2, LIVR ####  
[facility] ([id number])  
[address], [address]  
[place] [address]   
   
                                                    LIVER PANELon 2024   
   
                      Albumin [Mass/Vol] 4.1 g/dL   Normal     3.2-5.3    [facility]  
   
                                        Comment on above:   Performed By: #### 2  
324-2, LIVR ####  
[facility] ([id number])  
[address], [address]  
[place] [address]   
   
                                                    ALP [Catalytic   
activity/Vol]   238 U/L         High                      [facility]  
   
                                        Comment on above:   Performed By: #### 2  
324-2, LIVR ####  
[facility] ([id number])  
[address], [address]  
[place] [address]   
   
                                                    ALT [Catalytic   
activity/Vol]   182 U/L         High            0-40            [facility]  
   
                                        Comment on above:   Performed By: #### 2  
324-2, LIVR ####  
[facility] ([id number])  
[address], [address]  
[place] [address]   
   
                                                    AST [Catalytic   
activity/Vol]   106 U/L         High            0-41            [facility]  
   
                                        Comment on above:   Performed By: #### 2  
324-2, LIVR ####  
[facility] ([id number])  
[address], [address]  
[place] [address]   
   
                      Bilirubin [Mass/Vol] 0.4 mg/dL  Normal     0.3-1.2    [facility]  
   
                                        Comment on above:   Performed By: #### 2  
324-2, LIVR ####  
[facility] ([id number])  
[address], [address]  
[place] [address]   
   
                                                    Bilirubin.direct   
[Mass/Vol]      0.1 mg/dL       Normal          0.0-0.4         [facility]  
   
                                        Comment on above:   Performed By: #### 2  
324-2, LIVR ####  
[facility] ([id number])  
[address], [address]  
[place] [address]   
   
                      Protein [Mass/Vol] 7.6 g/dL   Normal     6.0-8.0    [facility]  
   
                                        Comment on above:   Performed By: #### 2  
324-2, LIVR ####  
[facility] ([id number])  
[address], [address]  
[place] [address]   
   
                                                    CBC AND AUTO DIFFon 20  
24   
   
                      ABSOLUTE BASOPHIL 0.1 X10E9/L Normal     0.0-0.2    [facility]  
   
                                        Comment on above:   Performed By: #### C  
MARY, 2857-1, CMP, 80983-4 ####  
[facility] ([id number])  
[address], [address]  
[place] [address]   
   
                      ABSOLUTE NEUTROPHIL 6.5 X10E9/L Normal     1.5-6.6    [facility]  
   
                                        Comment on above:   Performed By: #### C  
BCA, 2857-1, CMP, 85563-1 ####  
[facility] ([id number])  
[address], [address]  
[place] [address]   
   
                                                    Basophils/100 WBC   
(Bld)           0.9 %           Normal                          [facility]  
   
                                        Comment on above:   Performed By: #### RAPHAEL  
BCA, 2857-1, CMP, 76914-5 ####  
[facility] ([id number])  
[address]  
[place] [address]   
   
                                                    Eosinophils (Bld)   
[#/Vol]         1.0 10*3/uL     High            0.0-0.4         [facility]  
   
                                        Comment on above:   Performed By: #### RAPHAEL HERNANDEZ, 2857-1, CMP, 74692-3 ####  
[facility] ([id number])  
[address]  
[place] [address]   
   
                                                    Eosinophils/100 WBC   
(Bld)           9.9 %           Normal                          [facility]  
   
                                        Comment on above:   Performed By: #### RAPHAEL HERNANDEZ, 2857-1, CMP, 31194-3 ####  
[facility] ([id number])  
[address]  
[place] [address]   
   
                                                    Erythrocyte   
distribution width   
(RBC) [Ratio]   13.9 %          Normal          11.5-15.0       [facility]  
   
                                        Comment on above:   Performed By: #### RAPHAEL HERNANDEZ, 2857-1, CMP, 66927-4 ####  
[facility] ([id number])  
[address]  
[place] [address]   
   
                                                    Hematocrit (Bld)   
[Volume fraction] 36.0 %          Low             39-49           [facility]  
   
                                        Comment on above:   Performed By: #### RAPHAEL HERNANDEZ, 2857-1, CMP, 83395-1 ####  
[facility] ([id number])  
[address]  
[place] [address]   
   
                                                    Hemoglobin (Bld)   
[Mass/Vol]      12.4 g/dL       Low             13.0-17.0       [facility]  
   
                                        Comment on above:   Performed By: #### RAPHAEL  
BCA, 2857-1, CMP, 85982-5 ####  
[facility] ([id number])  
[address]  
[place] [address]   
   
                                                    Lymphocytes (Bld)   
[#/Vol]         1.5 10*3/uL     Normal          1.0-3.5         [facility]  
   
                                        Comment on above:   Performed By: #### RAPHAEL HERNANDEZ, 2857-1, CMP, 84292-1 ####  
[facility] ([id number])  
[address] [facility] [address]  
[place] [address]   
   
                                                    Lymphocytes/100 WBC   
(Bld)           16.1 %          Normal                          [facility]  
   
                                        Comment on above:   Performed By: #### RAPHAEL HERNANDEZ, 2857-1, CMP, 27273-0 ####  
[facility] ([id number])  
[address], [address] [facility] [address]  
[place] [address]   
   
                                                    MCH (RBC) [Entitic   
mass]           31.0 pg         Normal          27-34           [facility]  
   
                                        Comment on above:   Performed By: #### RAPHAEL HERNANDEZ, 2857-1, CMP, 74000-2 ####  
[facility] ([id number])  
[address] [facility] [address]  
[place] [address]   
   
                      MCHC (RBC) [Mass/Vol] 34.4 g/dL  Normal     32-36      [facility]  
   
                                        Comment on above:   Performed By: #### RAPHAEL HERNANDEZ, 2857-1, CMP, 98750-7 ####  
[facility] ([id number])  
[address], [address]  
[place] [address]   
   
                                                    MCV (RBC) [Entitic   
vol]            90 fL           Normal                    [facility]  
   
                                        Comment on above:   Performed By: #### RAPHAEL HERNANDEZ, 2857-1, CMP, 53108-2 ####  
[facility] ([id number])  
[address], [address]  
[place] [address]   
   
                                                    Monocytes (Bld)   
[#/Vol]         0.6 10*3/uL     Normal          0-0.9           [facility]  
   
                                        Comment on above:   Performed By: #### RAPHAEL HERNANDEZ, 2857-1, CMP, 27236-4 ####  
[facility] ([id number])  
[address], [address]  
[place] [address]   
   
                                                    Monocytes/100 WBC   
(Bld)           6.0 %           Normal                          [facility]  
   
                                        Comment on above:   Performed By: #### RAPHAEL  
BCA, 2857-1, CMP, 23266-0 ####  
[facility] ([id number])  
[address], [address]  
[place] [address]   
   
                                                    Neutrophils/100 WBC   
(Bld)           67.1 %          Normal                          [facility]  
   
                                        Comment on above:   Performed By: #### RAPHAEL HERNANDEZ, 2857-1, CMP, 25029-3 ####  
[facility] ([id number])  
[address], [address]  
[place] [address]   
   
                                                    Platelet mean volume   
(Bld) [Entitic vol] 9.0 fL          Normal          7-12            [facility]  
   
                                        Comment on above:   Performed By: #### RAPHAEL HERNANDEZ, 2857-1, CMP, 91721-8 ####  
[facility] ([id number])  
[address], [address]  
[place] [address]   
   
                                                    Platelets (Bld)   
[#/Vol]         216 10*3/uL     Normal          150-450         [facility]  
   
                                        Comment on above:   Performed By: #### RAPHAEL  
BCA, 2857-1, CMP, 60529-3 ####  
[facility] ([id number])  
[address], [address]  
[place] [address]   
   
                      RBC COUNT  3.99 X10E12/L Low        4.10-5.70  [facility]  
   
                                        Comment on above:   Performed By: #### RAPHAEL  
BCA, 2857-1, CMP, 66902-5 ####  
[facility] ([id number])  
[address], [address]  
[place] [address]   
   
                      WBC (Bld) [#/Vol] 9.6 10*3/uL Normal     4.0-11.0   [facility]  
   
                                        Comment on above:   Performed By: #### RAPHAEL  
BCA, 2857-1, CMP, 60018-6 ####  
[facility] ([id number])  
[address], [address]  
[place] [address]   
   
                                                    COMPREHENSIVE METABOLIC PANE  
Julio 2024   
   
                      Albumin [Mass/Vol] 4.1 g/dL   Normal     3.2-5.3    [facility]  
   
                                        Comment on above:   Performed By: #### C  
BCA, 2857-1, CMP, 75710-8 ####  
[facility] ([id number])  
[address], [address]  
[place] [address]   
   
                                                    ALP [Catalytic   
activity/Vol]   208 U/L         High                      [facility]  
   
                                        Comment on above:   Performed By: #### C  
BCA, 2857-1, CMP, 44109-6 ####  
[facility] ([id number])  
[address], [address]  
[place] [address]   
   
                                                    ALT [Catalytic   
activity/Vol]   65 U/L          High            0-40            [facility]  
   
                                        Comment on above:   Performed By: #### C  
BCA, 2857-1, CMP, 56217-3 ####  
[facility] ([id number])  
[address], [address]  
[place] [address]   
   
                      Anion gap [Moles/Vol] 16 mmol/L  High       5-15       [facility]  
   
                                        Comment on above:   Performed By: #### C  
BCA, 2857-1, CMP, 17048-0 ####  
[facility] ([id number])  
[address], [address]  
[place] [address]   
   
                                                    AST [Catalytic   
activity/Vol]   36 U/L          Normal          0-41            [facility]  
   
                                        Comment on above:   Performed By: #### C  
BCA, 2857-1, CMP, 52076-4 ####  
[facility] ([id number])  
[address], [address]  
[place] [address]   
   
                      Bilirubin [Mass/Vol] 0.5 mg/dL  Normal     0.3-1.2    [facility]  
   
                                        Comment on above:   Performed By: #### C  
BCA, 2857-1, CMP, 47536-7 ####  
[facility] ([id number])  
[address], [address]  
[place] [address]   
   
                      Calcium [Mass/Vol] 9.1 mg/dL  Normal     8.5-10.5   [facility]  
   
                                        Comment on above:   Performed By: #### C  
BCA, 2857-1, CMP, 02820-7 ####  
[facility] ([id number])  
[address], [facility] [address]  
[place] [address]   
   
                      Chloride [Moles/Vol] 102 mmol/L Normal          [facility]  
   
                                        Comment on above:   Performed By: #### C  
BCA, 2857-1, CMP, 39071-4 ####  
[facility] ([id number])  
[address], [address]  
[place] [address]   
   
                      CO2 [Moles/Vol] 23 mmol/L  Normal     22-32      [facility]  
   
                                        Comment on above:   Performed By: #### C  
BCA, 2857-1, CMP, 65883-0 ####  
[facility] ([id number])  
[address], [address]  
[place] [address]   
   
                      Creatinine [Mass/Vol] 4.47 mg/dL High       0.60-1.30  [facility]  
   
                                        Comment on above:   Result Comment: METH  
OD TRACEABLE TO IDMS STANDARD   
   
                                                            Performed By: #### C  
BCA, 2857-1, CMP, 32582-3 ####  
[facility] ([id number])  
[address], [address]  
[place] [address]   
   
                                                    GFR/1.73 sq   
M.predicted among   
non-blacks MDRD   
(S/P/Bld) [Vol   
rate/Area]      13 mL/min/{1.73_m2} Low             >59             [facility]  
   
                                        Comment on above:   Result Comment:  
Reported eGFR is based on the  
CKD-EPI  equation that does  
not use a race coefficient.   
   
                                                            Performed By: #### C  
BCA, 2857-1, CMP, 72916-4 ####  
[facility] ([id number])  
[address], [address]  
[place] [address]   
   
                      Glucose [Mass/Vol] 140 mg/dL  High       65-99      [facility]  
   
                                        Comment on above:   Performed By: #### C  
BCA, 2857-1, CMP, 14283-5 ####  
[facility] ([id number])  
[address], [address]  
[place] [address]   
   
                      Potassium [Moles/Vol] 4.1 mmol/L Normal     3.5-5.0    [facility]  
   
                                        Comment on above:   Performed By: #### C  
BCA, 2857-1, CMP, 77442-1 ####  
[facility] ([id number])  
[address], [address]  
[place] [address]   
   
                      Protein [Mass/Vol] 7.1 g/dL   Normal     6.0-8.0    [facility]  
   
                                        Comment on above:   Performed By: #### C  
BCA, 2857-1, CMP, 57196-4 ####  
[facility] ([id number])  
[address], [address]  
[place] [address]   
   
                      Sodium [Moles/Vol] 141 mmol/L Normal     134-146    [facility]  
   
                                        Comment on above:   Performed By: #### C  
BCA, 2857-1, CMP, 22515-1 ####  
[facility] ([id number])  
[address], [address]  
[place] [address]   
   
                                                    Urea nitrogen   
[Mass/Vol]      45 mg/dL        High            5-27            [facility]  
   
                                        Comment on above:   Performed By: #### C  
BCA, 2857-1, CMP, 67654-2 ####  
[facility] ([id number])  
[address], [address]  
[place] [address]   
   
                                                    HGB A1C (GLYCO-HGB)on 2024   
   
                      Glucose [Mass/Vol] 197 mg/dL  Normal                [facility]  
   
                                        Comment on above:   Performed By: #### C  
BCA, 2857-1, CMP, 40894-3 ####  
[facility] ([id number])  
[address], [address]  
[place] [address]   
   
                                                    HbA1c (Bld) [Mass   
fraction]       8.5 %           High            4.4-5.6         [facility]  
   
                                        Comment on above:   Result Comment: NOTE  
ADA Guidelines  
Result HgbA1c  
------------ ---------------  
Normal : less than 5.7 %  
Prediabetes : 5.7 % to 6.4 %  
Diabetes : > 6.4 %  
Use with caution in patients with abnormal hemoglobin variants   
as  
the half-life of red blood cells and in vivo glycation rates   
are  
affected.   
   
                                                            Performed By: #### C  
BCA, 2857-1, CMP, 51527-0 ####  
[facility] ([id number])  
[address], [address]  
[place] [address]   
   
                                                    Lipid 1996 panelon   
4   
   
                      Cholesterol [Mass/Vol] 147 mg/dL  Low        150-200    Pr  
[facility]  
   
                                        Comment on above:   Performed By: ###Patrizia LIM  
BCA, 2857-1, CMP, 89560-5 ####  
[facility] ([id number])  
[address], [address]  
[place] [address]   
   
                                                    Cholesterol in HDL   
[Mass/Vol]      43 mg/dL        Normal          >39             [facility]  
   
                                        Comment on above:   Result Comment:  
HDL <40 mg/dL - High Risk  
HDL > or = 40mg/dL- Desirable  
HDL >60 mg/dL - Negative Risk  
---------------------------------------------   
   
                                                            Performed By: #### RAPHAEL  
BCA, 2857-1, CMP, 55560-2 ####  
[facility] ([id number])  
[address], [address]  
[place] [address]   
   
                                                    Cholesterol in LDL   
[Mass/Vol]      48 mg/dL        Normal          <130            [facility]  
   
                                        Comment on above:   Result Comment:  
LDL <100 mg/dL - Desirable  
LDL >160 mg/dL - High Risk  
---------------------------------------------   
   
                                                            Performed By: #### C  
BCA, 2857-1, CMP, 31584-4 ####  
[facility] ([id number])  
[address], [address]  
[place] [address]   
   
                                                    Cholesterol in VLDL   
[Mass/Vol]      56 mg/dL        High            0-30            [facility]  
   
                                        Comment on above:   Performed By: #### C  
BCA, 2857-1, CMP, 53612-2 ####  
[facility] ([id number])  
[address], [address]  
[place] [address]   
   
                      CHOLESTEROL:HDL 3.4        Normal     1.0-5.0    [facility]  
   
                                        Comment on above:   Performed By: #### RAPHAEL  
BCA, 2857-1, CMP, 64958-6 ####  
[facility] ([id number])  
[address], [facility] [address]  
[place] [address]   
   
                                                    Triglyceride   
[Mass/Vol]      278 mg/dL       High                      [facility]  
   
                                        Comment on above:   Performed By: #### C  
BCA, 2857-1, CMP, 35956-0 ####  
[facility] ([id number])  
[address], [address] [address]   
   
                                                    Prostate specific Ag [Mass/V  
ol]on 2024   
   
                      PSA SCREEN 0.98 ng/mL Normal     0.00-4.00  [facility]  
   
                                        Comment on above:   Result Comment:  
The method used for this test is Liliane  
Castaner DXI chemiluminescent immunoassay.  
Values obtained by different assay methods  
cannot be used interchangeably.   
   
                                                            Performed By: #### C  
BCA, 2857-1, CMP, 35920-8 ####  
[facility] ([id number])  
[address], [address] [place] [address]   
   
                                                    Basophils Auto (Bld) [#/Vol]  
Ordered By: Cristian Raymond on 2023   
   
                                                    Basophils (Bld)   
[#/Vol]         0.1 10*3/uL                     0.0-0.2         [facility]  
   
                                                    Basophils/100 WBC Auto (Bld)  
Ordered By: Cristian Raymond on 2023   
   
                                                    Basophils/100 WBC   
(Bld)           0.8 %                           .               [facility]  
   
                                                    Calcium [Mass/volume] in Ser  
um or PlasmaOrdered By: ROXANNA CARR on 2023   
   
                      Calcium [Mass/Vol] 8.9 mg/dL             8.6-10.3   [facility]  
   
                                                    Carbon dioxide, total [Moles  
/volume] in Serum or PlasmaOrdered By: RXOANNA CARR  
 on   
2023   
   
                      CO2 [Moles/Vol] 21.0 mmol/L            21.0-31.0  [facility]  
   
                                                    Chloride [Moles/volume] in S  
israel or PlasmaOrdered By: ROXANNA CARR on   
2023   
   
                      Chloride [Moles/Vol] 107 mmol/L                 [facility]  
   
                                                    Creatinine [Mass/volume] in   
Serum or PlasmaOrdered By: ROXANNA CARR on   
2023   
   
                      Creatinine [Mass/Vol] 4.71 mg/dL            0.70-1.30  [facility]  
   
                                                    Eosinophils Auto (Bld) [#/Vo  
l]Ordered By: Cristian Raymond on 2023   
   
                                                    Eosinophils (Bld)   
[#/Vol]         1.2 10*3/uL                     0.0-0.45        [facility]  
   
                                                    Eosinophils/100 WBC Auto (Bl  
d)Ordered By: Cristian Raymond on 2023   
   
                                                    Eosinophils/100 WBC   
(Bld)           12.5 %                          .               [facility]  
   
                                                    Erythrocyte distribution wid  
th Auto (RBC) [Ratio]Ordered By: Cristian Raymond on   
2023   
   
                                                    Erythrocyte   
distribution width   
(RBC) [Ratio]   13.4 %                          12.0-14.8       [facility]  
   
                                                    Glucose Glucometer (BldC) [M  
ass/Vol]Ordered By: Guicho Bronson on 2023   
   
                      Glucose [Mass/Vol] 143 mg/dL                        [facility]  
   
                                        Comment on above:   Random Glucose Refer  
ence Range is dependent on time and   
content   
of last meal. Glucose of more than 200 mg/dL in a nonstressed,   
ambulatory subject supports the diagnosis of Diabetes Mellitus.   
   
                                                    Glucose [Mass/volume] in Ser  
um or PlasmaOrdered By: ROXANNA CARR on 2023   
   
                      Glucose [Mass/Vol] 130 mg/dL                  [facility]  
   
                                        Comment on above:   ADA recommended refe  
rence rangeRandom Glucose Reference Range   
is dependent on time and content of last meal. Glucose of more   
than 200 mg/dL in a nonstressed, ambulatory subject supports   
the diagnosis of Diabetes Mellitus.   
   
                                                    Hematocrit Auto (Bld) [Volum  
e fraction]Ordered By: Cristian Raymond on 2023  
  
   
                                                    Hematocrit (Bld)   
[Volume fraction] 27.6 %                          38.8-50.0       [facility]  
   
                                                    Hemoglobin [Mass/volume] in   
BloodOrdered By: Cristian Raymond on 2023   
   
                                                    Hemoglobin (Bld)   
[Mass/Vol]      9.4 g/dL                        13.0-17.0       [facility]  
   
                                                    Leukocytes [#/volume] correc  
rafaela for nucleated erythrocytes in Blood by Automated  
   
counOrdered By: Cristian Raymond on 2023   
   
                                                    WBC corrected for nucl   
RBC Auto (Bld) [#/Vol] 9.9 10*3/uL                     4.1-10.5        [facility]  
   
                                                    Lymphocytes Auto (Bld) [#/Vo  
l]Ordered By: Cristian Raymond on 2023   
   
                                                    Lymphocytes (Bld)   
[#/Vol]         1.7 10*3/uL                     1.00-4.8        [facility]  
   
                                                    Lymphocytes/100 WBC Auto (Bl  
d)Ordered By: Cristian Raymond on 2023   
   
                                                    Lymphocytes/100 WBC   
(Bld)           17.6 %                          .               [facility]  
   
                                                    MCH Auto (RBC) [Entitic mass  
]Ordered By: Cristian Raymond on 2023   
   
                                                    MCH (RBC) [Entitic   
mass]           32.6 pg                         27.5-35.2       [facility]  
   
                                                    MCHC Auto (RBC) [Mass/Vol]Or  
dered By: Cristian Raymond on 2023   
   
                      MCHC (RBC) [Mass/Vol] 33.9 g/dL             32.5-35.6  [facility]  
   
                                                    MCV Auto (RBC) [Entitic vol]  
Ordered By: Cristian Raymond on 2023   
   
                                                    MCV (RBC) [Entitic   
vol]            96.1 fL                         83.5-101        [facility]  
   
                                                    Monocytes Auto (Bld) [#/Vol]  
Ordered By: Cristian Raymond on 2023   
   
                                                    Monocytes (Bld)   
[#/Vol]         0.7 10*3/uL                     0.0-0.8         [facility]  
   
                                                    Monocytes/100 WBC Auto (Bld)  
Ordered By: Cristian Raymond on 2023   
   
                                                    Monocytes/100 WBC   
(Bld)           6.8 %                           .               [facility]  
   
                                                    Neutrophils Auto (Bld) [#/Vo  
l]Ordered By: Cristian Raymond on 2023   
   
                                                    Neutrophils (Bld)   
[#/Vol]         6.2 10*3/uL                     1.8-7.7         [facility]  
   
                                                    Neutrophils/100 WBC Auto (Bl  
d)Ordered By: Cristian Raymond on 2023   
   
                                                    Neutrophils/100 WBC   
(Bld)           62.3 %                          .               [facility]  
   
                                                    No Panel InformationOrdered   
By: ROXANNA CARR on 2023   
   
                                                    Estimated GFR   
(CKD-EPI)       12.376 mL/Min                                   [facility]  
   
                                                    Pharmacy Creatinine   
Clearance (Chem 16.59                                           [facility]  
   
                                                    No Panel InformationOrdered   
By: Guicho Bronson on 2023   
   
                                                    Bedside Glucose   
Comment         Glu2: cleaned meter                                 [facility]  
   
                                                    Nucleated erythrocytes [Pres  
ence] in Blood by Automated countOrdered By: Cristian Raymond on 2023   
   
                                                    Nucleated RBC Auto Ql   
(Bld)           0.0 /100{WBC}                   0-0.5           [facility]  
   
                                                    Platelet mean volume Auto (B  
ld) [Entitic vol]Ordered By: Cristian Raymond on   
2023   
   
                                                    Platelet mean volume   
(Bld) [Entitic vol] 8.2 fL                          6.6-10.1        [facility]  
   
                                                    Platelets Auto (Bld) [#/Vol]  
Ordered By: Cristian Raymond on 2023   
   
                                                    Platelets (Bld)   
[#/Vol]         239 10*3/uL                     150-450         [facility]  
   
                                                    Potassium [Moles/volume] in   
Serum or PlasmaOrdered By: ROXANNA CARR on   
2023   
   
                      Potassium [Moles/Vol] 4.2 mmol/L            3.5-5.1    [facility]  
   
                                                    RBC Auto (Bld) [#/Vol]Ordere  
d By: Cristian Raymond on 2023   
   
                      RBC (Bld) [#/Vol] 2.88 10*6/uL            3.90-5.60  [facility]  
   
                                                    Serum or plasma anion gap de  
terminationOrdered By: ROXANNA CARR on 2023   
   
                      Anion gap [Moles/Vol] 16.2 mmol/L            6.0-15.0   [facility]  
   
                                                    Sodium [Moles/volume] in Ser  
um or PlasmaOrdered By: ROXANNA CARR on 2023   
   
                      Sodium [Moles/Vol] 140 mmol/L            136-145    [facility]  
   
                                                    Urea nitrogen [Mass/volume]   
in Serum or PlasmaOrdered By: ROXANNA CARR on   
2023   
   
                                                    Urea nitrogen   
[Mass/Vol]      53 mg/dL                        7-25            [facility]  
   
                                                    WBC Auto (Bld) [#/Vol]Ordere  
d By: Cristian Raymond on 2023   
   
                      WBC (Bld) [#/Vol] 9.9 10*3/uL            4.1-10.5   [facility]  
   
                                                    Basophils Auto (Bld) [#/Vol]  
Ordered By: Cristian Raymond on 2022   
   
                                                    Basophils (Bld)   
[#/Vol]         0.1 10*3/uL                     0.0-0.2         [facility]  
   
                                                    Basophils/100 WBC Auto (Bld)  
Ordered By: Cristian Raymond on 2022   
   
                                                    Basophils/100 WBC   
(Bld)           1.0 %                           .               [facility]  
   
                                                    Blood hemoglobin measurement  
 (mass/volume)Ordered By: Cristian Raymond on   
2022   
   
                                                    Hemoglobin (Bld)   
[Mass/Vol]      10.2 g/dL                       13.0-17.0       [facility]  
   
                                                    Blood leukocytes automated c  
ount (number/volume)Ordered By: Cristian Raymond on   
2022   
   
                      WBC (Bld) [#/Vol] 12.2 10*3/uL            4.5-11.0   [facility]  
   
                                                    Creatinine and Glomerular fi  
ltration rate.predicted panel (S/P/Bld)Ordered By:   
Cristian Raymond on 2022   
   
                      Creatinine [Mass/Vol] 4.36 mg/dL            0.64-1.27  [facility]  
   
                                                    Eosinophils Auto (Bld) [#/Vo  
l]Ordered By: Cristian Raymond on 2022   
   
                                                    Eosinophils (Bld)   
[#/Vol]         0.9 10*3/uL                     0.0-0.45        [facility]  
   
                                                    Eosinophils/100 WBC Auto (Bl  
d)Ordered By: Cristian Raymond on 2022   
   
                                                    Eosinophils/100 WBC   
(Bld)           7.4 %                           .               [facility]  
   
                                                    Erythrocyte distribution wid  
th Auto (RBC) [Ratio]Ordered By: Cristian Raymond on   
2022   
   
                                                    Erythrocyte   
distribution width   
(RBC) [Ratio]   12.3 %                          12.0-14.8       [facility]  
   
                                                    Estimated glomerular filtrat  
ion rate (GFR) non- AmericanOrdered By:   
Cristian Raymond on 2022   
   
                                                    GFR/1.73 sq   
M.predicted among   
non-blacks MDRD   
(S/P/Bld) [Vol   
rate/Area]      13 mL/Min                                       [facility]  
   
                                                    Glucose Glucometer (BldC) [M  
ass/Vol]Ordered By: Ronald Sánchez on 2022  
   
   
                      Glucose [Mass/Vol] 182 mg/dL                        [facility]  
   
                                        Comment on above:   Random Glucose Refer  
ence Range is dependent on time and   
content   
of last meal. Glucose of more than 200 mg/dL in a nonstressed,   
ambulatory subject supports the diagnosis of Diabetes Mellitus.   
   
                                                    Hematocrit Auto (Bld) [Volum  
e fraction]Ordered By: Cristian Raymond on 2022  
  
   
                                                    Hematocrit (Bld)   
[Volume fraction] 29.3 %                          38.8-50.0       [facility]  
   
                                                    Laboratory - Hematology and   
Cell countsOrdered By: Cristian Raymond on 2022  
  
   
                                                    Nucleated RBC/100 WBC   
(Bld) [Ratio]   0.0 %                           0-0.5           [facility]  
   
                                                    Lymphocytes Auto (Bld) [#/Vo  
l]Ordered By: Cristian Raymond on 2022   
   
                                                    Lymphocytes (Bld)   
[#/Vol]         1.7 10*3/uL                     1.00-4.8        [facility]  
   
                                                    Lymphocytes/100 WBC Auto (Bl  
d)Ordered By: Cristian Raymond on 2022   
   
                                                    Lymphocytes/100 WBC   
(Bld)           13.8 %                          .               [facility]  
   
                                                    MCH Auto (RBC) [Entitic mass  
]Ordered By: Cristian Raymond on 2022   
   
                                                    MCH (RBC) [Entitic   
mass]           31.5 pg                         27.5-35.2       [facility] [place] [facility]  
   
                                                    MCHC Auto (RBC) [Mass/Vol]Or  
dered By: Cristian Raymond on 2022   
   
                      MCHC (RBC) [Mass/Vol] 34.7 g/dL             32.5-35.6  [facility]  
   
                                                    MCV Auto (RBC) [Entitic vol]  
Ordered By: Cristian Raymond on 2022   
   
                                                    MCV (RBC) [Entitic   
vol]            90.9 fL                         83.5-101        [facility]  
   
                                                    Monocytes Auto (Bld) [#/Vol]  
Ordered By: Cristian Raymond on 2022   
   
                                                    Monocytes (Bld)   
[#/Vol]         0.7 10*3/uL                     0.0-0.8         [facility] [place] [facility]  
   
                                                    Monocytes/100 WBC Auto (Bld)  
Ordered By: Cristian Raymond on 2022   
   
                                                    Monocytes/100 WBC   
(Bld)           6.1 %                           .               [facility]  
   
                                                    Neutrophils Auto (Bld) [#/Vo  
l]Ordered By: Cristian Raymond on 2022   
   
                                                    Neutrophils (Bld)   
[#/Vol]         8.8 10*3/uL                     1.8-7.7         [facility] [place] [facility]  
   
                                                    Neutrophils/100 WBC Auto (Bl  
d)Ordered By: Cristian Raymond on 2022   
   
                                                    Neutrophils/100 WBC   
(Bld)           71.7 %                          .               [facility]  
   
                                                    No Panel InformationOrdered   
By: Ronald Sánchez on 2022   
   
                                                    Bedside Glucose   
Comment         Glu2: cleaned meter                                 [facility]  
   
                                                    No Panel InformationOrdered   
By: Cristian Raymond on 2022   
   
                                                    Estimated GFR (   
American)       16 mL/Min                                       [facility]  
   
                                        Comment on above:   GFR estimated refere  
nce range: According to KDOQI guidelines,   
<60 ml/min/1.73m2 is sufficient to diagnose a patient with   
chronic kidney disease.   
   
                                                    Pharmacy Creatinine   
Clearance (Chem 18.20                                           [facility]  
   
                                                    Platelet mean volume Auto (B  
ld) [Entitic vol]Ordered By: Cristian Raymond on   
2022   
   
                                                    Platelet mean volume   
(Bld) [Entitic vol] 8.3 fL                          6.6-10.1        [facility]  
   
                                                    Platelets Auto (Bld) [#/Vol]  
Ordered By: Cristian Raymond on 2022   
   
                                                    Platelets (Bld)   
[#/Vol]         316 10*3/uL                     150-450         [facility]  
   
                                                    RBC Auto (Bld) [#/Vol]Ordere  
d By: Cristian Raymond on 2022   
   
                      RBC (Bld) [#/Vol] 3.22 10*6/uL            3.90-5.60  [facility]  
   
                                                    Serum or plasma calcium ramya  
urement (mass/volume)Ordered By: Cristian Raymond on   
2022   
   
                      Calcium [Mass/Vol] 9.0 mg/dL             8.2-10.2   [facility]  
   
                                                    Serum or plasma chloride roberto  
surement (moles/volume)Ordered By: Cristian Raymond   
on   
2022   
   
                      Chloride [Moles/Vol] 99 mmol/L                  [facility]  
   
                                                    Serum or plasma glucose ramya  
urement (mass/volume)Ordered By: Cristian Raymond on   
2022   
   
                      Glucose [Mass/Vol] 178 mg/dL                  [facility]  
   
                                        Comment on above:   ADA recommended refe  
rence range  
Random Glucose Reference Range is dependent on time and content   
of last meal. Glucose of more than 200 mg/dL in a nonstressed,   
ambulatory subject supports the diagnosis of Diabetes Mellitus.   
   
                                                    Serum or plasma potassium me  
asurement (moles/volume)Ordered By: Cristian Raymond   
on   
2022   
   
                      Potassium [Moles/Vol] 3.5 mmol/L            3.5-5.1    [facility]  
   
                                                    Serum or plasma sodium measu  
rement (moles/volume)Ordered By: Cristian Raymond on   
2022   
   
                      Sodium [Moles/Vol] 136 mmol/L            136-146    [facility]  
   
                                                    Serum or plasma total carbon  
 dioxide measurement (moles/volume)Ordered By:   
Cristian Raymond on 2022   
   
                      CO2 [Moles/Vol] 19.9 mmol/L            22.0-30.0  [facility]  
   
                                                    Serum or plasma urea nitroge  
n measurement (mass/volume)Ordered By: Cristian Raymond on   
2022   
   
                                                    Urea nitrogen   
[Mass/Vol]      50 mg/dL                        9-23            [facility] [place] [facility]  
   
                                                    CNCOon 2022   
   
                      CNCO       Letter Text Normal                [facility]  
   
                                                    CNOVon 2022   
   
                                        CNOV                Office Visit (TXCTGL  
)  
--------------------------  
--  
ARUN MOON   
([id number]) 1950 M   
Date Time Provider   
Department  
3/9/22 3:30 PM KIDNEY TXP   
[facility]  
During your visit today,   
we recorded the following   
information about you:  
Referring Provider:   
RANKER, JOHN O [[id number]]  
Allergies As of Date:   
2022  
(Not on File)  
Date Reviewed: Never   
Reviewed  
Primary Visit   
Diagnosis:Pre-transplant   
evaluation for kidney   
transplant  
[Z01.818]  
Prescriptions as of   
2022  
- aspirin, enteric coated   
(ASPIRIN, ENTERIC COATED)   
81 mg EC tablet  
Take 1 tablet by mouth   
once daily.  
- sodium bicarbonate 325   
mg tablet  
- atorvastatin (LIPITOR)   
40 mg tablet  
Take 1 tablet by mouth.  
- chlorthalidone   
(HYGROTON) 25 mg tablet  
- dulaglutide (TRULICITY)   
1.5 mg/0.5 mL pen injector  
as directed.  
- hhqtomuco-K4-[id number]-algal   
oil (METANX, ALGAL OIL,) 3   
mg-35 mg-2 mg -90.314 mg  
cap  
See Admin Instructions.  
- sevelamer carbonate   
(RENVELA) 800 mg tablet  
1 tablet with meals  
- tiZANidine (ZANAFLEX) 4   
mg tablet  
Take 1 tablet by mouth as   
needed.  
- omeprazole (PRILOSEC) 20   
mg capsule  
Take 20 mg by mouth once   
daily.  
- acetaminophen (TYLENOL   
ARTHRITIS PAIN) 650 mg CR   
tablet  
Take 650 mg by mouth every   
8 hours as needed.  
- cholecalciferol, vitamin   
D3, (VITAMIN D3 ORAL)  
Take 10,000 mg by mouth   
once daily.  
Problem List As Of Date:   
2022  
(None)  
Encounter Number:   
[id number]  
Encounter Status:Closed by   
LULÚ GUADARRAMA on 22 Normal                                  [facility]  
   
                                        CNOV                Office Visit (TXCTGL  
)  
--------------------------  
--  
ARUN MOON   
([id number]) 1950 M LV  
Date Time Provider   
Department  
3/9/22 3:00 PM [facility]   
[facility]  
During your visit today,   
we recorded the following   
information about you:  
Temperature Pulse Blood   
pressure Weight  
97.9 degrees 61/minute   
156/62 97.5 kg  
Height  
1.803 m  
Shon Angeles MD 3/10/2022   
4:24 PM Signed  
[facility]   
[facility] [place] [facility]   
[facility]  
Transplant Evaluation  
CC: Patient is a 71 year   
old male here for   
transplant candidacy   
evaluation.  
Reason for visit: here for   
evaluation to be a Kidney   
Transplant recipient.  
Referred by:  
John O Ranker, MD  
[address] [id number]  
I will communicate with   
the referring provider by   
letter and/or shared  
electronic medical record.  
HPI: Mr. Arun Moon   
is a 71 year old gentleman   
from [place], [place].  
Following with Dr. Ranker   
for CKD 4 not yet on   
dialysis, just placed   
fistula  
right forearm. Possible   
Covid-19 in 2022.   
Not yet vaccinated.  
PHx: CKD4, DM2 w/   
peripheral neuropathy,   
retinopathy, HTN, CHF   
w/prior MI ,  
, legally blind,   
arthritis, poor functional   
status in wheelchair about  
10-12 hrs per day, just   
seen for pressure injury   
of buttocks stage 1, HLD,  
kidney stone , UTI   
infections on and off   
whole life per patient,   
COPD  
former smoker for over 50   
years quit in ,   
anxiety/depression,   
obesity,  
right knee surgery ,   
vasectomy , nasal   
surgery , wisdom teeth  
extraction, has potential   
donor but unfortunately   
donor is in prison.  
PAOD (TIA non-embolic, CVA   
non-embolic, amputation,   
carotid artery stenosis,  
abnormal PVRs,   
claudication history,   
decreased pulses, etc.):No  
Stroke: NO  
Claudication: NO  
Carotid Artery Stenosis:   
NO  
Malignancy (including skin   
cancer): No  
Hypercoagulable (history   
of DVT/PE, miscarriages,   
prior use of   
anticoagulation,  
etc.):No  
Prior transplant: No  
CKD: Yes: Cause of CKD:   
DM/HTN  
Advanced CKD, not on   
dialysis, current GFR: 15   
(2022)  
Living Donors: No  
Residual UO: Yes  
DM: Yes Diagnosed at age   
50's, Type 2, Therapies:   
now on Trulicity and  
Hypoglycemia: No  
A1C 7.4  
PAST MEDICAL HISTORY  
Diagnosis Date  
- Anxiety  
- Arthritis  
- CHF (congestive heart   
failure) ([facility])  
- COPD (chronic   
obstructive pulmonary   
disease) ([facility])  
- Depression  
- Former smoker  
- HLD (hyperlipidemia)  
- HTN (hypertension)  
- Kidney stone   
- Legally blind  
- MI (myocardial   
infarction) ([facility])  
- Neuropathy  
- Obesity  
- Retinopathy  
- Type 2 diabetes mellitus   
with renal complication   
([facility])  
PAST SURGICAL HISTORY  
Procedure Laterality Date  
- KNEE SURGERY HX Right   
  
- NASAL SINUS SURGERY HX   
  
- TOOTH EXTRACTION  
- VASECTOMY   
Current Outpatient   
Medications  
Medication Sig  
- aspirin, enteric coated   
(ASPIRIN, ENTERIC COATED)   
81 mg EC tablet Take 1  
tablet by mouth once   
daily.  
- sodium bicarbonate 325   
mg tablet  
- atorvastatin (LIPITOR)   
40 mg tablet Take 1 tablet   
by mouth.  
- chlorthalidone   
(HYGROTON) 25 mg tablet  
- dulaglutide (TRULICITY)   
1.5 mg/0.5 mL pen injector   
as directed.  
- urcypgyoc-U1-[id number]-algal   
oil (METANX, ALGAL OIL,) 3   
mg-35 mg-2 mg -90.314 mg  
cap See Admin   
Instructions.  
- sevelamer carbonate   
(RENVELA) 800 mg tablet 1   
tablet with meals  
- tiZANidine (ZANAFLEX) 4   
mg tablet Take 1 tablet by   
mouth as needed.  
- omeprazole (PRILOSEC) 20   
mg capsule Take 20 mg by   
mouth once daily.  
- acetaminophen (TYLENOL   
ARTHRITIS PAIN) 650 mg CR   
tablet Take 650 mg by   
mouth  
every 8 hours as needed.  
- cholecalciferol, vitamin   
D3, (VITAMIN D3 ORAL) Take   
10,000 mg by mouth once  
daily.  
No current   
facility-administered   
medications for this   
visit.  
Social History  
Tobacco Use  
- Smoking status: Current   
Every Day Smoker  
Packs/day: 1.00  
Years: 58.00  
Pack years: 58.00  
Types: Cigarettes  
Start date: 3/12/1963  
- Smokeless tobacco: Never   
Used  
Substance Use Topics  
- Alcohol use: Not on file  
- Drug use: Not on file  
There were no vitals taken   
for this visit.  
Pre-transplant testing:  
Cardiac:  
ECHO: 2021 CE  
- ?Left Ventricle: There   
is mild concentric   
increased wall  
thickness/hypertrophy.  
- ?Left Ventricle:   
Systolic function is   
normal with an ejection   
fraction of  
55-60%.  
- ?Left Ventricle: Grade   
II diastolic dysfunction   
(pseudonormal) is present.  
- ?Left Atrium: Left   
atrium volume index is   
moderately increased. The   
left  
atrial volume index is   
46.8 mL/m2.  
- ?Right Atrium: Right   
atrium is mildly dilated.   
The right atrial area is   
21.3  
cm2.  
- ?No primary valve   
disease  
CXR: see CT CHEST  
CT CHEST: 2022  
IMPRESSION:  
1. ?No suspicious   
pulmonary nodule or   
thoracic lymphadenopathy.   
?Mild  
bronchial wall thickening   
may be related to the   
patient's reported history   
of  
smoking.  
2. ?Moderate to severe   
diffuse three-vessel   
coronary artery   
atherosclerotic  
calcifications.  
3. ?Patulous esophagus,   
which can be seen w (more   
content not included)... Normal                                  [facility]                Office Visit (TXCTGL  
)  
--------------------------  
--  
ARUN MOON   
([id number]) 1950 M   
Date Time Provider   
Department  
3/9/22 2:30 PM UROLOGY [facility]   
CLINIC TXCTGL  
During your visit today,   
we recorded the following   
information about you:  
Temperature Pulse Blood   
pressure Weight  
97.9 degrees 64/minute   
156/62 97.5 kg  
Height  
1.803 m  
Shabana Lang RN   
3/9/2022 3:06 PM Signed  
[facility]   
[facility] [place] [facility]   
[facility]  
Transplant Evaluation  
Patient not seen by   
surgeon. Declined by   
nephrology.  
CC: Patient is a 71 year   
old male here for   
transplant candidacy   
evaluation.  
Reason for visit: here for   
evaluation to be a Kidney   
Transplant recipient.  
Referred by:  
John O Ranker, MD  
[address] [id number]  
I will communicate with   
the referring provider by   
letter and/or shared  
electronic medical record.  
HPI: Mr. Arun Moon   
is a 71 year old gentleman   
from [place], [place].  
Following with Dr. Ranker   
for CKD 4 not yet on   
dialysis, just placed   
fistula  
right forearm. Possible   
Covid-19 in 2022.   
Not yet vaccinated.  
PHx: CKD4, DM2 w/   
peripheral neuropathy,   
retinopathy, HTN, CHF   
w/prior MI ,  
, legally blind,   
arthritis, poor functional   
status in wheelchair about  
10-12 hrs per day, just   
seen for pressure injury   
of buttocks stage 1, HLD,  
kidney stone , UTI   
infections on and off   
whole life per patient,   
COPD  
former smoker for over 50   
years quit in ,   
anxiety/depression,   
obesity,  
right knee surgery ,   
vasectomy , nasal   
surgery , wisdom teeth  
extraction, has potential   
donor but unfortunately   
donor is in prison.  
Prior transplant: No  
CKD: Yes: Cause of CKD:   
DM/HTN  
Advanced CKD, not on   
dialysis, current GFR: 15   
(2022)  
Living Donors: No  
Residual UO: Yes  
DM: Yes Diagnosed at age   
50's, Type 2, Therapies:   
now on Trulicity and  
Hypoglycemia: No  
A1C 7.4  
PAST MEDICAL HISTORY  
Diagnosis Date  
- Anxiety  
- Arthritis  
- CHF (congestive heart   
failure) ([facility])  
- COPD (chronic   
obstructive pulmonary   
disease) ([facility])  
- Depression  
- Former smoker  
- HLD (hyperlipidemia)  
- HTN (hypertension)  
- Kidney stone   
- Legally blind  
- MI (myocardial   
infarction) ([facility])  
- Neuropathy  
- Obesity  
- Retinopathy  
- Type 2 diabetes mellitus   
with renal complication   
([facility])  
PAST SURGICAL HISTORY  
Procedure Laterality Date  
- KNEE SURGERY HX Right   
  
- NASAL SINUS SURGERY HX   
  
- TOOTH EXTRACTION  
- VASECTOMY   
Current Outpatient   
Medications  
Medication Sig  
- atorvastatin (LIPITOR)   
40 mg tablet Take 1 tablet   
by mouth.  
- chlorthalidone   
(HYGROTON) 25 mg tablet  
- dulaglutide (TRULICITY)   
1.5 mg/0.5 mL pen injector   
as directed.  
- iwjdittcv-T3-[id number]-algal   
oil (METANX, ALGAL OIL,) 3   
mg-35 mg-2 mg -90.314 mg  
cap See Admin   
Instructions.  
- tiZANidine (ZANAFLEX) 4   
mg tablet Take 1 tablet by   
mouth as needed.  
- omeprazole (PRILOSEC) 20   
mg capsule Take 20 mg by   
mouth once daily.  
- acetaminophen (TYLENOL   
ARTHRITIS PAIN) 650 mg CR   
tablet Take 650 mg by   
mouth  
every 8 hours as needed.  
- cholecalciferol, vitamin   
D3, (VITAMIN D3 ORAL) Take   
10,000 mg by mouth once  
daily.  
- aspirin, enteric coated   
(ASPIRIN, ENTERIC COATED)   
81 mg EC tablet Take 1  
tablet by mouth once   
daily.  
- sodium bicarbonate 325   
mg tablet  
- sevelamer carbonate   
(RENVELA) 800 mg tablet 1   
tablet with meals  
No current   
facility-administered   
medications for this   
visit.  
No family history on file.  
No family status   
information on file.  
Social History  
Tobacco Use  
- Smoking status: Current   
Every Day Smoker  
Packs/day: 1.00  
Years: 58.00  
Pack years: 58.00  
Types: Cigarettes  
Start date: 3/12/1963  
- Smokeless tobacco: Never   
Used  
Substance Use Topics  
- Alcohol use: Not on file  
- Drug use: Not on file  
/62 (BP Site: Left   
Arm)   Pulse 64   Temp   
36.6 ?C (97.9 ?F)   Ht   
180.3  
cm (5' 11 )   Wt 97.5 kg   
(215 lb)   SpO2 99%   BMI   
29.99 kg/m?  
Pre-transplant testing:  
Cardiac:  
ECHO: 2021 CE  
- ?Left Ventricle: There   
is mild concentric   
increased wall  
thickness/hypertrophy.  
- ?Left Ventricle:   
Systolic function is   
normal with an ejection   
fraction of  
55-60%.  
- ?Left Ventricle: Grade   
II diastolic dysfunction   
(pseudonormal) is present.  
- ?Left Atrium: Left   
atrium volume index is   
moderately increased. The   
left  
atrial volume index is   
46.8 mL/m2.  
- ?Right Atrium: Right   
atrium is mildly dilated.   
The right atrial area is   
21.3  
cm2.  
- ?No primary valve   
disease  
CXR: see CT CHEST  
CT CHEST: 2022  
IMPRESSION:  
1. ?No suspicious   
pulmonary nodule or   
thoracic lymphadenopathy.   
?Mild  
bronchial wall thickening   
may be related to the   
patient's reported history   
of  
smoking.  
2. ?Moderate to severe   
diffuse three-vessel   
coronary artery   
atherosclerotic  
calcifications.  
3. ?Patulous esophagus,   
which can be seen with   
esophageal dysmotility. ?  
Correlate for dysphagia.  
CT ABD/PEL: 2022  
IMPRESSION:  
VASCULAR CALCIFICAT (more   
content not included)... Normal                                  [facility]  
   
                                        CNOV                Office Visit (TXCTGL  
)  
--------------------------  
--  
BALTAARUN LOREZNO   
([id number]) 1950 M   
Date Time Provider   
Department  
3/9/22 12:30 PM KIDNEY TXP   
COORDINATORS TXCTGL  
During your visit today,   
we recorded the following   
information about you:  
Referring Provider:   
RANKER, JOHN O [[id number]]  
Allergies As of Date:   
2022  
(Not on File)  
Date Reviewed: Never   
Reviewed  
Primary Visit   
Diagnosis:Pre-transplant   
evaluation for kidney   
transplant  
[Z01.818]  
Prescriptions as of   
2022  
- aspirin, enteric coated   
(ASPIRIN, ENTERIC COATED)   
81 mg EC tablet  
Take 1 tablet by mouth   
once daily.  
- sodium bicarbonate 325   
mg tablet  
- atorvastatin (LIPITOR)   
40 mg tablet  
Take 1 tablet by mouth.  
- chlorthalidone   
(HYGROTON) 25 mg tablet  
- dulaglutide (TRULICITY)   
1.5 mg/0.5 mL pen injector  
as directed.  
- iumbembey-F4-lrV11-algal   
oil (METANX, ALGAL OIL,) 3   
mg-35 mg-2 mg -90.314 mg  
cap  
See Admin Instructions.  
- sevelamer carbonate   
(RENVELA) 800 mg tablet  
1 tablet with meals  
- tiZANidine (ZANAFLEX) 4   
mg tablet  
Take 1 tablet by mouth as   
needed.  
- omeprazole (PRILOSEC) 20   
mg capsule  
Take 20 mg by mouth once   
daily.  
- acetaminophen (TYLENOL   
ARTHRITIS PAIN) 650 mg CR   
tablet  
Take 650 mg by mouth every   
8 hours as needed.  
- cholecalciferol, vitamin   
D3, (VITAMIN D3 ORAL)  
Take 10,000 mg by mouth   
once daily.  
Problem List As Of Date:   
2022  
(None)  
Encounter Number:   
[id number]  
Encounter Status:Closed by   
LULÚ GUADARRAMA on 22 [facility]  
   
                                        CN                Office Visit (TXCTGL  
)  
--------------------------  
--  
ARUN MOON   
([id number]) 1950 M   
Date Time Provider   
Department  
3/9/22 8:00 AM PRE TX   
GROUP EDUCATION TXCTGL  
During your visit today,   
we recorded the following   
information about you:  
Shawanda Jaramillo RN 3/9/2022   
10:02 AM Signed  
PRE-TRANSPLANT PATIENT   
EDUCATION NOTE  
Type of Transplant: Kidney  
Informed Consent for   
Evaluation signed: Yes  
Multiple Listing Form   
signed: Yes  
READINESS TO LEARN:  
Cognitive Ability: Alert   
and oriented  
Motivation to Learn: Eager  
Family Support: High -   
Very involved in pt care  
Instruction Provided to:   
Patient and family member  
Patient Learns Best by:   
Multiple Methods  
Factors Affecting   
Learning: None  
Physical Limitations   
Affecting Learning: None  
LEARNING RESPONSE:  
Diagnosis: CKD - HTN/DM  
Education Topics/Teaching   
Points:  
-Discussed living donor   
evaluation and approval   
process.  
-Discussed the evaluation   
and listing process.  
-Discussed the different   
types of  donors   
(KDPI scoring, DCD, High  
Risk).  
-Explained EPTS scoring   
and how it is calculated.  
-Explained the surgical   
procedure and potential   
complications, surgeons,  
hospital stay at the time   
of transplant.  
-Explained lifetime   
immunosuppression therapy   
and frequency of blood   
draws/labs  
post-op and post-op course   
of treatment.  
-Reviewed National and [facility]   
outcomes from the most   
recent [facility]   
center-specific  
report; a copy of the   
program summary was given   
to the patient.  
-Explained that if the   
transplant is not   
performed at a   
Medicare-approved  
transplant center it could   
affect the ability to have   
immunosuppressive  
medications paid under   
Medicare Part B.  
Supplemental Material:  
-Pre-Transplant Patient   
Education Binder  
-UNOS: Questions AND   
Answers for Transplant   
Candidates about Multiple   
Listing and  
Waiting Time Transfer  
-UNOS: Questions AND   
Answers for Transplant   
Candidates about Kidney   
Allocation  
Policy  
-Directions to access   
[facility]'s data   
through [facility].  
-Informed Consent for   
Transplant Program   
Participation patient   
education packet  
-National Kidney Registry   
pamphlet: Yes  
- Covid-19 Vaccination for   
Transplant Candidates  
Method of Instruction:   
Group class instruction  
Written instruction -   
handouts  
Verbal instruction  
Computer  
Patient/Family Response:   
Patient and family member   
asked appropriate  
questions, which were   
answered satisfactorily.  
Follow-Up Plan: Complete -   
No need for follow-up  
Referral/Recommendation:   
None  
Shawanda Jaramillo RN  
Pre-Transplant Coordinator  
Referring Provider:   
RANKER, JOHN O [[id number]]  
Allergies As of Date:   
2022  
(Not on File)  
Date Reviewed: Never   
Reviewed  
Reason for Visit:  
Patient Education [91]  
Primary Visit   
Diagnosis:Pre-transplant   
evaluation for kidney   
transplant  
[Z01.818]  
Problem List As Of Date:   
2022  
(None)  
Encounter Number:   
[id number]  
Encounter Status:Closed by   
SHAWANDA JARAMILLO on 3/9/22 [facility]  
   
                                                    CNSWon 2022   
   
                                        [facility]                Social Work (TXCT[facility])  
--------------------------  
--  
ARUN MOON   
([id number]) 1950 M   
Date Time Provider   
Department  
3/9/22 1:00 PM VIN PHILLIP  
During your visit today,   
we recorded the following   
information about you:  
RALPH Coughlin   
3/11/2022 10:38 AM Signed  
PSYCHOSOCIAL FACE TIME   
EVALUATION FOR KIDNEY   
TRANSPLANT  
COVID-19 PRECAUTIONS  
Social Supports: Daughter   
and son in-law  
Financial Concerns:   
Medicare  
Compliance: Pt is not   
dialysis dependent  
Mental Health: Stable  
Substance Abuse: Denies  
SIPAT: 7  
REFERRAL: Arun Moon   
was referred to Social   
Work for a psychosocial  
evaluation to establish if   
he would be a suitable   
candidate to receive a   
kidney  
transplant.  
DIALYSIS START DATE: NA.   
Pt is waiting for his   
fistula to heal.  
The pt does use assistive   
devices for ambulation. He   
reports that he uses a  
wheelchair most of the   
time.  
This social work   
psychosocial evaluation   
was completed with Arun Moon on  
2022. He was   
accompanied by his   
daughter, Liz Turcios.  
Race/Gender: White male  
[place] Citizen: Yes  
IDENTIFYING   
INFORMATION/LIVING   
SITUATION  
Arun Moon [id number]  
[address] [place] [address].  
The home is about 2 hours   
from [facility]. Arun Moon   
has been living in this  
home with his daughter for   
1.5 years.  
Arun Moon is is   
dependent with all ADL's   
for bathing after getting   
out  
of the shower, dressing,   
cooking and cleaning.   
Others in household are   
the pt's  
son in-law, Lei. The   
pt's daughter and son   
in-law drive.  
Pets in the home: Five   
small dogs. Liz cares   
for the animals.  
TRANSPLANT LODGING PLANS   
FOR PATIENTS WHO LIVE 2.5   
OR MORE HOURS AWAY FROM  
[facility]:  
Do you have the financial   
means to stay in the area   
for four weeks or more  
post-transplant? NA  
COMPREHENSION OF MEDICAL   
SITUATION  
Arun Moon reports   
that his kidney disease is   
due to T2 DM. Pt also has  
mild COPD, asthma, and a   
hx of three heart attacks.   
Pt also has HTN,  
Hyperlipidemia,   
neuropathy, and macular   
degeneration.  
Special Diets: Yes. He is   
on a diabetic, renal, and   
heart diet.  
A1c if Diabetic: 7.4  
Arun Moon was not   
able to recall information   
that was presented to him  
today during the kidney   
transplant educational   
class.  
Reviewed risks with the   
pt.  
Arun Moon does   
understand and, has   
knowledge of the   
transplant process,  
the risks of transplant,   
and transplant   
medications.  
According to patient's   
report:  
Missed doctor   
appointments: No.  
Missed/shortened/reschedul  
ed dialysis appointments:   
NA  
Knowledge of medications:   
Pt's daughter knows the   
names of his medications   
and  
carries the list. Pt   
states that he cannot read   
the labels.  
Medication Compliance:   
Liz sets his meds up   
weekly. Pt takes them with   
meals.  
Have you ever stopped   
taking any medication   
because you lost your   
insurance you  
could not afford it? No  
Have you stopped taking   
medication because you   
felt you didn't need it or  
because of side-effects?   
Yes. Pt took himself of   
Wellbutrin. He states that   
his  
late wife informed the   
pt's PCP that he was   
depressed. His doctor   
prescribed  
it. The pt reports that he   
stopped because it made   
him impotent. He did   
discuss  
stopping the medication   
with PCP. He titrated   
himself off of the   
Wellbutrin.  
Moral, Denominational, or   
ethical views about   
transplants or blood   
transfusions that  
would preclude you from a   
transplant? No.  
Have you ever been   
transplanted, evaluated,   
or listed at another   
center: No.  
Potential donor: No.  
SUBSTANCE USE/ABUSE  
Alcohol: Pt has one drink   
a year on New Year's Tami.  
Tobacco: Former smoker. Pt   
quit in 2021. Pt   
admits to having smoked 5  
ppd in the seventies. He   
was down to a half a ppd   
when he quit.  
Exposure to second hand   
smoke: No  
Illegal substances: Denies  
Over the Counter   
Medications: Tylenol PRN,   
Vitamin B complex and   
Vitamin D3  
Opioids/Controlled   
Substances: None  
CAGE Questionnaire  
Have you ever felt you   
should cut down on your   
drinking? None  
Have people annoyed you by   
criticizing your drinking?   
No  
Have you ever felt bad or   
guilty about your   
drinking? No  
Have you ever had a drink   
first thing in the morning   
to steady your nerves or  
to get rid of a hangover   
(eye opener)? No  
LEGAL ENCOUNTERS  
Currently on probation or   
parole: No  
Past or current warrants   
for arrest: No  
Substance related legal   
problems: No  
Valid drivers license: No  
MENTAL HEALTH HISTORY  
Affect:   
Appropriate/Consistent  
Alert: Alert  
Orientation: person, place   
and time  
Cognitive Function:   
Cognition appears   
relatively intact  
Mood: Euthymic  
Are you having thoughts   
that you would be better   
off dead, or of hurting  
yourself in some way?   
Denied when asked today.  
Current mental health   
diagnoses / current   
feelings of anxiety or   
depression:  
Denies  
Previous mental health   
diagnoses: Denies  
Psychiatric medication:   
No. Who prescribes:  
Counseling: No  
Psychiatric services (more   
content not included)... Normal                                  [facility]  
   
                                                    CT ABD/PEL WO IVCONon 2022   
   
                                        CT ABD/PEL WO IVCON * * *Final Report* *  
 *  
DATE OF EXAM: Mar 9 2022   
9:56AM  
[facility] 0531 - CT ABD/PEL WO   
IVCON / ACCESSION #   
[id number]  
PROCEDURE REASON:   
Pre-transplant evaluation   
for kidney transplant  
  
* * * * Physician   
Interpretation * * * *  
EXAMINATION: CT ABDOMEN   
AND PELVIS WITH IV   
CONTRAST  
HISTORY: 71 years old Male   
undergoing preoperative   
evaluation for  
possible renal transplant.  
TECHNIQUE: CT of the   
abdomen and pelvis was   
performed using standard  
technique, scanning from   
the esophageal   
diaphragmatic hiatus to   
the  
symphysis pubis.  
MQ: CTAP_3  
Contrast:  
IV contrast: None  
Oral contrast: None  
CT Radiation dose:   
Integrated Dose-length   
product (DLP) = 687   
mGy*cm.  
CT Dose Reduction   
Employed: Automated   
exposure control (AEC)  
COMPARISON: None available  
RESULT:  
Vascular Calcifications:  
Aorta: Severe   
calcification  
Right -  
Common Iliac Artery:   
Severe calcification  
External Iliac Artery:   
Moderate calcification  
Internal Iliac Artery:   
Severe calcification  
Left -  
Common Iliac Artery:   
Severe calcification  
External Iliac Artery:   
Moderate calcification  
Internal Iliac Artery:   
Severe calcification  
Abdomen / Pelvis:  
Liver: Unremarkable   
unenhanced liver.  
Biliary: Cholelithiasis.  
Spleen: No splenomegaly.  
Pancreas: Unremarkable.  
Adrenals: No mass.  
Kidneys: Bilateral renal   
vascular calcifications.   
No nephrolithiasis. A  
few LEFT renal cysts   
measuring up to 2 cm. No   
right-sided renal mass.  
No hydronephrosis.  
GI Tract: No bowel   
dilation. Normal appendix.   
There is diverticulosis.  
No changes of   
diverticulitis.  
Lymph Nodes: No   
lymphadenopathy.  
Mesentery/peritoneum: No   
ascites.  
Retroperitoneum: No mass.  
Pelvis: No mass or   
ascites.  
Bones/Soft Tissues:   
Degenerative changes.   
Small right fat-containing  
inguinal hernia.  
Lower thorax: A chest CT   
performed will be reported   
separately.  
IMPRESSION:  
VASCULAR CALCIFICATION AS   
DESCRIBED.  
: PSCB  
Transcribe Date/Time: Mar   
9 2022 10:19A  
Dictated by : BARBARA WRAY MD  
This examination was   
interpreted and the report   
reviewed and  
electronically signed by:  
FARHAD SOLIZ MD on Mar   
9 2022 10:32AM EST  
[id number] Normal                                  [facility]  
   
                                                    CT CHEST WO IVCONon 20  
22   
   
                                        CT CHEST WO IVCON   * * *Final Report* *  
 *  
DATE OF EXAM: Mar 9 2022   
9:56AM  
[facility] 0541 - CT CHEST WO   
IVCON / ACCESSION #   
[id number]  
PROCEDURE REASON: Tobacco   
use  
  
* * * * Physician   
Interpretation * * * *  
EXAMINATION: CHEST CT   
WITHOUT CONTRAST  
CLINICAL HISTORY:   
71-year-old male with   
history of tobacco use   
undergoing  
evaluation for kidney   
transplant.  
Technique: Spiral CT   
acquisition of the chest   
from the thoracic inlet to  
the upper abdomen without   
contrast.  
MQ: CTCWO_6  
CT Radiation dose:   
Integrated Dose-length   
product (DLP) for this   
visit =  
687 mGy*cm  
CT Dose Reduction   
Employed: Automated   
exposure control (AEC)  
Comparison: None available  
RESULT:  
Limitations: None.  
Lines, tubes, and devices:   
None.  
Lung parenchyma and   
airways: There is   
bronchial wall thickening   
present  
bilaterally. No suspicious   
pulmonary nodule. No   
consolidation. No  
pulmonary edema. The   
central airways are patent   
without suspicious  
endobronchial lesion..  
Pleural space: No pleural   
effusion, pleural   
thickening, or   
pneumothorax.  
Lower neck, lymph nodes,   
and mediastinum: The   
imaged thyroid gland is  
unremarkable. No   
supraclavicular or   
axillary lymphadenopathy.   
No  
enlarged mediastinal or   
hilar lymph nodes. The   
esophagus is patulous,  
which can be seen with   
dysmotility.  
Heart, pericardium, and   
thoracic vessels: There is   
common origin of the  
right brachiocephalic and   
left common carotid   
arteries, a normal   
anatomic  
variant. The thoracic   
aorta is normal in caliber   
measuring 3.2 cm in  
diameter in the mid   
ascending segment. The   
main pulmonary artery is  
normal in caliber.   
Moderate to severe   
three-vessel coronary   
artery  
atherosclerotic   
calcifications are   
present. Heart size is   
normal. No  
pericardial effusion.  
Bones and soft tissues: No   
destructive lytic or   
blastic bone lesion.  
Multilevel endplate   
degenerative changes are   
present within the lower  
cervical and throughout   
the thoracic spine,   
associated with vacuum   
disc  
phenomenon.  
Upper abdomen: A CT   
examination of the abdomen   
and pelvis, which is  
performed concurrently, is   
reported separately.  
 (topogram) images:   
No additional findings.  
IMPRESSION:  
1. No suspicious pulmonary   
nodule or thoracic   
lymphadenopathy. Mild  
bronchial wall thickening   
may be related to the   
patient's reported  
history of smoking.  
2. Moderate to severe   
diffuse three-vessel   
coronary artery  
atherosclerotic   
calcifications.  
3. Patulous esophagus,   
which can be seen with   
esophageal dysmotility.  
Correlate for dysphagia.  
: HONG  
Transcribe Date/Time: Mar   
9 2022 10:21A  
Dictated by : JORDI LOPEZ MD  
This examination was   
interpreted and the report   
reviewed and  
electronically signed by:  
JORDI LOPEZ MD on Mar 9   
2022 10:35AM EST  
[id number] Normal                                  [facility]  
   
                                                    Glucose Glucometer (BldC) [M  
ass/Vol]Ordered By: Ronald Sánchez on 2022  
   
   
                      Glucose [Mass/Vol] 117 mg/dL                        [facility]  
   
                                        Comment on above:   Random Glucose Refer  
ence Range is dependent on time and   
content   
of last meal. Glucose of more than 200 mg/dL in a nonstressed,   
ambulatory subject supports the diagnosis of Diabetes Mellitus.   
   
                                                    No Panel InformationOrdered   
By: Ronald Sánchez on 2022   
   
                                                    Bedside Glucose   
Comment         Glu2: cleaned meter                                 [facility]  
   
                                                    CNPNon 2022   
   
                                        CNPN                Telephone (TXCTGL)  
--------------------------  
--  
ARUN MOON   
([id number]) 1950 M   
Date Time Provider   
Department  
3/1/22 SALAS TITUS   
[facility]  
During your visit today,   
we recorded the following   
information about you:  
Salas Castellanomirela Chambers 3/1/2022   
3:39 PM Signed  
Spoke with patient   
reminding of appointment   
on 2022. Patient had   
me call  
his daughter Liz to   
remind of appointment.   
Spoke with Liz and she   
is  
stating that patient will   
be here. All questions   
answered and callback   
number  
was provided.  
Salas Marguerite Chambers  
Allergies As of Date:   
2022  
(Not on File)  
Date Reviewed: Never   
Reviewed  
Reason for Visit:  
Appointment [186]  
Problem List As Of Date:   
2022  
(None)  
Encounter Number:   
[id number]  
Encounter Status:Closed by   
SALAS TITUS MA on   
3/1/22              [facility]  
   
                                                    Tereza 2021   
   
                                        CNPN                Telephone (TXCTGL)  
--------------------------  
--  
ARUN MOON   
([id number]) 1950 M   
BLD  
Date Time Provider   
Department  
21 BLUNEEMA   
[place]ANGE[facility]  
During your visit today,   
we recorded the following   
information about you:  
Neema Blu 2021   
11:39 AM Signed  
Called patient regarding   
kidney transplant (Self   
Referral), he states he  
stopped smoking cigarettes   
or or about 2021, and   
request for me to call his  
daughter regarding   
questions regarding   
information received.  
Called and spoke with   
patient's daughter, intake   
completed (Self Referral),  
emailed to nurse   
coordinator for review.   
Neema Hurt  
Allergies As of Date:   
2021  
(Not on File)  
Date Reviewed: Never   
Reviewed  
Reason for Visit:  
Referral - Kidney Txp   
[[id number]]  
Primary Visit   
Diagnosis:Type 2 diabetes   
mellitus with diabetic   
chronic kidney  
disease, unspecified CKD   
stage, unspecified whether  
long term insulin use   
(HCC) [E11.22]  
Other Visit   
Diagnoses:Hypertension,   
unspecified type [I10]  
Pre-transplant evaluation   
for CKD (chronic kidney  
disease) [Z01.818]  
Order(s):CONSULT TO   
TRANSPLANT CENTER [[id number]]   
Order #: [id number]: 1  
Problem List As Of Date:   
2021  
(None)  
Encounter Number:   
[id number]  
Encounter Status:Closed by   
NEEMA [facility]URT [facility]   
21            [facility]   
   
                                        C[facility]                Telephone ([facility])  
--------------------------  
--  
ARUN MOON   
([id number]) 1950 M   
BLD  
Date Time Provider   
Department  
21 PAU ALONSO  
During your visit today,   
we recorded the following   
information about you:  
Pau Alonso RN 2021   
11:57 AM Signed  
I called and spoke with   
patient to confirm smoking   
status. Patient is a  
current smoker. I advised   
that at this time, we will   
need to close his  
referral. Once he has been   
tobacco free for 4-6   
weeks, we can open a new  
referral for him. Patient   
requested that I call and   
speak with his daughter.  
I called Liz and advised   
her of this information as   
well. She indicated she  
would speak with her   
father to discuss smoking   
cessation and will contact   
us  
for referral once he has   
been nicotine free for 4-6   
weeks. Both verbalized  
understanding that there   
would be random nicotine   
testing.  
Pau Alonso RN  
2021 11:56 AM  
Allergies As of Date:   
2021  
(Not on File)  
Date Reviewed: Never   
Reviewed  
Reason for Visit:  
Referral - Kidney Txp   
[[id number]]  
Problem List As Of Date:   
2021  
(None)  
Encounter Number:   
[id number]  
Encounter Status:Closed by   
PAU ALONSO on [facility]   
   
                                        CNPN                Telephone (TXCT[facility])  
--------------------------  
--  
ARUN MERAZ   
([id number]) 1950 M   
BLD  
Date Time Provider   
Department  
21 BLUNEEMA   
[facility]  
During your visit today,   
we recorded the following   
information about you:  
Neema Hurt 2021   
7:58 AM Signed  
21-CW; Called and   
spoke with patient   
regarding kidney   
transplant, intake  
completed, and emailed to   
nurse coordinators for   
review. Neema Hurt  
Allergies As of Date:   
2021  
(Not on File)  
Date Reviewed: Never   
Reviewed  
Reason for Visit:  
Referral - Kidney Txp   
[[id number]]  
Primary Visit   
Diagnosis:Type 2 diabetes   
mellitus with ESRD   
(end-stage renal  
disease) ([facility]) [E11.22,   
N18.6]  
Other Visit   
Diagnoses:Hypertension,   
unspecified type [I10]  
Pre-transplant evaluation   
for CKD (chronic kidney  
disease) [Z01.818]  
Order(s):CONSULT TO   
TRANSPLANT CENTER [[id number]]   
Order #: [id number]: 1  
Problem List As Of Date:   
2021  
(None)  
Encounter Number:   
[id number]  
Encounter Status:Closed by   
NEEMA HURT on   
21             Normal                                  [facility]  
  
  
  
Vital Signs  
  
  
                          Date Time    Vital Sign   Value        Performing   
Clinician                               Facility  
   
                                                    10-   
13:          Body height         180.3 cm            Nancy Torrez DPM  
Work Phone:   
[phone]                          [facility]  
   
                                                    10-   
13:                              Body mass index   
(BMI) [Ratio]             30.93 kg/m2               Nancy Torrez DPM  
Work Phone:   
[phone]                          [facility]  
   
                                                    10-   
13:          Body weight         100.61 kg           Nancy Torrez DPM  
Work Phone:   
[phone]                          [facility]  
   
                                                    10-   
09:          Body height         180.3 cm            Pepper Mendoza NP  
Work Phone:   
[phone]                          [facility]  
   
                                                    10-   
09:                              Body mass index   
(BMI) [Ratio]             30.93 kg/m2               Pepper Aichholz NP  
Work Phone:   
[phone]                          [facility]  
   
                                                    10-   
09:          Body temperature    98.49 [degF]        Pepper Aichholz NP  
Work Phone:   
[phone]                          [facility]  
   
                                                    10-   
09:          Body weight         100.61 kg           Pepper Aichholz NP  
Work Phone:   
[phone]                          [facility]  
   
                                                    10-   
09:                              Diastolic blood   
pressure                  68 mm[Hg]                 Pepper Aichholz NP  
Work Phone:   
[phone]                          [facility]  
   
                                                    10-   
09:          Heart rate          98 /min             Pepper Aichholz NP  
Work Phone:   
[phone]                          [facility]  
   
                                                    10-   
09:          Respiratory rate    19 /min             Pepper Aichholz NP  
Work Phone:   
[phone]                          [facility]  
   
                                                    10-   
09:                              SaO2% (BldA) [Mass   
fraction]                 98 %                      Pepper Aichholz NP  
Work Phone:   
[phone]                          [facility]  
   
                                                    10-   
09:                              Systolic blood   
pressure                  140 mm[Hg]                Pepper Aichholz NP  
Work Phone:   
[phone]                          [facility]  
   
                                                    2024   
12:                              Diastolic blood   
pressure                  90 mm[Hg]                 Pepper Aichholz  
Work Phone:   
[phone]                          [facility]  
   
                                                    2024   
12:          Heart rate          71 /min             Pepper Aichholz  
Work Phone:   
[phone]                          [facility]  
   
                                                    2024   
12:          Respiratory rate    16 /min             Pepper Aichholz  
Work Phone:   
[phone]                          [facility]  
   
                                                    2024   
12:                              SaO2% (BldA) [Mass   
fraction]                 100 %                     Pepper Aichholz  
Work Phone:   
[phone]                          [facility]  
   
                                                    2024   
12:                              Systolic blood   
pressure                  162 mm[Hg]                Pepper Aichholz  
Work Phone:   
[phone]                          [facility]  
   
                                                    2024   
10:          Body height         180.34 cm           Pepper Aichholz  
Work Phone:   
[phone]                          [facility]  
   
                                                    2024   
10:          Body weight         100.51 kg           Pepper Aichholz  
Work Phone:   
[phone]                          [facility]  
   
                                                    2024   
13:          Body height         180.34 cm           Pepper Aichholz  
Work Phone:   
[phone]                          [facility]  
   
                                                    2024   
13:                              Body mass index   
(BMI) [Ratio]             31.1 kg/m2                Pepper Aichholz  
Work Phone:   
[phone]                          [facility]  
   
                                                    2024   
13:          Body weight         101.15 kg           Pepper Aichholz  
Work Phone:   
[phone]                          [facility]  
   
                                                    2024   
11:          Body height         180.34 cm           Pepper Aichholz  
Work Phone:   
[phone]                          [facility]  
   
                                                    2024   
11:                              Body mass index   
(BMI) [Ratio]             30.4 kg/m2                Pepper Aichholz  
Work Phone:   
[phone]                          [facility]  
   
                                                    2024   
11:          Body weight         98.88 kg            Pepper Aichholz  
Work Phone:   
[phone]                          [facility]  
   
                                                    2024   
11:                              Diastolic blood   
pressure                  73 mm[Hg]                 Pepper Aichholz  
Work Phone:   
[phone]                          [facility]  
   
                                                    2024   
11:                              Systolic blood   
pressure                  142 mm[Hg]                Pepper Aichholz  
Work Phone:   
[phone]                          [facility]  
   
                                                    2024   
13:          Body height         180.3 cm            Beverly Zirker   
APRN-CNP  
Work Phone:   
[phone]                          [employer]  
   
                                                    2024   
13:                              Body mass index   
(BMI) [Ratio]             29.85 kg/m2               Beverly Ottorker   
APRN-CNP  
Work Phone:   
[phone]                          [employer]  
   
                                                    2024   
13:          Body temperature    97.7 [degF]         Beverly Ottorker   
APRN-CNP  
Work Phone:   
[phone]                          [facility]  
   
                                                    2024   
13:          Body weight         97.07 kg            Beverly Famrker   
APRN-CNP  
Work Phone:   
[phone]                          [employer]  
   
                                                    2024   
13:                              Diastolic blood   
pressure                  70 mm[Hg]                 Beverly Zirker   
APRN-CNP  
Work Phone:   
[phone]                          [employer]  
   
                                                    Comment on   
above:                                  [employer]   
   
                                                    2024   
13:          Heart rate          90 /min             Beverly Ottorker   
APRN-CNP  
Work Phone:   
[phone]                          [employer]  
   
                                                    2024   
13:          Respiratory rate    22 /min             Beverly Ottorker   
APRN-CNP  
Work Phone:   
[phone]                          [employer]  
   
                                                    2024   
13:                              SaO2% (BldA) [Mass   
fraction]                 98 %                      Beverly Ottorker   
APRN-CNP  
Work Phone:   
[phone]                          [employer]  
   
                                                    2024   
13:                              Systolic blood   
pressure                  162 mm[Hg]                Beverly Zirker   
APRN-CNP  
Work Phone:   
[phone]                          [employer]  
   
                                                    Comment on   
above:                                  [employer]   
   
                                                    2023   
09:                              Diastolic blood   
pressure                  70 mm[Hg]                 Pepper Mendoza  
Work Phone:   
[phone]                          [facility]  
   
                                                    2023   
09:          Heart rate          72 /min             Pepper Kelly  
Work Phone:   
[phone]                          [facility]  
   
                                                    2023   
09:          Respiratory rate    16 /min             Pepper Palmerhholz  
Work Phone:   
[phone]                          [facility]  
   
                                                    2023   
09:                              SaO2% (BldA) [Mass   
fraction]                 96 %                      Pepper Palmerhholz  
Work Phone:   
[phone]                          [facility]  
   
                                                    2023   
09:                              Systolic blood   
pressure                  149 mm[Hg]                Pepper Palmerhholz  
Work Phone:   
[phone]                          [facility]  
   
                                                    2023   
09:          Body temperature    98 [degF]           Pepper Palmerhholz  
Work Phone:   
[phone]                          [facility]  
   
                                                    2023   
08:                              Inhaled oxygen flow   
rate                      8 L/min                   Pepper Palmerhholz  
Work Phone:   
[phone]                          [facility]  
   
                                                    2023   
06:          Body height         180.34 cm           Pepper Sharlaholz  
Work Phone:   
[phone]                          [facility]  
   
                                                    2023   
06:                              Body mass index   
(BMI) [Ratio]             29.8 kg/m2                Pepperlorenzo Magdalenoholz  
Work Phone:   
[phone]                          [facility]  
   
                                                    2023   
06:          Body weight         97 kg               Pepper Sharlaholz  
Work Phone:   
[phone]                          [facility]  
   
                                                    2023   
12:          Body height         180.34 cm           Ronald Sánchez  
Other Phone:   
[phone]                           [facility]   
[employer]   
[employer]  
Other Phone:   
[phone]  
   
                                                    2023   
12:                              Body mass index   
(BMI) [Ratio]             29.56 kg/m2               Ronald Sánchez  
Other Phone:   
[phone]                           [facility]   
[employer]   
[employer]  
Other Phone:   
[phone]  
   
                                                    2023   
12:          Body temperature    97.8 [degF]         Ronald Sánchez  
Other Phone:   
[phone]                           [facility]   
[employer]   
[employer]  
Other Phone:   
[phone]  
   
                                                    2023   
12:          Body weight         96.16 kg            Ronald Finnlisa  
Other Phone:   
[phone]                           [facility] [place] [facility]   
[employer]   
[employer]  
Other Phone:   
[phone]  
   
                                                    2023   
12:                              Diastolic blood   
pressure                  78 mm[Hg]                 Ronald Princess  
Other Phone:   
[phone]                           [facility]   
[employer]   
[employer]  
Other Phone:   
[phone]  
   
                                                    2023   
12:                              SaO2% (BldA) [Mass   
fraction]                 99 %                      Ronald Finnlisa  
Other Phone:   
[phone]                           [facility]   
[employer]   
[employer]  
Other Phone:   
[phone]  
   
                                                    2023   
12:                              Systolic blood   
pressure                  158 mm[Hg]                Ronald Princess  
Other Phone:   
[phone]                           [facility]   
[employer]   
[employer]  
Other Phone:   
[phone]  
   
                                                    2023   
12:                              Diastolic blood   
pressure                  61 mm[Hg]                 FNP-BC Shantell   
Kell  
Work Phone:   
[phone]                          [facility]  
   
                                                    2023   
12:          Heart rate          75 /min             FNP-BC Shantell   
Kell  
Work Phone:   
[phone]                          [facility]  
   
                                                    2023   
12:          Respiratory rate    16 /min             [facility] Shantell   
Kell  
Work Phone:   
[phone]                          [facility]  
   
                                                    2023   
12:                              SaO2% (BldA) [Mass   
fraction]                 100 %                     [facility] Shantell   
Kell  
Work Phone:   
[phone]                          [facility]  
   
                                                    2023   
12:                              Systolic blood   
pressure                  164 mm[Hg]                [facility]NP-BC Shantell   
Kell  
Work Phone:   
[phone]                          [facility]  
   
                                                    2023   
11:          Body height         180.34 cm           FNP-BC Shantell   
Kell  
Work Phone:   
[phone]                          [facility]  
   
                                                    2023   
11:          Body weight         96.16 kg            FNP-BC Shantell   
Kell  
Work Phone:   
[phone]                          [facility]  
   
                                                    2022   
13:          Body height         180.34 cm           Carolee Cuevas  
Other Phone:   
[phone]                           [facility]   
[employer]   
[employer]  
Other Phone:   
[phone]  
   
                                                    2022   
13:                              Body mass index   
(BMI) [Ratio]             29.56 kg/m2               Carolee Rosadogovindo  
Other Phone:   
[phone]                           [facility]   
[employer]   
[employer]  
Other Phone:   
[phone]  
   
                                                    2022   
13:          Body temperature    97.8 [degF]         Carolee Bocanegrao  
Other Phone:   
[phone]                           [facility]   
[employer]   
[employer]  
Other Phone:   
[phone]  
   
                                                    2022   
13:          Body weight         96.16 kg            Carolee Bocanegrao  
Other Phone:   
[phone]                           [facility]   
[employer]   
[employer]  
Other Phone:   
[phone]  
   
                                                    2022   
13:                              Diastolic blood   
pressure                  68 mm[Hg]                 Carolee Rosadogovindo  
Other Phone:   
[phone]                           [facility]   
[employer]   
[employer]  
Other Phone:   
[phone]  
   
                                                    2022   
13:                              SaO2% (BldA) [Mass   
fraction]                 99 %                      Carolee Bocanegrao  
Other Phone:   
[phone]                           [facility]   
[employer]   
[employer]  
Other Phone:   
[phone]  
   
                                                    2022   
13:                              Systolic blood   
pressure                  146 mm[Hg]                Carolee Rosadogovindo  
Other Phone:   
[phone]                           [facility]   
[employer]   
[employer]  
Other Phone:   
[phone]  
   
                                                    2022   
12:          Body height         180.34 cm           Carolee Rosadogovindo  
Other Phone:   
[phone]                           [facility]   
[employer]   
[employer]  
Other Phone:   
[phone]  
   
                                                    2022   
12:                              Body mass index   
(BMI) [Ratio]             29.15 kg/m2               Carolee Rosadogovindo  
Other Phone:   
[phone]                           [facility]   
[employer]   
[employer]  
Other Phone:   
[phone]  
   
                                                    2022   
12:          Body temperature    97.7 [degF]         Carolee Cuevas  
Other Phone:   
[phone]                           [facility]   
[employer]   
[employer]  
Other Phone:   
[phone]  
   
                                                    2022   
12:          Body weight         94.8 kg             Carolee Cuevas  
Other Phone:   
[phone]                           [facility]   
[employer]   
[employer]  
Other Phone:   
[phone]  
   
                                                    2022   
12:                              Diastolic blood   
pressure                  94 mm[Hg]                 Carolee Cuevas  
Other Phone:   
[phone]                           [facility]   
[employer]   
[employer]  
Other Phone:   
[phone]  
   
                                                    2022   
12:                              SaO2% (BldA) [Mass   
fraction]                 99 %                      Carolee Cuevas  
Other Phone:   
[phone]                           [facility]   
[employer]   
[employer]  
Other Phone:   
[phone]  
   
                                                    2022   
12:                              Systolic blood   
pressure                  176 mm[Hg]                Carolee Cuevas  
Other Phone:   
[phone]                           [facility] [place]   
[employer]   
[employer]  
Other Phone:   
[phone]  
   
                                                    2022   
17:                              Diastolic blood   
pressure                  73 mm[Hg]                 FNP-BC Shantell   
Kell  
Work Phone:   
[phone]                          [facility]  
   
                                                    2022   
17:          Heart rate          70 /min             FNP-BC Shantell   
Kell  
Work Phone:   
[phone]                          [facility]  
   
                                                    2022   
17:          Respiratory rate    10 /min             FNP-BC Shantell   
Kell  
Work Phone:   
[phone]                          [facility]  
   
                                                    2022   
17:                              SaO2% (BldA) [Mass   
fraction]                 100 %                     [facility] Shantell   
Kell  
Work Phone:   
[phone]                          [facility]  
   
                                                    2022   
17:                              Systolic blood   
pressure                  187 mm[Hg]                [facility] Shantell   
Kell  
Work Phone:   
[phone]                          [facility]  
   
                                                    2022   
13:          Body height         180.34 cm           FNP-BC Shantell   
Kell  
Work Phone:   
[phone]                          [facility]  
   
                                                    2022   
13:          Body weight         96.61 kg            FNP-BC Shantell Castorena  
Work Phone:   
[phone]                          [facility]  
   
                                                    2022   
12:          Body height         180.34 cm           Ronald Sánchez  
Other Phone:   
[phone]                           [facility]   
[employer]   
[employer]  
Other Phone:   
[phone]  
   
                                                    2022   
12:                              Body mass index   
(BMI) [Ratio]             29.15 kg/m2               Ronald Sánchez  
Other Phone:   
[phone]                           [facility]   
[employer]   
[employer]  
Other Phone:   
[phone]  
   
                                                    2022   
12:          Body temperature    97.3 [degF]         Ronald Sánchez  
Other Phone:   
[phone]                           [facility]   
[employer]   
[employer]  
Other Phone:   
[phone]  
   
                                                    2022   
12:          Body weight         94.8 kg             Ronald Sánchez  
Other Phone:   
[phone]                           [facility]   
[employer]   
[employer]  
Other Phone:   
[phone]  
   
                                                    2022   
12:                              Diastolic blood   
pressure                  52 mm[Hg]                 Ronald Sánchez  
Other Phone:   
[phone]                           [facility]   
[employer]   
[employer]  
Other Phone:   
[phone]  
   
                                                    2022   
12:                              SaO2% (BldA) [Mass   
fraction]                 99 %                      Ronald Sánchez  
Other Phone:   
[phone]                           [facility]   
[employer]   
[employer]  
Other Phone:   
[phone]  
   
                                                    2022   
12:                              Systolic blood   
pressure                  146 mm[Hg]                Ronald Sánchez  
Other Phone:   
[phone]                           [facility]   
[employer]   
[employer]  
Other Phone:   
[phone]  
   
                                                    2022   
12:          Body height         180.34 cm           Ronald Sánchez  
Other Phone:   
[phone]                           [facility]   
[employer]   
[employer]  
Other Phone:   
[phone]  
   
                                                    2022   
12:                              Body mass index   
(BMI) [Ratio]             29.15 kg/m2               Ronald Sánchez  
Other Phone:   
[phone]                           [facility]   
[employer]   
[employer]  
Other Phone:   
[phone]  
   
                                                    2022   
12:          Body temperature    97.7 [degF]         Ronald Finnlisa  
Other Phone:   
[phone]                           [facility]   
[employer]   
[employer]  
Other Phone:   
[phone]  
   
                                                    2022   
12:          Body weight         94.8 kg             Ronald Princess  
Other Phone:   
[phone]                           [facility]   
[employer]   
[employer]  
Other Phone:   
[phone]  
   
                                                    2022   
12:                              Diastolic blood   
pressure                  60 mm[Hg]                 Ronald Princess  
Other Phone:   
[phone]                           [facility]   
[employer]   
[employer]  
Other Phone:   
[phone]  
   
                                                    2022   
12:                              SaO2% (BldA) [Mass   
fraction]                 98 %                      Ronald Finnlisa  
Other Phone:   
[phone]                           [facility]   
[employer]   
[employer]  
Other Phone:   
[phone]  
   
                                                    2022   
12:                              Systolic blood   
pressure                  144 mm[Hg]                Ronald Princess  
Other Phone:   
[phone]                           [facility]   
[employer]   
[employer]  
Other Phone:   
[phone]  
   
                                                    2022   
12:                              Diastolic blood   
pressure                  73 mm[Hg]                 FNP-BC Shantell   
Kell  
Work Phone:   
[phone]                          [facility]  
   
                                                    2022   
12:          Heart rate          61 /min             FNP-BC Shantell   
Kell  
Work Phone:   
[phone]                          [facility]  
   
                                                    2022   
12:          Respiratory rate    16 /min             FNP-BC Shantell   
Kell  
Work Phone:   
[phone]                          [facility]  
   
                                                    2022   
12:                              SaO2% (BldA) [Mass   
fraction]                 100 %                     FNP-BC Shantell   
Kell  
Work Phone:   
[phone]                          [facility]  
   
                                                    2022   
12:                              Systolic blood   
pressure                  151 mm[Hg]                FNP-BC Shantell   
Kell  
Work Phone:   
[phone]                          [facility]  
   
                                                    2022   
10:                              Inhaled oxygen flow   
rate                      8 L/min                   [facility] Shantell Castorena  
Work Phone:   
[phone]                          [facility]  
   
                                                    2022   
08:          Body height         180.34 cm           [facility] Shantell Castorena  
Work Phone:   
[phone]                          [facility]  
   
                                                    2022   
08:                              Body mass index   
(BMI) [Ratio]             29.8 kg/m2                [facility] Shantell Castorena  
Work Phone:   
[phone]                          [facility]  
   
                                                    2022   
08:          Body weight         97.06 kg            [facility] Shantell Castorena  
Work Phone:   
[phone]                          [facility]  
   
                                                    2022   
07:          Body temperature    98.3 [degF]         [facility] Shantell Castorena  
Work Phone:   
[phone]                          [facility]  
   
                                                    2022   
13:          Body height         180.34 cm           Ronald Sánchez  
Other Phone:   
[phone]                           [facility]   
[employer]   
[employer]  
Other Phone:   
[phone]  
   
                                                    2022   
13:                              Body mass index   
(BMI) [Ratio]             29.84 kg/m2               Ronald Sánchez  
Other Phone:   
[phone]                           [facility]   
[employer]   
[employer]  
Other Phone:   
[phone]  
   
                                                    2022   
13:          Body temperature    97.9 [degF]         Ronald Sánchez  
Other Phone:   
[phone]                           [facility]   
[employer]   
[employer]  
Other Phone:   
[phone]  
   
                                                    2022   
13:          Body weight         97.07 kg            Ronald Sánchez  
Other Phone:   
[phone]                           [facility]   
[employer]   
[employer]  
Other Phone:   
[phone]  
   
                                                    2022   
13:                              Diastolic blood   
pressure                  68 mm[Hg]                 Ronald Sánchez  
Other Phone:   
[phone]                           [facility]   
[employer]   
[employer]  
Other Phone:   
[phone]  
   
                                                    2022   
13:                              SaO2% (BldA) [Mass   
fraction]                 99 %                      Ronald Finnlisa  
Other Phone:   
[phone]                           [facility]   
[employer]   
[employer]  
Other Phone:   
[phone]  
   
                                                    2022   
13:                              Systolic blood   
pressure                  140 mm[Hg]                Ronald Finnlisa  
Other Phone:   
[phone]                           [facility]   
[employer]   
[employer]  
Other Phone:   
[phone]  
   
                                                    2022   
14:                              Diastolic blood   
pressure                  80 mm[Hg]                 FNP-BC Shantell   
Kell  
Work Phone:   
[phone]                          [facility]  
   
                                                    2022   
14:          Heart rate          86 /min             FNP-BC Shantell   
Kell  
Work Phone:   
[phone]                          [facility]  
   
                                                    2022   
14:          Respiratory rate    16 /min             FNP-BC Shantell   
Kell  
Work Phone:   
[phone]                          [facility]  
   
                                                    2022   
14:                              SaO2% (BldA) [Mass   
fraction]                 97 %                      FNP-BC Shantell   
Kell  
Work Phone:   
[phone]                          [facility]  
   
                                                    2022   
14:                              Systolic blood   
pressure                  169 mm[Hg]                FNP-BC Shantell   
Kell  
Work Phone:   
[phone]                          [facility]  
   
                                                    2022   
13:          Body height         180.34 cm           FNP-BC Shantell   
Kell  
Work Phone:   
[phone]                          [facility]  
   
                                                    2022   
13:                              Body mass index   
(BMI) [Ratio]             29.8 kg/m2                FNP-BC Shantell   
[place]ell  
Work Phone:   
[phone]                          [facility]  
   
                                                    2022   
13:          Body weight         97.06 kg            FNP-BC Shantell   
Kell  
Work Phone:   
[phone]                          [facility]  
   
                                                    2022   
13:          Body height         180.34 cm           Carolee Cuevas  
Other Phone:   
[phone]                           [facility]   
Professional   
[employer]poration  
Other Phone:   
[phone]  
   
                                                    2022   
13:                              Body mass index   
(BMI) [Ratio]             29.98 kg/m2               Carolee Cuevas  
Other Phone:   
[phone]                           [facility]   
[employer]   
[employer]  
Other Phone:   
[phone]  
   
                                                    2022   
13:          Body temperature    96.1 [degF]         Carolee Bocanegrao  
Other Phone:   
[phone]                           [facility]   
[employer]   
[employer]  
Other Phone:   
[phone]  
   
                                                    2022   
13:          Body weight         97.52 kg            Carolee Cuevas  
Other Phone:   
[phone]                           [facility]   
[employer]   
[employer]  
Other Phone:   
[phone]  
   
                                                    2022   
13:                              Diastolic blood   
pressure                  74 mm[Hg]                 Carolee Cuevas  
Other Phone:   
[phone]                           [facility]   
[employer]   
[employer]  
Other Phone:   
[phone]  
   
                                                    2022   
13:                              SaO2% (BldA) [Mass   
fraction]                 98 %                      Carolee Cuevas  
Other Phone:   
[phone]                           [facility]   
[employer]   
[employer]  
Other Phone:   
[phone]  
   
                                                    2022   
13:                              Systolic blood   
pressure                  138 mm[Hg]                Carolee Cuevas  
Other Phone:   
[phone]                           [facility]   
[employer]   
[employer]  
Other Phone:   
[phone]  
   
                                                    2022   
13:          Body height         180.34 cm           Carolee Cuevas  
Other Phone:   
[phone]                           [facility]   
[employer]   
[employer]  
Other Phone:   
[phone]  
   
                                                    2022   
13:                              Body mass index   
(BMI) [Ratio]             29.98 kg/m2               Carolee Bocanegrao  
Other Phone:   
[phone]                           [facility]   
[employer]   
[employer]  
Other Phone:   
[phone]  
   
                                                    2022   
13:          Body temperature    96.8 [degF]         Carolee Bocanegrao  
Other Phone:   
[phone]                           [facility]   
[employer]   
[employer]  
Other Phone:   
[phone]  
   
                                                    2022   
13:          Body weight         97.52 kg            Carolee Bocanegrao  
Other Phone:   
[phone]                           [facility]   
[employer]   
[employer]  
Other Phone:   
[phone]  
   
                                                    2022   
13:                              Diastolic blood   
pressure                  50 mm[Hg]                 Carolee Cuevas  
Other Phone:   
[phone]                           [facility]   
[employer]   
[employer]  
Other Phone:   
[phone]  
   
                                                    2022   
13:                              SaO2% (BldA) [Mass   
fraction]                 99 %                      Carolee Cuevas  
Other Phone:   
[phone]                           [facility]   
[employer]   
[employer]  
Other Phone:   
[phone]  
   
                                                    2022   
13:                              Systolic blood   
pressure                  130 mm[Hg]                Carolee Cuevas  
Other Phone:   
[phone]                           [facility]   
[employer]   
[employer]  
Other Phone:   
[phone]  
   
                                                    2022   
13:          Body height         180.34 cm           Ronald Sánchez  
Other Phone:   
[phone]                           [facility]   
[employer]   
[employer]  
Other Phone:   
[phone]  
   
                                                    2022   
13:                              Body mass index   
(BMI) [Ratio]             29.98 kg/m2               Ronald Sánchez  
Other Phone:   
[phone]                           [facility]   
[employer]   
[employer]  
Other Phone:   
[phone]  
   
                                                    2022   
13:          Body temperature    97.2 [degF]         Ronald Sánchez  
Other Phone:   
[phone]                           [facility]   
[employer]   
[employer]  
Other Phone:   
[phone]  
   
                                                    2022   
13:          Body weight         97.52 kg            Ronald Sánchez  
Other Phone:   
[phone]                           [facility]   
[employer]   
[employer]  
Other Phone:   
[phone]  
   
                                                    2022   
13:                              Diastolic blood   
pressure                  60 mm[Hg]                 Ronald Sánchez  
Other Phone:   
[phone]                           [facility]   
[employer]   
[employer]  
Other Phone:   
[phone]  
   
                                                    2022   
13:                              SaO2% (BldA) [Mass   
fraction]                 98 %                      Ronald Sánchez  
Other Phone:   
[phone]                           [facility]   
[employer]   
[employer]  
Other Phone:   
[phone]  
   
                                                    2022   
13:                              Systolic blood   
pressure                  160 mm[Hg]                Ronald Sánchez  
Other Phone:   
[phone]                           [facility]   
[employer]   
[employer]  
Other Phone:   
[phone]  
   
                                                    2022   
14:                              Diastolic blood   
pressure                  80 mm[Hg]                 FNP-BC Shantell   
Kell  
Work Phone:   
[phone]                          [facility]  
   
                                                    2022   
14:          Heart rate          53 /min             FNP-BC Shantell   
Kell  
Work Phone:   
[phone]                          [facility]  
   
                                                    2022   
14:          Respiratory rate    16 /min             FNP-BC Shantell   
Kell  
Work Phone:   
[phone]                          [facility]  
   
                                                    2022   
14:                              SaO2% (BldA) [Mass   
fraction]                 98 %                      FNP-BC Shantell   
Kell  
Work Phone:   
[phone]                          [facility]  
   
                                                    2022   
14:                              Systolic blood   
pressure                  168 mm[Hg]                FNP-BC Shantell   
Kell  
Work Phone:   
[phone]                          [facility]  
   
                                                    2022   
12:          Body height         180.34 cm           FNP-BC Shantell   
Kell  
Work Phone:   
[phone]                          [facility]  
   
                                                    2022   
12:                              Body mass index   
(BMI) [Ratio]             29.8 kg/m2                FNP-BC Shantell   
Kell  
Work Phone:   
[phone]                          [facility]  
   
                                                    2022   
12:          Body weight         97.06 kg            FNP-BC Shantell   
Kell  
Work Phone:   
[phone]                          [facility]  
   
                                                    2022   
10:          Body temperature    98.1 [degF]         FNP-BC Shantell   
Kell  
Work Phone:   
[phone]                          [facility]  
   
                                                    2022   
16:          Body height         180.34 cm           Ronald Princess  
Other Phone:   
[phone]                           [facility]   
[employer]   
[employer]  
Other Phone:   
[phone]  
   
                                                    2022   
16:                              Body mass index   
(BMI) [Ratio]             29.84 kg/m2               Ronald Princess  
Other Phone:   
[phone]                           [facility]   
[employer]   
[employer]  
Other Phone:   
[phone]  
   
                                                    2022   
16:          Body weight         97.07 kg            Ronald Princess  
Other Phone:   
[phone]                           [facility]   
[employer]   
[employer]  
Other Phone:   
[phone]  
   
                                                    2022   
16:                              Diastolic blood   
pressure                  60 mm[Hg]                 Ronald Princess  
Other Phone:   
[phone]                           [facility]   
[employer]   
[employer]  
Other Phone:   
[phone]  
   
                                                    2022   
16:                              SaO2% (BldA) [Mass   
fraction]                 98 %                      Ronald Princess  
Other Phone:   
[phone]                           [facility]   
[employer]   
[employer]  
Other Phone:   
[phone]  
   
                                                    2022   
16:                              Systolic blood   
pressure                  150 mm[Hg]                Ronald Princess  
Other Phone:   
[phone]                           [facility]   
[employer]   
[employer]  
Other Phone:   
[phone]  
  
  
  
Encounters  
  
  
                          Encounter Date Encounter Type Care Provider Facility  
   
                                                    Start: 10-  
End: 10-           ambulatory                Janette Borja MD                           Facility:[facility]  
   
                                                    Start: 10-  
End: 10-           Bamboo flowsheet          Nancy Torrez DPM  
Work Phone:   
[phone]                          [facility]  
   
                                                    Start: 10-  
End: 10-           Bamboo flowsheet          Nancy Torrez DPM  
Work Phone:   
[phone]                          [facility]  
   
                                                    Start: 10-  
End: 10-                         Patient encounter   
procedure                               Nancy Torrez DPM  
Work Phone:   
[phone]                          [facility]  
   
                                        Comment on above:   Dermatophytosis of n  
ail (Primary Dx);  
Dystrophic nail;  
Diabetic polyneuropathy associated with type 2 diabetes mellitus   
(CMS/HCC);  
Encounter for long-term (current) use of insulin (CMS/[facility])   
   
                                                    Start: 10-  
End: 10-     ambulatory          NANCY TORREZ     Not Available  
   
                                                    Start: 10-  
End: 10-     ambulatory          [facility]  
   
                                                    Start: 10-  
End: 10-           Refill                    Pepper Mendoza NP  
Work Phone:   
[phone]                          [facility]  
   
                                        Comment on above:   Muscle spasm;  
Spinal stenosis of lumbar region with neurogenic claudication   
   
                                                    Start: 10-  
End: 10-                         Office outpatient visit   
25 minutes                              Pepper Mendoza NP  
Work Phone:   
[phone]                          [facility]  
   
                                        Comment on above:   Degeneration of inte  
rvertebral disc of lumbar region,   
unspecified   
whether pain present (Primary Dx);  
Acute right hip pain;  
Primary hypertension (CMS/HCC);  
Hepatic fibrosis;  
End stage renal disease (CMS/HCC);  
Type 2 diabetes mellitus without complication, with long-term   
current use of insulin (CMS/HCC);  
Tobacco user;  
Mixed hyperlipidemia (CMS/[facility])   
   
                                                    Start: 10-  
End: 10-     ambulatory          PEPPER MENDOZA       Not Available  
   
                                                    Start: 2024  
End: 2024                         Admission to same day   
surgery center                          Pepper Mendoza  
Work Phone:   
[phone]                          [facility]   
[place]  
Work Phone:   
[phone]  
   
                                                    Start: 2024  
End: 2024           ambulatory                Pepper Mendoza  
Work Phone:   
[phone]                          [facility]  
Work Phone:   
[phone]  
   
                                                    Start: 2024  
End: 2024     ambulatory          JIM GRIGSBY   Not Available  
   
                                                    Start: 2024  
End: 2024     ambulatory          PEPPER MENDOZA    [facility]  
   
                                                    Start: 2024  
End: 2024                         Patient encounter   
procedure                               Pepper Mendoza  
Work Phone:   
[phone]                          [facility]   
[facility]  
Work Phone:   
[phone]  
   
                                                    Start: 2024  
End: 2024     ambulatory          NANCY TORREZ     Not Available  
   
                                                    Start: 2024  
End: 2024     ambulatory          IMAD ASAELIANA          [facility]  
   
                                                    Start: 2024  
End: 2024                         Patient encounter   
procedure                               Pepper Magdalenofrancisca  
Work Phone:   
[phone]                          [facility]   
[facility]   
Gastroenterology  
Work Phone:   
[phone]  
   
                                                    Start: 2024  
End: 2024     ambulatory          PEPPER PALMERBOGDANFRANCISCA       Not Available  
   
                                                    Start: 2024  
End: 2024                         Evaluation and   
management of inpatient   CYRIL MCENIL [facility]  
   
                                                    Start: 2024  
End: 2024                         Evaluation and   
management of inpatient   SHABANA G [facility]  
   
                                                    Start: 2024  
End: 2024                         Evaluation and   
management of inpatient   [facility]  
   
                                                    Start: 2024  
End: 2024     ambulatory          [facility]  
   
                                        Start: 2024   Encounter for other   
preprocedural   
examination               [facility]             [facility]  
   
                                                    Start: 2024  
End: 2024     ambulatory          NANCY TORREZ     Not Available  
   
                                                    Start: 2024  
End: 2024     ambulatory          NANCY TORREZ     Not Available  
   
                                                    Start: 2024  
End: 2024     ambulatory          JOSEF ALEXANDER [facility]   
Ambulatory [facility]  
   
                                                    Start: 2024  
End: 2024     ambulatory          AKOSUA M LISA [facility]  
   
                                                    Start: 2024  
End: 2024                         Patient encounter   
procedure                               Pepper Kelly  
Work Phone:   
[phone]                          [facility]   
Health  
Work Phone:   
[phone]  
   
                                                    Start: 2024  
End: 2024           ambulatory                Pepper JOCELYN Mendoza  
Work Phone:   
[phone]                          [facility]  
Work Phone:   
[phone]  
   
                                                    Start: 2024  
End: 2024     ambulatory          AKOSUA M NIENBERG [facility]  
   
                                                    Start: 2024  
End: 2024     ambulatory          [facility]     [facility]  
   
                                                    Start: 2024  
End: 2024                         Patient encounter   
procedure                               Pepper Mendoza  
Work Phone:   
[phone]                          [facility]   
Gastroenterology  
Work Phone:   
[phone]  
   
                                        Start: 2024   Patient encounter   
procedure                               Pepper Mendoza NP  
Work Phone:   
[phone]                          [facility]  
   
                                                    Start: 2024  
End: 2024     ambulatory          PEPPER MAGDALENOHOLZ       Not Available  
   
                                                    Start: 2024  
End: 2024     ambulatory          NANCY TORREZ     Not Available  
   
                                                    Start: 2024  
End: 2024     ambulatory          [facility]     [facility]  
   
                                                    Start: 2024  
End: 2024     ambulatory          PEPPER SHARLAHOLZ       Not Available  
   
                                                    Start: 2024  
End: 2024     ambulatory          [facility]  
   
                                                    Start: 2024  
End: 2024                         Office outpatient visit   
25 minutes                              [facility]   
AP[facility]-C[facility]  
Work Phone:   
[phone]                          [facility]   
Infectious Disease  
   
                                        Comment on above:   Hospital discharge f  
ollow-up (Primary Dx);  
End-stage renal disease on peritoneal dialysis (CMS-HCC);  
History of community acquired pneumonia;  
Light cigarette smoker;  
Insulin dependent type 2 diabetes mellitus (CMS-HCC);  
Legally blind;  
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   
   
                                                    Start: 2024  
End: 2024     ambulatory          VALERIA JORDAN       [facility]  
   
                                                    Start: 2024  
End: 2024     ambulatory          PEPPER SHARLAHOLZ       Not Available  
   
                                                    Start: 2024  
End: 2024                         Evaluation and   
management of inpatient   BASIA SOMMER             [facility]  
   
                                                    Start: 03-  
End: 2024                         Emergency department   
patient visit                           FRANCISCO CALABRESE                             [facility]  
   
                                                    Start: 03-  
End: 2024                         Emergency department   
patient visit             PEPPER MENDOZA           [facility]  
   
                                                    Start: 2024  
End: 2024     ambulatory          PEPPER MENDOZA     [facility]  
   
                                                    Start: 2024  
End: 2024     ambulatory          NANCY TORREZ     Not Available  
   
                                                    Start: 2024  
End: 2024     ambulatory          PEPPER MENDOZA     [facility]  
   
                                Start: 2024 Telephone encounter Stacey VENTURA   
Medardo   
APRN-CNP  
Work Phone:   
[phone]                          [facility]  
   
                                                    Start: 2024  
End: 2024     ambulatory          PEPPER MENDOZA       Not Available  
   
                                                    Start: 2023  
End: 2023                         Admission to same day   
surgery center                          Pepper Mendoza  
Work Phone:   
[phone]                          [facility]   
Center [facility] [place]  
   
                                                    Start: 2023  
End: 2023           ambulatory                Pepper Mendoza  
Work Phone:   
[phone]                          [facility]  
Work Phone:   
[phone]  
   
                                                    Start: 2023  
End: 2023                         Patient encounter   
procedure                               Pepper Mendoza  
Work Phone:   
[phone]                          [facility]Pre-Surgical   
Testing  
Work Phone:   
[phone]  
   
                                                    Start: 2023  
End: 2023           ambulatory                Ronald Sánchez  
Other Phone:   
[phone]                           [employer]   
[employer]  
Other Phone:   
[phone]  
   
                                        Start: 2023   Office outpatient vi  
sit   
10 minutes                Ronald Sánchez        [facility]  
   
                                                    Start: 2023  
End: 2023                         Admission to same day   
surgery center                          [facility] Shantell Castorena  
Work Phone:   
[phone]                          [facility]-Interventional   
Radiology  
Work Phone:   
[phone]  
   
                                                    Start: 2023  
End: 2023           ambulatory                [facility] Shantell Castorena  
Work Phone:   
[phone]                          [facility]  
Work Phone:   
[phone]  
   
                                                    Start: 2022  
End: 2022           ambulatory                Carolee Cuevas  
Other Phone:   
[phone]                           [employer]   
[employer]  
Other Phone:   
[phone]  
   
                                                    Start: 2022  
End: 2022                         Patient encounter   
procedure                               [facility] Shantell Castorena  
Work Phone:   
[phone]                          [facility]-Ultrasound   
[facility]ascular  
   
                                                    Start: 2022  
End: 2022           ambulatory                Carolee Cuevas  
Other Phone:   
[phone]                           [employer]   
[employer]  
Other Phone:   
[phone]  
   
                          Start: 2022 [facility] visit, estab pt Carolee Rosadoximena   
[facility] Vascular Surgery  
   
                                                    Start: 10-  
End: 10-           ambulatory                Ronald Sánchez  
Other Phone:   
[phone]                           [employer]   
[employer]  
Other Phone:   
[phone]  
   
                                        Start: 10-   Encounter for other   
preprocedural   
examination               Ronald Sánchez        [facility] Vascular Surgery  
   
                          Start: 10- Telephone encounter Ronald cali [facility]  
   
                                                    Start: 2022  
End: 2022                         Admission to same day   
surgery center                          [facility] Shantell Castorena  
Work Phone:   
[phone]                          [facility]   
Ctr-Interventional   
Radiology  
   
                                                    Start: 2022  
End: 2022           ambulatory                [facility] Shantell Castorena  
Work Phone:   
[phone]                          [facility]  
Work Phone:   
[phone]  
   
                                                    Start: 2022  
End: 2022           ambulatory                Ronald Sánchez  
Other Phone:   
[phone]                           [employer]  
Other Phone:   
[phone]  
   
                                        Start: 2022   Office outpatient vi  
sit   
15 minutes                Ronald Sánchez        [facility] Surgery  
   
                                                    Start: 2022  
End: 2022                         Patient encounter   
procedure                               [facility] Shantell Castorena  
Work Phone:   
[phone]                          [facility]Ultrasound   
[facility]ascular  
   
                                                    Start: 2022  
End: 2022           ambulatory                Ronald Langlisa  
Other Phone:   
[phone]                           [employer]  
Other Phone:   
[phone]  
   
                                        Start: 2022   Postop follow up vis  
it   
related to original px    Ronald Sánchez        [facility] Surgery  
   
                                                    Start: 2022  
End: 2022                         Admission to same day   
surgery center                          [facility] Shantell Castorena  
Work Phone:   
[phone]                          [facility]   
Center [facility] [place]  
   
                                                    Start: 2022  
End: 2022           ambulatory                Ronald Sánchez  
Other Phone:   
[phone]                           [employer]  
Other Phone:   
[phone]  
   
                                        Start: 2022   Office outpatient vi  
sit   
15 minutes                Ronald Aakashlisa        [facility] Surgery  
   
                                                    Start: 2022  
End: 2022                         Admission to same day   
surgery center                          [facility] Shantell Castorena  
Work Phone:   
[phone]                          [facility]Interventional   
Radiology  
   
                                                    Start: 2022  
End: 2022           ambulatory                Carolee Cuevas  
Other Phone:   
[phone]                           [employer]  
Other Phone:   
[phone]  
   
                          Start: 2022 Follow-up encounter Carolee LUCAS  
 Vascular Surgery  
   
                                                    Start: 2022  
End: 2022                         Patient encounter   
procedure                               [facility] Shantell Castorena  
Work Phone:   
[phone]                          [facility]Ultrasound   
[facility]ascular  
   
                                                    Start: 2022  
End: 2022           ambulatory                Carolee Cuevas  
Other Phone:   
[phone]                           [employer]  
Other Phone:   
[phone]  
   
                                        Start: 2022   Postop follow up vis  
it   
related to original px    Carolee Faith            [facility]  
   
                                                    Start: 2022  
End: 2022           ambulatory                Ronald Langlisa  
Other Phone:   
[phone]                           [employer]  
Other Phone:   
[phone]  
   
                                        Start: 2022   Postop follow up vis  
it   
related to original px    Ronald Sánchez        [facility] Surgery  
   
                                                    Start: 2022  
End: 2022                         Patient encounter   
procedure                               [facility]   
[facility]  
Work Phone:   
[phone]                          Transplant Center  
   
                                        Comment on above:   Pre-transplant evalu  
ation for kidney transplant (Primary Dx)   
   
                                                    Start: 2022  
End: 2022                         Patient encounter   
status                                  [facility]   
[facility]  
Work Phone:   
[phone]                          Transplant Center  
   
                                                    Start: 2022  
End: 2022                         Patient encounter   
procedure                               [facility]   
[facility]  
Work Phone:   
[phone]                          Transplant Center  
   
                                        Comment on above:   Pre-transplant evalu  
ation for kidney transplant (Primary Dx)   
   
                                                    Start: 2022  
End: 2022                         Patient encounter   
status                                  [facility]   
[facility]  
Work Phone:   
[phone]                          Transplant Center  
   
                                                    Start: 2022  
End: 2022                         Admission to same day   
surgery center                          [facility] Shantell Castoerna  
Work Phone:   
[phone]                          [facility]   
[facility] [place]  
   
                                                    Start: 2022  
End: 2022           ambulatory                Ronald Sánchez  
Other Phone:   
[phone]                           [employer]   
[employer]  
Other Phone:   
[phone]  
   
                                        Start: 2022   Encounter by joleen Sánchez        [facility]  
   
                                        Start: 2022   Office outpatient vi  
sit   
15 minutes                Ronald Sánchez        [facility] Surgery  
  
  
  
Procedures  
  
  
                          Date         Procedure    Procedure Detail Performing   
Clinician  
   
                          Start: 2024 Follow-up visit Follow-up    MONO CASANOVA  
   
                                        Start: 2024   Ultrasound elastogra  
phy of   
liver                                               Pepper Kelly  
Work Phone:   
[phone]  
   
                          Start: 2024 Ultrasonography of liver              
  Pepper Kelly  
Work Phone:   
[phone]  
   
                                        Start: 2023   Laparoscopic inserti  
on of   
peritoneal dialysis   
catheter                                            Pepper Kelly  
Work Phone:   
[phone]  
   
                                        Start: 2023   IR Fistulogram/TLA S  
tent   
(Right)                                             [facility] Shantell Castorena  
Work Phone:   
[phone]  
   
                                        Start: 2022   Ultrasonography of   
arteriovenous fistula                               [facility] Shantell Castorena  
Work Phone:   
[phone]  
   
                                        Start: 2022   IR Fistulogram/TLA S  
tent   
(Right)                                             [facility] Shantell Kell  
Work Phone:   
[phone]  
   
                                        Start: 2022   Ultrasonography of   
arteriovenous fistula                               [facility] Shantell Castorena  
Work Phone:   
[phone]  
   
                                        Start: 2022   Microalbumin [Mass/v  
olume]   
in Urine by Test strip                              Stacey Batres APRN-CNP  
Work Phone:   
[phone]  
   
                                        Start: 2022   Adult depression scr  
eening   
assessment                                          Staceyyi Batres APRN-CNP  
Work Phone:   
[phone]  
   
                          Start: 2022 Arteriovenous fistulization           
     [facility] Shantell Castorena  
Work Phone:   
[phone]  
   
                                        Start: 2022   IR Fistulogram/TLA S  
tent   
(Right)                                             [facility] Shantell Castorena  
Work Phone:   
[phone]  
   
                                        Start: 2022   Ultrasonography of   
arteriovenous fistula                               [facility] Shantell Castorena  
Work Phone:   
[phone]  
   
                          Start: 2022 Arteriovenous fistulization           
     [facility]ERYL Castorena  
Work Phone:   
[phone]  
   
                          Start: 2021 Colonoscopy               [facility]   
[facility]  
Work Phone:   
[phone]  
  
  
  
Plan of Treatment  
  
  
                          Date         Care Activity Detail       Author  
   
                                        Start: 2033   DTaP,Tdap and Td Vac  
cines   
(2 - Td or Tdap)                        DTaP,Tdap and Td   
Vaccines (2 - Td or   
Tdap)                                   [facility]  
   
                          Start: 2026 LIPID SCREEN LIPID SCREEN [facility]  
   
                                        Start: 2025   Urine screening for   
protein                                 Diabetes: Urine Protein   
Screening                               [facility]  
   
                          Start: 2025 Adult BMI Screening Adult BMI Screen  
ing [facility]  
   
                          Start: 03- Tobacco Screening Tobacco Screening   
[facility]  
   
                                        Start: 2025   Screening for malign  
ant   
neoplasm of colon                       Colorectal Cancer   
Screening                               [facility]  
   
                                        Comment on above:   Postponed from  (Patient Refused)   
   
                                                    Start: 2025  
End: 2025                         Patient encounter   
procedure                               2025 1:00 PM EST   
Procedure Visit [facility]   
PODIATRY [address] [place] [id number] [phone] Nancy Torrez, DPM [address] [place], [place]   
[id number] [phone]   
(Work) [phone]   
(Fax)                                   [facility] PODIATRY  
   
                          Start: 2024 DIABETES SCREEN DIABETES SCREEN [facility]  
   
                                                    Start: 2024  
End: 2024                         Patient encounter   
procedure                               2024 2:00 PM EST   
Office Visit [facility] [address] [place] [id number] [phone] Pepper Mendoza,  [address]   
[place]   
[id number] [phone]   
(Work) [phone]   
(Fax)                                   [facility]  
   
                                Start: 2024 Glaucoma screening Diabetes: R  
etinopathy   
Screening                               [facility]  
   
                                        Start: 2024   Hemoglobin A1c   
measurement                             Diabetes: Hemoglobin   
A1C                                     [facility]  
   
                          Start: 2024 Adult BMI Screening Adult BMI Screen  
ing [facility]  
   
                          Start: 2024 Tobacco Screening Tobacco Screening   
[facility]  
   
                          Start: 10- Influenza vaccination Influenza Vacc  
ine (#1) [facility]  
   
                                        Comment on above:   Postponed from  (Patient Does Not Have Time)   
   
                                                    Start: 10-  
End: 10-                         Patient encounter   
procedure                                           [facility] PODIATRY  
   
                                        Comment on above:   Arrived   
   
                          Start: 2024                           [facility]  
   
                                        Start: 2024   Ultrasonic guidance   
for   
needle biopsy                                       [facility]  
   
                          Start: 2024                           [facility]  
   
                                        Start: 2024   Actin smooth muscle   
IgG   
Ab [Units/volume] in   
Serum                                               [facility]  
   
                                        Start: 2024   Ceruloplasmin   
[Mass/volume] in Serum or   
Plasma                                              [facility]  
   
                                        Start: 2024   Hepatitis A virus Ab  
   
[Presence] in Serum by   
Immunoassay                                         [facility]  
   
                                        Start: 2024   Hepatitis A virus   
antibody, IgM type                                  [facility]  
   
                                        Start: 2024   Hepatitis B core ant  
ibody   
measurement                                         [facility]  
   
                                        Start: 2024   Hepatitis B core ant  
ibody   
measurement, IgM type                               [facility]  
   
                                        Start: 2024   Hepatitis B virus metcalf  
rface   
Ab [Presence] in Serum                              [facility]  
   
                                        Start: 2024   IgG [Mass/volume] in  
   
Serum or Plasma                                     [facility]  
   
                                        Start: 2024   Lipoprotein a   
[Moles/volume] in Serum   
or Plasma                                           [facility]  
   
                                        Start: 2024   Mitochondria M2 IgG   
Ab   
[Units/volume] in Serum                             [facility]  
   
                          Start: 2024                           [facility]  
   
                                        Start: 2024   Screening for malign  
ant   
neoplasm of colon         Colonoscopy               [email or web address]   
[facility]  
   
                                        Start: 2023   Administration of   
varicella zoster vaccine                Zoster (Shingles)   
Vaccine (2 of 2)                        [email or web address]   
[facility]  
   
                          Start: 10- Fall Risk Screening Fall Risk Screen  
ing [facility]   
[facility]  
   
                                        Start: 2023   Urine screening for   
protein                   Urine Microalbumin        [email or web address]   
[facility]  
   
                          Start: 2023 Depression Screening Depression Scre  
ening [facility]   
[facility]  
   
                                                    Start: 2023  
End: 2023                                             [facility]  
   
                          Start: 2023 Diabetic foot examination Diabetic F  
oot Exam [email or web address]   
[facility]  
   
                          Start: 2023 Influenza vaccination LUNG CANCER SC  
REENING [facility]  
   
                                        Start: 2023   Medicare Annual Well  
ness   
Visit                                   Medicare Annual   
Wellness Visit                          [facility]   
[facility]  
   
                          Start: 2023                           [facility]  
   
                          Start: 2022                           [facility]  
   
                          Start: 2022                           [facility]  
Work Phone:   
[phone]  
   
                          Start: 2022                           [facility]  
Work Phone:   
[phone]  
   
                                        Start: 2022   Ultrasonography of   
arteriovenous fistula     US AV Fistula             [facility]  
   
                          Start: 2022 Colonoscopy  COLONOSCOPY  [facility]  
   
                                        Start: 2022   COLORECTAL CANCER   
SCREENING                               COLORECTAL CANCER   
SCREENING                               [facility]  
   
                                        Start: 2022   ADVANCE DIRECTIVE   
DISCUSSION                              ADVANCE DIRECTIVE   
DISCUSSION                              [facility]  
   
                          Start: 2015 ADULT PREVNAR-13 ADULT PREVNAR-13 Cl  
[facility]  
   
                                        Start: 2015   PNEUMOVAX AGE 65 AND  
 OVER   
WITH 5YR LOOKBACK (#1)                  PNEUMOVAX AGE 65 AND   
OVER WITH 5YR LOOKBACK   
(#1)                                    [facility]  
   
                                Start: 2000 SHINGRIX VACCINE (1 of 2) SHIN  
GRIX VACCINE (1 of   
2)                                      [facility]  
   
                          Start: 1995 COLOGUARD (FIT-DNA) COLOGUARD (FIT-D  
NA) [facility]  
   
                          Start: 1995 CT COLONOGRAPHY CT COLONOGRAPHY [facility]  
   
                          Start: 1995 FECAL OCCULT BLOOD FECAL OCCULT BLOO  
D [facility]  
   
                          Start: 1995 SIGMOIDOSCOPY SIGMOIDOSCOPY [facility]  
   
                                        Start: 1969   HEPATITIS B (1 of 3   
-   
Risk 3-dose series)                     HEPATITIS B (1 of 3 -   
Risk 3-dose series)                     [facility]  
   
                                        Start: 1969   Urine microalbumin   
profile                   DTAP,TDAP,TD (1 - Tdap)   [facility]  
   
                                Start: 1968 Adult BMI Follow Up Plan Adult  
 BMI Follow Up   
Plan                                    [facility]  
   
                          Start: 1968 HEPATITIS C SCREENING HEPATITIS C SC  
BRYON [facility]  
   
                                        Start: 1962   Adult depression   
screening assessment      DEPRESSION SCREENING      [facility]  
   
                          Start: 1955 COVID-19 VACCINE (1) COVID-19 VACCIN  
E (1) [facility]  
   
                                        Start: 1951   HEPATITIS A (1 of 2   
-   
Risk 2-dose series)                     HEPATITIS A (1 of 2 -   
Risk 2-dose series)                     [facility]  
   
                                        Start: 1950   ABDOMINAL AORTIC ANE  
URYSM   
SCREENING                               ABDOMINAL AORTIC   
ANEURYSM SCREENING                      [facility] [facility]  
   
                                Start: 1950 Glaucoma screening Diabetic Op  
hthalmology   
Exam                                    [facility]  
   
                                        Start: 1950   Screening for malign  
ant   
neoplasm of colon                                   [facility]  
   
                          Start: 1950 Tobacco Counseling Tobacco Counselin  
g [facility]  
   
                                                            Alpha 1 antitrypsin   
[Mass/volume] in Serum or   
Plasma                                              [facility]  
   
                                                            Alpha 1 antitrypsin   
phenotyping [Identifier]   
in Serum or Plasma by   
Immunofixation                                      [facility]  
   
                                                            Hepatitis B virus metcalf  
rface   
Ag [Presence] in Serum or   
Plasma by Immunoassay                               [facility]  
   
                                                            Hepatitis C virus Ig  
G Ab   
[Presence] in Serum or   
Plasma by Immunoassay                               [facility]  
   
                                                            HFE gene mutations f  
ound   
[Identifier] in Blood or   
Tissue by Molecular   
genetics method Nominal                             [facility]  
   
                                                            Homogenous nuclear A  
b   
pattern [Titer] in Serum                            [facility]  
   
                                                            Nuclear Ab [Titer] i  
n   
Serum                                               [facility]  
   
                                       Patient Education              [facility]  
Work Phone:   
[phone]  
   
                                       Patient referral              [facility]  
Work Phone:   
[phone]  
   
                                                                 [facility]  
  
  
  
Immunizations  
  
  
                      Immunization Date Immunization Notes      Care Provider Fa  
cility  
   
                                        2024          hepatitis B vaccine,  
   
dialysis patient dosage                             Pepper Aichholz NP  
Work Phone:   
[phone]                          [facility]  
   
                                        2024          hepatitis B vaccine,  
   
dialysis patient dosage                             Pepper Aichholz NP  
Work Phone:   
[phone]                          [facility]  
   
                                        2024          hepatitis B vaccine,  
   
dialysis patient dosage                             Pepper Aichholz NP  
Work Phone:   
[phone]                          [facility]  
   
                                        2023          hepatitis B vaccine,  
   
dialysis patient dosage                             Pepper Aichholz NP  
Work Phone:   
[phone]                          [facility]  
   
                                        11-          Pneumococcal Conjuga  
te   
PCV 20                                              Pepper Aichholz NP  
Work Phone:   
[phone]                          [facility]  
   
                                        11-          zoster vaccine   
recombinant                                         Pepper Aichholz NP  
Work Phone:   
[phone]                          [facility]  
   
                                        2023          Influenza, High-dose  
   
Seasonal, Quadrivalent,   
Preservative Free                                   Pepper Aichholz NP  
Work Phone:   
[phone]                          [facility]  
   
                                        2023          tetanus toxoid, redu  
oskar   
diphtheria toxoid, and   
acellular pertussis   
vaccine, adsorbed                                   Pepper Aichholz NP  
Work Phone:   
[phone]                          [facility]  
   
                                        2023          zoster vaccine   
recombinant                                         Pepper Aichholz NP  
Work Phone:   
[phone]                          [facility]  
   
                                        2023          influenza virus vacc  
ine,   
unspecified formulation                             Pepper Aichholz NP  
Work Phone:   
[phone]                          [facility]  
   
                                        2023          zoster vaccine,   
unspecified formulation                             Stacey Batres   
APRN-CNP  
Work Phone:   
[phone]                          [facility]  
   
                                        10-          Influenza Vaccine,   
Quadrivalent, Adjuvanted                            Stacey Batres   
APRN-CNP  
Work Phone:   
[phone]                          [facility]  
   
                                        2022          pneumococcal conjuga  
te   
vaccine, 13 valent                                  Stacey Batres   
APRN-CNP  
Work Phone:   
[phone]                          [facility]  
   
                                        2021          influenza, high dose  
   
seasonal,   
preservative-free                                   Stacey Batres   
APRN-CNP  
Work Phone:   
[phone]                          [facility]  
   
                                        2020          influenza, high dose  
   
seasonal,   
preservative-free                                   Stacey Batres   
APRN-CNP  
Work Phone:   
[phone]                          [facility]  
   
                                        2020          pneumococcal   
polysaccharide vaccine,   
23 valent                                           Stacey Batres   
APRN-CNP  
Work Phone:   
[phone]                          [facility]  
  
  
  
Payers  
  
  
                          Date         Payer Category Payer        Policy ID  
   
                          2024   Medicare                    
   
                          2023   Medicaid                  [id number]  
[id number]  
[id number]  
   
                          2023   Medicaid                  [id number]  
   
                                2023      Medicare (Managed Care) Critical access hospital   
HEALTH Member   
Subscriber Plan / Payer   
(Effective   
2023-Present)   
Name: Arun Moon   
Member ID: [id number]   
Relation to Subscriber:   
Self Name: Arun Moon Subscriber ID:   
[id number] Payer ID: Not on   
file Group ID: [id number]   
Type: Not on file   
Address: [facility] [address]   
[place] [id number]                    [id number]  
[id number]  
   
                          2023   Unknown                   [id number]  
[id number]  
[id number]  
   
                          2023   Medicare                  [id number]   
[id number]  
[id number]  
   
                                2022      Medicare        [employer]ETNA MEDICARE A  
ETNA   
MEDICARE PPO   
[id number]   
2022-Present   
[phone] [place]   
[phone] PPO                          [id number]   
[id number]  
[id number]  
   
                          1950   Unknown                   [id number]   
[id number]  
[id number]  
   
                          1950   Unknown                   [id number]   
[id number].  
[id number]   Unknown                   [id number]   
[id number].  
1950   Unknown                   [id number]   
[id number].  
[id number]   Unknown                   [id number]   
[id number].  
1286  
   
                          1950   Unknown                   [id number]   
[id number].  
[id number]   Unknown                   [id number]   
[id number].  
1286  
   
                          1950   Unknown                   [id number]   
[id number].  
1286  
   
                          1950   Unknown                   [id number]   
[id number].  
1286  
   
                          1950   Unknown                   [id number]   
[id number].  
[id number]   Unknown                   [id number]   
[id number].  
1281950   Unknown                   [id number]   
[id number].  
1286  
   
                          1950   Unknown                   [id number]   
[id number].  
1286  
   
                          1950   Unknown                   [id number]   
[id number].  
1286  
   
                          1950   Unknown                   [id number]   
[id number].  
1286  
   
                          1950   Unknown                   [id number]   
[id number].  
1281950   Unknown                   [id number]   
[id number].  
1281950   Unknown                   [id number]   
[id number].  
1281950   Unknown                   [id number]   
[id number].  
1281950   Unknown                   [id number]   
[id number].  
1281950   Unknown                   [id number]   
[id number].  
1281950   Unknown                   [id number]   
[id number].  
1281950   Unknown                   [id number]   
[id number].  
93  
   
                          1950   Unknown                   [id number]   
[id number].  
93  
   
                          1950   Unknown                   [id number]   
[id number].  
1259  
   
                          1950   Unknown                   [id number]   
[id number].  
1950   Unknown                   [id number]   
[id number].  
1259  
   
                          1950   Unknown                   [id number]   
[id number].  
1259  
   
                          1950   Unknown                   [id number]   
[id number].  
1259  
   
                          1950   Unknown                   [id number]   
[id number].  
1950   Unknown                   [id number]   
[id number].  
9  
   
                          1950   Unknown                   [id number]   
[id number].  
1950   Unknown                   [id number]   
[id number].  
9  
   
                          1950   Unknown                   [id number]   
[id number].  
1950   Unknown                   [id number]   
[id number].  
9  
   
                          1950   Unknown                   [id number]   
[id number].  
1950   Unknown                   [id number]   
[id number].  
9  
   
                          1950   Unknown                   [id number]   
[id number].  
196  
   
                                       Private Health Insurance              101  
[id number]   
[id number]  
[id number]  
   
                                       Self-pay     Self Pay     [id number]  
[id number]  
[id number]  
   
                                       Unknown      HCAP/HFA/FAP Active [id number]   
[id number]  
[id number]  
  
  
  
Social History  
  
  
                          Date         Type         Detail       Facility  
   
                                                    Start:   
2021  
End: 2023                         Tobacco smoking status   
[facility]                      Smokes tobacco daily      [facility]  
Work Phone:   
[phone]  
   
                                                    Start:   
1963          History of tobacco use Cigarette Smoker    [facility]  
Work Phone:   
[phone]  
   
                                                    Start:   
2021  
End: 2024                         Cigarettes smoked   
current (pack per day)   
- Reported                1                         [facility]  
   
                                                    Start:   
2021  
End: 2023                         Tobacco use and   
exposure                                Smokeless tobacco   
non-user                                [facility]  
Work Phone:   
[phone]  
   
                                                    Start:   
1950          Sex Assigned At Birth Not on file         [facility]  
   
                                                    Start:   
2022  
End: 2022                         Exposure to SARS-CoV-2   
(event)                   Not sure                  [facility]  
   
                                                    Start:   
2022  
End: 2023                         Tobacco smoking status   
[facility]                      Ex-smoker (finding)       [facility]  
   
                                                    Start:   
1950          Sex Assigned At Birth Male                [facility]  
   
                                                    Start:   
2022  
End: 2024                         Tobacco smoking status   
[facility]                      Smoker (finding)          [facility]  
   
                                                    Start:   
2020  
End: 2024     Sex Assigned At Birth                     [facility]   
[employer]  
Other Phone:   
[phone]  
   
                                                    Start:   
2023  
End: 2024     Alcohol intake      Ex-drinker (finding) [facility]  
stem  
   
                                                            How often to you hav  
e a   
drink containing   
alcohol?                  Never                     [facility]  
   
                                                            How many standard   
drinks containing   
alcohol do you have on   
a typical day?            Not asked                 [facility]  
   
                                                    Start:   
2023          Tobacco Comment     6 cigarettes daily  [facility]  
tem  
   
                                                    Start:   
10-          Alcohol Comment     occ                 [facility]  
tem  
   
                                                    Start:   
2023                Gender identity           Identifies as male   
gender (finding)                        [facility]  
   
                                                    Start:   
2023          Sexual orientation  Heterosexual (finding) [facility]   
System  
   
                                                            Has the electric, ga  
s,   
oil, or water company   
threatened to shut off   
services in your home   
in past 12Mo              No                        [facility]  
   
                                                            Are you now ,  
   
, ,   
, never   
 or living with   
a partner?                                 [facility]  
   
                                                            Do you feel stress -  
   
tense, restless,   
nervous, or anxious, or   
unable to sleep at   
night because your mind   
is troubled all the   
time - these days [OSQ]   Not at all                [facility]  
   
                                                    Start:   
10-  
End: 10-                         Alcoholic beverage   
intake                                  Lifetime non-drinker   
(finding)                               [facility]  
   
                                                    Start:   
2023          Tobacco Comment     Quit 3-6 months ago [facility]  
   
                                                    Start:   
2023                Alcohol Comment           Caffeine: 2-3 cups/day   
coffee                                  [facility]  
  
  
  
Medical Equipment  
  
  
                                Procedure Code  Equipment Code  Equipment Origin  
al   
Text                                    Equipment   
Identifier                              Dates  
   
                                                    Insertion, catheter,   
dialysis, peritoneal,   
laparoscopic                                        Peritoneal   
dialysis catheter,   
chronic                                 ()[id number]  
[id number] FDA                              Start:   
2023  
   
                                                       [id number]  Start:   
2022  
   
                                                                Pen Needle,   
Diabetic (Bd Lizzy   
2nd Gen Pen   
Needle) 32 gauge x   
5/32  needle                                        Start:   
2024  
   
                                                                Uses up to 4   
needles per day.   
Use as instructed         [id number]                  Start:   
2024  
End:   
2025  
  
  
  
Goals  
  
  
                                Date            Patient Goal    Desired Activity  
/State  
   
                                                Personal health goal   
   
                                        Comment on above:   Formatting of this n  
ote might be different from the original.  
Evaluation of progress towards goal: Pt will return home with his   
daughter who is his caregiver.   
   
                                                                  
  
  
  
Clinical Notes 2021 to 10-  
Nancy Torrez DPM - 10/16/2024 1:00 PM EDTPatient InstructionsPepper Mendoza NP - 10/09/2024 11:00 AM Genny Mendoza NP - 10/09/2024 11:00 AM Genny Mendoza NP - 10/09/2024 10:59 AM EDT  
  
                                Note Date & Type Note            Facility  
   
                                                    10- History of Presen  
t   
illness Narrative                       Formatting of this note is different   
from the original.  
Images from the original note were   
not included.  
  
  
Subjective  
Patient ID: Keith Moon is a 74   
y.o. male who presents for DM Foot   
Care (PCP: Dr. Carlo GARZA 10/09/24,   
A1C: 9.1, BS: 239).  
  
HPI  
HPI  
Constitutional:  
patient returns to clinic requesting   
nail care and diabetic foot   
assessment.  
Onychomycosis/Toenail Fungus:  
Location: multiple digits with   
thickened, deformed and discolored   
toenails. Multiple digits are   
problematic/symptomatic.  
Duration: chronic toenail deformity,   
multiple years duration.  
Severity of symptoms: mild;   
acknowledging diabetic neuropathy.  
Onset: gradual, without known injury   
or trauma.  
Status: problematic/mildly   
symptomatic over the past several   
weeks or so.  
Context: hard to trim, hard to reach;   
self-care is difficult, ineffective   
and not practical; particularly in   
light of diabetic retinopathy   
(legally blind); significantly   
increasing risk exposure. Family   
members unable to provide effective   
care.  
Characteristics: discolored,   
thickened, pain , pressure ,   
elongated , /lifting ,   
ingrowing , crusty; without bleeding   
or drainage.  
Relieved by: patient along with   
family members are well satisfied   
with conservative and palliative care   
measures.  
Previous Treatment: palliative care   
as noted.  
Risk factors: type II diabetes.   
Peripheral neuropathy. Medical   
comorbidities. Retinopathy (legally   
blind). Polypharmacy. Aspirin   
therapy. Toenail deformity. Digital   
and/or shoe trauma and related   
complications.  
Aggravated by: shoe gear , pressure ,   
walking; catching and snagging on   
clothing etc..  
Medications  
  
Current Outpatient Medications:  
albuterol HFA 90 mcg/act inhaler,   
Inhale 2 puffs every 4 (four) hours   
if needed for shortness of breath or   
wheezing, Disp: , Rfl:  
amLODIPine (Norvasc) 5 MG tablet,   
Take 5 mg by mouth in the morning.,   
Disp: , Rfl:  
atorvastatin (Lipitor) 40 MG tablet,   
Take 40 mg by mouth in the morning.,   
Disp: , Rfl:  
B Complex-C-Zn-Folic Acid   
(Dialyvite/Zinc) tablet, Take 1   
tablet by mouth at bedtime, Disp: ,   
Rfl:  
benzonatate (Tessalon) 200 MG   
capsule, Take 200 mg by mouth 3   
(three) times a day as needed, Disp:   
, Rfl:  
bumetanide (Bumex) 2 MG tablet, Take   
2 mg by mouth Daily, Disp: , Rfl:  
cefdinir (Omnicef) 300 MG capsule,   
Take 300 mg by mouth, Disp: , Rfl:  
cetirizine (ZyrTEC) 10 MG tablet,   
Take 10 mg by mouth in the morning.,   
Disp: , Rfl:  
cholecalciferol (Vitamin D-3) 125 MCG   
(5000 UT) capsule, Take 5,000 Units   
by mouth in the morning., Disp: ,   
Rfl:  
Continuous Blood Gluc    
(FreeStyle Yudi 2 Sanford) device, 1   
Device Daily as needed, Disp: , Rfl:  
Continuous Glucose Sensor (FreeStyle   
Yudi 2 Sensor) misc, 1 each by Other   
route Every 10 (ten) days, Disp: ,   
Rfl:  
cyclobenzaprine (Flexeril) 10 MG   
tablet, Take 1 tablet (10 mg) by   
mouth every 12 (twelve) hours, Disp:   
60 tablet, Rfl: 2  
furosemide (Lasix) 40 MG tablet, Take   
1 tablet by mouth Daily, Disp: , Rfl:  
gentamicin (Garamycin) 0.1 %   
ointment, Apply 1 Application   
topically in the morning., Disp: ,   
Rfl:  
heparin 1000 units/mL injection,   
Infuse 1,000 Units/hr into a venous   
catheter every other day, Disp: ,   
Rfl:  
hydrOXYzine pamoate (Vistaril) 25 MG   
capsule, Take 25 mg by mouth every 8   
(eight) hours if needed for anxiety   
or itching, Disp: , Rfl:  
insulin aspart, with niacinamide,   
(Fiasp FlexTouch) 100 UNIT/ML   
injection, 5 units before meals and   
8-10 units at bedtime. For total max   
dose of 25 units daily, Disp: 23 mL,   
Rfl: 3  
insulin glargine (Basaglar KwikPen)   
100 UNIT/ML pen, 25 units daily,   
Disp: 27 mL, Rfl: 1  
Lokelma 5 g packet, 5 g, Disp: , Rfl:  
loratadine (Claritin) 10 MG tablet,   
Take 10 mg by mouth if needed for   
allergies, Disp: , Rfl:  
magnesium oxide (Mag-Ox) 400 (240 Mg)   
MG tablet, Take 400 mg by mouth.,   
Disp: , Rfl:  
Melatonin 1 MG sublingual tablet,   
Place 1 mg under the tongue at   
bedtime, Disp: , Rfl:  
omeprazole (PriLOSEC) 20 MG DR   
capsule, Take 20 mg by mouth in the   
morning., Disp: , Rfl:  
OneTouch Verio test strip, 1 each by   
Other route if needed, Disp: , Rfl:  
pen needle 32G x 4 mm misc, Uses up   
to 4 needles per day. Use as   
instructed, Disp: 400 each, Rfl: 3  
sevelamer carbonate (Renvela) 800 MG   
tablet, Take 800 mg by mouth in the   
morning and 800 mg at noon and 800 mg   
in the evening. Take with meals.,   
Disp: , Rfl:  
  
Allergies  
Thiopental  
  
Past Surgical History  
Past Surgical History:  
Procedure Laterality Date  
AV FISTULA PLACEMENT 10/2022  
Fistula  
CATHETERIZATION URETHRAL  
CYSTOSCOPY 2024  
GALLBLADDER SURGERY  
HEART CATH  
IR FISTULAGRAM 03/15/2023  
KNEE CARTILAGE SURGERY  
RT knee cartilage repair  
LIVER BIOPSY 2024  
NASAL SEPTUM SURGERY  
deviated septum  
VASECTOMY  
WISDOM TOOTH EXTRACTION  
wisdom teeth  
  
Family History  
Family History  
Adopted: Yes  
Problem Relation Name Age of Onset  
No Known Problems Mother  
No Known Problems Father  
  
Objective  
General Examination:  
GENERAL EXAMINATION: alert and   
oriented. Pleasant disposition.   
Accompanied by his daughter.  
FOOT EXAM:  
Date of Last Foot Exam 10/16/2024  
Sensory testing performed: sensations   
diminished  
Sensory and motor testing performed:   
strength diminished  
Pedal pulse taking performed: 1+  
Vascular:  
DORSALIS PEDIS PULSE: bilaterally,   
2/4.  
POSTERIOR TIBIAL PULSE: bilaterally,   
1/4.  
TEMPERATURE GRADIENT: warm to   
slightly cool.  
EDEMA: unremarkable for ankle edema.  
CAPILLARY FILLING TIME(sec):   
capillary fill intact bilateral   
digits less than 3 secs.  
Neurologic:  
VIBRATORY: absent at the MTP joints.  
SHARP SENSATION: tactile and light   
touch sensation intact.  
SEMMES-VANDANA 5.07 MONOFILAMENT:   
unable to localize multiple points   
plantarly.  
Dermatologic:  
SKIN FINDINGS: intact. Skin turgor is   
good.  
HYPERKERATOSIS: no forefoot or   
digital keratotic pressure lesions   
are noted.  
NAIL PATHOLOGY: Right great toe:   
Stable DSO deformity: Toenail   
dystrophy, thickening, elongation,   
discoloration, residual pincer   
deformity, periungual hyperkeratotic   
debris, without drainage.  
All remaining digits: Varying degrees   
of toenail dystrophy, thickening,   
elongation, discoloration, subtotal   
detachment, periungual hyperkeratotic   
and mycotic debris; without drainage.  
INGROWN NAIL PATHOLOGY: Satisfactory   
outcome lateral matricectomy/phenol   
technique right great toe.  
INTERDIGITAL MACERATION: clean, dry,   
non-inflamed.  
ULCER: no sign of ulceration or open   
wound.  
Ankle / Foot:  
RANGE OF MOTION: functional but   
limited ankle, subtalar and first MTP   
joint range of motion.  
  
Radiology:  
  
Assessment/Plan  
1. Onychodystrophy/mycosis multiple   
digits.  
2. Satisfactory outcome lateral   
marginal matricectomy right great toe   
(2024).  
3. Type II diabetes/IDDM.  
4. Diabetic peripheral neuropathy   
(Q9).  
5. Diabetic retinopathy (legally   
blind).  
6. CKD requiring dialysis  
  
Plan: Clinical Notes: conservative   
and palliative care measures are   
preferred, understood and again   
indicated; expressing no interest in   
oral or topical therapy.  
Continue B complex daily (Metanx cost   
prohibitive).  
Diabetic education and assessment   
relative to the high risk condition.  
  
Procedure:  
Toenail Debridement: Aseptic   
technique: power/manual   
instrumentation: onychodebridement   
length and thickness, curretage of   
offending crypotic margins,   
corby-ungual debris, providing   
effective pressure relief, reducing   
shoe and digital trauma; reducing   
potential risks associated with   
diabetic neuropathic foot condition   
and related complications.  
  
  
This note was created with the   
assistance of a speech recognition   
program. While intending to generate   
a timely document that accurately   
reflects the content of the visit, no   
guarantee can be provided that every   
grammatical or spelling mistake has   
been or will be identified or   
corrected. Thank you for your   
understanding.  
  
Nancy Torrez DPM  
Electronically signed by Nancy Torrez DPM at 10/16/2024 1:37 PM EDT  
documented in this encounter            [facility]  
   
                                        10- Instructions   
  
  
Nancy Torrez DPM - 10/16/2024   
1:00 PM EDTFormatting of this note   
might be different from the original.  
As noted  
Electronically signed by Nancy Torrez DPM at 10/16/2024 1:36 PM EDT  
  
documented in this encounter            [facility]  
   
                                                    10- History of Presen  
t   
illness Narrative                       Associated Problem(s): HLD   
(hyperlipidemia) (CMS/HCC)  
Formatting of this note might be   
different from the original.  
Please ask GI when OK to start   
atorvastatin  
Electronically signed by Pepper Mendoza NP at 10/09/2024 11:00 AM   
EDT  
Associated Problem(s): Type 2   
diabetes mellitus without   
complication, with long-term current   
use of insulin (CMS/HCC)  
Formatting of this note might be   
different from the original.  
Continue with Endo  
Electronically signed by Pepper Mendoza NP at 10/09/2024 11:00 AM   
EDT  
Associated Problem(s): DDD   
(degenerative disc disease), lumbar  
Formatting of this note might be   
different from the original.  
Will need to get copy of xrays from   
[facility]  
Requests referral to Pain Mgmt at   
[place]  
Electronically signed by Pepper Mendoza NP at 10/09/2024 10:59 AM   
EDT  
Associated Problem(s): End stage   
renal disease (CMS/HCC)  
Formatting of this note might be   
different from the original.  
Continue with nephrology  
And PD  
Electronically signed by Pepper Mendoza NP at 10/09/2024 10:58 AM   
EDT  
Associated Problem(s): Hepatic   
fibrosis  
Formatting of this note might be   
different from the original.  
Follow with GI next week  
  
Electronically signed by Pepper Mendoza NP at 10/09/2024 10:58 AM   
EDT  
Associated Problem(s): HTN   
(hypertension) (CMS/HCC)  
Formatting of this note might be   
different from the original.  
Stable no med dose changes  
Electronically signed by Pepper Mendoza NP at 10/09/2024 10:58 AM   
EDT  
Formatting of this note might be   
different from the original.  
241  
Right hip  
Pt would like a referral to [place]   
pain management  
Long acting 15u in the morning 10 at   
night  
Short acting is 6 un and sliding   
scale  
4x daily, add 1 un per every 50  
  
7day average-180  
14day average-196  
30 day average-198  
90 day average-209  
Electronically signed by TAMI RAMIREZ at 10/09/2024 11:01 AM EDT  
Formatting of this note is different   
from the original.  
Images from the original note were   
not included.  
  
Arun Moon is a 74 y.o. male   
presents with chief complaint of No   
chief complaint on file.  
  
HPI:  
  
Had a fall about 2 weeks ago, in the   
shower, slipped. Twisting his back   
and leg went out. Was evaluated at   
[facility], had xray negative. Would   
like to see Pain Mgmt at [place].  
Low back pain: 5/10 tooth ache type,   
constant, burning in the right knee,   
getting some charley horse type   
symptoms. No cauda equina symptoms.  
Blood sugar: 10 units BID, 6 units   
each meal and HS, plus sliding scale   
1 unit for every 50 units over 150.  
  
Still holding statin , had a liver   
biopsy, still waiting on results for   
this, Jeanie on 10/22/24.  
  
  
  
SUBJECTIVE:  
  
MEDICATIONS:  
Current Outpatient Medications  
Medication Instructions  
albuterol HFA 90 mcg/act inhaler 2   
puffs, Inhalation, Every 4 hours PRN  
amLODIPine (NORVASC) 5 mg, Oral,   
Daily  
atorvastatin (LIPITOR) 40 mg, Oral,   
Daily  
B Complex-C-Zn-Folic Acid   
(Dialyvite/Zinc) tablet 1 tablet,   
Oral, Nightly  
benzonatate (TESSALON) 200 mg, Oral,   
3 times daily PRN  
bumetanide (BUMEX) 2 mg, Oral, Daily  
cefdinir (OMNICEF) 300 mg, Oral  
cetirizine (ZYRTEC) 10 mg, Oral,   
Daily RT  
cholecalciferol (VITAMIN D-3) 5,000   
Units, Oral, Daily  
Continuous Blood Gluc    
(FreeStyle Yudi 2 [place]) device 1   
Device, Does not apply, Daily PRN  
Continuous Glucose Sensor (FreeStyle   
Yudi 2 Sensor) misc 1 each, Other,   
Every 10 days  
cyclobenzaprine (FLEXERIL) 10 mg,   
Oral, Every 12 hours  
furosemide (Lasix) 40 MG tablet 1   
tablet, Oral, Daily  
gentamicin (Garamycin) 0.1 % ointment   
1 Application, Topical, Daily RT  
heparin 1,000 Units/hr, Intravenous,   
Every other day  
hydrOXYzine pamoate (VISTARIL) 25 mg,   
Oral, Every 8 hours PRN  
insulin aspart, with niacinamide,   
(Fiasp FlexTouch) 100 UNIT/ML   
injection 5 units before meals and   
8-10 units at bedtime. For total max   
dose of 25 units daily  
insulin glargine (Basaglar KwikPen)   
100 UNIT/ML pen 25 units daily  
Lokelma 5 g  
loratadine (CLARITIN) 10 mg, Oral, As   
needed  
magnesium oxide (MAG-OX) 400 mg, Oral  
Melatonin 1 mg, Sublingual, Nightly  
omeprazole (PRILOSEC) 20 mg, Oral,   
Daily RT  
OneTouch Verio test strip 1 each,   
Other, As needed  
pen needle 32G x 4 mm misc Uses up to   
4 needles per day. Use as instructed  
sevelamer carbonate (RENVELA) 800 mg,   
Oral, 3 times daily with meals  
  
  
ALLERGIES:  
Allergies  
Allergen Reactions  
Thiopental  
Other Reaction(s): anger/rage  
  
  
REVIEW OF SYMPTOMS:  
  
Review of Systems  
Constitutional: Negative for activity   
change, appetite change and   
unexpected weight change.  
HENT: Negative for ear pain,   
nosebleeds, sneezing, trouble   
swallowing and voice change.  
Eyes: Negative for pain, discharge   
and visual disturbance.  
Respiratory: Negative for apnea,   
chest tightness and wheezing.  
Cardiovascular: Negative for leg   
swelling.  
Gastrointestinal: Negative for   
abdominal distention, blood in stool,   
constipation and diarrhea.  
Genitourinary: Negative for decreased   
urine volume, difficulty urinating,   
dysuria and hematuria.  
Musculoskeletal: Positive for   
arthralgias and back pain.  
Skin: Negative for color change.  
Neurological: Negative for dizziness,   
tremors and seizures.  
Psychiatric/Behavioral: Negative for   
agitation, decreased concentration,   
hallucinations, self-injury and   
suicidal ideas. The patient is not   
nervous/anxious.  
Hematological: Negative for   
adenopathy. Does not bruise/bleed   
easily.  
Endocrine: Negative for cold   
intolerance, heat intolerance,   
polydipsia and polyuria.  
Allergic/Immunologic: Negative for   
environmental allergies and food   
allergies.  
  
  
PAST MEDICAL HISTORY  
  
Past Medical History:  
Diagnosis Date  
Anxiety, generalized (CMS/HCC)   
3/19/2024  
Arthritis 2024  
Asthma (CMS/HCC) 2024  
Chronic back pain, unspecified back   
location, unspecified back pain   
laterality 2024  
COPD (chronic obstructive pulmonary   
disease) (CMS/HCC) 2024  
Diverticulosis 2024  
Elevated liver function tests   
2024  
ESRD (end stage renal disease)   
(CMS/HCC) 2024  
Gall stones 2024  
Heart disease 2024  
HLD (hyperlipidemia) (CMS/HCC)   
2024  
HTN (hypertension) (CMS/HCC)   
2024  
Legally blind 2024  
Migraines (CMS/HCC) 2024  
Muscle spasm 2023  
Myocardial infarct (CMS/HCC)   
2024  
Neuropathy  
Tobacco user 2024  
Type 2 diabetes mellitus with ESRD   
(end-stage renal disease) (CMS/HCC)   
2024  
  
  
Past Surgical History:  
Procedure Laterality Date  
AV FISTULA PLACEMENT 10/2022  
Fistula  
CATHETERIZATION URETHRAL  
CYSTOSCOPY 2024  
GALLBLADDER SURGERY  
HEART CATH  
IR FISTULAGRAM 03/15/2023  
KNEE CARTILAGE SURGERY  
RT knee cartilage repair  
NASAL SEPTUM SURGERY  
deviated septum  
VASECTOMY  
WISDOM TOOTH EXTRACTION  
wisdom teeth  
  
family history includes No Known   
Problems in his father and mother. He   
was adopted.  
  
OBJECTIVE:  
  
Visit Vitals  
/68 (BP Location: Left arm,   
Patient Position: Sitting, BP Cuff   
Size: Adult long)  
Pulse 98  
Temp 98.5 F (Temporal)  
Resp 19  
Ht 5' 11   
Wt 221 lb 12.8 oz  
SpO2 98%  
BMI 30.93 kg/m  
Smoking Status Former  
BSA 2.25 m  
  
  
Physical Exam  
Vitals and nursing note reviewed.  
Constitutional:  
Appearance: Normal appearance.  
HENT:  
Head: Normocephalic.  
Right Ear: External ear normal.  
Left Ear: External ear normal.  
Nose: Nose normal.  
Mouth/Throat:  
Mouth: Mucous membranes are moist.  
Pharynx: Oropharynx is clear.  
Eyes:  
Extraocular Movements: Extraocular   
movements intact.  
Conjunctiva/sclera: Conjunctivae   
normal.  
Cardiovascular:  
Rate and Rhythm: Normal rate and   
regular rhythm.  
Pulses: Normal pulses.  
Heart sounds: Normal heart sounds.  
Pulmonary:  
Effort: Pulmonary effort is normal.  
Breath sounds: Normal breath sounds.  
Abdominal:  
General: Bowel sounds are normal.  
Palpations: Abdomen is soft.  
Musculoskeletal:  
General: Tenderness (right knee)   
present.  
Cervical back: Neck supple.  
Right lower leg: No edema.  
Left lower leg: No edema.  
Lymphadenopathy:  
Cervical: No cervical adenopathy.  
Skin:  
General: Skin is warm and dry.  
Capillary Refill: Capillary refill   
takes 2 to 3 seconds.  
Neurological:  
General: No focal deficit present.  
Mental Status: He is alert.  
Psychiatric:  
Mood and Affect: Mood normal.  
Behavior: Behavior normal.  
Thought Content: Thought content   
normal.  
Judgment: Judgment normal.  
  
  
  
ASSESSMENT AND PLAN:  
  
Follow up in about 2 months (around   
2024) for Recheck.  
Problem List Items Addressed This   
Visit  
  
HTN (hypertension) (CMS/HCC)  
Stable no med dose changes  
  
  
End stage renal disease (CMS/HCC)  
Continue with nephrology  
And PD  
  
  
HLD (hyperlipidemia) (CMS/HCC)  
Please ask GI when OK to start   
atorvastatin  
  
  
Type 2 diabetes mellitus without   
complication, with long-term current   
use of insulin (CMS/HCC)  
Continue with Endo  
  
  
Tobacco user  
Hepatic fibrosis  
Follow with GI next week  
  
  
  
DDD (degenerative disc disease),   
lumbar - Primary  
Will need to get copy of xrays from   
[facility]  
Requests referral to Pain Mgmt at   
[place]  
  
  
Relevant Orders  
Ambulatory referral to Pain Medicine  
Acute right hip pain  
Relevant Orders  
Ambulatory referral to Pain Medicine  
  
  
Electronically signed by Pepper Mendoza NP at 10/09/2024 11:01 AM   
EDT  
documented in this encounter            [facility]  
  
  
  
                                                    2024 Evaluation note   
   
                                           
   
                                        Authored            2024 1:5  
6pm  
   
                                                    74-year-old man with end-sta  
ge renal disease referred   
to the liver clinic for evaluation of elevated  
liver enzymes  
  
JAMIE and SMA are positive Otherwise laboratory work up   
for infectious, autoimmune and metabolic  
etiologies of liver diseases were unremarkable.  
Patient has obesity, HTN, DM which are risk factors   
associated with MASLD. Patient does not have  
secondary causes of hepatic fat accumulation such as   
significant alcohol consumption, viral  
hepatitis, steatogenic medications (e.g., tamoxifen,   
amiodarone, methotrexate), or lipodystrophy.  
Ultrasound liver showed hepatic steatosis  
Fibroscan on 2024 showed LSM: 10.8 and   
- Will arrange for liver biopsy to confirm etiology   
AIH vs MASH and to confirm staging  
- Will check F   
  
  
  
[facility]  
Work Phone: [phone]- NoteMR THORACIC SPINE WO CONT  
History: Mid back pain for several years  
Procedure:  
Multiplanar multisequence MR imaging performed through the thoracic spine  
Findings:  
Disc space narrowing and posterior disc protrusions and facet hypertrophy T9-10 
and T10-11 level resulting in moderate central canal stenosis at those levels. 
Abnormal signal with edema within the cord at T9-10 level may represent gliosis 
and/or myelopathy  
More modest posterior disc bulging without stenosis at T11-12 level and within 
the mid and upper thoracic spine  
Appropriate signal within the posterior elements and remainder of the thoracic 
cord  
No paraspinal masses  
No foraminal narrowing  
IMPRESSION:  
Disc space narrowing and posterior disc protrusions and facet hypertrophy T9-10 
and T10-11 level resulting in moderate central canal stenosis at those levels. 
Abnormal signal with edema within the cord at T9-10 level may represent gliosis 
and/or myelopathy. I recommend contrast MRI for further assessment.  
More modest posterior disc bulging without stenosis at T11-12 level and within 
the mid and upper thoracic spine  
Workstation:[id number]  
Finalized by John Martinez MD on 2024 10:15 [facility]
2024 Evaluation note*   
  
                                        Author              [facility]  
   
                                        Authored            May 14th, 2024 12:05  
pm  
   
                                                    74-year-old man with end-sta  
ge renal disease referred to the liver clinic for   
evaluation of elevated  
liver enzymes  
  
Will get laboratory work up for infectious, autoimmune and metabolic etiologies 
of   
liver diseases.  
Patient has obesity, HTN, DM which are risk factors associated with MASLD. 
Patient   
does not have  
secondary causes of hepatic fat accumulation such as significant alcohol 
consumption,   
viral  
hepatitis, steatogenic medications (e.g., tamoxifen, amiodarone, methotrexate), 
or   
lipodystrophy.  
Will arrange for fibroscan  
Will arrange for ultrasound liver   
  
  
  
[facility]  
Work Phone: [phone]- History of Present illness Narrative* 
  Beverly Yusuf, APRN-CNP - 2024 1:00 PM EDTFormatting of this note is 
  different from the original.  
Subjective  
  
Patient ID: Arun Moon is a 74 y.o. male.  
  
Chief Complaint: Hospital follow-up  
  
Keith is here today in the Infectious Disease Clinic for hospital follow-up 
appointment. He was hospitalized from  through 2024. He 
originally presented to the emergency center with complaints of difficulty 
urinating and rapid heart rate. He has a medical history significant forend-
stage renal disease and currently does peritoneal dialysis at home, diabetes, 
blindness, and enedelia current smoker who is working on cutting down. He reports 
currently smoking 6-7 cigarettes daily.  
  
Fortunately at today's appointment, patient reports peritoneal dialysis is going
 well and he is having no difficulty urinating additionally he denies any 
unusual pain.  
  
Hospital diagnosis as follows  
Pneumonia  
Leukocytosis  
Elevated peritoneal fluid cell counts  
End-stage renal disease-on CAPD  
Diabetes mellitus type 2  
Legally blind  
  
Imaging from patient's hospital stay as follows:  
Chest x-ray March 15, 2024  
IMPRESSION:  
1. Hazy opacities in the left mid to lower lung and right lung base suspicious 
for pneumonia versusedema, possibly atypical pneumonia.  
2. Cardiac silhouette is nonenlarged. No convincing pulmonary vascular 
congestion. No pneumothorax or pleural effusion.  
  
CT abdomen pelvis March 15, 2024  
IMPRESSION:  
* Multiple renal calcifications are present bilaterally which appear to be 
vascular in nature. No ureteric calculi or definite evidence for obstruction.  
* Peritoneal dialysis catheter with small amount of fluid about liver and and 
pelvis with simple attenuation.  
* Mild atrophy of right kidney unchanged.  
* Mild prominence of left hepatic lobe with possible nodularity of liver 
surface. Some cirrhosis isnot excluded. The spleen is not enlarged.  
  
  
The following portions of the patient's history were reviewed and updated as 
appropriate: allergies, current medications, past family history, past medical 
history, past social history, past surgicalhistory, problem list, and medication
 reconciliation was completed including current medication andpost discharge 
medication.  
  
Review of Systems  
Constitutional: Negative for fever, chills and fatigue.  
HENT: Negative for trouble swallowing.  
Eyes: Negative.  
Respiratory: Negative for cough and shortness of breath.  
Cardiovascular: Negative for chest pain and leg swelling.  
Gastrointestinal: Negative for nausea, vomiting, abdominal pain, diarrhea and 
constipation.  
4 exchanges per night  
Endocrine: Negative.  
Genitourinary: Negative for hematuria and difficulty urinating.  
Pain with urination has resolved  
Musculoskeletal: Positive for gait problem (cane, walkers, walker (near fall)). 
Negative for myalgias, arthralgias and neck stiffness.  
Chronic pain: Back of neck to shoulders, Darrian hips, occasionally knees  
Skin: Negative for color change, pallor, rash and wound.  
Allergic/Immunologic: Negative.  
Neurological: Negative for speech difficulty, weakness and headaches.  
Hematological: Negative.  
Psychiatric/Behavioral: Negative for confusion.  
  
  
Objective  
  
Physical Exam  
Constitutional He is oriented to person, place, and time. No distress.  
Vitals reviewed.  
HENT  
Head Normocephalic.  
Eyes: Pupils are equal, round, and reactive to light.  
Neck Normal range of motion.  
Cardiovascular:  
Normal rate and regular rhythm.  
Pulses:  
intact distal pulses  
Pulmonary/Chest: Effort normal and breath sounds normal. No respiratory 
distress.  
Abdominal: Bowel sounds are normal. He exhibits no distension. Soft. There is no
 abdominal tenderness. Musculoskeletal:  
General: No edema. Normal range of motion.  
Cervical back: Normal range of motion.  
  
Neurological He is alert and oriented to person, place, and time.  
Skin: Skin is warm and dry.  
Psychiatric:  
He has a normal mood and affect. His behavior is normal.  
  
  
Assessment/Plan  
  
1. Hospital discharge follow-up  
2. End-stage renal disease on peritoneal dialysis (CMS-HCC)  
3. History of community acquired pneumonia  
4. Light cigarette smoker  
5. Insulin dependent type 2 diabetes mellitus (CMS-HCC)  
6. Legally blind  
7. 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  
  
Finalized Blood cultures 2024 with no growth  
Finalized Body fluid culture from 2024 with no growth  
Patient tested negative for influenza A, B, RSV, COVID during hospital stay  
  
Peritoneal fluid cell count from 2024, nucleated cell 91, 52 
neutrophils, repeat peritoneal fluid cell count 28 nucleated cells, 59% 
neutrophil  
He was treated with azithromycin and ceftriaxone while hospitalized  
He was discharged on Omnicef to complete his antibiotic course of treatment  
Patient's breathing and vital signs are stable at today's appointment  
  
Patient declines referral to GI for elevated liver enzymes, PT advised to 
follow-up with his primary care provider  
  
Patient advised not to smoke. Smoking education provided, including, there was 
no safe level of exposure to tobacco smoke, even secondhand smoke is harmful, 
tobacco smoke contains chemicals ago from the lungs to the blood in can damage 
the lungs and can cause asthma attacks, emphysema, chronic bronchitis, and 
cancer. Additionally the chemicals damage blood vessels, which can lead to heart
 attack and strokes. Patient encouraged to follow-up with PCP for smoking 
cessation programs.  
  
Follow-up in the infectious disease clinic as needed  
  
Patient encouraged to maintain a healthy lifestyle including eating a well-
balanced healthy diet, maintaining a healthy weight, appropriate periods of 
rest, exercise as approved by primary care provider, and routine follow-up with 
primary care provider for vaccinations and preventative care.  
  
Verbally discussed instructions and plan of care, questions were answered and 
patient and daughter Mara verbalized understanding.  
  
Total time spent was 35 minutes:  
Preparing to see the patient (e.g., review of tests)  
Obtaining and/or reviewing separately obtained history  
Performing a medically appropriate examination and/or evaluation  
Additional time spent with patient and daughter counseling and answering 
questions regarding recenthospital stay. Patient and daughter both telling me 
they felt rushed out of the hospital and did not understand diagnosis or 
treatment. All questions answered to their satisfaction.  
  
ADRY Sidhu  
[id number]  
  
Electronically signed by ADRY Sidhu at 2024 5:07 PM EDT  
  
[facility] Note[facility] CHEST 2 VWS  
PROCEDURE: CHEST, TWO VIEWS (PA and Lateral), 3/15/2024 11:50 AM  
CLINICAL INDICATION: sob  
COMPARISON: Chest 2 views 8/15/2023  
IMPRESSION:  
1. Hazy opacities in the left mid to lower lung and right lung base suspicious 
for pneumonia versusedema, possibly atypical pneumonia.  
2. Cardiac silhouette is nonenlarged. No convincing pulmonary vascular 
congestion. No pneumothorax or pleural effusion.  
Workstation:[id number]  
Finalized by Iker Smalls MD on 3/15/2024 11:55 [facility]01- Miscellaneous Notes* Telephone Encounter - Natalia Abad - 
  2024 2:42 PM ESTFormatting of this note might be different from the 
  original.  
Called Keith regarding the history of colon polyps referral that our office 
received from Pepper Mendoza NP, I left a message for him to call the office 
back to schedule an appointment.  
Electronically signed by Natalia Abad at 2024 2:44 PM EST  
  
* Telephone Encounter - Cherrie Uche - 2024 2:42 PM ESTFormatting of this 
  note might be different from the original.  
Spoke to patient's daughter, Liz who stated they would not like to schedule 
anything as Keith does not wish to have this procedure done.  
Electronically signed by Cherrie Ivey at 2024 3:05 PM EST  
  
[facility] 
[facility]ounter Note* Telephone Encounter - Natalia Abad - 2024 2:42 PM EST
  Formatting of this note might be different from the original.  
Called Keith regarding the history of colon polyps referral that our office 
received from Pepper Mendoza NP, I left a message for him to call the office 
back to schedule an appointment.  
Electronically signed by Natlaia Abad at 2024 2:44 PM EST  
  
[facility] Telephone encounter Note* Telephone Encounter 
  - Cherrie Uche - 2024 2:42 PM ESTFormatting of this note might be 
  different from the original.  
Spoke to patient's daughter, Liz who stated they would not like to schedule 
anything as Keith does not wish to have this procedure done.  
Electronically signed by Cherrie Ivey at 2024 3:05 PM EST  
  
[facility]- Evaluation note*   
  
                      Encounter Date Diagnosis  Assessment Notes Treatment Notes  
 Treatment   
Clinical Notes  
   
                                                AV fistula (ICD-10   
- I77.0)                                            This patient has a   
well-functioning   
right upper   
extremity, end to   
side, distal,   
Lonnie AV fistula.   
I think is ready   
for use. I have   
asked the patient   
to call me with   
any issues or   
concerns with the   
fistula. Otherwise   
we will see him as   
needed in the   
future.  
                                          
  
  
[facility]  
Other Phone: [phone]- [facility] [place] [facility]12[facility]*   
  
                      Encounter Date Diagnosis  Assessment Notes Treatment Notes  
 Treatment   
Clinical Notes  
   
                                        29 Dec, 2022        AV fistula   
(ICD-10 - I77.0)                                    We reviewed today's   
graft flow scan of   
his right arm AV   
fistula which shows   
area of narrowing in   
the proximal region   
of the fistula. The   
fistula measures   
0.91 cm at the   
origin and decreases   
to 0.22 cm and   
returns to 0.43 cm   
at the mid region.   
Flows at the origin   
are 561 mL/min   
decreasing to 137   
mL/min after the   
area of stenoses.   
This patient will   
require fistulogram   
with balloon   
angioplasty to   
enable this fistula   
to mature further.   
Procedure, risk,   
benefits were   
discussed with him   
at length and all of   
his questions were   
addressed. Time is   
on our side as this   
patient does not yet   
require   
hemodialysis. He   
will continue   
following closely   
with his nephrology   
specialist and we   
will get this on the   
schedule in the near   
future. He   
verbalizes   
understanding of all   
discussion, agrees   
with this plan, and   
denies any   
questions.  
                                          
   
                                        29 Dec, 2022        CKD (chronic   
kidney disease)   
(ICD-10 - N18.9)                                              
  
  
[facility]  
Other Phone: [phone]- Evaluation note*   
  
                      Encounter Date Diagnosis  Assessment Notes Treatment Notes  
 Treatment   
Clinical Notes  
   
                                                AV fistula   
(ICD-10 - I77.0)                                    This patient's   
surgical sites are   
healing nicely and   
his fistula has a   
good thrill and   
bruit today. He is   
not currently in   
need of hemodialysis   
and follows closely   
with his nephrology   
specialist for   
routine monitoring   
of his kidney   
function. Patient   
tells me his most   
recent creatinine   
clearance was around   
14. We will continue   
to follow him along   
closely and have him   
back in 6 or 8 weeks   
time with a graft   
flow scan of the   
right arm AV fistula   
to monitor for   
maturation of his   
access. If he were   
to require   
hemodialysis prior   
to this point, he   
knows to call us   
prior to any   
attempts at access   
of the fistula. He   
verbalizes   
understanding of all   
discussion today,   
agrees with this   
plan, denies any   
questions.  
                                          
   
                                                Chronic kidney   
disease, stage IV   
(severe) (ICD-10   
- N18.4)                                                      
  
  
[facility]  
Other Phone: [phone]- Evaluation note*   
  
                      Encounter Date Diagnosis  Assessment Notes Treatment Notes  
 Treatment Clinical   
Notes  
   
                                        03 Oct, 2022        Preop testing   
(ICD-10 - Z01.818)                                            
  
  
[facility]  
Other Phone: [phone]- [facility]Southern [place] [facility]09- Evaluation note*   
  
                      Encounter Date Diagnosis  Assessment Notes Treatment Notes  
 Treatment   
Clinical Notes  
   
                                        07 Sep, 2022        Malfunction of   
arteriovenous   
dialysis fistula,   
initial encounter   
(ICD-10 - T82.590A)                                 I did review the   
fistula duplex   
with the patient   
today in the   
office. He does   
have low   
velocities that   
are less than 300.   
Therefore we will   
need to perform a   
fistulogram to try   
and increase the   
flow. Looks like   
he might have a   
proximal stenoses.   
This is explained   
to the patient. He   
understands the   
risks and benefits   
and wishes to   
proceed consent   
was obtained we   
will schedule this   
in the near   
future.  
                                          
  
  
[facility]  
Other Phone: [phone]- Evaluation note*   
  
                      Encounter Date Diagnosis  Assessment Notes Treatment Notes  
 Treatment   
Clinical Notes  
   
                                                AV fistula   
(ICD-10 - I77.0)                                    I am pleased with   
appearance of this   
fistula. We will   
see him back in   
about 8 weeks with   
a duplex to   
evaluate the   
maturation process.   
The patient   
understands agrees   
the plan all his   
questions were   
answered.  
                                          
  
  
[facility]  
Other Phone: [phone]- [facility]Southern [place] [facility]06- Evaluation note*   
  
                      Encounter Date Diagnosis  Assessment Notes Treatment Notes  
 Treatment   
Clinical Notes  
   
                                                Thrombosis of   
arteriovenous   
fistula, initial   
encounter (ICD-10 -   
T82.868A)                                           This patient will   
need a more proximal   
AV fistula in the   
right arm. I did   
review the vein   
mapping today in my   
office. It appears   
that he has adequate   
cephalic vein in the   
right arm. We will   
proceed with a   
brachiocephalic AV   
fistula right upper   
extremity. We will   
schedule this in the   
near future. I did   
explain the risks and   
benefits as well as   
medical surgical   
tenderness. The   
patient or stands   
wishes to proceed.  
                                          
  
  
[facility]  
Other Phone: [phone]- Evaluation note*   
  
                      Encounter Date Diagnosis  Assessment Notes Treatment Notes  
 Treatment   
Clinical Notes  
   
                                        19 May, 2022        CKD (chronic   
kidney disease)   
(ICD-10 - N18.9)                                    We reviewed today's   
graft flow scan which   
shows velocities   
within the right arm   
fistula ranging from   
213 mL/min to as low   
as 69 mL/min. Fistula   
diameter is 3.1 to   
4.8 mm. This fistula   
is not maturing quite   
like it should be at   
this point. This   
patient is not   
currently requiring   
hemodialysis. Time is   
on her side.   
Recommendation for   
fistulogram was   
discussed with the   
patient. Procedure,   
risk, benefits were   
discussed at length   
and all of his   
questions were   
addressed. Dr. Sánchez in room to   
discuss further with   
him. All of his   
questions were   
addressed. We will   
plan for right arm   
fistulogram using   
retrograde approach.   
We will get this on   
the schedule in the   
near future. Patient   
verbalizes   
understanding of all   
discussion, agrees   
with this plan,   
denies any further   
questions.  
                                          
  
  
[facility]  
Other Phone: [phone]- Evaluation note*   
  
                      Encounter Date Diagnosis  Assessment Notes Treatment Notes  
 Treatment   
Clinical Notes  
   
                                                Chronic kidney   
disease, stage IV   
(severe) (ICD-10 -   
N18.4)                                              The wound looks   
fine today. I am   
not concerned   
about any   
infection. We   
dressed the wound   
and we will see   
him back next   
month.  
                                          
  
  
[facility]  
Other Phone: [phone]- Evaluation note*   
  
                      Encounter Date Diagnosis  Assessment Notes Treatment Notes  
 Treatment   
Clinical Notes  
   
                                        24 Mar, 2022        Chronic kidney   
disease, stage IV   
(severe) (ICD-10 -   
N18.4)                                                
  
  
This patient is   
doing well after   
creation of a   
right upper   
extremity snuffbox   
AV fistula. We   
will see him back   
in 8 weeks for a   
ultrasound of the   
fistula to   
evaluate the   
maturation   
process. Currently   
the patient is not   
on dialysis he has   
CKD 4.                                    
  
  
  
  
[facility]  
Other Phone: [phone]- NoteHNO ID: [id number]  
Author: Shon Angeles MD  
Service: ?  
Author Type: Physician  
Type: Progress Notes  
Filed: 3/10/2022 4:24 PM  
Note Text:  
[facility] [place] [facility]  
Transplant Evaluation  
CC: Patient is a 71 year old male here for transplant candidacy  
evaluation.  
Reason for visit: here for evaluation to be a Kidney Transplant  
recipient.  
Referred by:  
John O Ranker, MD  
[address] [facility] [address]  
I will communicate with the referring provider by letter and/or shared  
electronic medical record.  
HPI: Mr. Arun Moon is a 71 year old gentleman from [place], [place].  
Following with Dr. Ranker for CKD 4 not yet on dialysis, just placed  
fistula right forearm. Possible Covid-19 in 2022. Not yet  
vaccinated.  
PHx: CKD4, DM2 w/ peripheral neuropathy, retinopathy, HTN, CHF w/prior MI  
, , legally blind, arthritis, poor functional status in wheelchair  
about 10-12 hrs per day, just seen for pressure injury of buttocks stage  
1, HLD, kidney stone , UTI infections on and off whole life per  
patient, COPD former smoker for over 50 years quit in ,  
anxiety/depression, obesity, right knee surgery , vasectomy ,  
nasal surgery , wisdom teeth extraction, has potential donor but  
unfortunately donor is in prison.  
PAOD (TIA non-embolic, CVA non-embolic, amputation, carotid artery  
stenosis, abnormal PVRs, claudication history, decreased pulses, etc.):No  
Stroke: NO  
Claudication: NO  
Carotid Artery Stenosis: NO  
Malignancy (including skin cancer): No  
Hypercoagulable (history of DVT/PE, miscarriages, prior use of  
anticoagulation, etc.):No  
Prior transplant: No  
CKD: Yes: Cause of CKD: DM/HTN  
Advanced CKD, not on dialysis, current GFR: 15 (2022)  
Living Donors: No  
Residual UO: Yes  
DM: Yes Diagnosed at age 50's, Type 2, Therapies: now on Trulicity and  
Hypoglycemia: No  
A1C 7.4  
PAST MEDICAL HISTORY  
Diagnosis Date  
- Anxiety  
- Arthritis  
- CHF (congestive heart failure) ([facility])  
- COPD (chronic obstructive pulmonary disease) ([facility])  
- Depression  
- Former smoker  
- HLD (hyperlipidemia)  
- HTN (hypertension)  
- Kidney stone   
- Legally blind  
- MI (myocardial infarction) ([facility])  
- Neuropathy  
- Obesity  
- Retinopathy  
- Type 2 diabetes mellitus with renal complication ([facility])  
PAST SURGICAL HISTORY  
Procedure Laterality Date  
- KNEE SURGERY HX Right   
- NASAL SINUS SURGERY HX   
- TOOTH EXTRACTION  
- VASECTOMY   
Current Outpatient Medications  
Medication Sig  
- aspirin, enteric coated (ASPIRIN, ENTERIC COATED) 81 mg EC tablet Take 1  
tablet by mouth once daily.  
- sodium bicarbonate 325 mg tablet  
- atorvastatin (LIPITOR) 40 mg tablet Take 1 tablet by mouth.  
- chlorthalidone (HYGROTON) 25 mg tablet  
- dulaglutide (TRULICITY) 1.5 mg/0.5 mL pen injector as directed.  
- [id number]-algal oil (METANX, ALGAL OIL,) 3 mg-35 mg-2 mg  
-90.314 mg cap See Admin Instructions.  
- sevelamer carbonate (RENVELA) 800 mg tablet 1 tablet with meals  
- tiZANidine (ZANAFLEX) 4 mg tablet Take 1 tablet by mouth as needed.  
- omeprazole (PRILOSEC) 20 mg capsule Take 20 mg by mouth once daily.  
- acetaminophen (TYLENOL ARTHRITIS PAIN) 650 mg CR tablet Take 650 mg by  
mouth every 8 hours as needed.  
- cholecalciferol, vitamin D3, (VITAMIN D3 ORAL) Take 10,000 mg by mouth  
once daily.  
No current facility-administered medications for this visit.  
Social History  
Tobacco Use  
- Smoking status: Current Every Day Smoker  
Packs/day: 1.00  
Years: 58.00  
Pack years: 58.00  
Types: Cigarettes  
Start date: 3/12/1963  
- Smokeless tobacco: Never Used  
Substance Use Topics  
- Alcohol use: Not on file  
- Drug use: Not on file  
There were no vitals taken for this visit.  
Pre-transplant testing:  
Cardiac:  
ECHO: 2021 CE  
- ?Left Ventricle: There is mild concentric increased wall  
thickness/hypertrophy.  
- ?Left Ventricle: Systolic function is normal with an ejection fraction  
of 55-60%.  
- ?Left Ventricle: Grade II diastolic dysfunction (pseudonormal) is  
present.  
- ?Left Atrium: Left atrium volume index is moderately increased. The left  
atrial volume index is 46.8 mL/m2.  
- ?Right Atrium: Right atrium is mildly dilated. The right atrial area is  
21.3 cm2.  
- ?No primary valve disease  
CXR: see CT CHEST  
CT CHEST: 2022  
IMPRESSION:  
1. ?No suspicious pulmonary nodule or thoracic lymphadenopathy. ?Mild  
bronchial wall thickening may be related to the patient's reported history  
of smoking.  
2. ?Moderate to severe diffuse three-vessel coronary artery  
atherosclerotic calcifications.  
3. ?Patulous esophagus, which can be seen with esophageal dysmotility. ?  
Correlate for dysphagia.  
CT ABD/PEL: 2022  
IMPRESSION:  
VASCULAR CALCIFICATION AS DESCRIBED  
Vascular Calcifications:  
Aorta: Severe calcification  
Right -  
?? ?Common Iliac Artery: Severe calcification  
?? ?External Iliac Artery: Moderate calcif (more content not included)...
[facility]03- NoteHNO ID: [id number]  
Author: RALPH Coughlin  
Service: ?  
Author Type:   
Type: Progress Notes  
Filed: 3/11/2022 10:38 AM  
Note Text:  
PSYCHOSOCIAL FACE TIME EVALUATION FOR KIDNEY TRANSPLANT  
COVID-19 PRECAUTIONS  
Social Supports: Daughter and son in-law  
Financial Concerns: Medicare  
Compliance: Pt is not dialysis dependent  
Mental Health: Stable  
Substance Abuse: Denies  
SIPAT: 7  
REFERRAL: Arun Moon was referred to Social Work for a psychosocial  
evaluation to establish if he would be a suitable candidate to receive a  
kidney transplant.  
DIALYSIS START DATE: NA. Pt is waiting for his fistula to heal.  
The pt does use assistive devices for ambulation. He reports that he uses  
a wheelchair most of the time.  
This social work psychosocial evaluation was completed with Arun Moon on 2022. He was accompanied by his daughter, Liz Turcios.  
Race/Gender: White male  
[place] Citizen: Yes  
IDENTIFYING INFORMATION/LIVING SITUATION  
Arun Moon [id number]  
[address] [place] [id number].  
The home is about 2 hours from [facility]. Arun Moon has been living in  
this home with his daughter for 1.5 years.  
Arun Moon is is dependent with all ADL's for bathing after getting  
out of the shower, dressing, cooking and cleaning. Others in household are  
the pt's son in-law, Lei. The pt's daughter and son in-law drive.  
Pets in the home: Five small dogs. Liz cares for the animals.  
TRANSPLANT LODGING PLANS FOR PATIENTS WHO LIVE 2.5 OR MORE HOURS AWAY FROM  
[facility]:  
Do you have the financial means to stay in the area for four weeks or more  
post-transplant? NA  
COMPREHENSION OF MEDICAL SITUATION  
Arun Moon reports that his kidney disease is due to T2 DM. Pt also  
has mild COPD, asthma, and a hx of three heart attacks. Pt also has HTN,  
Hyperlipidemia, neuropathy, and macular degeneration.  
Special Diets: Yes. He is on a diabetic, renal, and heart diet.  
A1c if Diabetic: 7.4  
Arun Moon was not able to recall information that was presented to  
him today during the kidney transplant educational class.  
Reviewed risks with the pt.  
Arun Moon does understand and, has knowledge of the transplant  
process, the risks of transplant, and transplant medications.  
According to patient's report:  
Missed doctor appointments: No.  
Missed/shortened/rescheduled dialysis appointments: NA  
Knowledge of medications: Pt's daughter knows the names of his medications  
and carries the list. Pt states that he cannot read the labels.  
Medication Compliance: Liz sets his meds up weekly. Pt takes them with  
meals.  
Have you ever stopped taking any medication because you lost your  
insurance you could not afford it? No  
Have you stopped taking medication because you felt you didn't need it or  
because of side-effects? Yes. Pt took himself of Wellbutrin. He states  
that his late wife informed the pt's PCP that he was depressed. His doctor  
prescribed it. The pt reports that he stopped because it made him  
impotent. He did discuss stopping the medication with PCP. He titrated  
himself off of the Wellbutrin.  
Moral, Denominational, or ethical views about transplants or blood transfusions  
that would preclude you from a transplant? No.  
Have you ever been transplanted, evaluated, or listed at another center:  
No.  
Potential donor: No.  
SUBSTANCE USE/ABUSE  
Alcohol: Pt has one drink a year on [place] [employer] [place].  
Tobacco: Former smoker. Pt quit in 2021. Pt admits to having  
smoked 5 ppd in the seventies. He was down to a half a ppd when he quit.  
Exposure to second hand smoke: No  
Illegal substances: Denies  
Over the Counter Medications: Tylenol PRN, Vitamin B complex and Vitamin  
D3  
Opioids/Controlled Substances: None  
CAGE Questionnaire  
Have you ever felt you should cut down on your drinking? None  
Have people annoyed you by criticizing your drinking? No  
Have you ever felt bad or guilty about your drinking? No  
Have you ever had a drink first thing in the morning to steady your nerves  
or to get rid of a hangover (eye opener)? No  
LEGAL ENCOUNTERS  
Currently on probation or parole: No  
Past or current warrants for arrest: No  
Substance related legal problems: No  
Valid drivers license: No  
MENTAL HEALTH HISTORY  
Affect: Appropriate/Consistent  
Alert: Alert  
Orientation: person, place and time  
Cognitive Function: Cognition appears relatively intact  
Mood: Euthymic  
Are you having thoughts that you would be better off dead, or of hurting  
yourself in some way? Denied when asked today.  
Current mental health diagnoses / current feelings of anxiety or  
depression: Denies  
Previous mental health diagnoses: Denies  
Psychiatric medication: No. Who prescribes:  
Counseling: No  
Psychiatric services: No  
History of suicide attempt(s) or ideation: No  
History of harming self: No  
History of harming others: No  
History of psychiatric hospitalization: No  
Any family history of (more content not included)...[facility]
2022 NoteHNO ID: [id number]  
Author: Shabana Lang RN  
Service: ?  
Author Type: Registered Nurse  
Type: Progress Notes  
Filed: 3/9/2022 3:06 PM  
Note Text:  
[facility] [place] [facility]  
Transplant Evaluation  
Patient not seen by surgeon. Declined by nephrology.  
CC: Patient is a 71 year old male here for transplant candidacy  
evaluation.  
Reason for visit: here for evaluation to be a Kidney Transplant  
recipient.  
Referred by:  
John O Ranker, MD  
[address] [facility] [address]  
I will communicate with the referring provider by letter and/or shared  
electronic medical record.  
HPI: Mr. Arun Moon is a 71 year old gentleman from [place], [place].  
Following with Dr. Ranker for CKD 4 not yet on dialysis, just placed  
fistula right forearm. Possible Covid-19 in 2022. Not yet  
vaccinated.  
PHx: CKD4, DM2 w/ peripheral neuropathy, retinopathy, HTN, CHF w/prior MI  
, , legally blind, arthritis, poor functional status in wheelchair  
about 10-12 hrs per day, just seen for pressure injury of buttocks stage  
1, HLD, kidney stone , UTI infections on and off whole life per  
patient, COPD former smoker for over 50 years quit in ,  
anxiety/depression, obesity, right knee surgery , vasectomy ,  
nasal surgery , wisdom teeth extraction, has potential donor but  
unfortunately donor is in prison.  
Prior transplant: No  
CKD: Yes: Cause of CKD: DM/HTN  
Advanced CKD, not on dialysis, current GFR: 15 (2022)  
Living Donors: No  
Residual UO: Yes  
DM: Yes Diagnosed at age 50's, Type 2, Therapies: now on Trulicity and  
Hypoglycemia: No  
A1C 7.4  
PAST MEDICAL HISTORY  
Diagnosis Date  
- Anxiety  
- Arthritis  
- CHF (congestive heart failure) ([facility])  
- COPD (chronic obstructive pulmonary disease) ([facility])  
- Depression  
- Former smoker  
- HLD (hyperlipidemia)  
- HTN (hypertension)  
- Kidney stone   
- Legally blind  
- MI (myocardial infarction) ([facility])  
- Neuropathy  
- Obesity  
- Retinopathy  
- Type 2 diabetes mellitus with renal complication ([facility])  
PAST SURGICAL HISTORY  
Procedure Laterality Date  
- KNEE SURGERY HX Right   
- NASAL SINUS SURGERY HX   
- TOOTH EXTRACTION  
- VASECTOMY   
Current Outpatient Medications  
Medication Sig  
- atorvastatin (LIPITOR) 40 mg tablet Take 1 tablet by mouth.  
- chlorthalidone (HYGROTON) 25 mg tablet  
- dulaglutide (TRULICITY) 1.5 mg/0.5 mL pen injector as directed.  
- hopepqrfw-[id number]-algal oil (METANX, ALGAL OIL,) 3 mg-35 mg-2 mg  
-90.314 mg cap See Admin Instructions.  
- tiZANidine (ZANAFLEX) 4 mg tablet Take 1 tablet by mouth as needed.  
- omeprazole (PRILOSEC) 20 mg capsule Take 20 mg by mouth once daily.  
- acetaminophen (TYLENOL ARTHRITIS PAIN) 650 mg CR tablet Take 650 mg by  
mouth every 8 hours as needed.  
- cholecalciferol, vitamin D3, (VITAMIN D3 ORAL) Take 10,000 mg by mouth  
once daily.  
- aspirin, enteric coated (ASPIRIN, ENTERIC COATED) 81 mg EC tablet Take 1  
tablet by mouth once daily.  
- sodium bicarbonate 325 mg tablet  
- sevelamer carbonate (RENVELA) 800 mg tablet 1 tablet with meals  
No current facility-administered medications for this visit.  
No family history on file.  
No family status information on file.  
Social History  
Tobacco Use  
- Smoking status: Current Every Day Smoker  
Packs/day: 1.00  
Years: 58.00  
Pack years: 58.00  
Types: Cigarettes  
Start date: 3/12/1963  
- Smokeless tobacco: Never Used  
Substance Use Topics  
- Alcohol use: Not on file  
- Drug use: Not on file  
/62 (BP Site: Left Arm)   Pulse 64   Temp 36.6 ?C (97.9 ?F)   Ht  
180.3 cm (5' 11 )   Wt 97.5 kg (215 lb)   SpO2 99%   BMI 29.99 kg/m?  
Pre-transplant testing:  
Cardiac:  
ECHO: 2021 CE  
- ?Left Ventricle: There is mild concentric increased wall  
thickness/hypertrophy.  
- ?Left Ventricle: Systolic function is normal with an ejection fraction  
of 55-60%.  
- ?Left Ventricle: Grade II diastolic dysfunction (pseudonormal) is  
present.  
- ?Left Atrium: Left atrium volume index is moderately increased. The left  
atrial volume index is 46.8 mL/m2.  
- ?Right Atrium: Right atrium is mildly dilated. The right atrial area is  
21.3 cm2.  
- ?No primary valve disease  
CXR: see CT CHEST  
CT CHEST: 2022  
IMPRESSION:  
1. ?No suspicious pulmonary nodule or thoracic lymphadenopathy. ?Mild  
bronchial wall thickening may be related to the patient's reported history  
of smoking.  
2. ?Moderate to severe diffuse three-vessel coronary artery  
atherosclerotic calcifications.  
3. ?Patulous esophagus, which can be seen with esophageal dysmotility. ?  
Correlate for dysphagia.  
CT ABD/PEL: 2022  
IMPRESSION:  
VASCULAR CALCIFICATION AS DESCRIBED.  
Vascular Calcifications:  
Aorta: Severe calcification  
Right -  
?? ?Common Iliac Artery: Severe calcification  
?? ?External Iliac Artery: Moderate calcification  
?? ?Internal Iliac Artery: Severe calcification  
Left -  
?? ? Common Iliac Artery: Severe calc (more content not included)...[facility]03- NoteHNO ID: [id number]  
Author: Shawanda Jaramillo RN  
Service: ?  
Author Type: Registered Nurse  
Type: Progress Notes  
Filed: 3/9/2022 10:02 AM  
Note Text:  
PRE-TRANSPLANT PATIENT EDUCATION NOTE  
Type of Transplant: Kidney  
Informed Consent for Evaluation signed: Yes  
Multiple Listing Form signed: Yes  
READINESS TO LEARN:  
Cognitive Ability: Alert and oriented  
Motivation to Learn: Eager  
Family Support: High - Very involved in pt care  
Instruction Provided to: Patient and family member  
Patient Learns Best by: Multiple Methods  
Factors Affecting Learning: None  
Physical Limitations Affecting Learning: None  
LEARNING RESPONSE:  
Diagnosis: CKD - HTN/DM  
Education Topics/Teaching Points:  
-Discussed living donor evaluation and approval process.  
-Discussed the evaluation and listing process.  
-Discussed the different types of  donors (KDPI scoring, DCD, High  
Risk).  
-Explained EPTS scoring and how it is calculated.  
-Explained the surgical procedure and potential complications, surgeons,  
hospital stay at the time of transplant.  
-Explained lifetime immunosuppression therapy and frequency of blood  
draws/labs post-op and post-op course of treatment.  
-Reviewed National and [facility] outcomes [facility]  
[facility]; a copy of the program summary was given to the  
patient.  
-Explained that if the transplant is not performed at a Medicare-approved  
transplant center it could affect the ability to have immunosuppressive  
medications paid under Medicare Part B.  
Supplemental Material:  
-Pre-Transplant Patient Education Binder  
-UNOS: Questions AND Answers for Transplant Candidates about Multiple  
Listing and Waiting Time Transfer  
-UNOS: Questions AND Answers for Transplant Candidates about Kidney  
Allocation Policy  
-Directions to access [facility]'s data through the SRTR website.  
-Informed Consent for Transplant Program Participation patient education  
packet  
-National Kidney Registry pamphlet: Yes  
- Covid-19 Vaccination for Transplant Candidates  
Method of Instruction: Group class instruction  
Written instruction - handouts  
Verbal instruction  
Computer  
Patient/Family Response: Patient and family member asked appropriate  
questions, which were answered satisfactorily.  
Follow-Up Plan: Complete - No need for follow-up  
Referral/Recommendation: None  
Shawanda Jaramillo RN  
[facility]inator[facility]- NoteHNO ID: 
[id number]  
Author: RT Jose(R)  
Service: Radiology  
Author Type: Technologist  
Type: Progress Notes  
Filed: 3/9/2022 9:55 AM  
Note Text:  
Radiology Service Progress Note  
PATIENT NAME: Arun Moon  
MRN: [id number]  
DATE OF SERVICE: 2022  
TIME: 9:51 AM  
PATIENT IDENTITY VERIFICATION COMPLETED USING TWO (2) IDENTIFIERS: Name  
and Date of Birth confirmed by patient verbally and Name and Date of Birth  
confirmed by identification band.  
FALL SCREENING: Has the patient had 2 falls in the last year or 1 fall  
with injury or currently using an Ambulatory Assistive Device (Walker,  
Cane, Wheelchair, Crutches, etc.)? Yes, Patient High Risk for Falls  
What interventions were put in place to prevent falls during this visit?  
Increased Observations by Caregivers  
PATIENT GENDER DATA: Male  
PATIENT RELEVANT IMPLANT DATA REVIEWED: Yes  
RADIOLOGY DEPARTMENT: CT; Exam(s) Completed: Chest Abdomen Pelvis  
PERIPHERAL IV DATA: Not applicable  
SIGNED BY: RT Jose(R)  
2022 9:51 [facility]02- Evaluation note*   
  
                      Encounter Date Diagnosis  Assessment Notes Treatment Notes  
 Treatment   
Clinical Notes  
   
                                                CKD (chronic   
kidney disease)   
(ICD-10 - N18.9)                                      
  
  
I did review the   
patient's vein   
mapping with him   
today. Although he   
is right-handed the   
right cephalic vein   
in the forearm looks   
like a good   
possibility for AV   
fistula creation.   
This will be better   
than a more proximal   
fistula in the   
nondominant arm.   
Therefore I am   
recommending right   
upper extremity   
distal, and decide   
radiocephalic AV   
fistula creation.   
The risks and   
benefits were   
explained as well as   
medical surgical   
alternatives we also   
discussed potential   
complications as   
well as their   
management. He   
understands wished   
to proceed. We also   
reviewed the   
hemodialysis access   
handout together   
each page   
individually. All   
other questions were   
addressed we will   
schedule as the near   
future and I do   
prefer a regional   
block. Thank you                          
  
  
  
  
[employer]  
Other Phone: [phone]- NoteHNO ID: [id number]  
Author: Liza Cao RN  
Service: ?  
Author Type: Registered Nurse  
Type: Progress Notes  
Filed: 2021 9:50 AM  
Note Text:  
--------------------------------------------------------------------------------  
Summary: Preemptive Kidney Transplant Evaluation  
--------------------------------------------------------------------------------  
New Referral  
Referring Physician John Ranker  
Organ Type Kidney  
ESRD CKD  
HTN/DM  
Dialysis Dependant? No  
Preemptive  
GFR 13%  
Diabetes Yes.  
Dx Age 50  
Takes insulin with no unawareness  
Smoking Quit 21  
Smoked since age 13. At 1 time 5 packs/day. In the pat year was down to  
0.5pack/day  
Current BMI 31  
Previous Transplant No  
Date of Last Transplant N/A  
Currently Listed? No  
Willing to accept blood transfusion? Yes  
Potential Living Donor? No.  
(person willing is in prison in [place])  
Full Transplant Evaluation? Yes  
Malnutrition Screening Tool (MST)  
1. Have you lost weight without trying?  
Yes, How much weight have you lost: 2.2-11 lbs (1-5 Kg)- 1  
Weight loss score: 1  
2. Have you been eating poorly in the last week because of a decreased  
appetite?  
Yes- 1  
Appetite score: 1  
Total MST score (weight loss + appetite scores): 2  
Score of 2 or more = referral to registered dietitian for an individual  
appointment  
Liza Cao RN  
Pre-Kidney AND Pancreas Transplant Coordinator-Assistant  
[facility]   
  
                                                    Diagnosis  
   
                                                      
  
  
Pre-transplant evaluation for kidney transplant- Primary  
  
  
Other specified pre-operative examination  
  
documented in this encounter  
[facility] noteNo assessment information availableSamaritan [facility] Center  
Work Phone: [phone] noteNo InformationNorthospitals Professional 
Corporation  
Other Phone: [phone] note*   
  
                                                    Diagnosis  
   
                                                      
  
  
Hospital discharge follow-up- Primary  
  
  
Other follow-up examination  
   
                                                      
  
  
End-stage renal disease on peritoneal dialysis (CMS-HCC)  
   
                                                      
  
  
History of community acquired pneumonia  
   
                                                      
  
  
Light cigarette smoker  
   
                                                      
  
  
Insulin dependent type 2 diabetes mellitus (CMS-HCC)  
   
                                                      
  
  
Legally blind  
   
                                                      
  
  
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  
  
documented in this encounter  
[facility]   
  
                                                    Diagnosis  
   
                                                      
  
  
Degeneration of intervertebral disc of lumbar region, unspecified whether pain   
present- Primary  
   
                                                      
  
  
Acute right hip pain  
   
                                                      
  
  
Primary hypertension (CMS/HCC)  
  
  
Unspecified essential hypertension  
   
                                                      
  
  
Hepatic fibrosis  
  
  
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  
   
                                                      
  
  
End stage renal disease (CMS/HCC)  
  
  
End stage renal disease  
   
                                                      
  
  
Type 2 diabetes mellitus without complication, with long-term current use of 
insulin   
(CMS/HCC)  
   
                                                      
  
  
Tobacco user  
  
  
Tobacco use disorder  
   
                                                      
  
  
Mixed hyperlipidemia (CMS/HCC)  
  
  
Mixed hyperlipidemia  
  
documented in this encounter  
[facility]   
  
                                                    Diagnosis  
   
                                                      
  
  
Muscle spasm  
  
  
Spasm of muscle  
   
                                                      
  
  
Spinal stenosis of lumbar region with neurogenic claudication  
  
documented in this encounter  
[facility]*   
  
                                                    Diagnosis  
   
                                                      
  
  
Type 2 diabetes mellitus with diabetic polyneuropathy, without long-term current
 use   
of insulin (CMS/HCC)- Primary  
   
                                                      
  
  
Primary hypertension (CMS/HCC)  
  
  
Unspecified essential hypertension  
   
                                                      
  
  
ESRD (end stage renal disease) (CMS/HCC)  
  
  
End stage renal disease  
   
                                                      
  
  
Mixed hyperlipidemia (CMS/HCC)  
  
  
Mixed hyperlipidemia  
   
                                                      
  
  
Legally blind  
   
                                                      
  
  
Chronic obstructive pulmonary disease, unspecified COPD type (CMS/HCC)  
   
                                                      
  
  
Screening for prostate cancer  
  
  
Special screening for malignant neoplasm of prostate  
   
                                                      
  
  
Polyp of colon, unspecified part of colon, unspecified type  
   
                                                      
  
  
BMI 29.0-29.9,adult  
   
                                                      
  
  
Pneumonia due to infectious organism, unspecified laterality, unspecified part 
of   
lung- Primary  
   
                                                      
  
  
Primary hypertension (CMS/HCC)  
  
  
Unspecified essential hypertension  
   
                                                      
  
  
Elevated liver function tests  
  
  
Other abnormal blood chemistry  
   
                                                      
  
  
ESRD (end stage renal disease) (CMS/HCC)  
  
  
End stage renal disease  
   
                                                      
  
  
Type 2 diabetes mellitus without complication, with long-term current use of 
insulin   
(CMS/HCC)  
   
                                                      
  
  
ESRD (end stage renal disease) (CMS/HCC)- Primary  
  
  
End stage renal disease  
   
                                                      
  
  
Elevated liver function tests  
  
  
Other abnormal blood chemistry  
   
                                                      
  
  
Type 2 diabetes mellitus without complication, with long-term current use of 
insulin   
(CMS/HCC)  
   
                                                      
  
  
Tobacco user  
  
  
Tobacco use disorder  
   
                                                      
  
  
BMI 29.0-29.9,adult  
   
                                                      
  
  
Primary hypertension (CMS/[facility])  
  
  
Unspecified essential hypertension  
   
                                                      
  
  
Encounter for subsequent annual wellness visit (AWV) in Medicare patient- 
Primary  
   
                                                      
  
  
ESRD (end stage renal disease) (CMS/[facility])  
  
  
End stage renal disease  
   
                                                      
  
  
Type 2 diabetes mellitus without complication, with long-term current use of 
insulin   
(CMS/[facility])  
   
                                                      
  
  
Primary hypertension (CMS/[facility])  
  
  
Unspecified essential hypertension  
   
                                                      
  
  
Elevated liver function tests  
  
  
Other abnormal blood chemistry  
   
                                                      
  
  
Chronic back pain, unspecified back location, unspecified back pain laterality  
   
                                                      
  
  
Routine general medical examination at health care facility  
  
  
Routine general medical examination at a health care facility  
   
                                                      
  
  
Type 2 diabetes mellitus without complication, with long-term current use of 
insulin   
(CMS/[facility])- Primary  
   
                                                      
  
  
Type 2 diabetes mellitus with diabetic chronic kidney disease (CMS/[facility])  
   
                                                      
  
  
Dependence on renal dialysis (CMS/[facility])  
  
  
Renal dialysis status  
   
                                                      
  
  
End stage renal disease (CMS/[facility])  
  
  
End stage renal disease  
   
                                                      
  
  
Immunodeficiency due to conditions classified elsewhere (CMS/[facility])  
   
                                                      
  
  
Atherosclerosis of aorta (CMS/[facility])  
  
  
Atherosclerosis of aorta  
   
                                                      
  
  
Polyneuropathy due to type 2 diabetes mellitus (CMS/[facility])  
   
                                                      
  
  
Primary hypertension (CMS/[facility])  
  
  
Unspecified essential hypertension  
   
                                                      
  
  
Elevated liver function tests  
  
  
Other abnormal blood chemistry  
   
                                                      
  
  
Elevated alkaline phosphatase level  
   
                                                      
  
  
Tobacco user  
  
  
Tobacco use disorder  
   
                                                      
  
  
Impaired mobility and activities of daily living  
   
                                                      
  
  
Legally blind  
   
                                                      
  
  
Degeneration of intervertebral disc of lumbar region, unspecified whether pain   
present- Primary  
   
                                                      
  
  
Acute right hip pain  
   
                                                      
  
  
Primary hypertension (CMS/[facility])  
  
  
Unspecified essential hypertension  
   
                                                      
  
  
Hepatic fibrosis  
  
  
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  
   
                                                      
  
  
End stage renal disease (CMS/[facility])  
  
  
End stage renal disease  
   
                                                      
  
  
Type 2 diabetes mellitus without complication, with long-term current use of 
insulin   
(CMS/[facility])  
   
                                                      
  
  
Tobacco user  
  
  
Tobacco use disorder  
   
                                                      
  
  
Mixed hyperlipidemia (CMS/[facility])  
  
  
Mixed hyperlipidemia  
   
                                                      
  
  
Dermatophytosis of nail- Primary  
   
                                                      
  
  
Dystrophic nail  
  
  
Other specified disease of nail  
   
                                                      
  
  
Diabetic polyneuropathy associated with type 2 diabetes mellitus (CMS/[facility])  
   
                                                      
  
  
Encounter for long-term (current) use of insulin (CMS/HCC)  
  
  
Encounter for long-term (current) use of insulin  
  
documented in this encounter  
[facility]   
  
                                Type            Description     Date  
   
                                Medical History ESRD              
   
                                Medical History hypertension      
   
                                Medical History hypercholesterolemia   
   
                                Medical History heart attack      
   
                                Surgical History oral surgery      
   
                                Surgical History vasectomy         
   
                                Surgical History knee arthroscopy   
   
                                Surgical History deviated septum repair   
   
                                Surgical History heart catheterization   
  
  
[employer] [facility]  
Other Phone: [phone] general Narrative - Reported*   
  
                                Type            Description     Date  
   
                                Medical History ESRD              
   
                                Medical History hypertension      
   
                                Medical History hypercholesterolemia   
   
                                Medical History heart attack      
   
                                Surgical History oral surgery      
   
                                Surgical History vasectomy         
   
                                Surgical History knee arthroscopy   
   
                                Surgical History deviated septum repair   
   
                                Surgical History heart catheterization   
   
                                Hospitalization History see above         
  
  
[employer] [facility] [employer] [facility]  
Other Phone: [phone] general Narrative - Reported*   
  
                                Type            Description     Date  
   
                                Medical History ESRD              
   
                                Medical History hypertension      
   
                                Medical History hypercholesterolemia   
   
                                Medical History heart attack      
   
                                Surgical History oral surgery      
   
                                Surgical History vasectomy         
   
                                Surgical History knee arthroscopy   
   
                                Surgical History deviated septum repair   
   
                                Surgical History heart catheterization   
   
                                Surgical History RIGHT AVF         
   
                                Hospitalization History see above         
  
  
[employer]  
Other Phone: [phone] Discharge instructions  
Additional Instructions  
1. No driving if taking narcotic pain medication.  
  
2. No lifting more than 20 pounds for 2 weeks.[facility]  
Work Phone: [phone] on filedocumented in this encounter
[facility], IncDiagnostic Innovations G.ho.st SystemInstructions* Attachments  
  
The following attachments cannot be sent through Care Everywhere.  
  
* How to Wash Your Hands Properly (English)  
  
documented in this encounterMount [facility] SystemReason for referral 
(narrative)* Consultation (Routine) - Pending Review  
  
                          Specialty    Diagnoses / Procedures Referred By Contac  
t Referred To Contact  
   
                                        Pain Medicine         
  
  
Diagnoses  
  
  
Degeneration of   
intervertebral disc of   
lumbar region, unspecified   
whether pain present  
  
  
Acute right hip pain  
  
  
  
Procedures  
  
  
AK OFFICE/OUTPATIENT [facility] MDM 60 MINUTES                       
  
  
Pepper Mendoza NP  
  
  
[address] [place] [address]  
  
  
Phone: [phone]  
  
  
Fax: [phone]                         
  
  
Janette oBrja MD  
  
  
[address] [place] [address]  
  
  
Phone: [phone]  
  
  
Fax: [phone]  
  
  
  
                          Referral ID  Status       Reason       Start   
Date                                    Expiration   
Date                                    Visits   
Requested                               Visits   
Authorized  
   
                                        [id number]              Pending   
Review                                    
  
  
Specialty   
Services   
Required        10/9/2024       2025        1               1  
  
  
  
                                                    Scheduling Instructions  
   
                                                      
  
  
ASAP if possible  
  
  
  
  
Electronically signed by Pepper Mendoza NP at 10/09/2024 10:40 AM EDT  
  
  
[facility]  
  
Summary Purpose  
  
  
                                                      
  
  
  
Family History  
No Family History Records Found  
  
                          Relationship Condition    Age at Onset Recorded Date/T  
van  
   
                          Not Specified Congestive heart failure Unknown        
   
                                       Diabetes mellitus Unknown        
   
                          brother      Myocardial infarction Unknown        
   
                                       Hypertension Unknown        
   
                          sister       Diabetes mellitus Unknown        
   
                                       Malignant neoplasm of breast Unknown       
   
  
  
  
                          Relationship Condition    Age at Onset Recorded Date/T  
van  
   
                          Not Specified Congestive heart failure Unknown        
   
                                       Diabetes mellitus Unknown        
   
                          brother      Myocardial infarction Unknown        
   
                                       Hypertension Unknown        
   
                          sister       Diabetes mellitus Unknown        
   
                                       Malignant neoplasm of breast Unknown       
   
   
                          brother      Diabetes mellitus Unknown        
   
                          daughter     Family history of mental disorder Unknown  
        
   
                          father            Unknown        
   
                          Not Specified      Unknown        
  
  
  
                          Relationship Condition    Age at Onset Recorded Date/T  
van  
   
                          mother       Congestive heart failure Unknown        
   
                                       Diabetes mellitus Unknown        
   
                          brother      Myocardial infarction Unknown        
   
                                       Hypertension Unknown        
   
                          sister       Diabetes mellitus Unknown        
   
                                       Malignant neoplasm of breast Unknown       
   
   
                          brother      Diabetes mellitus Unknown        
   
                          daughter     Family history of mental disorder Unknown  
        
   
                          father            Unknown        
   
                          mother            Unknown        
  
  
  
Advance Directives  
No Advanced Directives Records Found  
  
                                Advance Directive Response        Recorded Date/  
Time  
   
                                Advance Directives Yes              12:24pm  
  
  
  
                                Advance Directive Response        Recorded Date/  
Time  
   
                                Advance Directives Yes              11:24am  
  
                                Documents on File  
  
                          Type         Date Recorded Patient Representative Expl  
anation  
   
                          Advance Directive 2020 2:47 PM              Linda  
r of    
2020  
  
                           Latest Code Status on File  
  
                          Code Status  Date Activated Date Inactivated Comments  
   
                          Full Code    3/16/2024 2:09 AM 3/18/2024 6:44 PM   
  
                                Documents on File  
  
                          Type         Date Recorded Patient Representative Expl  
anation  
   
                                                    Advance Directives and   
Living Will         2024 5:18 PM                       my directives living  
   
will  
  
                                Documents on File  
  
                          Type         Date Recorded Patient Representative Expl  
anation  
   
                                                    Advance Directives and   
Living Will         2024 5:18 PM                       my directives living  
   
will  
  
  
  
Chief Complaint and Reason for Visit  
  
  
                                        Chief Complaint     ESRD  
end stage renal disease  
  
  
  
                                        Chief Complaint     ESRD  
end stage renal disease  
ESRD  
  
  
  
                                        Chief Complaint     end stage renal dise  
ase  
ESRD  
ESRD  
  
  
  
                                        Chief Complaint     ESRD  
ESRD  
  
  
  
                                        Chief Complaint     N18.6  
ESRD  
  
  
  
                                        Chief Complaint     ELEVATED LIVER ENZYM  
ES  
elevated liver enzymes  
   
                                        Reason for Visit    Elevated liver enzym  
es  
  
  
  
                                        Chief Complaint     follow up  
Radiculopathy, lumbar region  
K74.00 R74.8  
   
                                        Reason for Visit    Elevated liver enzym  
es  
Hepatic fibrosis  
Hepatic steatosis  
Arthropathy of left hip  
Arthropathy of right knee  
Low back pain  
Spinal stenosis, lumbar region with neurogenic claudication  
Thoracic stenosis  
Hepatic steatosis  
  
  
  
Additional Source Comments  
  
  
  
                                                    Source Comments (unrecognize  
d section and content)  
   
                                                    In the event this informatio  
n is protected by the Federal Confidentiality of   
Alcohol   
and Drug Abuse Patient Records regulations: This information has been disclosed 
to   
you from records protected by Federal confidentiality rules (42 CFR Part 2). The
   
Federal rules prohibit you from making any further disclosure of this 
information   
unless further disclosure is expressly permitted by the written consent of the 
person   
to whom it pertains or as otherwise permitted by 42 CFR Part 2. A general   
authorization for the release of medical or other information is NOT sufficient 
for   
this purpose. The Federal rules restrict any use of the information to 
criminally   
investigate or prosecute any alcohol or drug abuse patient.[facility]  
  
  
  
  
                                                    Reason for Visit (unrecogniz  
ed section and content)  
   
                                          
  
  
  
                          Specialty    Diagnoses / Procedures Referred By Contac  
t Referred To Contact  
   
                                        TRANSPLANT            
  
  
Diagnoses  
  
  
Type 2 diabetes mellitus   
with ESRD (end-stage renal   
disease) (HCC)  
  
  
Hypertension, unspecified   
type  
  
  
Pre-transplant evaluation   
for CKD (chronic kidney   
disease)  
  
  
  
Procedures  
  
  
CONSULT TO TRANSPLANT   
CENTER  
  
  
NEW PATIENT VISIT LEVEL 5  
  
  
CHEST X-RAY, FRONT&LAT  
  
  
EKG TRACING(TC)  
  
  
CTA CHEST, W/WO CONTRAST  
  
  
CT ABDOMEN W & W/O   
CONTRAST                                  
  
  
Ranker, John O  
  
  
[address] [place] [address]  
  
  
Phone: [phone]  
  
  
Fax: [phone]                         
  
  
[facility]  
  
  
[address] [place] [address]  
  
  
Phone: [phone]  
  
  
  
                          Referral ID  Status       Reason       Start   
Date                                    Expiration   
Date                                    Visits   
Requested                               Visits   
Authorized  
   
                                [id number]        Authorized        
  
  
Financial   
Clearance   
Required -   
OON Payor  
  
  
Patient   
Cleared   
INN/SMCP   
Payor Auth   
Obtained        2021       99              99  
  
  
  
                                        Reason              Comments  
   
                                        Follow-up             
  
  
  
                                        Reason              Comments  
   
                                        DM Foot Care        PCP: Dr. Matos LV   
10/09/24, A1C: 9.1, BS: 239  
  
  
  
  
  
                                                    PREGNANCY (unrecognized sect  
ion and content)  
   
                                                    No Pregnancy Status Records   
FoundNo Pregnancy Status Records FoundNo Pregnancy   
Status   
Records FoundNo Pregnancy Status Records FoundNo Pregnancy Status Records 
FoundNo   
Pregnancy Status Records FoundNo Pregnancy Status Records FoundNo Pregnancy 
Status   
Records FoundNo Pregnancy Status Records Found  
  
  
  
  
                                                    INFORMATION SOURCE (unrecogn  
ized section and content)  
   
                                          
  
  
  
                                        DATE CREATED        AUTHOR  
   
                                04/10/2022                      [facility]  
  
  
  
                                DATE CREATED    AUTHOR          AUTHOR'S ORGANIZ  
ATION  
   
                                2024                      [facility]  
  
  
  
                                DATE CREATED    AUTHOR          AUTHOR'S ORGANIZ  
ATION  
   
                                2024                      [facility]  
  
  
  
                                DATE CREATED    AUTHOR          AUTHOR'S ORGANIZ  
ATION  
   
                                2024                      [facility]  
[facility]  
  
  
  
                                DATE CREATED    AUTHOR          AUTHOR'S ORGANIZ  
ATION  
   
                                2024                      [facility]  
  
  
  
                                DATE CREATED    AUTHOR          AUTHOR'S ORGANIZ  
ATION  
   
                                10/13/2024                      [facility]  
[facility]  
  
  
  
                                DATE CREATED    AUTHOR          AUTHOR'S ORGANIZ  
ATION  
   
                                10/18/2024                      [facility]  
  
  
  
                                DATE CREATED    AUTHOR          AUTHOR'S ORGANIZ  
ATION  
   
                                10/18/2024                      [facility]  
[facility]  
  
  
  
                                DATE CREATED    AUTHOR          AUTHOR'S ORGANIZ  
ATION  
   
                                10/27/2024                      [facility]  
  
  
  
  
  
                                                    Care Teams (unrecognized sec  
tion and content)  
   
                                          
  
  
  
                                                    Team Status: Inactive   
   
                          Member       Role         Status       Dates  
   
                          Shantell Castorena [facility] Primary Care Provider Active  
         
   
                          Charlie Matos MD Attending Provider Active         
  
  
  
                                                    Team Status: Inactive   
   
                          Member       Role         Status       Dates  
   
                          Shantell Castorena [facility] Primary Care Provider Active  
         
   
                          Ronald Sánchez MD Attending Provider Active     
      
  
  
  
                                                    Team Status: Active   
   
                          Member       Role         Status       Dates  
   
                          Shantell Castorena [facility] Primary Care Provider Active  
         
  
  
  
                                                    Team Status: Inactive   
   
                          Member       Role         Status       Dates  
   
                          Shantell Castorena [facility] Primary Care Provider Active  
         
   
                          Carolee Cuevas , NP-C Attending Provider Active       
    
  
  
  
                                                    Team Status: Inactive   
   
                          Member       Role         Status       Dates  
   
                          Ronald Sánchez MD Attending Provider Active     
      
   
                          Pepper Mendoza Primary Care Provider Active         
  
  
  
                                                    Team Status: Active   
   
                          Member       Role         Status       Dates  
   
                          Pepper Mendoza Primary Care Provider Active         
  
  
  
                                                    Team Status: Inactive   
   
                          Member       Role         Status       Dates  
   
                          Pepper Mendoza Primary Care Provider Active         
   
                          Guicho Bronson MD Attending Provider Active         
  
  
  
                      Team Member Relationship Specialty  Start Date End Date  
   
                                                      
  
  
Pepper Mendoza, APRN-CNP  
  
  
NPI: [id number]  
  
  
[address] [place] [address]  
  
  
[phone] (Work)  
  
  
[phone] (Fax) PCP - General   Nurse Practitioner 23            
  
  
  
                      Team Member Relationship Specialty  Start Date End Date  
   
                                                      
  
  
Pepper Mendoza APRN-CNP  
  
  
NPI: [id number]  
  
  
[address] [place] [address]  
  
  
[phone] (Work)  
  
  
[phone] (Fax) PCP - General   Nurse Practitioner 23            
  
  
  
                                                    Team Status: Inactive   
   
                          Member       Role         Status       Dates  
   
                          Pepper Mendoza Primary Care Provider Active       Sta  
rt: May 14th, 2024  
End: May 14th, 2024  
   
                          Yordy Lewis MD Attending Provider Active       Start:  
 May 14th, 2024  
End: May 14th, 2024  
  
  
  
                                                    Team Status: Inactive   
   
                          Member       Role         Status       Dates  
   
                          Pepper Mendoza Primary Care Provider Active       Sta  
rt: May 28th, 2024  
End: May 28th, 2024  
   
                          Yordy Lewis MD Attending Provider Active       Start:  
 May 28th, 2024  
End: May 28th, 2024  
  
  
  
                                                    Team Status: Inactive   
   
                          Member       Role         Status       Dates  
   
                          Pepper BANKS Palmerbogdanfrancisca Primary Care Provider Active       Sta  
rt: 2024  
End: 2024  
   
                          Yordy Lewis MD Attending Provider Active       Start:  
 2024  
End: 2024  
  
  
  
                                                    Team Status: Inactive   
   
                          Member       Role         Status       Dates  
   
                          Pepper Chakrabortybogdanfrancisca Primary Care Provider Active       Sta  
rt: 2024  
End: 2024  
   
                          Chapincito Mir MD Attending Provider Active       Star  
t: 2024  
End: 2024  
  
  
  
                                                    Team Status: Inactive   
   
                          Member       Role         Status       Dates  
   
                          Pepper JOCELYN Chakrabortybogdanfrancisca Primary Care Provider Active       Sta  
rt: 2024  
End: 2024  
   
                          Yordy Lewis MD Attending Provider Active       Start:  
 2024  
End: 2024  
  
  
  
                      Team Member Relationship Specialty  Start Date End Date  
   
                                                      
  
  
Shoaib Matos MD  
  
  
NPI: [id number]  
  
  
[address] [place] [address] [phone] (Work)  
  
  
[phone] (Fax) PCP - Devoted                   23            
   
                                                      
  
  
Shoaib Matos MD  
  
  
NPI: [id number]  
  
  
[address] [place] [address] [phone] (Work)  
  
  
[phone] (Fax) PCP - General   Family Medicine 3/19/24           
   
                                                      
  
  
Pepper Mendoza NP  
  
  
NPI: [id number]  
  
  
[address] [place] [address] [phone] (Work)  
  
  
[phone] (Fax) Nurse Practitioner Family Medicine 23           
   
                                                      
  
  
Pepper Mendoza NP  
  
  
NPI: [id number]  
  
  
[address] [place] [address] [phone] (Work)  
  
  
[phone] (Fax) Nurse Practitioner Family Medicine 3/19/24           
  
  
  
                      Team Member Relationship Specialty  Start Date End Date  
   
                                                      
  
  
Shoaib Matos MD  
  
  
NPI: [id number]  
  
  
[address] [place] [address] [phone] (Work)  
  
  
[phone] (Fax) PCP - [facility]oted                   23            
   
                                                      
  
  
Shoaib Matos MD  
  
  
NPI: [id number]  
  
  
[address] [place] [address] [phone] (Work)  
  
  
[phone] (Fax) PCP - General   Family Medicine 3/19/24           
   
                                                      
  
  
Pepper Mendoza NP  
  
  
NPI: [id number]  
  
  
[address] [place] [address] [phone] (Work)  
  
  
[phone] (Fax) Nurse Practitioner Family Medicine 23           
   
                                                      
  
  
Pepper Mendoza NP  
  
  
NPI: [id number]  
  
  
[address] [place] [address] [phone] (Work)  
  
  
[phone] (Fax) Nurse Practitioner Family Medicine 3/19/24           
  
  
  
                      Team Member Relationship Specialty  Start Date End Date  
   
                                                      
  
  
Shoaib Matos MD  
  
  
NPI: [id number]  
  
  
[address] [place] [address] [phone] (Work)  
  
  
[phone] (Fax) PCP - [facility]oted                   23            
   
                                                      
  
  
Shoaib Matos MD  
  
  
NPI: [id number]  
  
  
[address] [place] [address] [phone] (Work)  
  
  
[phone] (Fax) PCP - General   Family Medicine 3/19/24           
   
                                                      
  
  
Pepper Mendoza NP  
  
  
NPI: [id number]  
  
  
[address] [place] [address] [phone] (Work)  
  
  
[phone] (Fax) Nurse Practitioner Family Medicine 23           
   
                                                      
  
  
Pepper Mendoza NP  
  
  
NPI: [id number]  
  
  
[address] [place] [address] [phone] (Work)  
  
  
[phone] (Fax) Nurse Practitioner Family Medicine 3/19/24           
  
  
  
                      Team Member Relationship Specialty  Start Date End Date  
   
                                                      
  
  
Shoaib Matos MD  
  
  
NPI: [id number]  
  
  
[address] [place] [address] [phone] (Work)  
  
  
[phone] (Fax) PCP - [facility]oted                   23            
   
                                                      
  
  
Shoaib Matos MD  
  
  
NPI: [id number]  
  
  
[address] [place] [address] [phone] (Work)  
  
  
[phone] (Fax) PCP - General   Family Medicine 3/19/24           
   
                                                      
  
  
Pepper Mendoza NP  
  
  
NPI: [id number]  
  
  
[address] [place] [address] [phone] (Work)  
  
  
[phone] (Fax) Nurse Practitioner Family Medicine 23           
   
                                                      
  
  
Pepper Mendoza NP  
  
  
NPI: [id number]  
  
  
[address] [place] [address] [phone] (Work)  
  
  
[phone] (Fax) Nurse Practitioner Family Medicine 3/19/24           
  
  
  
  
  
                                                    Goals (unrecognized section   
and content)  
   
                                                    Goals may be documented in a  
n alternate section  
  
FOR RECORDS PERTAINING TO PATIENTS WHO ARE OR HAVE BEEN ENROLLED IN A CHEMICAL 
DEPENDENCY/SUBSTANCEABUSE PROGRAM, SOME INFORMATION MAY BE OMITTED. This 
clinical summary was aggregated from multiple sources. Caution should be 
exercised in using it in the provision of clinical care. This summary normalizes
 information from multiple sources, and as a consequence, information in this 
document may materially change the coding, format and clinical context of 
patient data. In addition, data may be omitted in some cases. CLINICAL DECISIONS
 SHOULD BE BASED ON THE PRIMARY CLINICAL RECORDS. [facility] [employer] [facility] provides
 no warranty or guarantee of the accuracy or completeness of information in this
 document.

## 2024-11-11 ENCOUNTER — HOSPITAL ENCOUNTER (OUTPATIENT)
Dept: HOSPITAL 101 - SURGOUT | Age: 74
Discharge: HOME | End: 2024-11-11
Payer: COMMERCIAL

## 2024-11-11 VITALS
OXYGEN SATURATION: 100 % | TEMPERATURE: 97.88 F | HEART RATE: 105 BPM | SYSTOLIC BLOOD PRESSURE: 192 MMHG | DIASTOLIC BLOOD PRESSURE: 86 MMHG

## 2024-11-11 VITALS
HEART RATE: 87 BPM | TEMPERATURE: 96.98 F | SYSTOLIC BLOOD PRESSURE: 151 MMHG | OXYGEN SATURATION: 100 % | DIASTOLIC BLOOD PRESSURE: 77 MMHG

## 2024-11-11 VITALS
TEMPERATURE: 96.98 F | OXYGEN SATURATION: 99 % | SYSTOLIC BLOOD PRESSURE: 134 MMHG | HEART RATE: 83 BPM | DIASTOLIC BLOOD PRESSURE: 78 MMHG

## 2024-11-11 DIAGNOSIS — I25.10: ICD-10-CM

## 2024-11-11 DIAGNOSIS — I25.2: ICD-10-CM

## 2024-11-11 DIAGNOSIS — N18.6: ICD-10-CM

## 2024-11-11 DIAGNOSIS — J44.9: ICD-10-CM

## 2024-11-11 DIAGNOSIS — I12.0: ICD-10-CM

## 2024-11-11 DIAGNOSIS — Z79.4: ICD-10-CM

## 2024-11-11 DIAGNOSIS — M48.062: Primary | ICD-10-CM

## 2024-11-11 DIAGNOSIS — Z99.2: ICD-10-CM

## 2024-11-11 DIAGNOSIS — E78.5: ICD-10-CM

## 2024-11-11 DIAGNOSIS — E11.22: ICD-10-CM

## 2024-11-11 PROCEDURE — 82948 REAGENT STRIP/BLOOD GLUCOSE: CPT

## 2024-11-11 PROCEDURE — 64483 NJX AA&/STRD TFRM EPI L/S 1: CPT

## 2024-11-11 RX ADMIN — SODIUM CHLORIDE 500 ML: 9 INJECTION, SOLUTION INTRAVENOUS at 07:45

## 2024-11-11 RX ADMIN — TRIAMCINOLONE ACETONIDE 80 MG: 40 INJECTION, SUSPENSION INTRA-ARTICULAR; INTRAMUSCULAR at 08:20

## 2024-11-11 RX ADMIN — IOHEXOL 12 MG: 240 INJECTION, SOLUTION INTRATHECAL; INTRAVASCULAR; INTRAVENOUS; ORAL at 08:20

## 2024-11-11 RX ADMIN — BUPIVACAINE HYDROCHLORIDE 1 ML: 2.5 INJECTION, SOLUTION EPIDURAL; INFILTRATION; INTRACAUDAL; PERINEURAL at 08:19

## 2024-11-11 RX ADMIN — SODIUM CHLORIDE 1 ML: 9 INJECTION, SOLUTION INTRAMUSCULAR; INTRAVENOUS; SUBCUTANEOUS at 08:19

## 2024-11-11 RX ADMIN — LIDOCAINE HYDROCHLORIDE 2 ML: 20 INJECTION, SOLUTION INFILTRATION; PERINEURAL at 08:20

## 2024-11-25 ENCOUNTER — OFFICE VISIT (OUTPATIENT)
Dept: INTERNAL MEDICINE CLINIC | Age: 74
End: 2024-11-25
Payer: COMMERCIAL

## 2024-11-25 VITALS
HEIGHT: 71 IN | TEMPERATURE: 98.1 F | WEIGHT: 224.4 LBS | SYSTOLIC BLOOD PRESSURE: 134 MMHG | HEART RATE: 100 BPM | BODY MASS INDEX: 31.42 KG/M2 | DIASTOLIC BLOOD PRESSURE: 76 MMHG

## 2024-11-25 DIAGNOSIS — Z79.4 TYPE 2 DIABETES MELLITUS WITH OTHER SPECIFIED COMPLICATION, WITH LONG-TERM CURRENT USE OF INSULIN (HCC): Primary | ICD-10-CM

## 2024-11-25 DIAGNOSIS — E11.69 TYPE 2 DIABETES MELLITUS WITH OTHER SPECIFIED COMPLICATION, WITH LONG-TERM CURRENT USE OF INSULIN (HCC): Primary | ICD-10-CM

## 2024-11-25 LAB — HBA1C MFR BLD: 7.8 % (ref 4.3–5.7)

## 2024-11-25 PROCEDURE — 83036 HEMOGLOBIN GLYCOSYLATED A1C: CPT | Performed by: REGISTERED NURSE

## 2024-11-25 PROCEDURE — NBSRV NON-BILLABLE SERVICE: Performed by: REGISTERED NURSE

## 2024-11-25 PROCEDURE — G0108 DIAB MANAGE TRN  PER INDIV: HCPCS | Performed by: REGISTERED NURSE

## 2024-11-25 RX ORDER — PHENOL 1.4 %
10 AEROSOL, SPRAY (ML) MUCOUS MEMBRANE NIGHTLY
COMMUNITY

## 2024-11-25 RX ORDER — DOCUSATE SODIUM 100 MG/1
100 CAPSULE, LIQUID FILLED ORAL 2 TIMES DAILY PRN
COMMUNITY

## 2024-11-25 RX ORDER — GENTAMICIN SULFATE 1 MG/G
1 OINTMENT TOPICAL DAILY
COMMUNITY
Start: 2024-03-19

## 2024-11-25 RX ORDER — PEN NEEDLE, DIABETIC 30 GX5/16"
1 NEEDLE, DISPOSABLE MISCELLANEOUS
COMMUNITY
Start: 2024-08-08

## 2024-11-25 RX ORDER — HYDROXYZINE PAMOATE 25 MG/1
25 CAPSULE ORAL EVERY 8 HOURS PRN
COMMUNITY
Start: 2024-05-25

## 2024-11-25 RX ORDER — ALBUTEROL SULFATE 90 UG/1
2 INHALANT RESPIRATORY (INHALATION) EVERY 6 HOURS PRN
COMMUNITY
Start: 2023-12-29

## 2024-11-25 RX ORDER — ASCORBIC ACID, THIAMINE, RIBOFLAVIN, NIACINAMIDE, PYRIDOXINE, FOLIC ACID, COBALAMIN, BIOTIN, PANTOTHENIC ACID, ZINC 100; 1.5; 1.7; 20; 10; 1; 6; 300; 10; 5 MG/1; MG/1; MG/1; MG/1; MG/1; MG/1; UG/1; UG/1; MG/1; MG/1
1 TABLET, COATED ORAL DAILY
COMMUNITY

## 2024-11-25 NOTE — PATIENT INSTRUCTIONS
Fiasp 8 units with breakfast plus the scale            6 units with lunch plus the scale            8 units with dinner plus the scale            8 units with evening dialysis plus the scale  Basglar 9 units in the  morning and 15 units in the evening  Try to get about 4-5 servings of carbohydrate at each meal        (60-75 grams of carbohydrate)  Try to do exercises every day. They stretches you are           Already doing PLUS  stretch band exercises 10 minutes          Every day --arms and knees and pedals with feet.  Always keep a treatment for low blood sugar with you

## 2024-11-25 NOTE — PROGRESS NOTES
heart disease, neuropathy. He is doing peritoneal dialysis nightly:  2  6liter bags 2.5 most nights but varies with fluid retention to 1.25 or 4.25 bags. Glucose levels rise with dialysis process. Taking Fiasp at the start of dialysis. Carb intake is not consistent and at times is quite significant. Began discussion of more controlled carb intake as well as a daily exercise routine. Encouragement given.  Review with PAM PinzonD. ALETA/ Dr. Hernandez    Curriculum Area Focus: Diabetes pathophysiology, Glucose checks/goals, Carbohydrate counting/meal planning, Physical activity goals, and Hypoglycemia management  DSME PLAN:     Patient Instructions   Fiasp increase from 6 units to 8 units with breakfast plus the scale            6 units with lunch plus the scale            Increase from 6 units to 8 units with dinner plus the scale            Increase from 6 units to 8 units with evening dialysis plus the scale  Basglar 9 units (decrease from 10 units) in the  morning and 15 units in the evening  Try to get about 4-5 servings of carbohydrate at each meal        (60-75 grams of carbohydrate)  Try to do exercises every day. They stretches you are           Already doing PLUS  stretch band exercises 10 minutes          Every day --arms and knees and pedals with feet.  Always keep a treatment for low blood sugar with you     Goals Addressed                   This Visit's Progress     60-75 grams carb per meal; limit snacks to 15 or less grams carb        Blood Pressure < 140/90   134/76     Exercise 10 minute stretch band exercise every day        HEMOGLOBIN A1C < 7   7.8          Meter download, medications, PMH and nursing assessment reviewed.    Jefry Cummings states He is willing to participate in this plan of care and verbalized understanding of all instructions provided. Teach back used to verify comprehension.      Follow-up: 2 months    Total time involved in direct patient education: 60 minutes.

## 2024-11-27 ENCOUNTER — TELEPHONE (OUTPATIENT)
Dept: INTERNAL MEDICINE CLINIC | Age: 74
End: 2024-11-27

## 2024-11-27 DIAGNOSIS — E11.3599 TYPE 2 DIABETES MELLITUS WITH PROLIFERATIVE RETINOPATHY, WITH LONG-TERM CURRENT USE OF INSULIN, MACULAR EDEMA PRESENCE UNSPECIFIED, UNSPECIFIED LATERALITY, UNSPECIFIED PROLIFERATIVE RETINOPATHY* (HCC): ICD-10-CM

## 2024-11-27 DIAGNOSIS — Z79.4 TYPE 2 DIABETES MELLITUS WITH PROLIFERATIVE RETINOPATHY, WITH LONG-TERM CURRENT USE OF INSULIN, MACULAR EDEMA PRESENCE UNSPECIFIED, UNSPECIFIED LATERALITY, UNSPECIFIED PROLIFERATIVE RETINOPATHY* (HCC): ICD-10-CM

## 2024-11-27 RX ORDER — INSULIN ASPART INJECTION 100 [IU]/ML
INJECTION, SOLUTION SUBCUTANEOUS
Qty: 10 ADJUSTABLE DOSE PRE-FILLED PEN SYRINGE | Refills: 3 | Status: SHIPPED | OUTPATIENT
Start: 2024-11-27

## 2024-11-27 NOTE — TELEPHONE ENCOUNTER
Call from Jefry stating they need an updated script at [facility] for the change in insulin dosing for his Fiasp.  ALETA/ Dr. Hernandez

## 2024-11-27 NOTE — TELEPHONE ENCOUNTER
Refill with updated units sent.    For Pharmacy Admin Tracking Only    Program: Medical Group  CPA in place:  Yes  Recommendation Provided To: Patient/Caregiver: 1 via Telephone  Intervention Detail: Refill(s) Provided  Intervention Accepted By: Patient/Caregiver: 1  Gap Closed?: Yes   Time Spent (min): 5

## 2024-12-05 ENCOUNTER — HOSPITAL ENCOUNTER
Dept: HOSPITAL 101 - PM | Age: 74
Discharge: HOME | End: 2024-12-05
Payer: COMMERCIAL

## 2024-12-05 DIAGNOSIS — M53.3: ICD-10-CM

## 2024-12-05 DIAGNOSIS — M48.062: Primary | ICD-10-CM

## 2024-12-05 DIAGNOSIS — M47.816: ICD-10-CM

## 2024-12-05 PROCEDURE — G0463 HOSPITAL OUTPT CLINIC VISIT: HCPCS

## 2024-12-05 NOTE — PM.CN
Consult Note: HPI
Data of Consult
Patient: known to practice within the last 3 years
Consult date: 10/21/24
Requesting Physician: 
Lorna Nichols NP

Primary Care Provider: 
Pepper Mendoza NP

Consult Narrative
Reason for consult: low back, bilateral lower extremity pain
Narrative: 
74yom who presents for evaluation. longstanding low back pain history with radiation into bilateral lower extremities. imaging shows multilevel severe stenosis, worst at l3-4. also multilevel facet arthropathy noted. recently evaluated by spine 
surgeon, who did not recommend surgery due to myriad other health issues. has continued in a series of provider directed home exercises >6 weeks, without benefit. has used flexeril with some benefit. denies adverse med side effects. recently 
underwent bilateral L3/4 TFESI and bilateral L4/5 TFESI with 75% improvement ongoing. 
cc:: 
CC: Lorna Nichols NP


Review of Systems
ROS  
 Status of ROS 10 or more systems reviewed and unremarkable except as noted in history and below   
 Musculoskeletal Reports: back pain, neck pain, extremity pain and joint pain   

PFSH
PFS
Medical History (Updated 10/21/24 @ 18:09 by Janette Borja MD)

Osteoarthritis
 ?M19.90 - Unspecified osteoarthritis, unspecified site (ICD-10)
Fistula
 ?L98.8 - Other specified disorders of the skin and subcutaneous tissue (ICD-10)
Trigger finger
 ?M65.30 - Trigger finger, unspecified finger (ICD-10)
Blind
 ?H54.7 - Unspecified visual loss (ICD-10)
Anxiety
 ?F41.9 - Anxiety disorder, unspecified (ICD-10)
Acid reflux
 ?K21.9 - Gastro-esophageal reflux disease without esophagitis (ICD-10)
Diabetes
 ?E11.9 - Type 2 diabetes mellitus without complications (ICD-10)
Kidney stone
 ?N20.0 - Calculus of kidney (ICD-10)
Cirrhosis of liver
 ?K74.60 - Unspecified cirrhosis of liver (ICD-10)
Requires peritoneal dialysis
 ?Z99.2 - Dependence on renal dialysis (ICD-10)
Kidney failure
 ?N19 - Unspecified kidney failure (ICD-10)
COPD (chronic obstructive pulmonary disease)
 ?J44.9 - Chronic obstructive pulmonary disease, unspecified (ICD-10)
Asthma
 ?J45.909 - Unspecified asthma, uncomplicated (ICD-10)
HTN (hypertension)
 ?I10 - Essential (primary) hypertension (ICD-10)
Palpitations
 ?R00.2 - Palpitations (ICD-10)
Heart attack
 ?I21.9 - Acute myocardial infarction, unspecified (ICD-10)
High cholesterol
 ?E78.00 - Pure hypercholesterolemia, unspecified (ICD-10)


Surgical History (Reviewed 12/05/24 @ 11:43 by Lorna Nichols NP)

Hx of cholecystectomy
 ?Z90.49 - Acquired absence of other specified parts of digestive tract (ICD-10)
History of rhinoplasty
 ?Z98.890 - Other specified postprocedural states (ICD-10)
History of cardiac cath
 ?Z98.890 - Other specified postprocedural states (ICD-10)
History of arthroscopic knee surgery
 ?Z98.890 - Other specified postprocedural states (ICD-10)
History of vasectomy
 ?Z98.52 - Vasectomy status (ICD-10)



Meds
Home Medications and Allergies
Home Medications

?Medication ?Instructions ?Recorded ?Confirmed ?Type
acetaminophen 650 mg 650 mg PO Q12H PRN fever 10/21/24 11/11/24 History
tablet,extended release (Tylenol    
Arthritis Pain)    
amlodipine 5 mg tablet 5 mg PO DAILY 10/21/24 11/11/24 History
bumetanide 2 mg tablet 2 mg PO DAILY 10/21/24 11/11/24 History
cyclobenzaprine 10 mg tablet 10 mg PO Q12H 10/21/24 11/11/24 History
hydroxyzine pamoate 25 mg capsule 25 mg PO TID 10/21/24 11/11/24 History
insulin aspart 6 unit subcut TID 10/21/24 11/11/24 History
(niacinamide)(U-100) 100 unit/mL(3    
mL) subcutaneous pen (Fiasp    
FlexTouch U-100 Insulin)    
insulin glargine 100 unit/mL (3 10 unit subcut QAM 10/21/24 11/11/24 History
mL) subcutaneous pen (Basaglar    
KwikPen U-100 Insulin)    
magnesium oxide 400 mg PO DAILY 10/21/24 11/11/24 History
melatonin 10 mg capsule 10 mg PO DAILY 10/21/24 11/11/24 History
omeprazole 20 mg capsule,delayed 20 mg PO BID 10/21/24 11/11/24 History
release    
sevelamer carbonate 800 mg tablet 800 mg PO TID 10/21/24 11/11/24 History
(Renvela)    
HEPARIN .QOD 10/28/24  History


Allergies

Allergy/AdvReac Type Severity Reaction Status Date / Time
thiopental (From Pentothal) Allergy  Agitated Verified 11/11/24 07:40



Exam
Narrative
Exam Narrative: 
Psych-alert and oriented x 3. Attentive and appropriate, constitutionally normal, displays normal mood and affect per situation. There are no obvious deficits in memory, reasoning, or intellect.?
Skin-no obvious rashes, bruising, erythema noted to the patient's area of pain.?
Extremities- extremities are warm with minimal edema and palpable pulses.
Lumbar-tenderness to palpation noted in the lumbar spine and paraspinal musculature. Pain is elicited with flexion, extension, and lateral rotation of the lumbar spine. Range of motion is diminished with these motions. Facet loading maneuvers are 
positive.?
Strength-noted to be unremarkable 
Sensory-no notable sensory deficits in the bilateral lower extremities to touch or pinprick in all dermatomal distributions
Sacroiliac - bilateral tenderness at PSIS. Positive Steve's bilaterally. Positive thigh thrust bilaterally.
Coordination remains intact.?
Gait remains non-antalgic

Results
Additional Findings
Additional findings: 
If on a controlled substance or opioids, I have checked an [place] report on this patient and there are no aberrancies noted in the prescribing history.??If on a controlled substance or opioid a drug screen was completed and reviewed within the last 
year, and if there has not been a drug screen completed we ordered one today to monitor higher risk, state monitored pain medication use. 

As part of providing excellent, safe, comprehensive care, the following was completed at our patient's visit:
1. A medication reconciliation and review to ensure accurate knowledge of current/active medications, including asking our patients to inform us about any over-the-counter medications or herbal remedies/nutritional supplements/alternative remedies.
2. A review to specifically ensure our patients have had annual screening for screening for depression, screening for tobacco use, and screening for unhealthy alcohol use. For concerning screenings had a discussion with the patient, provided patient 
education, and recommended follow-up with primary care provider when appropriate. If patient noted with a risk of falling, they received education on strength, gait, and balance training to prevent future risk of falling.

Assessment and Plan
Assessment and Plan
(1) Lumbar stenosis with neurogenic claudication: 
(2) Lumbar spondylosis: 
(3) Sacroiliac joint dysfunction of both sides: 

Plan
consider bilateral SIJ injections in the future
continue current medications
continue HEP as tolerated
f/u 2-3 months, sooner if needed

## 2024-12-05 NOTE — XMS_ITS
Comprehensive CCD (C-CDA v2.1)  
  
                          Created on: 2024  
  
  
SARAIARUN  
External Reference #: CDR,PersonID:[id number]  
: 1950  
Sex: Male  
  
Demographics  
  
  
                                        Address             [address] [place]  [address]  
   
                                        Home Phone          [phone]  
   
                                        Preferred Language  en  
   
                                        Marital Status        
   
                                        Judaism Affiliation Unknown  
   
                                        Race                White  
   
                                        Ethnic Group        Not  or Lati  
no  
  
  
Author  
  
  
                                        Organization        [facility]  
  
  
Care Team Providers  
  
  
                                Care Team Member Name Role            Phone  
   
                                Unavailable     Primary Care Provider Unavailabl  
e  
   
                                [facility] Primary Care Provider [phone]  
[phone]  
   
                                MD Ronald Sánchez Attending Provider [phone]  
[phone]  
   
                                MD Charlie Matos Attending Provider [phone]  
   
                                [facility] [facility] Primary Care Provider [phone]  
[phone]  
   
                                MD Ronald Sánchez Attending Provider [phone]  
[phone]  
   
                                Ronald Sánchez Unavailable     [phone]  
[phone]  
   
                                Carolee Cuevas Unavailable     [phone]  
   
                                [facility] Primary Care Provider [phone]  
[phone]  
   
                                MD Ronald Sánchez Attending Provider [phone]  
[phone]  
   
                                LAN Cuevas Attending Provider [phone]  
[phone]  
   
                                [facility] Primary Care Provider [phone]  
[phone]  
   
                                MD Ronald Sánchez Attending Provider [phone]  
[phone]  
   
                                [facility] A Primary Care Provider [phone]  
[phone]  
   
                                MD Ronald Sánchez Attending Provider [phone]  
[phone]  
   
                                Pepper Mendoza Primary Care Provider [phone]  
   
                                Pepper Mendoza Primary Care Provider [phone]  
   
                                MD Guicho Bronson Attending Provider [phone]  
   
                                Pepper Jhaveri Primary Care Provider   
[phone]  
   
                                BASIA SOMMER  Admitting       Unavailable  
   
                                BASIA SOMMER  Attending       Unavailable  
   
                                FRANCISCO CALABRESE Referring       Unavailab  
le  
   
                                AICHHOLZ, PEPPER J Primary Care    Unavailable  
   
                                ROXANNA SALINAS Consulting      Unavaila  
NELLY Colmenares    Consulting      Unavailable  
   
                                BEVERLY YUSUF Attending       Unavailable  
   
                                AICHHOLZ, PEPPER J Referring       Unavailable  
   
                                AICHHOLZ, PEPPER J Primary Care    Unavailable  
   
                                Aichholz, Pepper J Primary Care Provider [phone]  
   
                                MD Yordy Lewis  Attending Provider [phone]  
[phone]  
   
                                JOSEF CASANOVA Attending       Unavailable  
   
                                AICHHOLZ, PEPPER J Referring       Unavailable  
   
                                AICHHOLZ, PEPPER J Primary Care    Unavailable  
   
                                AICHHOLZ, PEPPER J Referring       Unavailable  
   
                                AICHHOLZ, PEPPER J Primary Care    Unavailable  
   
                                AICHHOLZ, PEPPER J Primary Care    Unavailable  
   
                                FRANCISCO CALABRESE Attending       UnavailFRANCISCO Harley Attending       Unavaila  
FRANCISCO Anderson Referring       Unavaila  
ble  
   
                                AICHHOLZ, PEPPER J Primary Care    Unavailable  
   
                                VALERIA JORDAN  Referring       Unavailable  
   
                                AICHHOLZ, PEPPER J Primary Care    Unavailable  
   
                                AICHHOLZ, PEPPER J Referring       Unavailable  
   
                                AICHHOLZ, PEPPER J Primary Care    Unavailable  
   
                                AICHHOLZ, PEPPER J Referring       Unavailable  
   
                                AICHHOLZ, PEPPER J Primary Care    Unavailable  
   
                                AKOSUA BAIG Attending       Unavailable  
   
                                AICHHOLZ, PEPPER J Referring       Unavailable  
   
                                AICHHOLZ, PEPPER J Primary Care    Unavailable  
   
                                AKOSUA BAIG Attending       Unavailable  
   
                                AKOSUA BAIG Referring       Unavailable  
   
                                AICHHOLZ, PEPPER J Primary Care    Unavailable  
   
                                EDWARDO, SHABANA G Referring       Unavailable  
   
                                AICHHOLZ, PEPPER J Primary Care    Unavailable  
   
                                EDWARDO, SHABANA G Referring       Unavailable  
   
                                AICHHOLZ, PEPPER J Primary Care    Unavailable  
   
                                EDWARDO, SHABANA G Referring       Unavailable  
   
                                AICHHOLZ, PEPPER J Primary Care    Unavailable  
   
                                EDWARDO, SHABANA G Admitting       Unavailable  
   
                                EDWARDO, SHABANA G Attending       Unavailable  
   
                                AICHHOLZ, PEPPER J Primary Care    Unavailable  
   
                                CYRIL LEARY Attending       Unavailable  
   
                                AICHHOLZ, PEPPER J Primary Care    Unavailable  
   
                                EDWARDO, SHABANA G Attending       Unavailable  
   
                                EDWARDO, SHABANA G Referring       Unavailable  
   
                                AICHHOLZ, PEPPER J Primary Care    Unavailable  
   
                                YORDY LEWIS     Referring       Unavailable  
   
                                AICHHOLZ, PEPPER J Primary Care    Unavailable  
   
                                AICHHOLZ, PEPPER J Referring       Unavailable  
   
                                AICHHOLZ, PEPPER J Primary Care    Unavailable  
   
                                Aichholz, Pepper J Primary Care Provider [phone]  
   
                                MD Radha Imeliana  Attending Provider [phone]  
   
                                Aichholmanish NP, Pepper Unavailable     [phone]  
   
                                Shoaib Matos MD Unavailable     [phone]  
   
                                Shoaib Matos MD Primary Care Provider [phone]  
   
                                Aichfrancisca NP, Pepper Unavailable     [phone]  
   
                                AICHANAYZ, PEPPER  Attending       Unavailable  
   
                                RUSHER, NANCY A Attending       Unavailable  
   
                                AICHHOLZ, PEPPER  Attending       Unavailable  
   
                                AICHHOLZ, PEPPER  Attending       Unavailable  
   
                                RUSHER, NANCY A Attending       Unavailable  
   
                                AICHHOLZ, PEPPER  Attending       Unavailable  
   
                                RUSHER, NANCY A Attending       Unavailable  
   
                                RUSHER, NANCY A Attending       Unavailable  
   
                                AICHHOLZ, PEPPER  Attending       Unavailable  
   
                                RUSHER, NANCY A Attending       Unavailable  
   
                                JIM GRIGSBY Attending       Unavailable  
   
                                AICHHOLZ, PEPPER  Referring       Unavailable  
   
                                AICHHOLMANISH, PEPPER  Attending       Unavailable  
   
                                RUSHER, NANCY VENTURA Attending       Unavailable  
   
                                Asaad, Imad     Attending       Unavailable  
   
                                Asaad, Imad     Admitting       Unavailable  
   
                                Pepper Mendoza J Primary Care    Unavailable  
   
                                Asaad, Imad     Attending       Unavailable  
   
                                Asaad, Imad     Admitting       Unavailable  
   
                                Pepper Mendoza Primary Care    Unavailable  
   
                                Jonh HOLDER, Janette Ireland Attending         
Unavailable  
   
                                Jonh HOLDER, Andrius Ireland Attending         
Unavailable  
   
                                Jonh HOLDER, Janette Ireland Attending         
Unavailable  
   
                                PEPPER MENDOZA. Referring       Unavailable  
   
                                No Family, Physician Primary Care    Unavailable  
   
                                ARCHIE VELA Referring       Unavailable  
   
                                No Family, Physician Primary Care    Unavailable  
   
                                PEPPER MENDOZA. Primary Care    Unavailable  
  
  
  
Allergies  
  
  
                                                    Allergy   
Classification                          Reported   
Allergen(s)               Allergy Type              Date of   
Onset                     Reaction(s)               Facility  
   
                                                    PHENobarbital  
(1 source)          PHENobarbital       Drug Allergy          
4                                       Unknown   
Reaction,   
violent                                 [facility]  
   
                                                      
(17 sources)                            PHENobarbital;   
Translations:   
[PHENOBARBITAL]           Drug Allergy                
2                                       violent,   
Unknown   
Reaction                                [facility]  
   
                                                      
(4 sources)               sodium pentothal          Propensity to   
adverse   
reactions                                 
2                                       becomes   
violent                                 [facility]  
   
                                                      
(6 sources)                             Thiopental;   
Translations:   
[THIOPENTAL   
SODIUM]                   Drug Allergy                
0                                       Other (See   
Comments)                               [facility]  
   
                                                      
(5 sources)         Thiopental          Drug Allergy          
3                                                   [facility]   
[facility]  
  
  
  
Medications  
Current Medications  
  
  
  
                      Medication Drug Class(es) Dates      Sig (Normalized) Sig   
(Original)  
   
                                                    8 hr acetaminophen   
650 mg extended   
release oral tablet  
(11 sources)                                        Start:   
2023                              take 1950 mg by   
mouth every eight   
hours                                   Acetaminophen   
Active 1950 MG PO   
Q8H 2023 12:00am  
  
  
  
                                Start: 2023                 Acetaminophen   
(Tylenol Arthritis) 650   
mg Tablet Extended Release Active   
1300 MG PO Q8H 2023   
12:00am  
   
                                                    Start: 10-  
End: 2023                                     Acetaminophen (Tylenol) 325   
mg Tablet   
Discontinued 650 MG PO As Directed   
2022 12:00am 2023 11:44am  
   
                                                            take 1 tablet by rayne  
th every   
eight hours as needed                   acetaminophen (TYLENOL ARTHRITIS) 650   
mg 8 hr tablet Take 1 tablet (650 mg   
total) by mouth every 8 (eight) hours   
as needed. 0 Active  
  
  
  
                                        Comment on above:   Take 650 mg by mouth  
 every 8 hours as needed.   
   
                                                    [id number]   
actuat albuterol   
0.09 mg/actuat   
metered dose   
inhaler  
(5 sources)                             beta2-Adrenergic   
Agonist                                 Start:   
20                                      take 2 puff(s) by   
inhalation every   
four hours for   
wheezing                                albuterol HFA 90   
mcg/act inhaler   
Inhale 2 puffs   
every 4 (four)   
hours if needed   
for shortness of   
breath or wheezing   
2023 Active  
   
                                                    amLODIPine 10 mg   
oral tablet  
(20 sources)                            Dihydropyridine Calcium   
Channel Blocker                         Start:   
20                                      take 5 mg by mouth   
once daily at   
bedtime                                 Amlodipine Active   
5 MG PO Daily at   
bedtime 2022 1:00am  
  
  
  
                                        Start: 2022   take 1 tablet by rayne  
th in the   
morning                                 amLODIPine (NORVASC) 10 mg tablet   
Indications: Essential hypertension   
Take 1 tablet (10 mg total) by mouth   
in the morning. 90 tablet 3   
2022 Active  
   
                                                            take 1 tablet by rayne  
th in the   
morning                                 amLODIPine (Norvasc) 5 MG tablet   
Take 5 mg by mouth in the morning.   
Active  
   
                                                                amLODIPine Besyl  
ate Active  
  
  
  
                                                    atorvastatin 40 mg   
oral tablet  
(20 sources)                            HMG-CoA   
Reductase   
Inhibitor                               Start: 2021  
End: 2024                         take 1 tablet   
by mouth in   
the morning                             atorvastatin   
(LIPITOR) 40 mg   
tablet Take 1   
tablet (40 mg   
total) by mouth in   
the morning. 0   
2024 Active  
  
  
  
                                                                Atorvastatin Garland  
cium Active  
  
  
  
                                        Comment on above:   Take 1 tablet by rayne  
th.   
   
                                                    B Complex-C-Zn-Folic   
Acid   
(Dialyvite/Zinc)   
tablet  
(5 sources)                                         Start:   
2024                              take 1 tablet by   
mouth at bedtime                        B Complex-C-Zn-Folic   
Acid   
(Dialyvite/Zinc)   
tablet Take 1 tablet   
by mouth at bedtime   
2024 Active  
   
                                                    bumetanide 2 mg oral   
tablet  
(9 sources)               Loop Diuretic             Start:   
2024                              take 1 tablet by   
mouth once daily                        bumetanide (Bumex) 2   
MG tablet Take 2 mg   
by mouth Daily   
2024 Active  
  
  
  
                                        Start: 10-   take 1 tablet by rayne  
th once   
daily                                   bumetanide (BUMEX) 2 mg tablet Take   
1 tablet (2 mg total) by mouth   
daily. 0 10/10/2023 Active  
  
  
  
                                                    cefdinir 300 mg oral   
capsule  
(5 sources)                             Cephalosporin   
Antibacterial                                               cefdinir (Omnicef) 3  
00   
MG capsule Take 300 mg   
by mouth Active  
   
                                                    cetirizine   
hydrochloride 10 mg   
oral tablet  
(5 sources)                             Histamine-1 Receptor   
Antagonist                                          take 1 tablet   
by mouth in   
the morning                             cetirizine (ZyrTEC) 10   
MG tablet Take 10 mg   
by mouth in the   
morning. Active  
   
                                                    cholecalciferol 0.125   
mg oral tablet  
(16 sources)              Vitamin D                 Start:   
  
022                                     take 1 tablet   
by mouth once   
daily                                   Cholecalciferol   
(Vitamin D3) (Vitamin   
D3) 125 mcg (5,000   
unit) Tablet Active   
03777 UNIT PO Daily   
2022   
1:00am  
  
  
  
                                                take 1 capsule by mouth in the m  
orning cholecalciferol (Vitamin D-3) 125 MCG   
(5000   
UT) capsule Take 5,000 Units by mouth in the   
morning. Active  
   
                                                take 63300 mg by mouth once daniela  
y cholecalciferol, vitamin D3, 10 mcg/drop (400  
  
unit/drop) drops Take 10,000 mg by mouth   
daily. 0 Active  
  
  
  
                                                    Continuous Blood Gluc   
 (FreeStyle   
Yudi 2 Pepin) device  
(5 sources)                                         Start:   
2023                                          Continuous Blood Gluc   
 (FreeStyle   
Yudi 2 Pepin)   
device 1 Device Daily   
as needed 2023   
Active  
   
                                                    Continuous Glucose   
Sensor (FreeStyle   
Yudi 2 Sensor) misc  
(5 sources)                                         Start:   
2024                                          Continuous Glucose   
Sensor (FreeStyle   
Yudi 2 Sensor) misc   
1 each by Other route   
Every 10 (ten) days   
2024 Active  
   
                                                    cyclobenzaprine   
hydrochloride 10 mg   
oral tablet  
(8 sources)                             Muscle   
Relaxant                                Start:   
2024  
End: 2024                         take 1   
tablet by   
mouth once                              cyclobenzaprine   
(Flexeril) 10 MG   
tablet Indications:   
Muscle spasm , Spinal   
stenosis of lumbar   
region with   
neurogenic   
claudication Take 1   
tablet (10 mg) by   
mouth every 12   
(twelve) hours 60   
tablet 2 10/10/2024   
2024 Active  
  
  
  
                                        Start: 2024   take 10 mg by mouth   
every   
twelve hours                            Cyclobenzaprine Active 10 MG PO Every   
12 hours May 14th, 2024 12:00am  
  
  
  
                                                    Dailyvite  
(3 sources)                             Start: 2023   take 800 mg   
by mouth once   
daily at   
bedtime                                 Dailyvite Active   
800 MG PO Daily   
at bedtime 2023   
12:00am with zinc  
   
                                                    Flash Glucose   
Sensor (Freestyle   
Yudi 2 Sensor)   
kit  
(1 source)                      Start: 2024                 Flash Glucose   
Sensor (Freestyle   
Yudi 2 Sensor)   
kit Active EACH   
.ROUTE .MEDSUPPLY   
 12:00am As   
directed  
   
                                                    FREESTYLE YUDI 2   
SENSOR kit  
(2 sources)                     Start: 2023                 FREESTYLE LIBR  
E 2   
SENSOR kit  
   
                                                    furosemide 40 mg   
oral tablet  
(5 sources)         Loop Diuretic                           take 1 tablet   
by mouth once   
daily                                   furosemide   
(Lasix) 40 MG   
tablet Take 1   
tablet by mouth   
Daily Active  
   
                                                    gentamicin 0.001   
mg/mg topical   
ointment  
(6 sources)                     Start: 2024                 gentamicin   
(Garamycin) 0.1 %   
ointment Apply 1   
Application   
topically in the   
morning.   
2024 Active  
   
                                                    1 ml heparin   
sodium, porcine   
1000 unt/ml   
injection  
(6 sources)                             Unfractionated   
Heparin,   
Anti-coagulant                                              heparin 1000   
units/mL   
injection Infuse   
1,000 Units/hr   
into a venous   
catheter every   
other day Active  
  
  
  
                                                                heparin sodium,p  
orcine (heparin, porcine,) 1,000 unit/mL infusion for dialysis  
  
Infuse 1,000 Units/hr into a venous catheter every other day. 0 Active  
  
  
  
                                                    hydrOXYzine pamoate   
25 mg oral capsule  
(20 sources)        Antihistamine       Start: 2024   take 1 capsule   
by mouth every   
eight hours as   
needed for   
anxiety                                 hydrOXYzine pamoate   
(Vistaril) 25 MG   
capsule Take 25 mg   
by mouth every 8   
(eight) hours if   
needed for anxiety   
or itching   
2024 Active  
  
  
  
                                        Start: 2024   take 1 tablet by rayne  
th three   
times daily as needed                   Hydroxyzine Hcl Active 1 TAB PO   
Three times daily May 14th, 2024   
12:00am FreeTextSi tablet as   
needed Orally Once a day; Note:   
Source Status: Taking; Provider:   
Princess Cardenas (NPI:   
[id number])  
   
                                        Start: 2024   take 1 tablet by rayne  
th once   
daily as needed                         Hydroxyzine Hcl Active 1 TAB PO   
Daily May 14th, 2024 12:00am   
FreeTextSi tablet as needed   
Orally Once a day; Note: Source   
Status: Taking; Provider: Princess Cardenas (NPI: [id number])  
   
                                Start: 2023                 hydrOXYzine (V  
ISTARIL) 25 mg capsule   
take 1 capsule by mouth every   
morning then 1 capsule AT NOON then   
1 capsule every evening if needed   
for anxiety up to 90 DAYS 270   
capsule 0 2023 Active  
   
                                                    Start: 2022  
End: 2024                         take 25 mg by mouth three times   
daily                                   Hydroxyzine Pamoate Discontinued 25   
MG PO Three times daily 2022 12:00am May 14th, 2024 11:58am  
   
                                                            take 1 tablet by rayne  
th every   
twenty-four hours                       hydrOXYzine HCl 25 MG 1 tablet as   
needed Orally Once a day Active  
   
                                                                hydrOXYzine HCl   
Active  
  
  
  
                                                    3 ml insulin   
aspart, human   
100 unt/ml pen   
injector  
(6 sources)         Insulin Analog      Start: 2024   inject 6 [IU] by   
subcutaneous   
injection three   
times daily                             Insulin Aspart   
(Niacinamide) (Fiasp   
Flextouch U-100   
Insulin) 100 unit/mL   
(3 mL) insulin pen   
Active 6 UNIT SUBCUT   
Three times daily   
2024   
12:00am  
  
  
  
                                Start: 2024                 insulin aspart  
, with niacinamide, (Fiasp FlexTouch) 100   
UNIT/ML   
injection Indications: Type 2 diabetes mellitus without   
complication, with long-term current use of insulin (CMS/HCC) 5   
units before meals and 8-10 units at bedtime. For total max dose   
of 25 units daily 23 mL 3 2024 Active  
  
  
  
                                                    3 ml insulin glargine   
100 unt/ml pen injector  
(7 sources)     Insulin Analog  Start: 2024                 insulin glargi  
ne (Basaglar   
KwikPen) 100 UNIT/ML pen   
Indications: Type 2   
diabetes mellitus without   
complication, with   
long-term current use of   
insulin (CMS/HCC) 25 units   
daily 27 mL 1 2024   
Active  
  
  
  
                                        Start: 2024   inject 10 [IU] by metcalf  
bcutaneous   
injection once daily at bedtime         Insulin Glargine (Basaglar   
Kwikpen U-100 Insulin) 100   
unit/mL (3 mL) insulin pen   
Active 10 UNIT SUBCUT every day   
in the morning and at bedtime   
May 14th, 2024 12:00am  
  
  
  
                                                    insulin   
glargine,hum.rec.anlog   
(BASAGLAR KWIKPEN U-100   
INSULIN SUBQ)  
(1 source)                                                  inject 10 [IU] by   
subcutaneous   
injection in the   
morning                                 insulin   
glargine,hum.rec.anlog   
(BASAGLAR KWIKPEN U-100   
INSULIN SUBQ) Inject 10   
Units under the skin in the   
morning. 0 Active  
   
                                                    loratadine 10 mg oral tablet  
(7 sources)                                                     loratadine (Clar  
itin) 10 MG   
tablet Take 10 mg by mouth   
if needed for allergies   
Active  
   
                                                    magnesium oxide 400 mg oral   
tablet  
(8 sources)                                         Start:   
10-13-2  
023                                                 magnesium oxide (Mag-Ox) 400  
   
(240 Mg) MG tablet Take 400   
mg by mouth. 10/13/2023   
Active  
   
                                                    melatonin 10 mg oral tablet  
(19 sources)                                        Start:   
  
024                                     take 1 tablet by   
mouth once daily at   
bedtime                                 Melatonin Active 1 TAB PO   
Daily at bedtime May 14th,   
2024 12:00am [employer]   
tablet at bedtime as needed   
Orally Once a day; Note:   
Source Status: Taking;   
Provider: Princess Cardenas (NPI: [id number])  
  
  
  
                                                            take 1 tablet under   
the tongue at   
bedtime                                 Melatonin 1 MG sublingual tablet Place 1  
 mg   
under the tongue at bedtime Active  
   
                                                            take 1 tablet by rayne  
th once daily at   
bedtime as needed                       Melatonin 10 MG 1 tablet at bedtime as   
needed Orally Once a day Active  
   
                                                                Melatonin Active  
  
  
  
                                                    omeprazole 20 mg   
delayed release   
oral tablet  
(20 sources)                            Proton Pump   
Inhibitor                 Start: 2023         take 20 mg by   
mouth twice   
daily                                   Omeprazole Active   
20 MG PO Twice   
daily 2023 12:00am  
  
  
  
                                        Start: 2023   take 20 mg by mouth   
once daily   
at bedtime                              Omeprazole Active 20 MG PO Daily at   
bedtime 2023 12:00am  
   
                                                            take 1 capsule by mo  
uth in the   
morning                                 omeprazole (PriLOSEC) 20 MG DR   
capsule Take 20 mg by mouth in the   
morning. Active  
   
                                                                Omeprazole Activ  
e  
  
  
  
                                        Comment on above:   Take 20 mg by mouth   
once daily.   
   
                                                    ONETOUCH ULTRA2 METER   
misc  
(2 sources)                                         Start:   
  
1                                                   ONETOUCH ULTRA2 METER   
misc  
   
                                                    polyethylene glycol   
3350 23169 mg powder   
for oral solution  
(1 source)      Osmotic Laxative                                 polyethylene gl  
ycol   
(GLYCOLAX) 17   
gram/dose powder Take   
17 g by mouth in the   
morning. 0 Active  
   
                                                    0.25 mg, 0.5 mg dose   
1.5 ml semaglutide   
1.34 mg/ml pen   
injector  
(2 sources)                                                     semaglutide (OZE  
MPIC)   
0.25 mg or 0.5 mg(2   
mg/1.5 mL) pen   
injector Inject under   
the skin. 0 Active  
   
                                                    sodium bicarb  
(10 sources)                                                    sodium bicarb Ac  
tive  
   
                                                    sodium zirconium   
cyclosilicate 5000 mg   
powder for oral   
suspension  
(5 sources)                                                     Lokelma 5 g pack  
et 5   
g Active  
   
                                                    Super B Complex  
(12 sources)                                                    Super B Complex   
Active  
   
                                                    UNABLE TO FIND  
(2 sources)                                                 take 1 tablet by   
mouth in the   
morning                                 UNABLE TO FIND Take 1   
tablet by mouth in   
the morning.   
DailyVite with Zinc.   
0 Active  
   
                                                    Vitamin B Complex  
(9 sources)                                         Start:   
  
2                                       take 1 tablet by   
mouth once daily                        Vitamin B Complex   
Active 1 TAB PO Daily   
2022   
2:58pm  
  
  
  
                                                    Start: 2022  
End: 2023                         take 1 tablet by mouth once   
daily                                   Vitamin B Complex Discontinued 1 TAB   
PO Daily 2022 1:00am   
2023 11:46am  
   
                                        Start: 2022   take 1 tablet by rayne  
th once   
daily                                   Vitamin B Complex Active 1 TAB PO   
Daily 2022 12:00am  
   
                                        Start: 2022   take 1 tablet by rayne  
th once   
daily                                   Vitamin B Complex Active 1 TAB PO   
Daily February 17th, 2022 1:00am  
  
  
  
                                                    Vitamin D3  
(12 sources)                                                    Vitamin D3 Activ  
e  
  
  
  
Completed/Discontinued Medications  
  
  
  
                      Medication Drug Class(es) Dates      Sig (Normalized) Sig   
(Original)  
   
                                                    acetaminophen 325 mg   
/ HYDROcodone   
bitartrate 5 mg oral   
tablet  
(3 sources)               Opioid Agonist            Start:   
2023  
End:   
2024                              take 1 tablet by   
mouth every six   
hours                                   Hydrocodone-Acetam  
inophen   
Discontinued 1 TAB   
PO Q6H 28 7 May   
2nd, 2023 May   
14th, 2024 11:58am  
   
                                                    aspirin 81 mg   
delayed release oral   
tablet  
(20 sources)                            Platelet   
Aggregation   
Inhibitor,   
Nonsteroidal   
Anti-inflammatory   
Drug                                    Start:   
2022  
End:   
10-                              take 1 tablet by   
mouth once daily                        Aspirin (Aspirin   
Low-Strength) 81   
mg Tablet,Delayed   
Release (Dr/Ec)   
Discontinued 81 MG   
PO Daily 2022 1:00am   
May 14th, 2024   
11:57am  
  
  
  
                                                                Baby Aspirin Act  
randi  
  
  
  
                                        Comment on above:   [facility]   
   
                                                    benzonatate 200 mg oral   
capsule  
(8 sources)                             Non-narcotic   
Antitussive                             Start:   
2024  
End:   
2024                                          Benzonatate Discontinued   
MG PO May 14th, 2024   
12:00am 2024   
1:48pm  
  
  
  
                                        Start: 2024   take 1 capsule by mo  
uth three   
times daily as needed                   benzonatate (Tessalon) 200 MG   
capsule Take 200 mg by mouth 3   
(three) times a day as needed   
2024 Active  
  
  
  
                                                    chlorthalidone 25 mg   
oral tablet  
(11 sources)                            Thiazide-like   
Diuretic                                Start:   
2021  
End: 2023                         take 25 mg   
by mouth   
once daily                              Chlorthalidone   
Discontinued 25 MG   
PO Daily 2022 1:00am   
2023   
11:45am  
   
                                                    cholecalciferol,   
vitamin D3, (VITAMIN   
D3 ORAL)  
(2 sources)                                                 take 11139   
mg by mouth   
once daily                              cholecalciferol,   
vitamin D3, (VITAMIN   
D3 ORAL) Take 10,000   
mg by mouth once   
daily. 0 Active  
   
                                        Comment on above:   Take 10,000 mg by mo  
uth once daily.   
   
                                                    diphenhydrAMINE   
hydrochloride 50 mg   
oral tablet  
(3 sources)                             Histamine-1   
Receptor   
Antagonist                              Start:   
2023  
End: 2024                         take 50 mg   
by mouth   
once daily   
at bedtime                              Diphenhydramine Hcl   
Discontinued 50 MG   
PO Daily at bedtime   
2023   
12:00am May 14th,   
2024 11:57am  
   
                                                    0.5 ml dulaglutide 3   
mg/ml auto-injector  
(16 sources)                            GLP-1 Receptor   
Agonist                                 Start:   
2022  
End: 2023                                     Dulaglutide   
(Trulicity) 1.5   
mg/0.5 mL pen   
injector   
Discontinued 1.5 MG   
SUBCUT every week   
2022   
1:00am 2023 11:45am takes   
on Sat.  
  
  
  
                                                                Trulicity Active  
  
  
  
                                        Comment on above:   as directed.   
   
                                                    [id number]  
l oil (METANX, ALGAL   
OIL,) 3 mg-35 mg-2 mg   
-90.314 mg cap  
(2 sources)                                         Start:   
2022                                          [id number]   
oil (METANX, ALGAL OIL,)   
3 mg-35 mg-2 mg -90.314   
mg cap See Admin   
Instructions. 0   
2022 Active  
   
                                        Comment on above:   See Admin Instructio  
ns.   
   
                                                    lidocaine 25 mg/ml /   
prilocaine 25 mg/ml   
topical cream  
(3 sources)                             Antiarrhythmic, Amide   
Local Anesthetic                        Start:   
2023  
End:   
2024                                          Lidocaine-Prilocaine   
Discontinued 1 APPLIC   
TOPICAL 3 Times a week   
2023 12:00am   
May 14th, 2024 11:58am   
 with   
dialysis  
   
                                                    Ozempic  
(4 sources)                                                     Ozempic Not-Taki  
ng  
  
  
  
                                                                Ozempic Active  
  
  
  
                                                    Semaglutide  
(3 sources)                                         Start: 2023  
End: 2024                                     Semaglutide (Ozempic) 0.25 m  
g or 0.5 mg (2 mg/3   
mL) pen injector Discontinued 0.5 MG SUBCUT every   
week 2023 12:00am May 14th, 2024   
11:59am   
  
  
  
                                Start: 2023                 Semaglutide (O  
zempic) 0.25 mg or 0.5 mg (2 mg/3 mL) pen   
injector   
Active 0.5 MG SUBCUT every week 2023 12:00am   
  
  
  
  
                                                    sevelamer   
carbonate 800 mg   
oral tablet  
(20 sources)              Phosphate Binder          Start: 2022  
End: 2024                         take 1 tablet   
by mouth three   
times daily at   
mealtime                                Sevelamer Carbonate   
(Renvela) 800 mg   
tablet Discontinued   
800 MG PO Three   
times daily May   
14th, 2024 12:00am   
2024 10:19am must   
administer with a   
meal/food  
  
  
  
                                        Start: 2022   take 2 tablets by mo  
uth three   
times daily                             Sevelamer Carbonate (Renvela) 800   
mg Tablet Active 1600 MG PO Three   
times daily 2022 1:00am  
   
                                                                Renvela Active  
  
  
  
                                        Comment on above:   1 tablet with meals   
   
                                                    sodium bicarbonate 325   
mg oral tablet  
(13 sources)                                        Start:   
2022  
End:   
2023                              take 325 mg by   
mouth twice   
daily                                   Sodium Bicarbonate   
Discontinued 325 MG PO   
Twice daily 2022 1:00am   
2023   
11:46am  
  
  
  
                                                                Sodium Bicarbona  
te 4.2 % as directed Intravenous Active  
  
  
  
                                                    tiZANidine 4 mg   
oral tablet  
(20 sources)                            Central alpha-2   
Adrenergic Agonist                      Start: 2022  
End: 2024                         take 4 mg by   
mouth twice   
daily                                   Tizanidine   
Discontinued 4 MG   
PO Twice daily   
2022   
1:00am May 14th,   
2024 11:59am  
  
  
  
                                                                tiZANidine HCl A  
ctive  
  
  
  
                                        Comment on above:   Take 1 tablet by rayne  
 as needed.   
  
  
  
Problems  
Active Problems  
  
  
                                                    Problem   
Classification  Problem         Date            Documented Date Episodic/Chronic  
   
                                                    Acquired foot   
deformities  
(14 sources)                            Acquired hallux   
valgus; Translations:   
[Hallux valgus   
(acquired),   
unspecified foot]                       Onset:   
2022                Chronic  
   
                                                    Acute and unspecified   
renal failure  
(4 sources)                             Renal failure   
syndrome;   
Translations:   
[Unspecified kidney   
failure]                                10-          Chronic  
   
                                                    Acute myocardial   
infarction  
(5 sources)                             Myocardial   
infarction;   
Translations: [Acute   
myocardial   
infarction,   
unspecified]                            Onset:   
2024                Chronic  
   
                                                    Administrative/social   
admission  
(7 sources)                             Reduced mobility;   
Translations: [Other   
reduced mobility]                       Onset:   
2020                Episodic  
   
                                                    Anxiety disorders  
(5 sources)                             Generalized anxiety   
disorder;   
Translations:   
[Generalized anxiety   
disorder]                               Onset:   
2024                Chronic  
   
                                                    Asthma  
(5 sources)                             Asthma; Translations:   
[Unspecified asthma,   
uncomplicated]                          Onset:   
2024                Chronic  
   
                                                    Blindness and vision   
defects  
(6 sources)                             Legal blindness;   
Translations: [Legal   
blindness, as defined   
in [place]]                                 Onset:   
2024                Chronic  
   
                                                    Chronic kidney   
disease  
(20 sources)                            Chronic kidney   
disease;   
Translations:   
[Chronic kidney   
disease, unspecified]                   Onset:   
2021  
Resolved:   
2022                                          Chronic  
   
                                                    Chronic obstructive   
pulmonary disease and   
bronchiectasis  
(6 sources)                             Chronic obstructive   
pulmonary disease,   
unspecified;   
Translations:   
[Chronic obstructive   
lung disease]                           Onset:   
2024                Chronic  
   
                                                    Complication of   
device; implant or   
graft  
(8 sources)                             Arteriovenous fistula   
thrombosis;   
Translations:   
[Thrombosis due to   
vascular prosthetic   
devices, implants and   
grafts, initial   
encounter]                              Onset:   
2022  
Resolved:   
2022                                          Chronic  
   
                                                    Complication of   
device; implant or   
graft  
(5 sources)                             Other mechanical   
complication of   
surgically created   
arteriovenous   
fistula, initial   
encounter;   
Translations:   
[Mechanical   
complication of   
arteriovenous   
surgical fistula]                       Onset:   
2022  
Resolved:   
2022                                          Episodic  
   
                                                    Coronary   
atherosclerosis and   
other heart disease  
(3 sources)                             History of myocardial   
infarction;   
Translations: [Old   
myocardial   
infarction]                             Onset:   
2021                Chronic  
   
                                                    Diabetes mellitus   
with complications  
(20 sources)                            Secondary diabetes   
mellitus;   
Translations:   
[Diabetes mellitus   
due to underlying   
condition with   
unspecified diabetic   
retinopathy with   
macular edema]                          Onset:   
2020                Chronic  
   
                                                    Diabetes mellitus   
without complication  
(9 sources)                             Insulin treated type   
2 diabetes mellitus;   
Translations: [Type 2   
diabetes mellitus   
without   
complications]                          Onset:   
2024                Chronic  
   
                                                    Disorders of lipid   
metabolism  
(8 sources)                             Mixed hyperlipidemia;   
Translations: [Mixed   
hyperlipidemia]                         Onset:   
01-                10-                Chronic  
   
                                                    Diverticulosis and   
diverticulitis  
(5 sources)                             Diverticular disease;   
Translations:   
[Diverticulosis of   
intestine, part   
unspecified, without   
perforation or   
abscess without   
bleeding]                               Onset:   
2024                Chronic  
   
                                                    Essential   
hypertension  
(10 sources)                            Essential   
hypertension;   
Translations:   
[Essential (primary)   
hypertension]                           Onset:   
2020                Chronic  
   
                                                    Headache; including   
migraine  
(5 sources)                             Migraine;   
Translations:   
[Migraine,   
unspecified, not   
intractable, without   
status migrainosus]                     Onset:   
2024                Chronic  
   
                                                    Immunity disorders  
(5 sources)                             Secondary immune   
deficiency disorder;   
Translations:   
[Immunodeficiency due   
to conditions   
classified elsewhere]                   Onset:   
2024                Chronic  
   
                                                    Mycoses  
(6 sources)                             Onychomycosis due to   
dermatophyte ;   
Translations: [Tinea   
unguium]                                Onset:   
2023                Episodic  
   
                                                    Osteoarthritis  
(9 sources)                             Arthropathy of left   
hip joint;   
Translations:   
[Unilateral primary   
osteoarthritis, left   
hip]                                    Onset:   
2024                Chronic  
   
                                                    Other aftercare  
(1 source)                              Post-discharge   
follow-up;   
Translations:   
[Encounter for   
follow-up examination   
after completed   
treatment for   
conditions other than   
malignant neoplasm]                     2024          Episodic  
   
                                                    Other aftercare  
(7 sources)                             Marijuana user;   
Translations: [Other   
long term (current)   
drug therapy]                           Onset:   
2024                Episodic  
   
                                                    Other aftercare  
(1 source)                              Other long term   
(current) drug   
therapy;   
Translations: [Other   
long term (current)   
drug therapy]                           Onset:   
2024                                          Episodic  
   
                                                    Other aftercare  
(1 source)                              Long-term current use   
of insulin;   
Translations: [Long   
term (current) use of   
insulin]                                10-          Episodic  
   
                                                    Other and ill-defined   
heart disease  
(5 sources)                             Heart disease;   
Translations: [Heart   
disease, unspecified]                   Onset:   
2024                Chronic  
   
                                                    Other circulatory   
disease  
(6 sources)                             Arteriovenous   
fistula;   
Translations:   
[Arteriovenous   
fistula, acquired]                                          Chronic  
   
                                                    Other circulatory   
disease  
(4 sources)                             Arteriovenous   
fistula, acquired                       Onset:   
2022  
Resolved:   
2022                                          Chronic  
   
                                                    Other circulatory   
disease  
(1 source)                              Arteriovenous fistula   
of right upper   
extremity;   
Translations:   
[Arteriovenous   
fistula, acquired]                      2024          Chronic  
   
                                                    Other connective   
tissue disease  
(5 sources)                             Myofascial pain;   
Translations:   
[Myalgia, other site]                   Onset:   
2024                Episodic  
   
                                                    Other liver diseases  
(6 sources)                             Steatosis of liver;   
Translations: [Fatty   
(change of) liver,   
not elsewhere   
classified]                             Onset:   
2024                Chronic  
   
                                                    Other liver diseases  
(9 sources)                             Hepatic fibrosis;   
Translations:   
[Hepatic fibrosis]                      Onset:   
2024                Chronic  
   
                                                    Other liver diseases  
(3 sources)                             Fatty (change of)   
liver, not elsewhere   
classified;   
Translations: [Other   
chronic nonalcoholic   
liver disease]                          Onset:   
2024                Chronic  
   
                                                    Other liver diseases  
(2 sources)                             Elevated liver   
enzymes level;   
Translations:   
[Abnormal levels of   
other serum enzymes]                     2024          Episodic  
   
                                                    Other lower   
respiratory disease  
(1 source)                              H/O: pneumonia;   
Translations:   
[Personal history of   
pneumonia   
(recurrent)]                            2024          Episodic  
   
                                                    Other nervous system   
disorders  
(2 sources)                             Mononeuropathy of   
lower limb;   
Translations:   
[Unspecified   
mononeuropathy of   
unspecified lower   
limb]                                   Onset:   
2022                Chronic  
   
                                                    Other nervous system   
disorders  
(1 source)                              Other chronic pain;   
Translations: [Other   
chronic pain]                           Onset:   
2024                                          Chronic  
   
                                                    Other nervous system   
disorders  
(5 sources)                             Chronic pain   
syndrome;   
Translations:   
[Chronic pain   
syndrome]                               Onset:   
2024                Chronic  
   
                                                    Other nervous system   
disorders  
(3 sources)                             Acute postoperative   
pain; Translations:   
[Other acute   
postprocedural pain]                     2023          Episodic  
   
                                                    Other non-traumatic   
joint disorders  
(7 sources)                             Hip pain;   
Translations: [Pain   
in right hip]                           Onset:   
10-                10-                Episodic  
   
                                                    Other nutritional;   
endocrine; and   
metabolic disorders  
(2 sources)                             Body mass index 30+ -   
obesity;   
Translations:   
[Obesity,   
unspecified]                            Onset:   
2021                Chronic  
   
                                                    Other skin disorders  
(6 sources)                             Dystrophia unguium;   
Translations: [Nail   
dystrophy]                              Onset:   
2023                Episodic  
   
                                                    Peripheral and   
visceral   
atherosclerosis  
(5 sources)                             Atherosclerosis of   
aorta; Translations:   
[Atherosclerosis of   
aorta]                                  Onset:   
2024                Chronic  
   
                                                    Residual codes;   
unclassified  
(6 sources)                             Past history of   
procedure;   
Translations: [Other   
specified   
postprocedural   
states]                                 Onset:   
2024                Episodic  
   
                                                    Spondylosis;   
intervertebral disc   
disorders; other back   
problems  
(7 sources)                             Degeneration of   
lumbar intervertebral   
disc; Translations:   
[Degeneration of   
intervertebral disc   
of lumbar region,   
unspecified whether   
pain present]                           Onset:   
10-                10-                Chronic  
   
                                                    Spondylosis;   
intervertebral disc   
disorders; other back   
problems  
(20 sources)                            Radiculopathy,   
thoracic region;   
Translations:   
[Radiculopathy,   
lumbar region]                          Onset:   
2024                Episodic  
   
                                                    Substance-related   
disorders  
(1 source)                              Light cigarette   
smoker; Translations:   
[Nicotine dependence,   
cigarettes,   
uncomplicated]                          2024          Chronic  
   
                                                    Unclassified  
(2 sources)                             Elevation of levels   
of liver transaminase   
levels; Translations:   
[Elevation of levels   
of liver transaminase   
levels]                                 Onset:   
2024                                            
   
                                                    Unclassified  
(1 source)                Rapid Heart Rate          Onset:   
03-                                            
   
                                                    Unclassified  
(1 source)                              rapid heart rate,   
vomiting, flank pain                    Onset:   
03-                                            
   
                                                    Unclassified  
(1 source)                              Hepatic fibrosis,   
unspecified;   
Translations:   
[Hepatic fibrosis,   
unspecified]                            Onset:   
2024                                            
  
  
Past or Other Problems  
  
  
                                                    Problem   
Classification  Problem         Date            Documented Date Episodic/Chronic  
   
                                                    Biliary tract disease  
(5 sources)                             Gallstone;   
Translations:   
[Calculus of   
gallbladder without   
cholecystitis   
without obstruction]                    Onset:   
2024  
Resolved:   
2024                Episodic  
   
                                                    Calculus of urinary   
tract  
(2 sources)                             Kidney stone;   
Translations:   
[Calculus of kidney]                    Onset:   
2021                Episodic  
   
                                                    Cardiac dysrhythmias  
(1 source)                              Tachycardia,   
unspecified;   
Translations:   
[Tachycardia,   
unspecified]                            Onset:   
03-                                          Episodic  
   
                                                    Fluid and electrolyte   
disorders  
(2 sources)                             Hyperkalemia;   
Translations:   
[Hyperkalemia]                          Onset:   
2022                Episodic  
   
                                                    Genitourinary   
symptoms and   
ill-defined   
conditions  
(16 sources)                            Albuminuria ;   
Translations:   
[Proteinuria,   
unspecified]                            Onset:   
2022  
Resolved:   
2024                Episodic  
   
                                                    Malaise and fatigue  
(5 sources)                             Asthenia;   
Translations:   
[Weakness]                              Onset:   
2024                Episodic  
   
                                                    Mood disorders  
(7 sources)               Mood disorders            Onset:   
2022  
Resolved:   
2024                  
   
                                                    Other aftercare  
(2 sources)                             Long term (current)   
use of insulin;   
Translations: [Long   
term (current) use   
of insulin]                             Onset:   
2024                                          Episodic  
   
                                                    Other and unspecified   
benign neoplasm  
(5 sources)                             Polyp of colon;   
Translations: [Polyp   
of colon]                               Onset:   
2024                Episodic  
   
                                                    Other connective   
tissue disease  
(5 sources)                             Pain in toe;   
Translations: [Pain   
in unspecified   
toe(s)]                                 Onset:   
2023                Episodic  
   
                                                    Other connective   
tissue disease  
(6 sources)                             Spasm; Translations:   
[Other muscle spasm]                    Onset:   
2023                Episodic  
   
                                                    Other liver diseases  
(4 sources)                             Abnormal levels of   
other serum enzymes;   
Translations: [Other   
nonspecific abnormal   
serum enzyme levels]                    Onset:   
2024                Episodic  
   
                                                    Other liver diseases  
(5 sources)                             Alkaline phosphatase   
raised;   
Translations:   
[Abnormal levels of   
other serum enzymes]                    Onset:   
2024                Episodic  
   
                                                    Other lower   
respiratory disease  
(1 source)                              Shortness of breath;   
Translations:   
[Shortness of   
breath]                                 Onset:   
03-                                          Episodic  
   
                                                    Other nutritional;   
endocrine; and   
metabolic disorders  
(5 sources)                             Overweight in   
adulthood with body   
mass index of 25 or   
more but less than   
30; Translations:   
[Body mass index   
(BMI) 29.0-29.9,   
adult]                                  Onset:   
2024                Episodic  
   
                                                    Other screening for   
suspected conditions   
(not mental disorders   
or infectious   
disease)  
(11 sources)                            Encounter for   
screening for   
malignant neoplasm   
of prostate;   
Translations:   
[Patient encounter   
status]                                 Onset:   
2024                Episodic  
   
                                                    Pneumonia (except   
that caused by   
tuberculosis or   
sexually transmitted   
disease)  
(9 sources)                             Pneumonia,   
unspecified   
organism;   
Translations:   
[Pneumonia]                             Onset:   
03-                03-                Episodic  
   
                                                    Residual codes;   
unclassified  
(7 sources)                             Tobacco user;   
Translations:   
[Tobacco use]                           Onset:   
03-                10-                Episodic  
  
  
  
Results  
  
  
                          Test Name    Value        Interpretation Reference   
Range                                   Facility  
   
                                                    HEMOGLOBIN A1C POCon -  
024   
   
                                                    HbA1c (Bld) [Mass   
fraction]       7.8 %           High            4.3-5.7         [facility]  
   
                                        Comment on above:   Performed By: #### P  
[id number] ####  
[facility]  
[address] [place] [address]   
   
                                                    SURGICAL PATHOLOGY REFERENCE  
 LAB CONSULTon 10-   
   
                      CASE REPORT            Normal                [facility]   
[facility]   
[facility]  
   
                                        Comment on above:   Order Comment: Speci  
men Type: FORMALIN-FIXED PARAFFIN-EMBEDDED  
  
TISSUE SPECIMEN  
Ordering Facility: [facility]  
Address: [address] [place], [place] [address] [id number]   
   
                                                            Result Comment: Surg  
ical Pathology Report Case: [id number]  
Authorizing Provider: Yordy Lewis MD Collected: 10/16/2024   
01:25 PM  
Ordering Location: [facility] Received: 10/16/2024   
01:24 PM  
[facility]  
Pathologist: Jorge L Boone MD  
Specimen: Slide(s), 2 SLIDES [id number]   
   
                                                            Performed By: #### L  
[id number] ####  
[facility]  
CLIA [id number]  
[address] [place] [address] OF [facility]   
   
                      CLINICAL HISTORY CONSULT REQUESTED Normal                [facility]  
[facility]  
   
                                        Comment on above:   Order Comment: Speci  
men Type: FORMALIN-FIXED PARAFFIN-EMBEDDED  
  
TISSUE SPECIMEN  
Ordering Facility: [facility]  
Address: [address] [place], [place] [address] [id number]   
   
                                                            Performed By: #### L  
[id number] ####  
[facility]  
CLIA [id number]  
[address] [place] [address] [place] OF [facility]   
   
                      DIAGNOSIS COMMENT            Normal                [facility]  
   
                                        Comment on above:   Order Comment: Speci  
men Type: FORMALIN-FIXED PARAFFIN-EMBEDDED  
  
TISSUE SPECIMEN  
Ordering Facility: [facility]  
Address: [address] [place], [place] [address] [id number]   
   
                                                            Result Comment: Than  
k you for the opportunity to review this   
case in consultation. The patient is a 74-year-old man who   
underwent liver biopsy.  
This liver biopsy shows preserved hepatic architecture. Portal   
tracts contain mild chronic inflammation without significant   
interface activity. Bile ducts show minimal injury, and no   
significant ductular reaction is present. The lobular   
parenchyma demonstrates moderate steatosis (approximately 35%   
of sampled parenchyma) with scattered ballooning degeneration.   
Trichrome stain highlights periportal and centrizonal   
pericellular fibrosis. No cytoplasmic globules are present on   
PAS-D stain, and an iron stain highlights scattered Kupffer   
cell siderosis (2+).  
Overall, steatosis with ballooned hepatocytes and widespread   
pericellular fibrosis is indicative of steatohepatitis, and the   
presence of periportal fibrosis specifies stage 2 disease   
(scale 0-4, FRANCES method). I note the patient's positive JAMIE and   
SMA, but there are no features of autoimmune hepatitis in this   
biopsy. Notably, fatty liver disease can be associated with   
nonspecific autoantibody elevation.  
The following stains were performed at the [facility]:   
trichrome, PAS-D, iron.  
Thank you for sending this case in consultation. Please do not   
hesitate to contact the GI/[facility] Pathology consultation Service   
at [phone] with questions or if additional follow-up   
information becomes available regarding this patient.   
   
                                                            Performed By: #### L  
[id number] ####  
[facility]  
CLIA [id number]  
[address] [place] [address]   
   
                      FINAL DIAGNOSIS Liver, biopsy: Normal                [facility]  
   
                                        Comment on above:   Order Comment: Speci  
men Type: FORMALIN-FIXED PARAFFIN-EMBEDDED  
  
TISSUE SPECIMEN  
Ordering Facility: [facility]  
Address: [address] [place] [address] [place] [address]   
   
                                                            Result Comment: - St  
eatohepatitis with periportal and   
centrizonal pericellular fibrosis.  
Electronically signed by Jorge L Boone MD on 2024 at   
3:54 PM   
   
                                                            Performed By: #### L  
[id number] ####  
[facility]  
CLIA [id number]  
[address] [place] [address]   
   
                      FINAL PERFORMING LAB            Normal                [facility]  
   
                                        Comment on above:   Order Comment: Speci  
men Type: FORMALIN-FIXED PARAFFIN-EMBEDDED  
  
TISSUE SPECIMEN  
Ordering Facility: [facility]  
Address: [address] [place] [address] [place] [address]   
   
                                                            Result Comment: Diag  
nostic interpretation performed at:   
[facility], [address] [place] [address] CLIA# [id number]  
: Andrea Hernandez MD   
   
                                                            Performed By: #### L  
[id number] ####  
[facility]  
CLIA [id number]  
[address] [place] [address]   
   
                                                    Activated partial thrombopla  
stin time (aPTT) in platelet poor plasma by   
coagulation   
aOrdered By: Yordy Lewis on 2024   
   
                      aPTT Coag (PPP) [Time] 31.5 s                25.1-36.5  [facility]  
   
                                        Comment on above:   A hematocrit value g  
reater than 55% may lead to inaccurate   
results in coagulation testing. Patients having hematocrit   
values >55% require a special collection tube for coagulation   
studies. Please contact the laboratory at [phone] for   
redraw instructions.   
   
                                                    Coagulation Profileon 2024   
   
                      aPTT Coag (Bld) [Time] 31.5 s     Normal     25.1-36.5    
e   
[facility]   
Physician   
Group  
   
                                        Comment on above:   Order Comment: STAT   
FOR BX   
   
                                                            Result Comment: A he  
matocrit value greater than 55% may lead to   
inaccurate  
results in coagulation testing. Patients having hematocrit  
values >55% require a special collection tube for  
coagulation studies.  
Please contact the laboratory at [phone] for redraw  
instructions.  
PERFORMED BY:  
[place] [id number] [phone]  
PATHOLOGIST MEDICAL DIRECTOR  
PAUL RODRIGUEZ M.D.   
   
                                                            Performed By: #### P  
LT, PP ####  
[facility]  
[address] [place] [address]   
   
                                                    INR in Platelet poor plasma   
by Coagulation assayOrdered By: Yordy Lewis on   
2024   
   
                                                    INR Coag (PPP)   
[Relative time] 0.9 {INR}       Normal                          [facility]  
   
                                        Comment on above:   INR Therapeutic Rang  
e A) Pre- and Peroperative OAT started two  
  
weeks before surgery. NOT HIP SURGERY: 1.5 - 2.5 HIP SURGERY: 2   
- 3B) Primary and secondary prevention of venous THROMBOSIS: 2   
- 3C) Active venous thrombosis, pulmonary embolismand   
prevention of recurrent venous thrombosis: 2 - 3D) Prevention   
of arterial thromboembolismincluding patients with mechanical   
heart valves: 3 - 4.5   
   
                                                            Order Comment: STAT   
FOR BX   
   
                                                            Result Comment: INR   
Therapeutic Range  
A) Pre- and Peroperative OAT started two weeks before  
surgery. NOT HIP SURGERY: 1.5 - 2.5  
HIP SURGERY: 2 - 3  
B) Primary and secondary prevention of venous  
THROMBOSIS: 2 - 3  
C) Active venous thrombosis, pulmonary embolism  
and prevention of recurrent venous thrombosis: 2 - 3  
D) Prevention of arterial thromboembolism  
including patients with mechanical heart valves: 3 - 4.5   
   
                                                            Performed By: #### P  
LT, PP ####  
[facility]  
[address] [place], [place] [id number] [place]   
   
                                                    Julio 2024   
   
                                        L                   --------------------  
------  
--------------------------  
--------------------------  
--------------  
Specimen: [id number]   
Received:    
Status: NIRAJ Bales Num:   
[id number]  
Spec Type: Surgical Subm   
Dr: Charlie Bermudez DO  
  
Tissues: A Liver - Needle   
Biopsy (RANDOM LIVER BX)  
Procedures: [place] SALINAS/Bryan MCKEON  
--------------------------  
--------------  
Age/  
Patient Birth Sex Location   
Account Attending   
Physician  
--------------------------  
--------------  
Arun Moon/AYESHA    
[id number] Yordy Lewis MD  
--------------------------  
--------------  
  
SPEC NUM: [id number] RECD:   
 STATUS: NIRAJ BALES NUM: [id number]  
[place]:  [facility]   
DR: Charlie Bermudez DO  
  
ENTERED: 24-   
[facility] DR: Yordy Lewis MD  
  
SPEC TYPE: Surgical DEPT:   
S  
ENTERED BY: [id number] RECV   
BY: [id number]  
  
ORDERED: HE/2, Gross/Micro   
L5  
ORDERED: HE/2, Gross/Micro   
L5  
  
Supplemental Report  
  
  
Addendum 1 Entered:   
  
  
Supplemental for findings   
of consultation report   
from [facility]  
  
-Steatohepatitis with   
periportal and centrizonal   
pericellular fibrosis  
  
  
Addendum Signed   
______(signature on   
file)______ Nina Medrano MD 24 1341  
  
--------------------------  
--------------  
  
Pathological Diagnosis  
  
Random liver biopsies:  
  
-3 tan hepatic core   
segments for gross   
processing only. Pending   
further consultation  
services in reference   
laboratory.  
  
Clinical Information  
  
Elevated liver enzymes,   
hepatic fibrosis, fatty   
liver, hepatic steatosis  
  
  
  
--------------------------  
--------------  
Specimen: [id number]   
Received:    
Status: NIRAJ Leah Num:   
[id number]  
Spec Type: Surgical Subm   
Dr: Charlie Bermudez DO  
  
Tissues: A Liver - Needle   
Biopsy (RANDOM LIVER BX)  
Procedures: HE/2,   
Gross/Micro L5  
--------------------------  
--------------  
Patient: SaraiArun   
[id number] (Continued)  
--------------------------  
--------------  
  
  
Specimen: [id number]   
Received:    
(Continued)  
  
  
Signed   
__________(signature on   
file)___________ ChinRenea Medrano MD 10/11/24 1032  
  
  
--------------------------  
--------------  
Specimen: [id number]   
Received:    
Status: NIRAJ Bales Num:   
[id number]  
Spec Type: Surgical Subm   
Dr: Charlie Bermduez DO  
  
Tissues: A Liver - Needle   
Biopsy (RANDOM LIVER BX)  
Procedures: [place] SALINAS/Bryan L5  
--------------------------  
--------------  
Patient: SaraiArun   
[id number] (Continued)  
--------------------------  
--------------  
  
  
Specimen: [id number]   
Received:    
(Continued)  
  
  
Gross Description  
  
The specimen was received   
in formalin with the   
patient's name and  random   
liver biopsy  and  
consists of 3 tan needle   
core biopsies ranging in   
length from 1.3 to 1.6 cm   
each with an  
average diameter of 0.1   
cm. The specimen is   
entirely submitted in   
cassette A1.  
  
CPT Codes  
  
[id number]  
--------------------------  
--------------  
  
  
  
  
  
  
  
  
  
  
  
  
  
  
  
  
  
  
  
  
  
  
  
  
  
  
  
  
  
  
  
  
  
--------------------------  
--------------  
Specimen: [id number]   
Received:    
Status: NIRAJ Bales Num:   
[id number]  
Spec Type: Surgical Subm   
Dr: Charlie Bermudez DO  
  
Tissues: A Liver - Needle   
Biopsy (RANDOM LIVER BX)  
Procedures: HE/2,   
Gross/Micro L5  
--------------------------  
--------------  
Patient: Arun Moon   
[id number] (Continued)  
--------------------------  
--------------  
  
  
Signed   
__________(signature on   
file)___________ Emiliano-Moses Medrano MD 10/11/24 1032 Normal                                  The   
[facility]   
Physician   
Group  
   
                                                    Platelets [#/volume] in [place]  
d by Automated countOrdered By: Yordy Lewis on   
2024   
   
                                                    Platelets (Bld)   
[#/Vol]         243 10*3/uL     Normal          150-450         [facility] [facility]  
   
                                        Comment on above:   Order Comment: STAT   
FOR BX   
   
                                                            Result Comment: PERF  
ORMED BY:  
[place] [address]  
[phone]  
PATHOLOGIST MEDICAL DIRECTOR  
PAUL RODRIGUEZ M.D.   
   
                                                            Performed By: #### P  
LT, PP ####  
[facility]  
[address] [place] [address] [facility]   
   
                                                    Prothrombin time (PT)Ordered  
 By: Yordy Lewis on 2024   
   
                      PT Coag (PPP) [Time] 10.8 s     Normal     9.0-12.9   [facility]  
   
                                        Comment on above:   A hematocrit value g  
reater than 55% may lead to inaccurate   
results in coagulation testing. Patients having hematocrit   
values >55% require a special collection tube for coagulation   
studies. Please contact the laboratory at [phone] for   
redraw instructions.   
   
                                                            Order Comment: STAT   
FOR BX   
   
                                                            Result Comment: A he  
matocrit value greater than 55% may lead to   
inaccurate  
results in coagulation testing. Patients having hematocrit  
values >55% require a special collection tube for  
coagulation studies.  
Please contact the laboratory at [phone] for redraw  
instructions.   
   
                                                            Performed By: #### P  
LT, PP ####  
[facility]  
[address] [place] [address] [place]   
   
                                                    US guide needle placementon   
2024   
   
                                                    US guide needle   
placement                               [facility]  
[address] [place] [address]  
  
Ultrasound Report  
Signed  
  
Patient: Arun Moon   
MR#: [id number]  
[id number]  
: 1950   
Acct:[id number]  
  
Age/Sex: 74 / M ADM Date:   
24  
  
Loc:  Room: Type: [facility]  
Attending Dr: Yordy Lewis MD  
  
Ordering Provider: Yordy Lewis MD  
Date of Service: 24  
Accession #: ([id number])   
US/US needle biopsy:   
K74.00 - Hepatic fibrosis,   
unspecified  
([id number]) US/US guide   
needle placement: ,  
  
  
Copies to: Yordy Lewis MD  
  
  
Ultrasound-guided Random   
Liver Biopsy  
  
HISTORY: Hepatic steatosis  
  
The biopsy device:   
18-gauge by 10 cm Bard   
biopsy gun utilized.  
  
Core samples: 3 core   
samples were obtained.  
  
Informed consent was   
obtained discussing the   
procedure and risks.   
Patient agreed. The skin   
entry  
site was localized with   
ultrasound. Skin entry   
prepped and draped in   
sterile fashion with local  
lidocaine administered.   
The biopsy device was   
administered into the   
liver with ultrasound   
guidance.  
Adequate core biopsy   
samples obtained. Samples   
sent to pathology for   
further assessment.   
Patient  
states no immediate   
complications. No active   
bleeding identified with   
ultrasound.  
  
  
  
ORDER #: [id number] US/US   
needle biopsy  
IMPRESSION: Successful   
ultrasound-guided random   
liver biopsy.  
  
  
Ultrasound the liver   
obtained 1 hour post   
biopsy.  
  
No hematoma or active   
bleeding identified.  
  
IMPRESSION: No active   
bleeding or hematoma.  
  
Impression dictated by:   
Charlie Bermudez M.D.2024 2:48 PM  
  
  
Dictation Location:   
[place] [address]  
  
Tech: Gabriela Guaman  
  
Transcribed By: [facility]   
24 1448  
Dictated By: Charlie Bermudez DO 24 1447  
  
Signed By:  
24 1448       Normal                                  [facility]   
[facility]   
[facility]   
[facility]  
   
                                                    GLUCOSEon 2024   
   
                      Glucose [Mass/Vol] 185 mg/dL  High       65-99      [facility]  
   
                                        Comment on above:   Performed By: #### C  
BCA, 2857-1, CMP, 50091-9 ####  
[facility] ([id number])  
[address], [address]  
[place] [address]   
   
                                                    HGB A1C (GLYCO-HGB)on 2024   
   
                      Glucose [Mass/Vol] 214 mg/dL  Normal                [facility]  
   
                                        Comment on above:   Performed By: #### C  
BCA, 2857-1, CMP, 09274-3 ####  
[facility] ([id number])  
[address], [address]  
[place] [address]   
   
                                                    HbA1c (Bld) [Mass   
fraction]       9.1 %           High            4.4-5.6         [facility]  
   
                                        Comment on above:   Result Comment: NOTE  
ADA Guidelines  
Result HgbA1c  
------------ ---------------  
Normal : less than 5.7 %  
Prediabetes : 5.7 % to 6.4 %  
Diabetes : > 6.4 %  
Use with caution in patients with abnormal hemoglobin variants   
as  
the half-life of red blood cells and in vivo glycation rates   
are  
affected.   
   
                                                            Performed By: #### C  
BCA, 2857-1, CMP, 67049-3 ####  
[facility] ([id number])  
[address], [address]  
[place] [address]   
   
                                                    Lipid 1996 panelon   
4   
   
                      Cholesterol [Mass/Vol] 269 mg/dL  High       150-200    [facility]  
[facility]  
   
                                        Comment on above:   Performed By: #### RAPHAEL HERNANDEZ, 2857-1, KRISTIAN, 72571-6 ####  
[facility] ([id number])  
[address], [address]  
[place] [address]   
   
                                                    Cholesterol in HDL   
[Mass/Vol]      47 mg/dL        Normal          >39             [facility]  
   
                                        Comment on above:   Result Comment:  
HDL <40 mg/dL - High Risk  
HDL > or = 40mg/dL- Desirable  
HDL >60 mg/dL - Negative Risk  
---------------------------------------------   
   
                                                            Performed By: #### RAPHAEL  
BCA, 2857-1, CMP, 79801-4 ####  
[facility] ([id number])  
[address], [address]  
[place] [address]   
   
                                                    Cholesterol in LDL   
[Mass/Vol]      148 mg/dL       High            <130            [facility]  
   
                                        Comment on above:   Result Comment:  
LDL <100 mg/dL - Desirable  
LDL >160 mg/dL - High Risk  
---------------------------------------------   
   
                                                            Performed By: #### RAPHAEL HERNANDEZ, 2857-1, CMP, 19675-1 ####  
[facility] ([id number])  
[address], [address]  
[place] [address]   
   
                                                    Cholesterol in VLDL   
[Mass/Vol]      74 mg/dL        High            0-30            [facility]  
   
                                        Comment on above:   Performed By: #### RAPHAEL HERANNDEZ, 2857-1, CMP, 76352-1 ####  
[facility] ([id number])  
[address], [address]  
[place] [address]   
   
                      CHOLESTEROL:HDL 5.7        High       1.0-5.0    [facility]  
   
                                        Comment on above:   Performed By: #### RAPHAEL HERNANDEZ, 2857-1, CMP, 37202-8 ####  
[facility] ([id number])  
[address], [address]  
[place] [address]   
   
                                                    Triglyceride   
[Mass/Vol]      368 mg/dL       High                      [facility]  
   
                                        Comment on above:   Performed By: #### RAPHAEL HERNANDEZ, 2857-1, CMP, 08537-8 ####  
[facility] ([id number])  
[address], [address]  
[place] [address]   
   
                                                    POTASSIUMon 2024   
   
                      Potassium [Moles/Vol] 3.6 mmol/L Normal     3.5-5.0    [facility]  
   
                                        Comment on above:   Performed By: #### RAPHAEL HERNANDEZ, 2857-1, CMP, 96682-8 ####  
[facility] ([id number])  
[address], [address]  
[place] [address]   
   
                                                    BASIC METABOLIC PANLon    
   
                      Anion gap [Moles/Vol] 16 mmol/L  High       5-15       [facility]  
   
                                        Comment on above:   Performed By: #### C  
BCA, 2857-1, CMP, 85044-7 ####  
[facility] ([id number])  
[address], [address]  
[place] [address]   
   
                      Calcium [Mass/Vol] 9.5 mg/dL  Normal     8.5-10.5   [facility]  
   
                                        Comment on above:   Performed By: #### C  
BCA, 2857-1, CMP, 89854-2 ####  
[facility] ([id number])  
[address], [address]  
[place] [address]   
   
                      Chloride [Moles/Vol] 99 mmol/L  Normal          [facility]  
   
                                        Comment on above:   Performed By: #### C  
BCA, 2857-1, CMP, 70449-5 ####  
[facility] ([id number])  
[address], [address]  
[place] [address]   
   
                      CO2 [Moles/Vol] 24 mmol/L  Normal     22-32      [facility]  
   
                                        Comment on above:   Performed By: #### C  
BCA, 2857-1, CMP, 43118-7 ####  
[facility] ([id number])  
[address], [address]  
[place] [address]   
   
                      Creatinine [Mass/Vol] 5.56 mg/dL High       0.60-1.30  [facility]  
   
                                        Comment on above:   Result Comment: METH  
OD TRACEABLE TO IDMS STANDARD   
   
                                                            Performed By: #### C  
BCA, 2857-1, CMP, 22780-8 ####  
[facility] ([id number])  
[address]  
[place] [address]   
   
                                                    GFR/1.73 sq   
M.predicted among   
non-blacks MDRD   
(S/P/Bld) [Vol   
rate/Area]      10 mL/min/{1.73_m2} Low             >59             [facility]  
   
                                        Comment on above:   Result Comment:  
Reported eGFR is based on the  
CKD-EPI  equation that does  
not use a race coefficient.   
   
                                                            Performed By: #### C  
BCA, 2857-1, CMP, 86448-1 ####  
[facility] ([id number])  
[address], [address]  
[place] [address]   
   
                      Glucose [Mass/Vol] 175 mg/dL  High       65-99      [facility]  
   
                                        Comment on above:   Performed By: #### C  
BCA, 2857-1, CMP, 28211-3 ####  
[facility] ([id number])  
[address], [address]  
[place] [address]   
   
                      Potassium [Moles/Vol] 3.6 mmol/L Normal     3.5-5.0    [facility]  
   
                                        Comment on above:   Performed By: #### C  
BCA, 2857-1, CMP, 93310-9 ####  
[facility] ([id number])  
[address], [address]  
[place] [address]   
   
                      Sodium [Moles/Vol] 139 mmol/L Normal     134-146    [facility]  
   
                                        Comment on above:   Performed By: #### C  
BCA, 2857-1, CMP, 27793-6 ####  
[facility] ([id number])  
[address], [address]  
[place] [address]   
   
                                                    Urea nitrogen   
[Mass/Vol]      55 mg/dL        High            5-27            [facility]  
   
                                        Comment on above:   Performed By: #### C  
BCA, 2857-1, CMP, 60440-0 ####  
[facility] ([id number])  
[address], [address]  
[place] [address]   
   
                                                    CBC AND AUTO DIFFon 20  
24   
   
                      ABSOLUTE BASOPHIL 0.2 X10E9/L Normal     0.0-0.2    [facility]  
   
                                        Comment on above:   Performed By: #### C  
BCA, 2857-1, CMP, 83781-8 ####  
[facility] ([id number])  
[address], [address]  
[place] [address]   
   
                      ABSOLUTE NEUTROPHIL 7.3 X10E9/L High       1.5-6.6    [facility]  
   
                                        Comment on above:   Performed By: #### C  
BCA, 2857-1, CMP, 85431-5 ####  
[facility] ([id number])  
[address], [address]  
[place] [address]   
   
                                                    Basophils/100 WBC   
(Bld)           2.2 %           Normal                          [facility]  
   
                                        Comment on above:   Performed By: #### RAPHAEL  
BCA, 2857-1, CMP, 33746-4 ####  
[facility] ([id number])  
[address], [address]  
[place] [address]   
   
                                                    Eosinophils (Bld)   
[#/Vol]         1.3 10*3/uL     High            0.0-0.4         [facility]  
   
                                        Comment on above:   Performed By: #### RAPHAEL  
BCA, 2857-1, CMP, 30311-8 ####  
[facility] ([id number])  
[address]  
[place] [address]   
   
                                                    Eosinophils/100 WBC   
(Bld)           12.1 %          Normal                          [facility]  
   
                                        Comment on above:   Performed By: #### RAPHAEL  
BCA, 2857-1, CMP, 78876-1 ####  
[facility] ([id number])  
[address], [address]  
[place] [address]   
   
                                                    Erythrocyte   
distribution width   
(RBC) [Ratio]   13.6 %          Normal          11.5-15.0       [facility]  
   
                                        Comment on above:   Performed By: #### RAPHAEL  
BCA, 2857-1, CMP, 56906-0 ####  
[facility] ([id number])  
[address]  
[place] [address]   
   
                                                    Hematocrit (Bld)   
[Volume fraction] 35.6 %          Low             39-49           [facility]  
   
                                        Comment on above:   Performed By: #### RAPHAEL  
BCA, 2857-1, CMP, 30994-7 ####  
[facility] ([id number])  
[address], [address]  
[place] [address]   
   
                                                    Hemoglobin (Bld)   
[Mass/Vol]      12.1 g/dL       Low             13.0-17.0       [facility]  
   
                                        Comment on above:   Performed By: #### C  
MARY, 2857-1, CMP, 12016-8 ####  
[facility] ([id number])  
[address], [address]  
[place] [address]   
   
                                                    Lymphocytes (Bld)   
[#/Vol]         1.3 10*3/uL     Normal          1.0-3.5         [facility]  
   
                                        Comment on above:   Performed By: #### RAPHAEL HERNANDEZ, 2857-1, CMP, 12722-3 ####  
[facility] ([id number])  
[address], [address]  
[place] [address]   
   
                                                    Lymphocytes/100 WBC   
(Bld)           11.9 %          Normal                          [facility]  
   
                                        Comment on above:   Performed By: #### RAPHAEL HERNANDEZ, 2857-1, CMP, 00656-6 ####  
[facility] ([id number])  
[address], [address]  
[place] [address]   
   
                                                    MCH (RBC) [Entitic   
mass]           32.4 pg         Normal          27-34           [facility]  
   
                                        Comment on above:   Performed By: #### RAPHAEL HERNANDEZ, 2857-1, CMP, 99107-1 ####  
[facility] ([id number])  
[address], [address]  
[place] [address]   
   
                      MCHC (RBC) [Mass/Vol] 33.9 g/dL  Normal     32-36      [facility]  
   
                                        Comment on above:   Performed By: #### RAPHAEL HERNANDEZ, 2857-1, CMP, 96067-2 ####  
[facility] ([id number])  
[address], [address]  
[place] [address]   
   
                                                    MCV (RBC) [Entitic   
vol]            95 fL           Normal                    [facility]  
   
                                        Comment on above:   Performed By: #### RAPHAEL HERNANDEZ, 2857-1, CMP, 51868-3 ####  
[facility] ([id number])  
[address], [address]  
[place] [address]   
   
                                                    Monocytes (Bld)   
[#/Vol]         0.7 10*3/uL     Normal          0-0.9           [facility]  
   
                                        Comment on above:   Performed By: #### C  
BCA, 2857-1, CMP, 86894-2 ####  
[facility] ([id number])  
[address], [address]  
[place] [address]   
   
                                                    Monocytes/100 WBC   
(Bld)           6.2 %           Normal                          [facility]  
   
                                        Comment on above:   Performed By: #### RAPHAEL  
BCA, 2857-1, CMP, 10329-2 ####  
[facility] ([id number])  
[address], [address]  
[place] [address]   
   
                                                    Neutrophils/100 WBC   
(Bld)           67.6 %          Normal                          [facility]  
   
                                        Comment on above:   Performed By: #### RAPHAEL  
BCA, 2857-1, CMP, 97825-6 ####  
[facility] ([id number])  
[address], [address]  
[place] [address]   
   
                                                    Platelet mean volume   
(Bld) [Entitic vol] 9.7 fL          Normal          7-12            [facility]  
   
                                        Comment on above:   Performed By: #### RAPHAEL  
BCA, 2857-1, CMP, 40625-4 ####  
[facility] ([id number])  
[address], [address]  
[place] [address]   
   
                                                    Platelets (Bld)   
[#/Vol]         229 10*3/uL     Normal          150-450         [facility]  
   
                                        Comment on above:   Performed By: #### RAPHAEL  
BCA, 2857-1, CMP, 82838-2 ####  
[facility] ([id number])  
[address], [address]  
[place] [address]   
   
                      RBC COUNT  3.73 X10E12/L Low        4.10-5.70  [facility]  
   
                                        Comment on above:   Performed By: #### RAPHAEL  
BCA, 2857-1, CMP, 02853-8 ####  
[facility] ([id number])  
[address], [address]  
[place] [address]   
   
                      WBC (Bld) [#/Vol] 10.8 10*3/uL Normal     4.0-11.0   [facility]  
   
                                        Comment on above:   Performed By: #### C  
BCA, 2857-1, CMP, 00627-3 ####  
[facility] ([id number])  
[address], [address]  
[place] [address]   
   
                                                    LIVER PANELon 2024   
   
                      Albumin [Mass/Vol] 4.1 g/dL   Normal     3.2-5.3    [facility]  
   
                                        Comment on above:   Performed By: #### C  
BCA, 2857-1, CMP, 48403-8 ####  
[facility] ([id number])  
[address], [address]  
[place] [address]   
   
                                                    ALP [Catalytic   
activity/Vol]   161 U/L         High                      [facility]  
   
                                        Comment on above:   Performed By: #### C  
BCA, 2857-1, CMP, 30982-6 ####  
[facility] ([id number])  
[address], [address]  
[place] [address]   
   
                                                    ALT [Catalytic   
activity/Vol]   68 U/L          High            0-40            [facility]  
   
                                        Comment on above:   Performed By: #### C  
BCA, 2857-1, CMP, 07499-9 ####  
[facility] ([id number])  
[address], [address]  
[place] [address]   
   
                                                    AST [Catalytic   
activity/Vol]   46 U/L          High            0-41            [facility]  
   
                                        Comment on above:   Performed By: #### C  
BCA, 2857-1, CMP, 93577-5 ####  
[facility] ([id number])  
[address], [address]  
[place] [address]   
   
                      Bilirubin [Mass/Vol] 0.5 mg/dL  Normal     0.3-1.2    [facility]  
   
                                        Comment on above:   Performed By: #### C  
BCA, 2857-1, CMP, 65859-1 ####  
[facility] ([id number])  
[address], [address]  
[place] [address]   
   
                                                    Bilirubin.direct   
[Mass/Vol]      0.2 mg/dL       Normal          0.0-0.4         [facility]  
   
                                        Comment on above:   Performed By: #### C  
MARY, 2857-1, CMP, 47314-3 ####  
[facility] ([id number])  
[address], [address]  
[place] [address]   
   
                      Protein [Mass/Vol] 7.5 g/dL   Normal     6.0-8.0    [facility]  
   
                                        Comment on above:   Performed By: #### C  
MARY, 2857-1, CMP, 45390-7 ####  
[facility] ([id number])  
[address], [address] [facility] [address]  
[place] [address]   
   
                                                    PROTIME AND INRon 2024  
   
   
                                                    INR Coag (PPP)   
[Relative time] 0.9 {INR}       Normal          0.8-1.1         [facility]  
   
                                        Comment on above:   Performed By: #### C  
MARY, 2857-1, CMP, 15638-0 ####  
[facility] ([id number])  
[address], [address]  
[place] [address]   
   
                      PT Coag (PPP) [Time] 10.4 s     Normal     9.8-13.2   [facility]  
   
                                        Comment on above:   Result Comment: NEW   
REFERENCE RANGE   
   
                                                            Performed By: #### RAPHAEL HERNANDEZ, 2857-1, CMP, 38461-5 ####  
[facility] ([id number])  
[address], [address]  
[place] [address]   
   
                                                    URINALYSISon 2024   
   
                      Bilirubin Ql (U) Negative   Normal     NEG        [facility]  
   
                                        Comment on above:   Performed By: #### C  
MARY, 2857-1, CMP, 31890-3 ####  
[facility] ([id number])  
[address], [address]  
[place] [address]   
   
                      BLOOD/HGB  Small      Abnormal   NEG        [facility]  
   
                                        Comment on above:   Performed By: #### RAPHAEL HERNANDEZ, 2857-1, CMP, 32322-7 ####  
[facility] ([id number])  
[address], [address]  
[place] [address]   
   
                      Color (U)  YELLOW     Normal     YELLOW     [facility]  
   
                                        Comment on above:   Performed By: #### C  
MARY, 2857-1, CMP, 07021-5 ####  
[facility] ([id number])  
[address], [address]  
[place] [address]   
   
                      Glucose Ql (U) 500 mg/dL  Abnormal   NEG        [facility]  
   
                                        Comment on above:   Performed By: #### C  
MARY, 2857-1, CMP, 28822-9 ####  
[facility] ([id number])  
[address], [address]  
[place] [address]   
   
                                                    Hyaline casts LM Ql   
(Urine sed)     4 /lpf          High            0-2             [facility]  
   
                                        Comment on above:   Performed By: #### RAPHAEL HERNANDEZ, 2857-1, CMP, 95004-7 ####  
[facility] ([id number])  
[address], [address]  
[place] [address]   
   
                      Ketones Ql (U) Negative   Normal     NEG        [facility]  
   
                                        Comment on above:   Performed By: #### RAPHAEL HERNANDEZ, 2857-1, CMP, 19660-5 ####  
[facility] ([id number])  
[address], [address]  
[place] [address]   
   
                                                    Leukocyte esterase   
Test strip Ql (U) Negative        Normal          NEG             [facility]  
   
                                        Comment on above:   Performed By: #### RAPHAEL HERNANDEZ, 2857-1, CMP, 31625-1 ####  
[facility] ([id number])  
[address], [address]  
[place] [address]   
   
                      MUCOUS     PRESENT    Abnormal   NONE       [facility]  
   
                                        Comment on above:   Performed By: #### RAPHAEL HERNANDEZ, 2857-1, CMP, 68330-7 ####  
[facility] ([id number])  
[address], [address]  
[place] [address]   
   
                      Nitrite Ql (U) Negative   Normal     NEG        [facility]  
   
                                        Comment on above:   Performed By: #### C  
BCA, 2857-1, CMP, 60596-3 ####  
[facility] ([id number])  
[address], [address]  
[place] [address]   
   
                      pH (U)     6.0 [pH]   Normal     5.0-8.5    [facility]  
   
                                        Comment on above:   Performed By: #### C  
BCA, 2857-1, CMP, 68310-5 ####  
[facility] ([id number])  
[address], [address]  
[place] [address]   
   
                      Protein Ql (U) 300 mg/dL  Abnormal   NEG        [facility]  
   
                                        Comment on above:   Performed By: #### RAPHAEL HERNANDEZ, 2857-1, CMP, 12211-1 ####  
[facility] ([id number])  
[address], [address]  
[place] [address]   
   
                      R.B.CELLS  <1         Normal     0-5        [facility]  
   
                                        Comment on above:   Performed By: #### RAPHAEL  
BCA, 2857-1, CMP, 44997-9 ####  
[facility] ([id number])  
[address], [address]  
[place] [address]   
   
                                                    Specific gravity (U)   
[Rel density]   1.022           Normal          1.003-1.035     [facility]  
   
                                        Comment on above:   Performed By: #### RAPHAEL  
BCA, 2857-1, CMP, 60630-4 ####  
[facility] ([id number])  
[address], [address]  
[place] [address]   
   
                      SQUAMOUS EPITHELIUM <1         Normal     0-5        [facility]  
   
                                        Comment on above:   Performed By: #### C  
BCA, 2857-1, CMP, 58829-2 ####  
[facility] ([id number])  
[address], [address]  
[place] [address]   
   
                      TURBIDITY  CLEAR      Normal     CLEAR      [facility]  
   
                                        Comment on above:   Performed By: #### RAPHAEL  
BCA, 2857-1, CMP, 77406-4 ####  
[facility] ([id number])  
[address], [address]  
[place] [address]   
   
                                                    Urobilinogen (U)   
[Mass/Vol]      mg/dL           Normal          <1.1            [facility]  
   
                                        Comment on above:   Performed By: #### C  
BCA, 2857-1, CMP, 37145-9 ####  
[facility] ([id number])  
[address], [address]  
[place] [address]   
   
                      W.B.CELLS  4 /hpf     Normal     0-5        [facility]  
   
                                        Comment on above:   Performed By: #### C  
BCA, 2857-1, CMP, 43583-8 ####  
[facility] ([id number])  
[address], [address]  
[place] [address]   
   
                                                    URINE CULTUREon 2024   
   
                                                    Bacteria identified Cx   
Nom (U)                                 CULTURE RESULTS  
NO GROWTH AT <1000 CFU/mL Normal                                  [facility]  
   
                                        Comment on above:   Performed By: #### C  
BCA, 2857-1, CMP, 77431-1 ####  
[facility] ([id number])  
[address], [address] [place] [address]   
   
                                                    XR CHEST 2 VWSon 2024   
   
                                        XR CHEST 2 VWS      XR CHEST 2 VWS  
Chest 2 views  
History: Anesthesia   
clearance. Preadmission   
testing. Preop   
examination; Hypertension,   
unspecified type; Type 2   
diabetes mellitus with   
other specified   
complication, with   
long-term current use of   
insulin (CMS-HCC);   
Personal history of MI   
(myocardial infarction);   
Stage 4 chronic kidney   
disease (CMS-HCC)  
Comparison: 3/15/2024  
Findings:  
Chest 2 views.  
Stable cardiomediastinal   
silhouette. No focal   
opacity, effusion or   
pneumothorax. Degenerative   
changes of the thoracic   
spine.  
Impression:  
No evident acute   
cardiopulmonary process.  
Workstation:[id number]  
Finalized by Francisco Verma MD on 2024 3:52   
PM                  Normal                                  [facility]  
   
                                                    aPTT Coag (PPP) [Time]on    
   
                      aPTT Coag (Bld) [Time] 37 s       Normal     26-37      Pr  
[facility]  
   
                                        Comment on above:   Result Comment: NEW   
[facility]   
   
                                                            Performed By: #### C  
BCA, 2857-1, CMP, 71445-5 ####  
[facility] ([id number])  
[address], [address]  
[place] [address]   
   
                                                    CT ABDOMEN AND PELVIS WO CON  
Ton 2024   
   
                                                    CT ABDOMEN AND PELVIS   
WO CONT                                 CT ABDOMEN AND PELVIS WO   
CONT  
*ADDENDUM*Addendum: This   
addendum is made as per   
referring service request.   
Small lesion lower pole   
left kidney shows simple   
attenuation and is   
unchanged in size since   
2023 consistent with   
small benign cyst. Small   
upper pole lesion also   
noted unchanged with   
simple attenuation. No   
further follow-up   
warranted.  
Workstation:[id number]  
Finalized by Zee Sanchez MD on 2024   
2:08 PM             Normal                                  [facility]  
   
                                                    MR LUMBAR SPINE WO CONTon    
   
                                                    MR LUMBAR SPINE WO   
CONT                                    MR LUMBAR SPINE WO CONT  
History: Lumbar   
radiculopathy  
Exam/Technique:   
Multiplanar multisequence   
images of the lumbar spine   
obtained without IV dye.  
Comparison: CT abdomen   
pelvis 3/15/2024  
Findings: There is focal   
upper endplate defect at   
the posterior aspect of L3   
most consistent with small   
Schmorl's node with   
minimal edema. The   
remaining vertebral body   
heights are well-preserved   
with no gross acute   
osseous injury. There is   
subtle grade 1   
anterolisthesis of L4 on   
L5  
Conus medullaris is of   
adequate configuration   
with the tip at L1-L2   
level.  
At T11-T12, there is mild   
degenerative disc disease   
with mild loss of disc   
height and diffuse disc   
bulge. There is mild   
spinal canal stenosis with   
mild effacement of the   
ventral nerve roots.   
Neuroforamina are patent   
bilaterally.  
At T12-L1, there is   
moderate degenerative disc   
disease with moderate loss   
of disc height and diffuse   
disc bulge. There is no   
significant spinal canal   
stenosis or neural   
foraminal narrowing.  
At L1-L2, intervertebral   
disc is well-preserved.   
There is no spinal canal   
stenosis or neural   
foraminal narrowing.  
At L2-L3, there is mild   
diffuse disc bulge with   
mild bilateral neural   
foraminal narrowing.  
There is moderate   
bilateral facet joint   
disease with mild joint   
effusion.  
At L3-L4, there is severe   
spinal canal stenosis   
secondary to diffuse disc   
bulge with posterior   
annular tear. There is   
severe ligamentum flavum   
hypertrophy and bilateral   
facet joint disease with   
bilateral effusion. There   
is moderate bilateral   
neural foraminal narrowing   
with effacement of the   
exiting nerve roots.  
At L4-L5, there is grade 1   
anterolisthesis of L4 on   
L5.  
There is moderate spinal   
canal stenosis secondary   
to diffuse disc bulge with   
moderate loss of disc   
height and severe   
left-sided neural   
foraminal narrowing. There   
is moderate right-sided   
neural foraminal   
narrowing.  
There are severe bilateral   
facet joint disease left   
worse than right with   
bilateral pleural   
effusion.  
At L5-S1, there is left   
subarticular disc   
protrusion effacing the   
left S1 nerve roots.   
Neuroforamina are grossly   
patent. There is mild   
bilateral facet joint   
disease.  
IMPRESSION: There are   
multilevel disc and facet   
joint disease. Findings   
are worse at L3-L4 with   
severe spinal canal   
stenosis and posterior   
annular tear.  
At L4-L5 there is severe   
left-sided neural   
foraminal narrowing and   
moderate right-sided   
neural foraminal   
narrowing.  
At L5-S1 there is left   
subarticular disc   
protrusion with   
significant effacement of   
the left S1 nerve root.  
There are multilevel facet   
joint disease with joint   
effusion worse at L3-L4   
and L4-L5.  
Workstation:[id number]  
Finalized by Sherry Alexander MD on 2024   
10:11 AM            Normal                                  [facility]  
   
                                                    JAMIE Antinuclear Antibodieson  
 2024   
   
                                Antinuclear Abs, IFA Positive        Critically   
abnormal                  .                         [facility]   
[facility]   
[facility]   
[facility]  
   
                                        Comment on above:   Result Comment: Nega  
tive <1:80  
Borderline 1:80  
Positive >1:80   
   
                                                            Performed By: #### C  
ERULOP, HAAB, L-K MICRO, HBCAB, HCBIGM,   
JAMIE, SMAB, ALPHA PHEN, MITOM2, HBSAG, HEMOCHROM, HAABT, IGG,   
HBSAB, HCV RX PCR ####[facility] Acct#[id number],#### RALPH   
####[facility] [id number] [place] [id number] [facility]   
   
                      Midbody Pattern 1:640      High       .          [facility]   
[facility]   
[facility]   
[facility]  
   
                                        Comment on above:   Result Comment: ICAP  
 nomenclature: AC-[id number]   
   
                                                            Performed By: #### C  
ERULOP, HAAB, L-K MICRO, HBCAB, HCBIGM,   
JAMIE, SMAB, ALPHA PHEN, MITOM2, HBSAG, HEMOCHROM, HAABT, IGG,   
HBSAB, HCV RX PCR ####[facility] Acct#[id number],#### RALPH   
####[facility] [id number]   
   
                      Note 1                Normal     .          [facility]   
[facility]   
[facility]   
[facility]  
   
                                        Comment on above:   Result Comment: Cici joyce Potential Disease Association  
------------- ---------------------------------------------  
Homogeneous Systemic Lupus Erythematosus, Drug Induced  
Systemic Lupus Erythematosus, Chronic  
Autoimmune hepatitis, Juvenile Idiopathic  
Arthritis  
------------- ---------------------------------------------  
Speckled Sjogren Syndrome, Systemic Lupus  
Erythematosus, Subacute Cutaneous Lupus,  
 Lupus, Congenital Heart Block,  
Mixed Connective Tissue Disease,  
Scleroderma-diffuse, Scleroderma-Autoimmune  
Myositis Overlap Syndrome, Systemic Lupus  
Ipytjtunercdw-Xustxpuhqwz-Snzgffzxrs  
Myositis Overlap Syndrome, Systemic  
Autoimmune Rheumatic Disease,  
Undifferentiated Connective Tissue Disease  
------------- ---------------------------------------------  
Nucleolar Systemic Sclerosis, Scleroderma-Autoimmune  
Myositis Overlap Syndrome, Sjogren  
Syndrome, Raynaud phenomenon, Pulmonary  
Arterial Hypertension, Systemic Autoimmune  
Rheumatic Disease, Cancer  
------------- ---------------------------------------------  
Centromere Scleroderma-CREST, Limited Cutaneous SSc,  
Raynaud's Phenomenon, Primary Biliary  
Cholangitis  
------------- ---------------------------------------------  
Nuclear Dot Primary Biliary Cholangitis  
------------- ---------------------------------------------  
Nuclear Primary Biliary Cholangitis, Autoimmune  
Membrane Hepatitis/Liver disease, Systemic Autoimmune  
Rheumatic Disease, Autoimmune Cytopenias,  
Linear Scleroderma, Antiphospholipid Syndrome  
------------- ---------------------------------------------  
Performed at: [facility] [id number]  
: Corky Newsome PhD, Phone: [phone]   
   
                                                            Performed By: #### C  
ERULOP, HAAB, L-K MICRO, HBCAB, HCBIGM,   
JAMIE, SMAB, ALPHA PHEN, MITOM2, HBSAG, HEMOCHROM, HAABT, IGG,   
HBSAB, HCV RX PCR ####[facility] Acct#[id number],#### RALPH   
####[facility]   
   
                      Speckled Pattern 1:160      High       .          [facility]   
[facility]   
Phys[facility]   
[facility]  
   
                                        Comment on above:   Result Comment: ICAP  
 nomenclature: [id number],5,[id number]   
   
                                                            Performed By: #### C  
ERULOP, HAAB, L-K MICRO, HBCAB, HCBIGM,   
JAMIE, SMAB, ALPHA PHEN, MITOM2, HBSAG, HEMOCHROM, HAABT, IGG,   
HBSAB, HCV RX PCR ####[facility] Acct#[id number],#### RALPH   
####[facility]   
   
                                                    Spindle Apparatus   
Pattern         1:640           High            .               [facility]   
[facility]   
Phys[facility]   
Group  
   
                                        Comment on above:   Result Comment: ICAP  
 nomenclature: [id number],26   
   
                                                            Performed By: #### C  
ERULOP, HAAB, L-K MICRO, HBCAB, HCBIGM,   
JAMIE, SMAB, ALPHA PHEN, MITOM2, HBSAG, HEMOCHROM, HAABT, IGG,   
HBSAB, HCV RX PCR ####[facility] Acct#[id number],#### RALPH   
####[facility] [place]   
[address] [facility]   
   
                                                    Alpha-1-Antitrypsin Phenotyp  
acacia 2024   
   
                      Alpha 1 Anti-Trypsin 163 mg/dL  Normal     101-187    [facility]   
[facility]   
Physician   
Group  
   
                                        Comment on above:   Performed By: #### C  
ERULOP, HAAB, L-K MICRO, HBCAB, HCBIGM,   
JAMIE, SMAB, ALPHA PHEN, MITOM2, HBSAG, HEMOCHROM, HAABT, IGG,   
HBSAB, HCV RX PCR ####[facility] Acct#[id number],#### RALPH   
####[place] [address] [place] [id number] [facility]   
   
                      Phenotype (P1) MM         Normal     .          [facility]   
[facility]   
Physician   
Group  
   
                                        Comment on above:   Result Comment: Phen  
otype Population A-1-AT Concentration*  
Incidence % % of MM (Typical Range)  
MM 86.5% 100% (96 - 189)  
MS 8.0% 86% (83 - 161)  
MZ 3.9% 61% (60 - 111)  
FM 0.4% 100% (93 - 191)  
SZ 0.3% 41% (42 - 75)  
SS 0.1% 64% (62 - 119)  
ZZ 0.05% 19% (16 - 38)  
FS 0.05% 70% (70 - 128)  
FZ Unknown 46% (44 - 88)  
FF Unknown Unknown  
*A-1-AT concentration in the homozygous MM phenotype  
is taken as the reference normal. Percent deficiency  
in each phenotype is reported relative to this  
reference. Ranges used to confirm phenotype.  
Performed at:  - [address] [id number]  
: Corky Newsome PhD, Phone: [phone]  
Performed at:  - [address] [id number]  
: Rossana Thompson MD, Phone: [phone]   
   
                                                            Performed By: #### C  
ERULOP, HAAB, L-K MICRO, HBCAB, HCBIGM,   
JAMIE, SMAB, ALPHA PHEN, MITOM2, HBSAG, HEMOCHROM, HAABT, IGG,   
HBSAB, HCV RX PCR ####[facility] Acct#[id number],#### RALPH   
####[facility] [address]   
   
                                                    Ceruloplasminon 2024   
   
                      Ceruloplasmin 23.1 mg/dL Normal     16.0-31.0  [facility]   
[facility]   
Physician   
Group  
   
                                        Comment on above:   Result Comment: Perf  
ormed at: [facility] [address] [id number]  
: Corky Newsome PhD, Phone: [phone]  
PERFORMED BY:  
[place] [address]  
[phone]  
PATHOLOGIST MEDICAL DIRECTOR  
PAUL RODRIGUEZ M.D.   
   
                                                            Performed By: #### C  
ERULOP, HAAB, L-K MICRO, HBCAB, HCBIGM,   
JAMIE, SMAB, ALPHA PHEN, MITOM2, HBSAG, HEMOCHROM, HAABT, IGG,   
HBSAB, HCV RX PCR ####  
[facility] Acct#[id number]  
,  
#### RALPH ####  
[address]   
   
                                                    Ferritin [Mass/volume] in Se  
rum or PlasmaOrdered By: Yordy Lewis on 2024   
   
                      Ferritin [Mass/Vol] 572.1 ng/mL High       23.9-336.2 [facility]  
   
                                        Comment on above:   Result Comment: PERF  
ORMED BY:  
[place] [address]  
[phone]  
PATHOLOGIST MEDICAL DIRECTOR  
PAUL RODRIGUEZ M.D.   
   
                                                            Performed By: #### C  
ERULOP, HAAB, L-K MICRO, HBCAB, HCBIGM,   
JAMIE, SMAB, ALPHA PHEN, MITOM2, HBSAG, HEMOCHROM, HAABT, IGG,   
HBSAB, HCV RX PCR ####  
[facility] Acct#[id number]  
,  
#### RALPH ####  
[facility]  
[address] [place] [address]   
   
                                                    Hep C Ab wRfx to Qnt PCRon 0  
2024   
   
                                                    Hepatitis C Virus   
Antibody            Non-Reactive        Normal              Non   
Reactive                                [facility]   
[facility]   
Phys[facility]   
Group  
   
                                        Comment on above:   Performed By: #### C  
ERULOP, HAAB, L-K MICRO, HBCAB, HCBIGM,   
JAMIE, SMAB, ALPHA PHEN, MITOM2, HBSAG, HEMOCHROM, HAABT, IGG,   
HBSAB, HCV RX PCR ####  
[facility] Acct#[id number]  
,  
#### RALPH ####  
[address]   
   
                                                    Interpretation   
Hepatitis C                     Normal          .               [facility]   
[facility]   
Phys[facility]   
Group  
   
                                        Comment on above:   Result Comment: Not   
infected with HCV unless early or acute   
infection is  
suspected (which may be delayed in an immunocompromised  
individual), or other evidence exists to indicate HCV  
infection.   
   
                                                            Performed By: #### C  
ERULOP, HAAB, L-K MICRO, HBCAB, HCBIGM,   
JAMIE, SMAB, ALPHA PHEN, MITOM2, HBSAG, HEMOCHROM, HAABT, IGG,   
HBSAB, HCV RX PCR ####  
[facility] Acct#[id number]  
,  
#### RALPH ####  
[address]   
   
                                                    Hepatitis A Antibody IgMon 0  
2024   
   
                                                    Hepatitis A Antibody   
IgM             Negative        Normal          Negative        [facility]   
[facility]   
Physician   
Group  
   
                                        Comment on above:   Performed By: #### C  
ERULOP, HAAB, L-K MICRO, HBCAB, HCBIGM,   
JAMIE, SMAB, ALPHA PHEN, MITOM2, HBSAG, HEMOCHROM, HAABT, IGG,   
HBSAB, HCV RX PCR ####  
[facility] Acct#[id number]  
,  
#### RALPH ####  
[address]   
   
                                                    Hepatitis A Antibody Totalon  
 2024   
   
                                                    Hepatitis A Antibody   
Total           Negative        Normal          Negative        [facility]   
[facility]   
Physician   
Group  
   
                                        Comment on above:   Result Comment: Comm  
ent: The HAV total antibody assay detects   
both IgG and  
IgM but does not differentiate between them. A negative  
result suggests susceptibility to infection. A positive  
result could be due to vaccination, previously resolved  
infection or active infection. Testing for HAV IgM should  
be performed if active HAV infection is suspected. [facility]  
offers profiles that will automatically reflex positive HAV  
total antibody results to IgM (e.g., panel #[id number] HAV  
Antibody w/ Rfx).   
   
                                                            Performed By: #### C  
ERULOP, HAAB, L-K MICRO, HBCAB, HCBIGM,   
JAMIE, SMAB, ALPHA PHEN, MITOM2, HBSAG, HEMOCHROM, HAABT, IGG,   
HBSAB, HCV RX PCR ####[facility] Acct#[id number],#### RALPH   
####[address]   
   
                                                    Hepatitis B Core Antibodyon   
2024   
   
                                                    Hepatitis B Core   
Antibody        Negative        Normal          Negative        [facility]   
[facility]   
Physician   
Group  
   
                                        Comment on above:   Performed By: #### C  
ERULOP, HAAB, L-K MICRO, HBCAB, HCBIGM,   
JAMIE, SMAB, ALPHA PHEN, MITOM2, HBSAG, HEMOCHROM, HAABT, IGG,   
HBSAB, HCV RX PCR ####[facility] Acct#[id number],#### RALPH   
####[address]   
   
                                                    Hepatitis B Core Antibody Ig  
Mon 2024   
   
                                                    Hepatitis B Core   
Antibody IgM    Negative        Normal          Negative        The   
[facility]   
Physician   
Group  
   
                                        Comment on above:   Result Comment: Perf  
ormed at:  - [facility] [id number]  
: Corky Newsome PhD, Phone: [phone]   
   
                                                            Performed By: #### C  
ERULOP, HAAB, L-K MICRO, HBCAB, HCBIGM,   
JAMIE, SMAB, ALPHA PHEN, MITOM2, HBSAG, HEMOCHROM, HAABT, IGG,   
HBSAB, HCV RX PCR ####  
[facility] Acct#[id number]  
,  
#### RALPH ####  
[address]   
   
                                                    Hepatitis B Surface Antibody  
on 2024   
   
                                                    Hepatitis B Surface   
Antibody        Non-Reactive    Normal          .               The   
[facility]   
Physician   
Group  
   
                                        Comment on above:   Result Comment: Non   
Reactive: Inconsistent with immunity,  
less than 10 mIU/mL  
Reactive: Consistent with immunity,  
greater than 9.9 mIU/mL   
   
                                                            Performed By: #### C  
ERULOP, HAAB, L-K MICRO, HBCAB, HCBIGM,   
JAMIE, SMAB, ALPHA PHEN, MITOM2, HBSAG, HEMOCHROM, HAABT, IGG,   
HBSAB, HCV RX PCR ####[facility] Acct#[id number],#### RALPH   
####[address]   
   
                                                    Hepatitis B Surface Antigeno  
n 2024   
   
                      HBsAg Screen Negative   Normal     Negative   The   
[facility]   
Physician   
Group  
   
                                        Comment on above:   Result Comment: PERF  
ORMED BY:  
[facility]  
[address] [place] [address]  
[phone]  
PATHOLOGIST MEDICAL DIRECTOR  
PAUL RODRIGUEZ M.D.   
   
                                                            Performed By: #### C  
ERULOP, HAAB, L-K MICRO, HBCAB, HCBIGM,   
JAMIE, SMAB, ALPHA PHEN, MITOM2, HBSAG, HEMOCHROM, HAABT, IGG,   
HBSAB, HCV RX PCR ####[facility] Acct#[id number],#### RALPH   
####[facility] [address]   
   
                                                    Hereditary Hemochromatosis,D  
NAon 2024   
   
                                                    Hereditary   
Hemochromatosis                 Normal          .               The   
[facility]   
Physician   
Group  
   
                                        Comment on above:   Result Comment: Resu  
lts:  
c.845G>A (p.Kdu138Jyc) - Not Detected  
c.187C>G (p.Ora36Fqd) - Detected, heterozygous  
c.193A>T (p.Uzh41Rdv) - Not Detected  
Not associated with increased risk to develop clinical  
symptoms of Hereditary Hemochromatosis. In symptomatic  
individuals, other causes of iron overload should be  
evaluated. See Additional Information and Comments.  
Additional Clinical Information:  
Hereditary hemochromatosis (HFE related) is an autosomal  
recessive iron storage disorder. Patients may have a  
genetic diagnosis of hereditary hemochromatosis and never  
show clinical symptoms. Clinical symptoms typically appear  
between 40 to 60 years in males and after menopause in  
females. Signs and symptoms may include organ damage,  
primarily in the liver, risk for hepatocellular  
carcinoma, diabetes, and heart disease due to iron  
accumulation. Life expectancy may be decreased in  
individuals who develop cirrhosis. Treatment for  
clinically symptomatic individuals may include  
therapeutic phlebotomy. Liver transplant may be used to  
treat end stage liver failure. For preventive care,  
monitoring for iron overload is recommended for patients  
who are homozygous for c.845G>A (p.Ict138Sko) and have yet  
to experience clinical symptoms.  
Comments:  
The most common HFE variants associated with hereditary  
hemochromatosis are c.845G>A (p.Wrx311Nxg), c.187C>G  
(p.Nyp86Uyl), c.193A>T (p.Sij93Hqh). While patients  
homozygous for c.845G>A (p.Kil651Phw) are the most likely  
to present clinical symptoms, less than 10% develop  
clinically significant iron overload with tissue and organ  
damage.  
Genetic counseling is recommended to discuss the potential  
clinical implications of positive results, as well as  
recommendations for testing family members.  
Genetic Coordinators are available for health care  
providers to discuss results at [phone] [phone]  
Test Details:  
Three variants analyzed:  
c.845G>A (p.Irm786Bvy), commonly referred to as C282Y  
c.187C>G (p.Nzo83Wob), commonly referred to as H63D  
c.193A>T (p.Fiz84Gjn), commonly referred to as S65C  
Methods/Limitations:  
DNA Analysis of the HFE gene (NM_000410.4) was performed  
by PCR amplification followed by restriction enzyme  
digestion analyses. Results must be combined with clinical  
information for the most accurate interpretation. Molecular-  
based testing is highly accurate, but as in any laboratory  
test, diagnostic errors may occur. False positive or false  
negative results may occur for reasons that include genetic  
variants, blood transfusions, bone marrow transplantation,  
somatic or tissue-specific mosaicism, mislabeled samples,  
or erroneous representation of family relationships.  
This test was developed and its performance  
characteristics determined by [facility]. It has not been  
cleared or approved by the Food and Drug Administration.  
References:  
Charles BR, [place] PC, Nick KV, Deepak LW, Félix AS;  
American Association for the Study of Liver Diseases.  
Diagnosis and management of hemochromatosis: 2011 practice  
guideline by the American Association for the Study of  
Liver Diseases. Hepatology. 2011 Jul;54(1):328-43. doi:  
10.1002/hep.95657. PMID: 18082310; PMCID: LYS1325514.  
Mundo G, Geovani P, Naomi DW, Manoj H, Flor O,  
Christiano S, Ab I, Julio M, Isma S. [facility] best practice  
guidelines for the molecular genetic diagnosis of  
hereditary hemochromatosis (HH). Eur J Hum Leighann. 2016  
Apr;24(4):479-95. doi: 10.1038/ejhg.2015.128. Epub 2015. PMID: 76450924; PMCID: ELU8779281.   
   
                                                            Performed By: #### C  
ERULOP, HAAB, L-K MICRO, HBCAB, HCBIGM,   
JAMIE, SMAB, ALPHA PHEN, MITOM2, HBSAG, HEMOCHROM, HAABT, IGG,   
HBSAB, HCV RX PCR ####[facility] Acct#[id number],#### RALPH   
####[address]   
   
                      Reviewed by:            Normal     .          The   
[facility]   
Physician   
Group  
   
                                        Comment on above:   Result Comment: Leslee Pedro, Ph.D., FACMG  
Performed at: [address] [id number]  
: Zhane Whipple [facility], Phone: [phone]  
PERFORMED BY:  
[place] [address]  
[phone]  
PATHOLOGIST MEDICAL DIRECTOR  
PAUL RODRIGUEZ M.D.   
   
                                                            Performed By: #### C  
ERULOP, HAAB, L-K MICRO, HBCAB, HCBIGM,   
JAMIE, SMAB, ALPHA PHEN, MITOM2, HBSAG, HEMOCHROM, HAABT, IGG,   
HBSAB, HCV RX PCR ####[facility] Acct#[id number],#### RALPH   
####[address]   
   
                                                    Immunoglobulin Bry    
   
                      Immunoglobulin G 838 mg/dL  Normal     [id number]   The   
[facility]   
Physician   
Group  
   
                                        Comment on above:   Result Comment: Perf  
ormed at:  - [facility] [id number]  
: Corky Newsome PhD, Phone: [phone]   
   
                                                            Performed By: #### C  
ERULOP, HAAB, L-K MICRO, HBCAB, HCBIGM,   
JAMIE, SMAB, ALPHA PHEN, MITOM2, HBSAG, HEMOCHROM, HAABT, IGG,   
HBSAB, HCV RX PCR ####[facility]ct#[id number],#### RALPH   
####[address]   
   
                                                    Liver-Kidney Microsomal Abon  
 2024   
   
                                                    Liver-Kidney   
Microsomal Ab   3.5             Normal          0.0-20.0        The   
[facility]   
Physician   
[facility]  
   
                                        Comment on above:   Result Comment: Nega  
tive 0.0 - 20.0  
Equivocal 20.1 - 24.9  
Positive >24.9  
LKM type 1 antibodies are detected in patients with  
autoimmune hepatitis type 2 and in up to 8% of  
patients with chronic HCV infection.  
Performed at: [address] [id number]  
: Croky Newsome PhD, Phone: [phone]   
   
                                                            Performed By: #### C  
ERULOP, HAAB, L-K MICRO, HBCAB, HCBIGM,   
JAMIE, SMAB, ALPHA PHEN, MITOM2, HBSAG, HEMOCHROM, HAABT, IGG,   
HBSAB, HCV RX PCR ####[facility]#[id number],#### RALPH   
####[address]   
   
                                                    Mitochondrial (M2) Antibodyo  
n 2024   
   
                                                    Mitochondrial (M2)   
Antibody        <20.0           Normal          0.0-20.0        The   
[facility]   
Physician   
[facility]  
   
                                        Comment on above:   Result Comment: Nega  
tive 0.0 - 20.0  
Equivocal 20.1 - 24.9  
Positive >24.9  
Mitochondrial (M2) Antibodies are found in 90-96% of  
patients with primary biliary cirrhosis.  
Performed at: [address] [id number]  
: Corky Newsome PhD, Phone: [phone]   
   
                                                            Performed By: #### C  
ERULOP, HAAB, L-K MICRO, HBCAB, HCBIGM,   
JAMIE, SMAB, ALPHA PHEN, MITOM2, HBSAG, HEMOCHROM, HAABT, IGG,   
HBSAB, HCV RX PCR ####[facility]#[id number],#### RALPH   
####[place] [address] [place]   
   
                                                    Smooth Muscle Antibodyon    
   
                      Smooth Muscle Antibody 24         High       0-19       Th  
e   
[facility]   
Physician   
Group  
   
                                        Comment on above:   Result Comment: Nega  
tive 0 - 19  
Weak positive 20 - 30  
Moderate to strong positive >30  
Actin Antibodies are found in 52-85% of patients with  
autoimmune hepatitis or chronic active hepatitis and  
in 22% of patients with primary biliary cirrhosis.   
   
                                                            Performed By: #### C  
ERULOP, HAAB, L-K MICRO, HBCAB, HCBIGM,   
JAMIE, SMAB, ALPHA PHEN, MITOM2, HBSAG, HEMOCHROM, HAABT, IGG,   
HBSAB, HCV RX PCR ####[facility]ct#[id number],#### RALPH   
####[facility] [address] [address] [place]   
   
                                                    US liveron 2024   
   
                                         liver            [facility]  
[address] [id number]  
  
Ultrasound Report  
Signed  
  
Patient: Arun Moon   
MR#: [id number]  
[id number]  
: 1950   
Acct:[id number]  
  
Age/Sex: 74 / M ADM Date:   
24  
  
Loc:  Room: Type: [facility]  
Attending Dr: Yordy Lewis MD  
  
Ordering Provider: Yordy Lewis MD  
Date of Service: 24  
Accession #: ([id number])   
US/US liver: R74.8 -   
Abnormal levels of other   
serum enzymes  
  
  
Copies to: Yordy Lweis MD  
  
  
LIMITED ABDOMINAL   
ULTRASOUND:  
  
CLINICAL HISTORY: Elevated   
LFTs.  
  
COMPARISON: None  
  
TECHNIQUE: Grayscale and   
color Doppler images of   
the right upper quadrant   
organs were obtained.  
  
FINDINGS:  
  
Pancreas: Visualized   
portions appear   
unremarkable.  
  
Liver: Fatty infiltration.  
  
Gallbladder: Removed.  
  
CBD: 7 mm.  
  
  
ORDER #: [id number] US/US   
liver  
IMPRESSION:  
  
FATTY INFILTRATION OF THE   
LIVER. NO ACUTE PROCESS IS   
SEEN..  
  
Impression dictated by:   
Jordi Mcdowell Jr.,   
D.OAlvino2024 12:31 PM  
  
  
Dictation Location:   
[place] [address]  
  
Tech: Gabriela Deniz  
  
Transcribed By: MICHELLE   
24 1231  
Dictated By: Jordi Mcdowell Jr, DO 24   
1231  
  
Signed By:  
24 1231       Normal                                  The   
[facility]   
Physician   
Group  
   
                                                    HGB A1C (GLYCO-HGB)on 2024   
   
                      Glucose [Mass/Vol] 186 mg/dL  Normal                [facility]  
[facility]  
   
                                        Comment on above:   Performed By: #### C  
BCA, 2857-1, CMP, 19126-0 ####  
[facility] ([id number])  
[address], [address]  
[place] [address]   
   
                                                    HbA1c (Bld) [Mass   
fraction]       8.1 %           High            4.4-5.6         [facility]  
   
                                        Comment on above:   Result Comment: NOTE  
ADA Guidelines  
Result HgbA1c  
------------ ---------------  
Normal : less than 5.7 %  
Prediabetes : 5.7 % to 6.4 %  
Diabetes : > 6.4 %  
Use with caution in patients with abnormal hemoglobin variants   
as  
the half-life of red blood cells and in vivo glycation rates   
are  
affected.   
   
                                                            Performed By: #### C  
BCA, 2857-1, CMP, 57597-6 ####  
[facility] ([id number])  
[address], [address]  
[place] [address]   
   
                                                    MICROALBUMIN - ALBUMIN:CREAT  
ININE URINE RATIOon 2024   
   
                      ALB/CREAT RATIO 698.1 mg/g creat High       0.0-30.0   [facility]  
   
                                        Comment on above:   Performed By: #### RAPHAEL  
BCA, 2857-1, CMP, 82017-4 ####  
[facility] ([id number])  
[address], [address]  
[place] [address]   
   
                                                    Albumin DL <= 20 mg/L   
(U) [Mass/Vol]  94.1 mg/dL      High            0.0-1.9         [facility]  
   
                                        Comment on above:   Performed By: #### RAPHAEL  
BCA, 2857-1, CMP, 10150-9 ####  
[facility] ([id number])  
[address], [address]  
[place] [address]   
   
                      URINE CREAT 134.79 mg/dL Normal                [facility]  
   
                                        Comment on above:   Performed By: #### RAPHAEL  
BCA, 2857-1, CMP, 93760-5 ####  
[facility] ([id number])  
[address], [address]  
[place] [address]   
   
                                                    URINALYSISon 2024   
   
                      Bilirubin Ql (U) Negative   Normal     NEG        [facility]  
   
                                        Comment on above:   Performed By: #### C  
BCA, 2857-1, CMP, 82391-0 ####  
[facility] ([id number])  
[address], [address]  
[place] [address]   
   
                      BLOOD/HGB  Small      Abnormal   NEG        [facility]  
   
                                        Comment on above:   Performed By: #### C  
BCA, 2857-1, CMP, 54729-6 ####  
[facility] ([id number])  
[address], [address]  
[place] [address]   
   
                      Color (U)  YELLOW     Normal     YELLOW     [facility]  
   
                                        Comment on above:   Performed By: #### RAPHAEL  
BCA, 2857-1, CMP, 96136-2 ####  
[facility] ([id number])  
[address], [address]  
[place] [address]   
   
                      Glucose Ql (U) 300 mg/dL  Abnormal   NEG        [facility]  
   
                                        Comment on above:   Performed By: #### RAPHAEL  
BCA, 2857-1, CMP, 46286-3 ####  
[facility] ([id number])  
[address], [address]  
[place] [address]   
   
                      GRANULAR CASTS 1 /lpf     High       0          [facility]  
   
                                        Comment on above:   Performed By: #### RAPHAEL  
BCA, 2857-1, CMP, 27925-4 ####  
[facility] ([id number])  
[address], [address]  
[place] [address]   
   
                                                    Hyaline casts LM Ql   
(Urine sed)     9 /lpf          High            0-2             [facility]  
   
                                        Comment on above:   Performed By: #### C  
BCA, 2857-1, CMP, 81519-6 ####  
[facility] ([id number])  
[address], [address]  
[place] [address]   
   
                      Ketones Ql (U) Negative   Normal     NEG        [facility]  
   
                                        Comment on above:   Performed By: #### RAPHAEL  
BCA, 2857-1, CMP, 07607-6 ####  
[facility] ([id number])  
[address], [address]  
[place] [address]   
   
                                                    Leukocyte esterase   
Test strip Ql (U) Negative        Normal          NEG             [facility]  
   
                                        Comment on above:   Performed By: #### C  
BCA, 2857-1, CMP, 86642-9 ####  
[facility] ([id number])  
[address], [address]  
[place] [address]   
   
                      MUCOUS     PRESENT    Abnormal   NONE       [facility]  
   
                                        Comment on above:   Performed By: #### C  
BCA, 2857-1, CMP, 25057-9 ####  
[facility] ([id number])  
[address], [address]  
[place] [address]   
   
                      Nitrite Ql (U) Negative   Normal     NEG        [facility]  
   
                                        Comment on above:   Performed By: #### RAPHAEL HERNANDEZ, 2857-1, CMP, 91510-5 ####  
[facility] ([id number])  
[address], [address]  
[place] [address]   
   
                      pH (U)     6.0 [pH]   Normal     5.0-8.5    [facility]  
   
                                        Comment on above:   Performed By: #### C  
MARY, 2857-1, CMP, 70421-8 ####  
[facility] ([id number])  
[address], [address]  
[place] [address]   
   
                      Protein Ql (U) 300 mg/dL  Abnormal   NEG        [facility]  
   
                                        Comment on above:   Performed By: #### RAPHAEL HERNANDEZ, 2857-1, CMP, 74136-9 ####  
[facility] ([id number])  
[address], [address]  
[place] [address]   
   
                      R.B.CELLS  25 /hpf    High       0-5        [facility]  
   
                                        Comment on above:   Performed By: #### RAPHAEL  
BCA, 2857-1, CMP, 72880-6 ####  
[facility] ([id number])  
[address], [address]  
[place] [address]   
   
                                                    Specific gravity (U)   
[Rel density]   1.021           Normal          1.003-1.035     [facility]  
   
                                        Comment on above:   Performed By: #### C  
BCA, 2857-1, CMP, 65006-5 ####  
[facility] ([id number])  
[address], [address]  
[place] [address]   
   
                      SQUAMOUS EPITHELIUM 1 /hpf     Normal     0-5        [facility]  
   
                                        Comment on above:   Performed By: #### C  
BCA, 2857-1, CMP, 12424-9 ####  
[facility] ([id number])  
[address], [facility] [address]  
[place] [address]   
   
                      TURBIDITY  CLEAR      Normal     CLEAR      [facility]  
   
                                        Comment on above:   Performed By: #### C  
BCA, 2857-1, CMP, 89594-5 ####  
[facility] ([id number])  
[address], [facility] [address]  
[place] [address]   
   
                                                    Urobilinogen (U)   
[Mass/Vol]      mg/dL           Normal          <1.1            [facility]  
   
                                        Comment on above:   Performed By: #### C  
BCA, 2857-1, CMP, 33757-7 ####  
[facility] ([id number])  
[address], [facility] [address]  
[place] [address]   
   
                      W.B.CELLS  1 /hpf     Normal     0-5        [facility]  
   
                                        Comment on above:   Performed By: #### C  
BCA, 2857-1, CMP, 41858-2 ####  
[facility] ([id number])  
[address], [address] [address]   
   
                                                    US ABDOMEN LMTDon 2024  
   
   
                                        US ABDOMEN LMTD     US ABDOMEN LMTD  
HISTORY: A 74-year-old   
male with the history of   
the elevated serum   
alkaline phosphatase.  
TECHNIQUE: Multiple   
real-time images of the   
right upper abdomen are   
obtained. The color   
Doppler study is   
performed.  
COMPARISON: Comparison is   
made with the CT scan of   
the abdomen and pelvis of   
3/15/2024 and the upper   
abdominal ultrasound   
examination of 2023.  
FINDINGS: Liver appears   
irregular in contour.   
There is increase   
echogenicity in liver   
suggestive of fatty   
infiltration. No focal   
mass is identified. Liver   
measures 17.6 cm in   
vertical length.  
There is history of   
cholecystectomy.. The   
common bile duct is normal   
and measures 5.7 mm in   
diameter. No intrahepatic   
biliary ductal dilatation   
is identified. There is a   
negative sonographic   
Casanova's sign.  
The visualized pancreas   
appears normal.  
There is no evidence of   
free fluid in the upper   
abdomen.  
The color Doppler study   
reveals normal hepatopedal   
flow in the main portal   
vein.  
IMPRESSION:  
1. Fatty infiltration of   
the liver without evidence   
of focal mass.  
2. Status post   
cholecystectomy. No   
evidence of biliary ductal   
dilatation.  
3. Visualized pancreas is   
unremarkable.  
4. There is a normal   
directional flow in main   
portal vein.  
Workstation:[id number]  
Finalized by Anthony Chapman MD on 2024 8:54 AM Normal                                  [facility]  
   
                                                    COMPREHENSIVE METABOLIC PANE  
Julio 2024   
   
                      Albumin [Mass/Vol] 3.9 g/dL   Normal     3.2-5.3    [facility]  
   
                                        Comment on above:   Performed By: #### C  
BCA, 2857-1, CMP, 63140-3 ####  
[facility] ([id number])  
[address], [address]  
[place] [address]   
   
                                                    ALP [Catalytic   
activity/Vol]   414 U/L         High                      [facility]  
   
                                        Comment on above:   Performed By: #### C  
BCA, 2857-1, CMP, 01848-3 ####  
[facility] ([id number])  
[address] [address]  
[place] [address]   
   
                                                    ALT [Catalytic   
activity/Vol]   322 U/L         High            0-40            [facility]  
   
                                        Comment on above:   Performed By: #### C  
BCA, 2857-1, CMP, 68577-0 ####  
[facility] ([id number])  
[address], [address] [facility] [address]  
[place] [address]   
   
                      Anion gap [Moles/Vol] 15 mmol/L  Normal     5-15       [facility]  
   
                                        Comment on above:   Performed By: #### C  
BCA, 2857-1, CMP, 61886-3 ####  
[facility] ([id number])  
[address], [address]  
[place] [address]   
   
                                                    AST [Catalytic   
activity/Vol]   194 U/L         High            0-41            [facility]  
   
                                        Comment on above:   Performed By: #### C  
BCA, 2857-1, CMP, 59983-8 ####  
[facility] ([id number])  
[address], [address]  
[place] [address]   
   
                      Bilirubin [Mass/Vol] 0.5 mg/dL  Normal     0.3-1.2    [facility]  
   
                                        Comment on above:   Performed By: #### C  
BCA, 2857-1, CMP, 26362-2 ####  
[facility] ([id number])  
[address], [address]  
[place] [address]   
   
                      Calcium [Mass/Vol] 9.2 mg/dL  Normal     8.5-10.5   [facility]  
   
                                        Comment on above:   Performed By: #### C  
BCA, 2857-1, CMP, 01113-0 ####  
[facility] ([id number])  
[address], [address]  
[place] [address]   
   
                      Chloride [Moles/Vol] 96 mmol/L  Low             [facility]  
   
                                        Comment on above:   Performed By: #### C  
BCA, 2857-1, CMP, 08852-4 ####  
[facility] ([id number])  
[address], [address]  
[place] [address]   
   
                      CO2 [Moles/Vol] 25 mmol/L  Normal     22-32      [facility]  
   
                                        Comment on above:   Performed By: #### C  
BCA, 2857-1, CMP, 96472-3 ####  
[facility] ([id number])  
[address], [address]  
[place] [address]   
   
                      Creatinine [Mass/Vol] 5.44 mg/dL High       0.60-1.30  [facility]  
   
                                        Comment on above:   Result Comment: METH  
OD TRACEABLE TO IDMS STANDARD   
   
                                                            Performed By: #### C  
BCA, 2857-1, CMP, 35453-4 ####  
[facility] ([id number])  
[address], [address]  
[place] [address]   
   
                                                    GFR/1.73 sq   
M.predicted among   
non-blacks MDRD   
(S/P/Bld) [Vol   
rate/Area]      10 mL/min/{1.73_m2} Low             >59             [facility]  
   
                                        Comment on above:   Result Comment:  
Reported eGFR is based on the  
CKD-EPI  equation that does  
not use a race coefficient.   
   
                                                            Performed By: #### C  
BCA, 2857-1, CMP, 22566-2 ####  
[facility] ([id number])  
[address]  
[place] [address]   
   
                      Glucose [Mass/Vol] 204 mg/dL  High       65-99      [facility]  
   
                                        Comment on above:   Performed By: #### C  
BCA, 2857-1, CMP, 97548-4 ####  
[facility] ([id number])  
[address]  
[place] [address]   
   
                      Potassium [Moles/Vol] 4.1 mmol/L Normal     3.5-5.0    [facility]  
   
                                        Comment on above:   Performed By: #### C  
BCA, 2857-1, CMP, 65687-9 ####  
[facility] ([id number])  
[address]  
[place] [address]   
   
                      Protein [Mass/Vol] 7.6 g/dL   Normal     6.0-8.0    [facility]  
   
                                        Comment on above:   Performed By: #### C  
BCA, 2857-1, CMP, 40455-1 ####  
[facility] ([id number])  
[address]  
[place] [address]   
   
                      Sodium [Moles/Vol] 136 mmol/L Normal     134-146    [facility]  
   
                                        Comment on above:   Performed By: #### C  
BCA, 2857-1, CMP, 75157-7 ####  
[facility] ([id number])  
[address]  
[place] [address]   
   
                                                    Urea nitrogen   
[Mass/Vol]      69 mg/dL        High            5-27            [facility]  
   
                                        Comment on above:   Performed By: #### C  
BCA, 2857-1, CMP, 40807-6 ####  
[facility] ([id number])  
[address]  
[place] [address]   
   
                                                    CBC AND AUTO DIFFon 20  
24   
   
                      ABSOLUTE BASOPHIL 0.0 X10E9/L Normal     0.0-0.2    [facility]  
   
                                        Comment on above:   Performed By: #### C  
BCA, 08433-0, CMP, 37946-8, 99138-8 ####  
[facility] ([id number])  
[address]  
[place] [address]   
   
                      ABSOLUTE NEUTROPHIL 10.4 X10E9/L High       1.5-6.6    [facility]  
   
                                        Comment on above:   Performed By: #### C  
BCA, 05338-1, CMP, 86415-5, 55938-6 ####  
[facility] ([id number])  
[address]  
[place] [address]   
   
                                                    Basophils/100 WBC   
(Bld)           0.3 %           Normal                          [facility]  
   
                                        Comment on above:   Performed By: #### C  
BCA, 05299-5, CMP, 86563-9, 09282-5 ####  
[facility] ([id number])  
[address]  
[place] [address]   
   
                                                    Eosinophils (Bld)   
[#/Vol]         0.5 10*3/uL     High            0.0-0.4         [facility]  
   
                                        Comment on above:   Performed By: #### C  
BCA, 38966-6, CMP, 95751-4, 87744-2 ####  
[facility] ([id number])  
[address]  
[place] [address]   
   
                                                    Eosinophils/100 WBC   
(Bld)           4.2 %           Normal                          [facility]  
   
                                        Comment on above:   Performed By: #### C  
BCA, 95331-6, CMP, 36557-9, 47205-2 ####  
[facility] ([id number])  
[address]  
[place] [address]   
   
                                                    Erythrocyte   
distribution width   
(RBC) [Ratio]   14.7 %          Normal          11.5-15.0       [facility]  
   
                                        Comment on above:   Performed By: #### C  
MARY, 62791-8, [facility], 61467-6, 95879-5 ####  
[facility] ([id number])  
[address]  
[place] [address]   
   
                                                    Hematocrit (Bld)   
[Volume fraction] 25.8 %          Low             39-49           [facility]  
   
                                        Comment on above:   Performed By: #### RAPHAEL HERNANDEZ, 26885-5, [facility], 76581-5, 85603-7 ####  
[facility] ([id number])  
[address]  
[place] [address]   
   
                                                    Hemoglobin (Bld)   
[Mass/Vol]      8.7 g/dL        Low             13.0-17.0       [facility]  
   
                                        Comment on above:   Performed By: #### RAPHAEL HERNANDEZ, 09729-1, [facility], 30782-7, 04520-7 ####  
[facility] ([id number])  
[address]  
[place] [address]   
   
                                                    Lymphocytes (Bld)   
[#/Vol]         1.1 10*3/uL     Normal          1.0-3.5         [facility]  
   
                                        Comment on above:   Performed By: #### RAPHAEL HERNANDEZ, 36116-7, [facility], 80152-0, 15713-0 ####  
[facility] ([id number])  
[address]  
[place] [address]   
   
                                                    Lymphocytes/100 WBC   
(Bld)           8.3 %           Normal                          [facility]  
   
                                        Comment on above:   Performed By: #### RAPHAEL HERNANDEZ, 83639-3, [facility], 01059-6, 73284-5 ####  
[facility] ([id number])  
[address]  
[place] [address]   
   
                                                    MCH (RBC) [Entitic   
mass]           31.6 pg         Normal          27-34           [facility]  
   
                                        Comment on above:   Performed By: #### RAPHAEL  
BCA, 53836-6, [facility], 67210-8, 13781-8 ####  
[facility] ([id number])  
[address]  
[place] [address]   
   
                      MCHC (RBC) [Mass/Vol] 33.9 g/dL  Normal     32-36      [facility]  
   
                                        Comment on above:   Performed By: #### RAPHAEL  
BCA, 02797-2, CMP, 26110-2, 00275-2 ####  
[facility] ([id number])  
[address]  
[place] [address]   
   
                                                    MCV (RBC) [Entitic   
vol]            93 fL           Normal                    [facility]  
   
                                        Comment on above:   Performed By: #### RAPHAEL  
BCA, 08221-4, CMP, 90372-7, 47114-7 ####  
[facility] ([id number])  
[address]   
[place] [address]   
   
                                                    Monocytes (Bld)   
[#/Vol]         0.8 10*3/uL     Normal          0-0.9           [facility]  
   
                                        Comment on above:   Performed By: #### RAPHAEL  
BCA, 62560-0, CMP, 66493-6, 13952-0 ####  
[facility] ([id number])  
[address]  
[place] [address]   
   
                                                    Monocytes/100 WBC   
(Bld)           6.2 %           Normal                          [facility]  
   
                                        Comment on above:   Performed By: #### RAPHAEL  
BCA, 07500-8, CMP, 41068-3, 03608-6 ####  
[facility] ([id number])  
[address]  
[place] [address]   
   
                                                    Neutrophils/100 WBC   
(Bld)           81.0 %          Normal                          [facility]  
   
                                        Comment on above:   Performed By: #### RAPHAEL  
BCA, 58394-1, CMP, 10844-4, 82249-2 ####  
[facility] ([id number])  
[address]  
[place] [address]   
   
                                                    Platelet mean volume   
(Bld) [Entitic vol] 8.6 fL          Normal          7-12            [facility]  
   
                                        Comment on above:   Performed By: #### C  
BCA, 12089-3, CMP, 78473-8, 25205-6 ####  
[facility] ([id number])  
[address]  
[place] [address]   
   
                                                    Platelets (Bld)   
[#/Vol]         186 10*3/uL     Normal          150-450         [facility]  
   
                                        Comment on above:   Performed By: #### C  
BCA, 72340-5, CMP, 57331-5, 40125-8 ####  
[facility] ([id number])  
[address]  
[place] [address]   
   
                      RBC COUNT  2.76 X10E12/L Low        4.10-5.70  [facility]  
   
                                        Comment on above:   Performed By: #### C  
BCA, 30189-0, CMP, 78254-5, 57953-0 ####  
[facility] ([id number])  
[address]  
[place] [address]   
   
                      WBC (Bld) [#/Vol] 12.9 10*3/uL High       4.0-11.0   [facility]  
   
                                        Comment on above:   Performed By: #### C  
BCA, 44359-3, CMP, 07238-7, 67987-8 ####  
[facility] ([id number])  
[address]  
[place] [address]   
   
                                                    COMPREHENSIVE METABOLIC PANE  
Julio 2024   
   
                      Albumin [Mass/Vol] 2.6 g/dL   Low        3.2-5.3    [facility]  
   
                                        Comment on above:   Performed By: #### C  
BCA, 44942-2, CMP, 16593-2, 69445-7 ####  
[facility] ([id number])  
[address]  
[place] [address]   
   
                                                    ALP [Catalytic   
activity/Vol]   222 U/L         High                      [facility]  
   
                                        Comment on above:   Performed By: #### C  
BCA, 20591-0, CMP, 83437-1, 80188-3 ####  
[facility] ([id number])  
[address]  
[place] [address]   
   
                                                    ALT [Catalytic   
activity/Vol]   106 U/L         High            0-40            [facility]  
   
                                        Comment on above:   Performed By: #### C  
BCA, 09302-6, CMP, 45804-5, 93081-5 ####  
[facility] ([id number])  
[address]  
[place] [address]   
   
                      Anion gap [Moles/Vol] 14 mmol/L  Normal     5-15       [facility]  
   
                                        Comment on above:   Performed By: #### C  
BCA, 70299-6, CMP, 68297-7, 74562-6 ####  
[facility] ([id number])  
[address]  
[place] [address]   
   
                                                    AST [Catalytic   
activity/Vol]   77 U/L          High            0-41            [facility]  
   
                                        Comment on above:   Performed By: #### C  
BCA, 28359-1, CMP, 65565-1, 29783-5 ####  
[facility] ([id number])  
[address]  
[place] [address]   
   
                      Bilirubin [Mass/Vol] 0.8 mg/dL  Normal     0.3-1.2    [facility]  
   
                                        Comment on above:   Performed By: #### C  
BCA, 85282-7, CMP, 85989-2, 14323-5 ####  
[facility] ([id number])  
[address]  
[place] [address]   
   
                      Calcium [Mass/Vol] 8.3 mg/dL  Low        8.5-10.5   [facility]  
   
                                        Comment on above:   Performed By: #### C  
BCA, 80678-7, CMP, 82171-9, 11648-7 ####  
[facility] ([id number])  
[address]  
[place] [address]   
   
                      Chloride [Moles/Vol] 99 mmol/L  Normal          [facility]  
   
                                        Comment on above:   Performed By: #### C  
BCA, 22436-4, CMP, 59116-5, 35984-2 ####  
[facility] ([id number])  
[address]  
[place] [address]   
   
                      CO2 [Moles/Vol] 22 mmol/L  Normal     22-32      [facility]  
   
                                        Comment on above:   Performed By: #### C  
BCA, 41085-1, CMP, 83028-8, 19711-8 ####  
[facility] ([id number])  
[address]  
[place] [address]   
   
                      Creatinine [Mass/Vol] 4.55 mg/dL High       0.70-1.20  [facility]  
   
                                        Comment on above:   Result Comment: METH  
OD TRACEABLE TO IDMS STANDARD   
   
                                                            Performed By: #### C  
BCA, 62707-8, CMP, 07578-3, 55744-7 ####  
[facility] ([id number])  
[address]  
[place] [address]   
   
                                                    GFR/1.73 sq   
M.predicted among   
non-blacks MDRD   
(S/P/Bld) [Vol   
rate/Area]      13 mL/min/{1.73_m2} Low             >59             [facility]  
   
                                        Comment on above:   Result Comment:  
Reported eGFR is based on the  
CKD-EPI  equation that does  
not use a race coefficient.   
   
                                                            Performed By: #### C  
BCA, 23947-2, CMP, 81377-4, 95315-6 ####  
[facility] ([id number])  
[address] [place] [address]   
   
                      Glucose [Mass/Vol] 194 mg/dL  High       65-99      [facility]  
   
                                        Comment on above:   Performed By: #### C  
BCA, 54778-8, CMP, 88290-2, 63047-2 ####  
[facility] ([id number])  
[address]  
[place] [address]   
   
                      Potassium [Moles/Vol] 3.6 mmol/L Normal     3.5-5.0    [facility]  
   
                                        Comment on above:   Performed By: #### C  
BCA, 22860-8, CMP, 90133-9, 13868-1 ####  
[facility] ([id number])  
[address]  
[place] [address]   
   
                      Protein [Mass/Vol] 6.5 g/dL   Normal     6.0-8.0    [facility]  
   
                                        Comment on above:   Performed By: #### C  
BCA, 23819-8, CMP, 82665-1, 84665-6 ####  
[facility] ([id number])  
[address]  
[place] [address]   
   
                      Sodium [Moles/Vol] 135 mmol/L Normal     134-146    [facility]  
   
                                        Comment on above:   Performed By: #### C  
BCA, 15735-1, CMP, 13678-3, 54579-9 ####  
[facility] ([id number])  
[address] [place] [address]   
   
                                                    Urea nitrogen   
[Mass/Vol]      65 mg/dL        High            5-27            [facility]  
   
                                        Comment on above:   Performed By: #### C  
MARY, 65877-1, [facility], 16269-7, 92873-8 ####  
[facility] ([id number])  
[address] [place] [address]   
   
                                                    Glucose Glucometer (BldC) [M  
ass/Vol]on 2024   
   
                      Glucose [Mass/Vol] 322 mg/dL  High       65-99      [facility]  
   
                      Glucose [Mass/Vol] 177 mg/dL  High       65-99      [facility]  
   
                                                    MAGNESIUMon 2024   
   
                      Magnesium [Mass/Vol] 2.1 mg/dL  Normal     1.8-2.6    [facility]  
   
                                        Comment on above:   Performed By: #### C  
MARY, 95766-5, [facility], , 28410-7 ####  
[facility] ([id number])  
[address] [place] [address]   
   
                                                    POTASSIUMon 2024   
   
                      Potassium [Moles/Vol] 3.8 mmol/L Normal     3.5-5.0    [facility]  
   
                                        Comment on above:   Performed By: #### RAPHAEL HERNANDEZ, 64766-7, [facility], 72748-4, 71546-1 ####  
[facility] ([id number])  
[address] [place] [address]   
   
                      Potassium [Moles/Vol] 3.3 mmol/L Low        3.5-5.0    [facility]  
   
                                        Comment on above:   Performed By: #### RAPHAEL HERNANDEZ, 27440-3, [facility], 03837-3, 01982-2 ####  
[facility] ([id number])  
[address] [place] [address]   
   
                                                    Procalcitonin IA [Mass/Vol]o  
n 2024   
   
                      PROCALCITONIN 1.48 ng/mL High       <0.05      [facility]  
   
                                        Comment on above:   Result Comment: NOTE  
<0.50 ng/mL - Low risk of severe sepsis and/or septic shock.  
<2.00 ng/mL - Recommend retesting within 6-24 hours.  
>2.00 ng/mL - High risk of sepsis and/or septic shock.   
   
                                                            Performed By: #### C  
BCA, 41312-7, CMP, 94722-3, 39301-3 ####  
[facility] ([id number])  
[address]  
[place] [address]   
   
                                                    BF CELL CT AND DIFFon 2024   
   
                                        BODY FLUID COMMENT  ----------Interpreta  
tion--  
------              Normal                                  [facility]  
   
                                        Comment on above:   Result Comment: Refe  
rence values for this fluid type are  
undefined, as fluid accumulation is  
considered abnormal.   
   
                                                            Performed By: #### C  
BCA, 50663-1, CMP, 71404-1, 48998-7 ####  
[facility] ([id number])  
[address]  
[place] [address]   
   
                      FLUID CLARITY CLEAR      Normal                [facility]  
   
                                        Comment on above:   Performed By: #### C  
BCA, 85056-9, CMP, 50046-7, 75666-4 ####  
[facility] ([id number])  
[address]  
[place] [address]   
   
                      FLUID COLOR COLORLESS  Normal                [facility]  
   
                                        Comment on above:   Performed By: #### C  
BCA, 88942-7, CMP, 03585-6, 08760-9 ####  
[facility] ([id number])  
[address]  
[place] [address]   
   
                      FLUID LYMPHOCYTE 10 %       Normal                [facility]  
   
                                        Comment on above:   Performed By: #### C  
BCA, 97234-6, CMP, 77625-7, 02310-9 ####  
[facility] ([id number])  
[address]  
[place] [address]   
   
                      FLUID NEUTROPHILS 59 %       Normal                [facility]  
[facility]  
   
                                        Comment on above:   Performed By: #### C  
BCA, 21119-3, CMP, 88537-3, 36839-3 ####  
[facility] ([id number])  
[address]  
[place] [address]   
   
                      FLUID RBC CT 1 /uL      Normal                [facility]  
   
                                        Comment on above:   Performed By: #### C  
BCA, 07800-1, CMP, 71800-6, 74160-2 ####  
[facility] ([id number])  
[address]  
[place] [address]   
   
                      FLUID SPECIMEN TYPE PERITONEAL FLUID Normal                 
 [facility]  
   
                                        Comment on above:   Performed By: #### C  
BCA, 67967-7, CMP, 29162-2, 63534-9 ####  
[facility] ([id number])  
[address]  
[place] [address]   
   
                      MACROPHAGES 31 %       Normal                [facility] [facility]  
   
                                        Comment on above:   Performed By: #### C  
BCA, 41837-1, CMP, 52346-3, 16334-7 ####  
[facility] ([id number])  
[address]  
[place] [address]   
   
                      NUCLEATED CELL CT 28 /uL     Normal                [facility]  
   
                                        Comment on above:   Performed By: #### C  
MARY, 95569-5, CMP, , 84942-8 ####  
[facility] ([id number])  
[address]  
[place] [address]   
   
                                                    BLOOD CULTUREon 2024   
   
                                                    Bacteria identified   
Aer cx Nom (Bld)                        CULTURE RESULTS  
NO GROWTH 5 DAYS    Normal                                  [facility]  
   
                                                    Bacteria identified   
Aer cx Nom (Bld)                        CULTURE RESULTS  
NO GROWTH 5 DAYS    Normal                                  [facility]  
   
                                                    CBC AND AUTO DIFFon 20  
24   
   
                      ABSOLUTE BASOPHIL 0.1 X10E9/L Normal     0.0-0.2    [facility]  
   
                                        Comment on above:   Performed By: #### C  
MARY, 76359-6, CMP, , 43905-8 ####  
[facility] ([id number])  
[address]  
[place] [address]   
   
                      ABSOLUTE NEUTROPHIL 14.9 X10E9/L High       1.5-6.6    Pro  
[facility]  
   
                                        Comment on above:   Performed By: #### C  
BCA, 30017-0, CMP, 53544-2, 99413-1 ####  
[facility] ([id number])  
[address]  
[place] [address]   
   
                                                    Basophils/100 WBC   
(Bld)           0.4 %           Normal                          [facility]  
   
                                        Comment on above:   Performed By: #### C  
BCA, 05822-1, CMP, 99072-0, 26795-9 ####  
[facility] ([id number])  
[address]  
[place] [address]   
   
                                                    Eosinophils (Bld)   
[#/Vol]         0.4 10*3/uL     Normal          0.0-0.4         [facility]  
   
                                        Comment on above:   Performed By: #### C  
BCA, 66317-9, CMP, 06761-8, 18152-9 ####  
[facility] ([id number])  
[address]  
[place] [address]   
   
                                                    Eosinophils/100 WBC   
(Bld)           2.5 %           Normal                          [facility]  
   
                                        Comment on above:   Performed By: #### C  
BCA, 43092-7, CMP, 80015-9, 96550-9 ####  
[facility] ([id number])  
[address]  
[place] [address]   
   
                                                    Erythrocyte   
distribution width   
(RBC) [Ratio]   14.9 %          Normal          11.5-15.0       [facility]  
   
                                        Comment on above:   Performed By: #### C  
BCA, 96890-5, CMP, 34044-3, 77435-1 ####  
[facility] ([id number])  
[address]  
[place] [address]   
   
                                                    Hematocrit (Bld)   
[Volume fraction] 26.5 %          Low             39-49           [facility]  
   
                                        Comment on above:   Performed By: #### C  
BCA, 71371-9, CMP, 57515-4, 22626-3 ####  
[facility] ([id number])  
[address]  
[place] [address]   
   
                                                    Hemoglobin (Bld)   
[Mass/Vol]      9.0 g/dL        Low             13.0-17.0       [facility]  
   
                                        Comment on above:   Performed By: #### C  
BCA, 18251-4, CMP, 83537-6, 99326-7 ####  
[facility] ([id number])  
[address]  
[place] [address]   
   
                                                    Lymphocytes (Bld)   
[#/Vol]         0.9 10*3/uL     Low             1.0-3.5         [facility]  
   
                                        Comment on above:   Performed By: #### C  
BCA, 78137-6, CMP, 75407-5, 26655-8 ####  
[facility] ([id number])  
[address]  
[place] [address]   
   
                                                    Lymphocytes/100 WBC   
(Bld)           5.3 %           Normal                          [facility]  
   
                                        Comment on above:   Performed By: #### C  
BCA, 81694-0, CMP, 02331-9, 39060-0 ####  
[facility] ([id number])  
[address]  
[place] [address]   
   
                                                    MCH (RBC) [Entitic   
mass]           31.5 pg         Normal          27-34           [facility]  
   
                                        Comment on above:   Performed By: #### C  
BCA, 49200-6, CMP, 86843-2, 73664-4 ####  
[facility] ([id number])  
[address]  
[place] [address]   
   
                      MCHC (RBC) [Mass/Vol] 34.0 g/dL  Normal     32-36      [facility]  
   
                                        Comment on above:   Performed By: #### RAPHAEL  
BCA, 35481-7, CMP, 24576-7, 05061-3 ####  
[facility] ([id number])  
[address]  
[place] [address]   
   
                                                    MCV (RBC) [Entitic   
vol]            93 fL           Normal                    [facility]  
   
                                        Comment on above:   Performed By: #### C  
BCA, 42746-8, CMP, 45437-8, 69773-1 ####  
[facility] ([id number])  
[address]  
[place] [address]   
   
                                                    Monocytes (Bld)   
[#/Vol]         1.1 10*3/uL     High            0-0.9           [facility]  
   
                                        Comment on above:   Performed By: #### C  
BCA, 42010-2, CMP, 65937-8, 36290-2 ####  
[facility] ([id number])  
[address]  
[place] [address]   
   
                                                    Monocytes/100 WBC   
(Bld)           6.2 %           Normal                          [facility]  
   
                                        Comment on above:   Performed By: #### C  
BCA, 53351-6, CMP, 82019-6, 88642-7 ####  
[facility] ([id number])  
[address]  
[place] [address]   
   
                                                    Neutrophils/100 WBC   
(Bld)           85.6 %          Normal                          [facility]  
   
                                        Comment on above:   Performed By: #### C  
BCA, 98517-3, CMP, 79740-3, 99175-3 ####  
[facility] ([id number])  
[address]  
[place] [address]   
   
                                                    Platelet mean volume   
(Bld) [Entitic vol] 8.4 fL          Normal          7-12            [facility]  
   
                                        Comment on above:   Performed By: #### C  
BCA, 78590-0, CMP, 29038-6, 27252-9 ####  
[facility] ([id number])  
[address]  
[place] [address]   
   
                                                    Platelets (Bld)   
[#/Vol]         170 10*3/uL     Normal          150-450         [facility]  
   
                                        Comment on above:   Performed By: #### C  
BCA, 09814-9, CMP, 59956-2, 39151-6 ####  
[facility] ([id number])  
[address]  
[place] [address]   
   
                      RBC COUNT  2.86 X10E12/L Low        4.10-5.70  [facility]  
   
                                        Comment on above:   Performed By: #### C  
BCA, 43728-1, CMP, 62655-8, 90300-8 ####  
[facility] ([id number])  
[address]  
[place] [address]   
   
                      WBC (Bld) [#/Vol] 17.4 10*3/uL High       4.0-11.0   [facility]  
   
                                        Comment on above:   Performed By: #### C  
BCA, 24522-8, CMP, 94416-8, 69573-6 ####  
[facility] ([id number])  
[address]  
[place] [address]   
   
                                                    COMPREHENSIVE METABOLIC PANE  
Julio 2024   
   
                      Albumin [Mass/Vol] 2.7 g/dL   Low        3.2-5.3    [facility]  
   
                                        Comment on above:   Performed By: #### C  
BCA, 27807-5, CMP, 14104-3, 13240-1 ####  
[facility] ([id number])  
[address]  
[place] [address]   
   
                                                    ALP [Catalytic   
activity/Vol]   186 U/L         High                      [facility]  
   
                                        Comment on above:   Performed By: #### C  
BCA, 71034-3, CMP, 14971-5, 00862-2 ####  
[facility] ([id number])  
[address]  
[place] [address]   
   
                                                    ALT [Catalytic   
activity/Vol]   67 U/L          High            0-40            [facility]  
   
                                        Comment on above:   Performed By: #### C  
BCA, 71232-8, CMP, 84534-5, 72646-0 ####  
[facility] ([id number])  
[address]  
[place] [address]   
   
                      Anion gap [Moles/Vol] 13 mmol/L  Normal     5-15       [facility]  
   
                                        Comment on above:   Performed By: #### C  
BCA, 11747-4, CMP, 72401-7, 59347-1 ####  
[facility] ([id number])  
[address]  
[place] [address]   
   
                                                    AST [Catalytic   
activity/Vol]   35 U/L          Normal          0-41            [facility]  
   
                                        Comment on above:   Performed By: #### C  
BCA, 27592-7, CMP, 77236-1, 46153-3 ####  
[facility] ([id number])  
[address]  
[place] [address]   
   
                      Bilirubin [Mass/Vol] 0.9 mg/dL  Normal     0.3-1.2    [facility]  
   
                                        Comment on above:   Performed By: #### C  
BCA, 53533-5, CMP, 09249-3, 33225-8 ####  
[facility] ([id number])  
[address]  
[place] [address]   
   
                      Calcium [Mass/Vol] 8.4 mg/dL  Low        8.5-10.5   [facility]  
   
                                        Comment on above:   Performed By: #### C  
BCA, 59494-6, CMP, 14718-2, 00744-4 ####  
[facility] ([id number])  
[address] [place] [address]   
   
                      Chloride [Moles/Vol] 96 mmol/L  Low             [facility]  
   
                                        Comment on above:   Performed By: #### C  
MARY, 02563-8, [facility], 90196-9, 61853-2 ####  
[facility] ([id number])  
[address] [place] [address]   
   
                      CO2 [Moles/Vol] 22 mmol/L  Normal     22-32      [facility]  
   
                                        Comment on above:   Performed By: #### C  
MARY, 01698-0, [facility], 19958-0, 88476-7 ####  
[facility] ([id number])  
[address] [place] [address]   
   
                      Creatinine [Mass/Vol] 5.22 mg/dL High       0.70-1.20  [facility]  
   
                                        Comment on above:   Result Comment: METH  
OD TRACEABLE TO IDMS STANDARD   
   
                                                            Performed By: #### C  
MARY, 16783-6, [facility], , 43276-3 ####  
[facility] ([id number])  
[address] [place] [address]   
   
                                                    GFR/1.73 sq   
M.predicted among   
non-blacks MDRD   
(S/P/Bld) [Vol   
rate/Area]      11 mL/min/{1.73_m2} Low             >59             [facility]  
   
                                        Comment on above:   Result Comment:  
Reported eGFR is based on the  
CKD-EPI  equation that does  
not use a race coefficient.   
   
                                                            Performed By: #### C  
MARY, 24260-2, [facility], , 11442-1 ####  
[facility] ([id number])  
[address] [place] [address]   
   
                      Glucose [Mass/Vol] 207 mg/dL  High       65-99      [facility]  
   
                                        Comment on above:   Performed By: #### C  
MARY, 75053-5, [facility], 89380-5, 48620-5 ####  
[facility] ([id number])  
[address] [place] [address]   
   
                      Potassium [Moles/Vol] 2.9 mmol/L Low        3.5-5.0    [facility]  
   
                                        Comment on above:   Performed By: #### C  
BCA, 70621-3, [facility], 16706-7, 13175-8 ####  
[facility] ([id number])  
[address]  
[place] [address]   
   
                      Protein [Mass/Vol] 6.7 g/dL   Normal     6.0-8.0    [facility]  
   
                                        Comment on above:   Performed By: #### C  
BCA, 11348-0, [facility], 12714-6, 07815-1 ####  
[facility] ([id number])  
[address]  
[place] [address]   
   
                      Sodium [Moles/Vol] 131 mmol/L Low        134-146    [facility]  
   
                                        Comment on above:   Performed By: #### C  
MARY, 04193-9, [facility], 82907-8, 11001-8 ####  
[facility] ([id number])  
[address]  
[place] [address]   
   
                                                    Urea nitrogen   
[Mass/Vol]      69 mg/dL        High            5-27            [facility]  
   
                                        Comment on above:   Performed By: #### C  
BCA, 86066-7, [facility], 43391-4, 27225-7 ####  
[facility] ([id number])  
[address]  
[place] [address]   
   
                                                    Glucose Glucometer (dC) [M  
ass/Vol]on 2024   
   
                      Glucose [Mass/Vol] 255 mg/dL  High       65-99      [facility]  
   
                      Glucose [Mass/Vol] 272 mg/dL  High       65-99      [facility]  
   
                      Glucose [Mass/Vol] 273 mg/dL  High       65-99      [facility]  
   
                      Glucose [Mass/Vol] 169 mg/dL  High       65-99      [facility]  
   
                                                    MAGNESIUMon 2024   
   
                      Magnesium [Mass/Vol] 2.1 mg/dL  Normal     1.8-2.6    [facility]  
   
                                        Comment on above:   Performed By: #### C  
BCA, 23431-8, CMP, 20927-3, 07414-1 ####  
[facility] ([id number])  
[address]  
[place] [address]   
   
                                                    POTASSIUMon 2024   
   
                      Potassium [Moles/Vol] 3.5 mmol/L Normal     3.5-5.0    [facility]  
[facility]   
[facility]  
   
                                        Comment on above:   Performed By: #### C  
BCA, 66208-3, CMP, 32242-7, 80678-1 ####  
[facility] ([id number])  
[address]  
[place] [address]   
   
                                                    BF CELL CT AND DIFFon 2024   
   
                                        BODY FLUID COMMENT  ----------Interpreta  
tion--  
------              Normal                                  [facility]  
   
                                        Comment on above:   Result Comment: Refe  
rence values for this fluid type are  
undefined, as fluid accumulation is  
considered abnormal.   
   
                                                            Performed By: #### B  
FCT ####  
[facility] ([id number])  
[address]  
[place] [address]  
[facility] ([id number])  
[address], [address]  
[place] [address]   
   
                      FLUID CLARITY CLEAR      Normal                [facility]  
   
                                        Comment on above:   Performed By: #### B  
FCT ####  
[facility] ([id number])  
[address]  
[place] [address]  
[facility] ([id number])  
[address], [address]  
[place] [address]   
   
                      FLUID COLOR LIGHT STRAW Normal                [facility]  
   
                                        Comment on above:   Performed By: #### B  
FCT ####  
[facility] ([id number])  
[address]  
[place] [address]  
[facility] ([id number])  
[address]  
[place] [address]   
   
                      FLUID LYMPHOCYTE 10 %       Normal                [facility]  
   
                                        Comment on above:   Performed By: #### B  
FCT ####  
[facility] ([id number])  
[address]  
[place] [address]  
[facility] ([id number])  
[address], [address]  
[place] [address]   
   
                      FLUID MESOTHELIAL 1 %        Normal                [facility]  
   
                                        Comment on above:   Performed By: #### B  
FCT ####  
[facility] ([id number])  
[address] [place] [address]  
[facility] ([id number])  
[address], [address]  
[place] [address]   
   
                      FLUID NEUTROPHILS 52 %       Normal                [facility]  
   
                                        Comment on above:   Performed By: #### B  
FCT ####  
[facility] ([id number])  
[address] [place] [address]  
[facility] ([id number])  
[address], [address]  
[place] [address]   
   
                      FLUID RBC CT 9 /uL      Normal                [facility]  
   
                                        Comment on above:   Performed By: #### B  
FCT ####  
[facility] ([id number])  
[address] [place] [address]  
[facility] ([id number])  
[address], [address]  
[place] [address]   
   
                      FLUID SPECIMEN TYPE PERITONEAL FLUID Normal                 
 [facility]  
   
                                        Comment on above:   Performed By: #### B  
FCT ####  
[facility] ([id number])  
[address] [place] [address]  
[facility] ([id number])  
[address]  
[place] [address]   
   
                      MACROPHAGES 37 %       Normal                [facility]  
   
                                        Comment on above:   Performed By: #### B  
FCT ####  
[facility] ([id number])  
[address] [place] [address]  
[facility] ([id number])  
[address]  
[place] [address]   
   
                      NUCLEATED CELL CT 91 /uL     Normal                [facility]  
   
                                        Comment on above:   Performed By: #### B  
FCT ####  
[facility] ([id number])  
[address] [place] [address]   
[place] [address]  
[facility] ([id number])  
[address]  
[place] [address]   
   
                                                    CBC AND AUTO DIFFon 20  
24   
   
                      ABSOLUTE BASOPHIL 0.1 X10E9/L Normal     0.0-0.2    [facility]  
   
                                        Comment on above:   Performed By: #### C  
BCA, 58628-9, CMP, 30227-5, 05653-9 ####  
[facility] ([id number])  
[address]  
[place] [address]   
   
                      ABSOLUTE NEUTROPHIL 16.7 X10E9/L High       1.5-6.6    [facility]  
   
                                        Comment on above:   Performed By: #### C  
BCA, 59573-8, CMP, 61476-5, 43397-9 ####  
[facility] ([id number])  
[address]  
[place] [address]   
   
                                                    Basophils/100 WBC   
(Bld)           0.3 %           Normal                          [facility]  
   
                                        Comment on above:   Performed By: #### C  
BCA, 14163-0, CMP, 52925-9, 33758-8 ####  
[facility] ([id number])  
[address]  
[place] [address]   
   
                                                    Eosinophils (Bld)   
[#/Vol]         0.3 10*3/uL     Normal          0.0-0.4         [facility]  
   
                                        Comment on above:   Performed By: #### C  
BCA, 35742-4, CMP, 61008-3, 52616-8 ####  
[facility] ([id number])  
[address]  
[place] [address]   
   
                                                    Eosinophils/100 WBC   
(Bld)           1.4 %           Normal                          [facility]  
   
                                        Comment on above:   Performed By: #### C  
BCA, 47031-5, CMP, 20761-5, 79818-8 ####  
[facility] ([id number])  
[address]  
[place] [address]   
   
                                                    Erythrocyte   
distribution width   
(RBC) [Ratio]   15.4 %          High            11.5-15.0       [facility]  
   
                                        Comment on above:   Performed By: #### C  
BCA, 75344-8, CMP, 85074-8, 39544-6 ####  
[facility] ([id number])  
[address]  
[place] [address]   
   
                                                    Hematocrit (Bld)   
[Volume fraction] 26.7 %          Low             39-49           [facility]  
   
                                        Comment on above:   Performed By: #### C  
BCA, 41220-4, CMP, 97812-3, 18277-9 ####  
[facility] ([id number])  
[address]  
[place] [address]   
   
                                                    Hemoglobin (Bld)   
[Mass/Vol]      9.3 g/dL        Low             13.0-17.0       [facility]  
   
                                        Comment on above:   Performed By: #### C  
BCA, 82982-1, CMP, 03899-2, 46601-2 ####  
[facility] ([id number])  
[address]  
[place] [address]   
   
                                                    Lymphocytes (Bld)   
[#/Vol]         1.1 10*3/uL     Normal          1.0-3.5         [facility] System  
   
                                        Comment on above:   Performed By: #### C  
MARY, 54452-1, CMP, 06594-8, 52543-9 ####  
[facility] ([id number])  
[address]  
[place] [address]   
   
                                                    Lymphocytes/100 WBC   
(Bld)           5.7 %           Normal                          [facility]  
   
                                        Comment on above:   Performed By: #### RAPHAEL  
BCA, 81703-6, CMP, 10930-9, 58136-1 ####  
[facility] ([id number])  
[address]  
[place] [address]   
   
                                                    MCH (RBC) [Entitic   
mass]           32.5 pg         Normal          27-34           [facility]  
   
                                        Comment on above:   Performed By: #### C  
BCA, 79920-9, CMP, 82136-4, 02436-0 ####  
[facility] ([id number])  
[address]  
[place] [address]   
   
                      MCHC (RBC) [Mass/Vol] 35.0 g/dL  Normal     32-36      [facility]  
   
                                        Comment on above:   Performed By: #### C  
BCA, 70705-1, CMP, 59283-5, 40647-9 ####  
[facility] ([id number])  
[address]  
[place] [address]   
   
                                                    MCV (RBC) [Entitic   
vol]            93 fL           Normal                    [facility]  
   
                                        Comment on above:   Performed By: #### RAPHAEL  
BCA, 18579-0, CMP, 77421-0, 67906-1 ####  
[facility] ([id number])  
[address]  
[place] [address]   
   
                                                    Monocytes (Bld)   
[#/Vol]         1.1 10*3/uL     High            0-0.9           [facility]  
   
                                        Comment on above:   Performed By: #### C  
BCA, 42581-1, CMP, 38427-1, 59653-6 ####  
[facility] ([id number])  
[address]  
[place] [address]   
   
                                                    Monocytes/100 WBC   
(Bld)           5.9 %           Normal                          [facility]  
   
                                        Comment on above:   Performed By: #### C  
BCA, 21807-5, CMP, 07260-9, 65616-7 ####  
[facility] ([id number])  
[address]  
[place] [address]   
   
                                                    Neutrophils/100 WBC   
(Bld)           86.7 %          Normal                          [facility]  
   
                                        Comment on above:   Performed By: #### C  
BCA, 07892-4, CMP, 16283-6, 34071-9 ####  
[facility] ([id number])  
[address]  
[place] [address]   
   
                                                    Platelet mean volume   
(Bld) [Entitic vol] 8.4 fL          Normal          7-12            [facility]  
   
                                        Comment on above:   Performed By: #### C  
BCA, 39012-6, CMP, 34628-7, 72634-3 ####  
[facility] ([id number])  
[address]  
[place] [address]   
   
                                                    Platelets (Bld)   
[#/Vol]         197 10*3/uL     Normal          150-450         [facility]  
   
                                        Comment on above:   Performed By: #### C  
BCA, 70355-3, CMP, 00785-3, 08241-8 ####  
[facility] ([id number])  
[address]  
[place] [address]   
   
                      RBC COUNT  2.87 X10E12/L Low        4.10-5.70  [facility]  
   
                                        Comment on above:   Performed By: #### C  
BCA, 58939-3, CMP, 44753-0, 64616-3 ####  
[facility] ([id number])  
[address]  
[place] [address]   
   
                      WBC (Bld) [#/Vol] 19.3 10*3/uL High       4.0-11.0   [facility]  
   
                                        Comment on above:   Performed By: #### C  
BCA, 45276-9, CMP, 94974-9, 48126-1 ####  
[facility] ([id number])  
[address]  
[place] [address]   
   
                                                    COMPREHENSIVE METABOLIC PANE  
Julio 2024   
   
                      Albumin [Mass/Vol] 3.3 g/dL   Normal     3.2-5.3    [facility]  
   
                                        Comment on above:   Performed By: #### C  
BCA, 59694-7, CMP, 13626-4, 55168-0 ####  
[facility] ([id number])  
[address]  
[place] [address]   
   
                                                    ALP [Catalytic   
activity/Vol]   192 U/L         High                      [facility]  
   
                                        Comment on above:   Performed By: #### C  
BCA, 26921-4, CMP, 37113-9, 81831-7 ####  
[facility] ([id number])  
[address]  
[place] [address]   
   
                                                    ALT [Catalytic   
activity/Vol]   83 U/L          High            0-40            [facility]  
   
                                        Comment on above:   Performed By: #### C  
BCA, 17733-2, CMP, 75623-1, 92444-4 ####  
[facility] ([id number])  
[address]  
[place] [address]   
   
                      Anion gap [Moles/Vol] 14 mmol/L  Normal     5-15       [facility]  
   
                                        Comment on above:   Performed By: #### C  
BCA, 57463-6, CMP, 81686-5, 75489-6 ####  
[facility] ([id number])  
[address]  
[place] [address]   
   
                                                    AST [Catalytic   
activity/Vol]   43 U/L          High            0-41            [facility]  
   
                                        Comment on above:   Performed By: #### C  
BCA, 82480-0, CMP, 02819-1, 90424-3 ####  
[facility] ([id number])  
[address]  
[place] [address]   
   
                      Bilirubin [Mass/Vol] 1.1 mg/dL  Normal     0.3-1.2    [facility]  
   
                                        Comment on above:   Performed By: #### C  
BCA, 88914-0, CMP, 46192-1, 89084-7 ####  
[facility] ([id number])  
[address]  
[place] [address]   
   
                      Calcium [Mass/Vol] 8.6 mg/dL  Normal     8.5-10.5   [facility]  
   
                                        Comment on above:   Performed By: #### C  
BCA, 34518-9, CMP, 18628-9, 46064-8 ####  
[facility] ([id number])  
[address]  
[place] [address]   
   
                      Chloride [Moles/Vol] 97 mmol/L  Low             [facility]  
   
                                        Comment on above:   Performed By: #### C  
BCA, 43981-6, CMP, 33586-2, 97725-6 ####  
[facility] ([id number])  
[address]  
[place] [address]   
   
                      CO2 [Moles/Vol] 21 mmol/L  Low        22-32      [facility]  
   
                                        Comment on above:   Performed By: #### C  
BCA, 18017-6, CMP, 12855-5, 73088-1 ####  
[facility] ([id number])  
[address]  
[place] [address]   
   
                      Creatinine [Mass/Vol] 5.64 mg/dL High       0.70-1.20  [facility]  
   
                                        Comment on above:   Result Comment: METH  
OD TRACEABLE TO IDMS STANDARD   
   
                                                            Performed By: #### C  
BCA, 49683-3, CMP, 19600-4, 29698-9 ####  
[facility] ([id number])  
[address]  
[place] [address]   
   
                                                    GFR/1.73 sq   
M.predicted among   
non-blacks MDRD   
(S/P/Bld) [Vol   
rate/Area]      10 mL/min/{1.73_m2} Low             >59             [facility]  
   
                                        Comment on above:   Result Comment:  
Reported eGFR is based on the  
CKD-EPI  equation that does  
not use a race coefficient.   
   
                                                            Performed By: #### C  
BCA, 59365-6, CMP, 19814-0, 22873-6 ####  
[facility] ([id number])  
[address]  
[place] [address]   
   
                      Glucose [Mass/Vol] 182 mg/dL  High       65-99      [facility]  
   
                                        Comment on above:   Performed By: #### C  
BCA, 35975-0, CMP, 44908-5, 57574-8 ####  
[facility] ([id number])  
[address]  
[place] [address]   
   
                      Potassium [Moles/Vol] 3.5 mmol/L Normal     3.5-5.0    [facility]  
   
                                        Comment on above:   Performed By: #### C  
BCA, 56035-2, CMP, 88976-9, 30627-0 ####  
[facility] ([id number])  
[address]  
[place] [address]   
   
                      Protein [Mass/Vol] 7.1 g/dL   Normal     6.0-8.0    [facility]  
   
                                        Comment on above:   Performed By: #### C  
BCA, 74275-7, CMP, 42367-5, 78392-8 ####  
[facility] ([id number])  
[address]  
[place] [address]   
   
                      Sodium [Moles/Vol] 132 mmol/L Low        134-146    [facility]  
   
                                        Comment on above:   Performed By: #### C  
BCA, 56666-1, CMP, 14380-2, 32005-1 ####  
[facility] ([id number])  
[address]  
[place] [address]   
   
                                                    Urea nitrogen   
[Mass/Vol]      78 mg/dL        High            5-27            [facility]  
   
                                        Comment on above:   Performed By: #### C  
BCA, 61133-4, CMP, 28723-9, 31995-0 ####  
[facility] ([id number])  
[address]  
[place] [address]   
   
                                                    FLUID CULTUREon 2024   
   
                                                    Bacteria identified   
Aer cx Nom (Body fld)                   GRAM STAIN  
WHITE BLOOD CELLS PRESENT  
NO ORGANISMS SEEN  
ON CONCENTRATED SMEAR  
  
CULTURE RESULTS  
NO GROWTH 5 DAYS    Normal                                  [facility]  
   
                                        Comment on above:   Performed By: #### C  
MARY, 25650-8, KRISTIAN, 95229-5, 05157-9 ####  
[facility] ([id number])  
[address] [facility]   
[place] [address]   
   
                                                    Glucose Glucometer (BldC) [M  
ass/Vol]on 2024   
   
                      Glucose [Mass/Vol] 225 mg/dL  High       65-99      [facility]  
   
                      Glucose [Mass/Vol] 263 mg/dL  High       65-99      [facility]  
[facility]  
   
                      Glucose [Mass/Vol] 317 mg/dL  High       65-99      [facility]  
[facility]  
   
                      Glucose [Mass/Vol] 198 mg/dL  High       65-99      [facility]  
   
                      Glucose [Mass/Vol] 183 mg/dL  High       65-99      [facility]  
   
                                                    Lactate (P dara) [Moles/Vol]o  
n 2024   
   
                      LACTATE W/REFLEX 1.1 mmol/L Normal     0.4-2.0    [facility]  
   
                                        Comment on above:   Result Comment:  
Result did not trigger repeat Lactate,  
re-order if needed.   
   
                                                            Performed By: #### C  
MARY, 49209-1, KRISTIAN, 19723-4, 26357-4 ####  
[facility] ([id number])  
[address]  
[place] [address]   
   
                                                    MAGNESIUMon 2024   
   
                      Magnesium [Mass/Vol] 2.3 mg/dL  Normal     1.8-2.6    [facility]  
   
                                        Comment on above:   Performed By: #### RAPHAEL HERNANDEZ, 84638-8, KRISTIAN, 86103-7, 10454-3 ####  
[facility] ([id number])  
[address]  
[place] [address]   
   
                                                    Procalcitonin IA [Mass/Vol]o  
n 2024   
   
                      PROCALCITONIN 2.68 ng/mL High       <0.05      [facility]  
   
                                        Comment on above:   Result Comment: NOTE  
<0.50 ng/mL - Low risk of severe sepsis and/or septic shock.  
<2.00 ng/mL - Recommend retesting within 6-24 hours.  
>2.00 ng/mL - High risk of sepsis and/or septic shock.   
   
                                                            Performed By: #### RAPHAEL HERNANDEZ, 61714-0, CMP, 15045-4, 52119-7 ####  
[facility] ([id number])  
[address] [place] [address]   
   
                                                    CBC AND AUTO DIFFon 03-15-20  
24   
   
                      ABSOLUTE BASOPHIL 0.1 X10E9/L Normal     0.0-0.2    [facility]  
   
                                        Comment on above:   Performed By: #### C  
BCA, CMP, 3040-3, 55440-9, 1988, 31214-  
5,   
15680-8, 78147-3, 42365-2 ####  
[facility] ([id number])  
[address] [place] [address]   
   
                      ABSOLUTE NEUTROPHIL 18.6 X10E9/L High       1.5-6.6    [facility]  
   
                                        Comment on above:   Performed By: #### C  
BCA, CMP, 3040-3, , 1988, 47076-  
5,   
42573-2, 52350-7, 68716-2 ####  
[facility] ([id number])  
[address] [place] [address]   
   
                                                    Basophils/100 WBC   
(Bld)           0.5 %           Normal                          [facility]  
   
                                        Comment on above:   Performed By: #### C  
BCA, CMP, 3040-3, 51547-2, 1988, 81951-  
5,   
47673-1, 76028-7, 43150-8 ####  
[facility] ([id number])  
[address] [place] [address]   
   
                                                    Eosinophils (Bld)   
[#/Vol]         0.0 10*3/uL     Normal          0.0-0.4         [facility]  
   
                                        Comment on above:   Performed By: #### C  
BCA, CMP, 3040-3, , 1988, 09611-  
5,   
51773-7, 04212-4, 29057-7 ####  
[facility] ([id number])  
[address] [place] [address]   
   
                                                    Eosinophils/100 WBC   
(Bld)           0.2 %           Normal                          [facility]  
   
                                        Comment on above:   Performed By: #### C  
BCA, CMP, 3040-3, 09155-6, 1988, 08040-  
5,   
08094-4, 45267-8, 03495-8 ####  
[facility] ([id number])  
[address] [place] [address]   
   
                                                    Erythrocyte   
distribution width   
(RBC) [Ratio]   14.8 %          Normal          11.5-15.0       [facility]  
   
                                        Comment on above:   Performed By: #### C  
BCA, CMP, 3040-3, 31861-8, 1988, 13151-  
5,   
17494-9, 76386-5, 55918-8 ####  
[facility] ([id number])  
[address] [place] [address]   
   
                                                    Hematocrit (Bld)   
[Volume fraction] 29.4 %          Low             39-49           [facility]  
   
                                        Comment on above:   Performed By: #### C  
BCA, CMP, 3040-3, , 1988, 12412-  
5,   
94706-6, 51094-2, 12880-2 ####  
[facility] ([id number])  
[address] [place] [address]   
   
                                                    Hemoglobin (Bld)   
[Mass/Vol]      10.2 g/dL       Low             13.0-17.0       [facility]  
   
                                        Comment on above:   Performed By: #### C  
BCA, CMP, 3040-3, , 1988, 77480-  
5,   
32315-5, 40826-2, 57826-0 ####  
[facility] ([id number])  
[address] [place] [address]   
   
                                                    Lymphocytes (Bld)   
[#/Vol]         0.7 10*3/uL     Low             1.0-3.5         [facility]  
   
                                        Comment on above:   Performed By: #### C  
BCA, CMP, 3040-3, , 1988, 39325-  
5,   
02167-7, 37762-3, 54633-2 ####  
[facility] ([id number])  
[address] [place] [address]   
   
                                                    Lymphocytes/100 WBC   
(Bld)           3.3 %           Normal                          [facility]  
   
                                        Comment on above:   Performed By: #### C  
BCA, CMP, 3040-3, 05584-9, 1988, 63159-  
5,   
51710-1, 42020-1, 44097-1 ####  
[facility] ([id number])  
[address] [place] [address]   
   
                                                    MCH (RBC) [Entitic   
mass]           31.8 pg         Normal          27-34           [facility]  
   
                                        Comment on above:   Performed By: #### C  
BCA, CMP, 3040-3, , 1988, -  
5,   
97255-4, 19989-8, 90317-4 ####  
[facility] ([id number])  
[address] [place] [address]   
   
                      MCHC (RBC) [Mass/Vol] 34.6 g/dL  Normal     32-36      [facility]  
   
                                        Comment on above:   Performed By: #### C  
BCA, CMP, 3040-3, , 1988, -  
5,   
79562-5, 58475-5, 11304-3 ####  
[facility] ([id number])  
[address] [place] [address]   
   
                                                    MCV (RBC) [Entitic   
vol]            92 fL           Normal                    [facility]  
   
                                        Comment on above:   Performed By: #### C  
BCA, CMP, 3040-3, , 1988, 48183-  
,   
13583-9, 07645-9, 68067-1 ####  
[facility] ([id number])  
[address] [place] [address]   
   
                                                    Monocytes (Bld)   
[#/Vol]         1.3 10*3/uL     High            0-0.9           [facility]  
   
                                        Comment on above:   Performed By: #### C  
BCA, CMP, 3040-3, 79615-6, -, 81279-  
5,   
83873-5, 63515-6, 76994-6 ####  
[facility] ([id number])  
[address] [place] [address]   
   
                                                    Monocytes/100 WBC   
(Bld)           6.1 %           Normal                          [facility]  
   
                                        Comment on above:   Performed By: #### C  
BCA, CMP, 3040-3, 70635-0, 1988, 02376-  
5,   
54810-7, 51247-7, 36967-9 ####  
[facility] ([id number])  
[address] [place] [address]   
   
                                                    Neutrophils/100 WBC   
(Bld)           89.9 %          Normal                          [facility]  
   
                                        Comment on above:   Performed By: #### C  
BCA, CMP, -3, , 1988, 15935-  
5,   
12217-1, 77923-4, 49028-0 ####  
[facility] ([id number])  
[address] [place] [address]   
   
                                                    Platelet mean volume   
(Bld) [Entitic vol] 8.6 fL          Normal          7-12            [facility]  
   
                                        Comment on above:   Performed By: #### C  
BCA, CMP, 3040-3, , 1988, 18653-  
5,   
58434-4, 17104-6, 00901-4 ####  
[facility] ([id number])  
[address] [place] [address]   
   
                                                    Platelets (Bld)   
[#/Vol]         257 10*3/uL     Normal          150-450         [facility]  
   
                                        Comment on above:   Performed By: #### C  
BCA, CMP, 3040-3, 71724-3, 1988, 28976-  
5,   
10497-2, 89318-3, 50637-3 ####  
[facility] ([id number])  
[address] [place]  
[place] [address]   
   
                      RBC COUNT  3.21 X10E12/L Low        4.10-5.70  [facility]  
   
                                        Comment on above:   Performed By: #### C  
BCA, CMP, -3, , 1988, -  
5,   
46320-8, 52264-9, 54999-2 ####  
[facility] ([id number])  
[address] [place] [address]   
   
                      WBC (Bld) [#/Vol] 20.7 10*3/uL High       4.0-11.0   [facility]  
   
                                        Comment on above:   Performed By: #### C  
BCA, CMP, 3040-3, , 1988, ,   
14841-3, 86453-0, 55772-5 ####  
[facility] ([id number])  
[address] [place] [address]   
   
                                                    COMPREHENSIVE METABOLIC PANE  
Julio 03-   
   
                      Albumin [Mass/Vol] 4.0 g/dL   Normal     3.2-5.3    [facility]  
   
                                        Comment on above:   Performed By: #### C  
BCA, CMP, 3, , 1988, -  
5,   
50927-0, 45680-6, 96773-4 ####  
[facility] ([id number])  
[address] [place] [address]   
   
                                                    ALP [Catalytic   
activity/Vol]   233 U/L         High                      [facility]  
   
                                        Comment on above:   Performed By: #### C  
BCA, CMP, 0-3, , 1988, -  
5,   
89155-0, 67501-5, 83067-9 ####  
[facility] ([id number])  
[address] [place] [address]   
   
                                                    ALT [Catalytic   
activity/Vol]   121 U/L         High            0-40            [facility]  
   
                                        Comment on above:   Performed By: #### C  
BCA, CMP, -3, , 1988, 20590-  
5,   
10247-3, 70356-0, 03509-0 ####  
[facility] ([id number])  
[address] [place] [address]   
   
                      Anion gap [Moles/Vol] 13 mmol/L  Normal     5-15       [facility]  
   
                                        Comment on above:   Performed By: #### C  
BCA, CMP, 3040-3, , 1988, 22828-  
5,   
32229-5, 75186-4, 44164-0 ####  
[facility] ([id number])  
[address] [place] [address]   
   
                                                    AST [Catalytic   
activity/Vol]   79 U/L          High            0-41            [facility]  
   
                                        Comment on above:   Performed By: #### C  
BCA, CMP, -3, , 1988, 34259-  
5,   
95818-5, 94173-2, 47561-7 ####  
[facility] ([id number])  
[address] [place] [address]   
   
                      Bilirubin [Mass/Vol] 1.0 mg/dL  Normal     0.3-1.2    [facility]  
   
                                        Comment on above:   Performed By: #### C  
BCA, CMP, 3040-3, , 1988, 30102-  
5,   
04005-1, 42464-9, 92067-5 ####  
[facility] ([id number])  
[address] [place] [address]   
   
                      Calcium [Mass/Vol] 9.0 mg/dL  Normal     8.5-10.5   [facility]  
   
                                        Comment on above:   Performed By: #### C  
BCA, CMP, 3040-3, , 1988, 78735-  
,   
71236-3, 35635-1, 61001-6 ####  
[facility] ([id number])  
[address] [place] [address]   
   
                      Chloride [Moles/Vol] 96 mmol/L  Low             [facility]  
   
                                        Comment on above:   Performed By: #### C  
BCA, CMP, 3040-3, 38144-9, 1988, 41256-  
5,   
00789-4, 35569-1, 91744-3 ####  
[facility] ([id number])  
[address] [place] [address]   
   
                      CO2 [Moles/Vol] 20 mmol/L  Low        22-32      [facility]  
   
                                        Comment on above:   Performed By: #### C  
BCA, CMP, 3040-3, , 1988, 30464-  
5,   
33310-4, 17534-3, 43388-4 ####  
[facility] ([id number])  
[address] [place] [address]   
   
                      Creatinine [Mass/Vol] 5.58 mg/dL High       0.70-1.20  [facility]  
   
                                        Comment on above:   Result Comment: METH  
OD TRACEABLE TO IDMS STANDARD   
   
                                                            Performed By: #### C  
BCA, CMP, 3040-3, , 1988, 10823-8,   
66427-1, 12342-6, 24643-6 ####  
[facility] ([id number])  
[address] [place] [address]   
   
                                                    GFR/1.73 sq   
M.predicted among   
non-blacks MDRD   
(S/P/Bld) [Vol   
rate/Area]      10 mL/min/{1.73_m2} Low             >59             [facility]  
   
                                        Comment on above:   Result Comment:  
Reported eGFR is based on the  
CKD-EPI  equation that does  
not use a race coefficient.   
   
                                                            Performed By: #### C  
BCA, CMP, 3040-3, , 1988, 04933-4,   
22363-4, 69112-6, 95074-1 ####  
[facility] ([id number])  
[address] [place] [address]   
   
                      Glucose [Mass/Vol] 265 mg/dL  High       65-99      [facility]  
   
                                        Comment on above:   Performed By: #### C  
BCA, CMP, 3040-3, , 1988, 97686-  
5,   
32769-2, 21284-2, 45439-1 ####  
[facility] ([id number])  
[address] [place] [address]   
   
                      Potassium [Moles/Vol] 3.7 mmol/L Normal     3.5-5.0    [facility]  
[facility]  
   
                                        Comment on above:   Performed By: #### C  
BCA, CMP, 3040-3, , 1988, 77386-  
5,   
61099-0, 42692-1, 75986-4 ####  
[facility] ([id number])  
[address] [place] [address] [place] [address]   
   
                      Protein [Mass/Vol] 8.1 g/dL   High       6.0-8.0    [facility]  
   
                                        Comment on above:   Performed By: #### C  
BCA, CMP, -3, , 1988, 67980-  
5,   
16500-6, 83844-2, 84239-7 ####  
[facility] ([id number])  
[address] [place] [address]   
   
                      Sodium [Moles/Vol] 129 mmol/L Low        134-146    [facility]  
   
                                        Comment on above:   Performed By: #### C  
BCA, CMP, 3040-3, , 1988, 00896-  
5,   
46083-6, 59906-1, 62205-4 ####  
[facility] ([id number])  
[address] [place] [address]   
   
                                                    Urea nitrogen   
[Mass/Vol]      75 mg/dL        High            5-27            [facility]  
   
                                        Comment on above:   Performed By: #### C  
BCA, CMP, 3040-3, , 1988, 59043-  
5,   
11665-9, 28635-4, 45791-6 ####  
[facility] ([id number])  
[address] [place] [address]  
[place] [address]   
   
                                                    CRP [Mass/Vol]on 03-   
   
                      C REACTIVE PROTEIN 6.4 mg/dL  High       0.000-0.744 [facility]  
   
                                        Comment on above:   Performed By: #### RAPHAEL HERNANDEZ, 2857-1, CMP, 14581-6 ####  
[facility] ([id number])  
[address], [address]  
[place] [address]   
   
                                                    Fibrin D-dimer DDU (PPP) [Ma  
ss/Vol]on 03-   
   
                      D DIMER    252 ng/mL DDU Normal     <255       [facility]  
   
                                        Comment on above:   Result Comment:  
Results <255 ng/mL DDU: The presence of a  
VTE can safely be excluded with a negative  
D-Dimer result and Wells score. A negative  
result doesn't exclude the possibility of DIC.  
The test be repeated along with other  
diagnostic tests if the patient's symptoms  
persist or worsen.  
[email or web address]  
[email or web address]   
   
                                                            Performed By: #### C  
MARY, 2857-1, CMP, 78932-5 ####  
[facility] ([id number])  
[address], [address] [place] [address]   
   
                                                    Glucose Glucometer (BldC) [M  
ass/Vol]on 03-   
   
                      Glucose [Mass/Vol] 280 mg/dL  High       65-99      [facility]  
   
                                                    LIPASEon 03-   
   
                                                    Lipase [Catalytic   
activity/Vol]   66 U/L          High            17-40           [facility]  
   
                                        Comment on above:   Performed By: #### C  
MARY, 2857-1, CMP, 38944-5 ####  
[facility] ([id number])  
[address], [address]  
[place] [address]   
   
                                                    Lactate (P dara) [Moles/Vol]o  
n 03-   
   
                      Lactate [Moles/Vol] 2.4 mmol/L High       0.4-2.0    [facility]  
   
                                        Comment on above:   Performed By: #### RAPHAEL HERNANDEZ, 2857-1, KRISTIAN, 89320-8 ####  
[facility] ([id number])  
[address], [address]  
[place] [address]   
   
                      LACTATE W/REFLEX 2.7 mmol/L High       0.4-2.0    [facility]  
   
                                        Comment on above:   Performed By: #### C  
MARY, 2857-1, KRISTIAN, 16823-5 ####  
[facility] ([id number])  
[address], [address]  
[place] [address]   
   
                                                    MAGNESIUMon 03-   
   
                      Magnesium [Mass/Vol] 2.1 mg/dL  Normal     1.8-2.6    [facility]  
   
                                        Comment on above:   Performed By: #### C  
MARY, 2857-1, KRISTIAN, 33040-4 ####  
[facility] ([id number])  
[address], [address]  
[place] [address]   
   
                                                    Natriuretic peptide B [Mass/  
Vol]on 03-   
   
                                                    Natriuretic peptide B   
(Bld) [Mass/Vol] 116 pg/mL       High            <100.0          [facility]  
   
                                        Comment on above:   Performed By: #### C  
MARY, 285-1, KRISTIAN, 68602-6 ####  
[facility] ([id number])  
[address], [address]  
[place] [address]   
   
                                                    Procalcitonin IA [Mass/Vol]o  
n 03-   
   
                      PROCALCITONIN 1.33 ng/mL High       <0.05      [facility]  
   
                                        Comment on above:   Result Comment: NOTE  
<0.50 ng/mL - Low risk of severe sepsis and/or septic shock.  
<2.00 ng/mL - Recommend retesting within 6-24 hours.  
>2.00 ng/mL - High risk of sepsis and/or septic shock.   
   
                                                            Performed By: #### C  
MARY, 2857-1, KRISTIAN, 39639-6 ####  
[facility] ([id number])  
[address], [address]  
[place] [address]   
   
                                                    SARS/FLU A+B/RSV by NAAT/Mol  
ecularon 03-   
   
                                                    SARS/FLU A+B/RSV by   
NAAT/Molecular                          FLU A PCR  
Negative (qualifier value)  
FLU B PCR  
Negative (qualifier value)  
RSV by PCR  
Negative (qualifier value)  
SARS CoV 2  
Not detected (qualifier   
value)  
NOTE  
The Xpert Xpress   
SARS-CoV-2/Flu/RSV Plus   
test is a rapid,   
multiplexed  
real-time RT-PCR test   
intended for the   
simultaneous qualitative  
detection and   
differentiation of   
SARS-CoV-2, influenza A,   
influenza B  
and respiratory syncytial   
virus (RSV) viral RNA from   
individuals  
suspected of respiratory   
viral infection consistent   
with COVID-19 by  
their healthcare provider.   
This test has not been   
validated in  
asymptomatic patients. The   
Xpert Xpress SARS-CoV-2   
test is intended  
for use by qualified and   
trained operators who are   
performing tests  
using either [employer]   
or [employer]   
systems and is  
limited to laboratories   
that meet the CLIA   
requirements to perform  
high and moderate   
complexity tests.  
The Xpert Xpress   
SARS-CoV-2/Flu/RSV Plus is   
only for use under the  
Food and Drug   
Administration's Emergency   
Use Authorization.  
Results are for the   
simultaneous detection and   
differentiation of  
SARS-CoV-2, influenza A,   
influenza B and RSV   
nucleic acids in  
clinical specimens.   
SARS-CoV-2, influenza A,   
influenza B and RSV RNA  
identified by this test   
are generally detectable   
in upper respiratory  
samples during the acute   
phase of infection.   
Positive results are  
indicative of the presence   
of the identified virus,   
but do not rule  
out bacterial infection or   
co-infection with other   
pathogens not  
detected by this test.  
Clinical correlation with   
patient history and other   
diagnostic  
information is necessary   
to determine patient   
infection status. The  
agent detected may not be   
the definite cause of   
disease.  
Negative results do not   
preclude SARS-CoV-2,   
influenza A, influenza B  
and RSV infection and   
should not be used as the   
sole basis for  
treatment or other patient   
management decisions.   
Negative results  
must be combined with   
clinical observations,   
patient history and  
epidemiological   
information. An Invalid   
result may occur with  
specimen-associated   
inhibition unable to be   
resolved with specimen  
repeat.  
Fact Sheet for Healthcare   
Providers:  
https://www.fda.gov/media/  
663609/download  
Fact Sheet for Patients:  
https://www.fda.gov/media/  
532393/download     Normal                                  [facility]  
   
                                        Comment on above:   Performed By: #### C  
[facility], 2857-1, [facility], 42252-2 ####  
[facility] ([id number])  
[address], [address]  
[place] [address]   
   
                                                    TROPONIN Ion 03-   
   
                                                    Troponin I.cardiac   
[Mass/Vol]      0.03 ng/mL      Normal          0.00-0.04       [facility]  
   
                                        Comment on above:   Performed By: #### C  
BCA, 2857-1, CMP, 88885-3 ####  
[facility] ([id number])  
[address], [address]  
[place] [address]   
   
                                                    Troponin I.cardiac   
[Mass/Vol]      0.05 ng/mL      High            0.00-0.04       [facility]  
   
                                        Comment on above:   Result Comment:  
Concentrations greater than or equal  
to 0.05 ng/ml are considered elevated.  
Elevations of Troponin may be due to causes  
other than myocardial ischemia. Recommend  
serial Troponin testing be performed.  
---------------------------------------------   
   
                                                            Performed By: #### C  
BCA, 2857-1, CMP, 18715-0 ####  
[facility] ([id number])  
[address], [address]  
[place] [address]   
   
                                                    URN MACROSCOPIC NURon 03-15-  
2024   
   
                      BILIRUBIN SARAH Negative   Normal     NEG        [facility]  
   
                                        Comment on above:   Performed By: #### C  
BCA, 2857-1, CMP, 22998-0 ####  
[facility] ([id number])  
[address], [address]  
[place] [address]   
   
                      BLOOD/HGB SARAH MODERATE   Abnormal   NEG        [facility]  
   
                                        Comment on above:   Performed By: #### C  
BCA, 2857-1, CMP, 79400-2 ####  
[facility] ([id number])  
[address], [address]  
[place] [address]   
   
                      GLUCOSE SARAH 500 mg/dL  Abnormal   NEG        [facility]  
   
                                        Comment on above:   Performed By: #### C  
BCA, 2857-1, CMP, 60363-3 ####  
[facility] ([id number])  
[address], [address]  
[place] [address]   
   
                      KETONES SARAH Negative   Normal     NEG        [facility]  
   
                                        Comment on above:   Performed By: #### C  
BCA, 2857-1, CMP, 30680-3 ####  
[facility] ([id number])  
[address], [address]  
[place] [address]   
   
                      LEUKOCYTE ESTERASE SARAH Small      Abnormal   NEG        Pr  
[facility]  
   
                                        Comment on above:   Performed By: #### C  
BCA, 2857-1, CMP, 55411-7 ####  
[facility] ([id number])  
[address], [address]  
[place] [address]   
   
                      NITRITE SARAH Negative   Normal     NEG        [facility]  
   
                                        Comment on above:   Performed By: #### C  
BCA, 2857-1, CMP, 21732-2 ####  
[facility] ([id number])  
[address], [address]  
[place] [address]   
   
                      PH SARAH     5.5        Normal     5.0-8.5    [facility]  
   
                                        Comment on above:   Performed By: #### C  
BCA, 2857-1, CMP, 51884-3 ####  
[facility] ([id number])  
[address], [address]  
[place] [address]   
   
                      PROTEIN SARAH >=300      Abnormal   NEG        [facility]  
   
                                        Comment on above:   Performed By: #### C  
BCA, 2857-1, CMP, 50124-2 ####  
[facility] ([id number])  
[address], [address]  
[place] [address]   
   
                      SPECIFIC GRAVITY SARAH 1.020      Normal     1.003-1.035 [facility]  
   
                                        Comment on above:   Performed By: #### C  
BCA, 2857-1, CMP, 94350-5 ####  
[facility] ([id number])  
[address], [address]  
[place] [address]   
   
                      UROBILINOGEN SARAH 0.2 eu/dL  Normal     <1.1       [facility]  
   
                                        Comment on above:   Performed By: #### C  
BCA, 2857-1, CMP, 44306-3 ####  
[facility] ([id number])  
[address], [address]  
[place] [address]   
   
                                                    GGTon 2024   
   
                                                    Gamma glutamyl   
transferase [Catalytic   
activity/Vol]   367 U/L         High            9-64            [facility]  
   
                                        Comment on above:   Performed By: #### 2  
324-2, LIVR ####  
[facility] ([id number])  
[address], [address]  
[place] [address]   
   
                                                    LIVER PANELon 2024   
   
                      Albumin [Mass/Vol] 4.1 g/dL   Normal     3.2-5.3    [facility]  
   
                                        Comment on above:   Performed By: #### 2  
324-2, LIVR ####  
[facility] ([id number])  
[address], [address]  
[place] [address]   
   
                                                    ALP [Catalytic   
activity/Vol]   238 U/L         High                      [facility]  
   
                                        Comment on above:   Performed By: #### 2  
324-2, LIVR ####  
[facility] ([id number])  
[address], [address]  
[place] [address]   
   
                                                    ALT [Catalytic   
activity/Vol]   182 U/L         High            0-40            [facility]  
   
                                        Comment on above:   Performed By: #### 2  
324-2, LIVR ####  
[facility] ([id number])  
[address], [address]  
[place] [address]   
   
                                                    AST [Catalytic   
activity/Vol]   106 U/L         High            0-41            [facility]  
   
                                        Comment on above:   Performed By: #### 2  
324-2, LIVR ####  
[facility] ([id number])  
[address], [address]  
[place] [address]   
   
                      Bilirubin [Mass/Vol] 0.4 mg/dL  Normal     0.3-1.2    [facility]  
   
                                        Comment on above:   Performed By: #### 2  
324-2, LIVR ####  
[facility] [facility] ([id number])  
[address], [address]  
[place] [address]   
   
                                                    Bilirubin.direct   
[Mass/Vol]      0.1 mg/dL       Normal          0.0-0.4         [facility]  
   
                                        Comment on above:   Performed By: #### 2  
324-2, LIVR ####  
[facility] ([id number])  
[address], [address]  
[place] [address]   
   
                      Protein [Mass/Vol] 7.6 g/dL   Normal     6.0-8.0    [facility]  
   
                                        Comment on above:   Performed By: #### 2  
324-2, LIVR ####  
[facility] ([id number])  
[address], [address]  
[place] [address]   
   
                                                    CBC AND AUTO DIFFon 20  
24   
   
                      ABSOLUTE BASOPHIL 0.1 X10E9/L Normal     0.0-0.2    [facility]  
   
                                        Comment on above:   Performed By: #### C  
BCA, 2857-1, CMP, 00538-8 ####  
[facility] ([id number])  
[address], [address]  
[place] [address]   
   
                      ABSOLUTE NEUTROPHIL 6.5 X10E9/L Normal     1.5-6.6    [facility]  
   
                                        Comment on above:   Performed By: #### C  
BCA, 2857-1, CMP, 68170-4 ####  
[facility] ([id number])  
[address], [address]  
[place] [address]   
   
                                                    Basophils/100 WBC   
(Bld)           0.9 %           Normal                          [facility]  
   
                                        Comment on above:   Performed By: #### C  
BCA, 2857-1, CMP, 19956-8 ####  
[facility] ([id number])  
[address], [address]  
[place] [address]   
   
                                                    Eosinophils (Bld)   
[#/Vol]         1.0 10*3/uL     High            0.0-0.4         [facility]  
   
                                        Comment on above:   Performed By: #### C  
BCA, 2857-1, CMP, 60162-0 ####  
[facility] ([id number])  
[address], [address]  
[place] [address]   
   
                                                    Eosinophils/100 WBC   
(Bld)           9.9 %           Normal                          [facility]  
   
                                        Comment on above:   Performed By: #### RAPHAEL  
BCA, 2857-1, CMP, 89674-4 ####  
[facility] ([id number])  
[address], [address]  
[place] [address]   
   
                                                    Erythrocyte   
distribution width   
(RBC) [Ratio]   13.9 %          Normal          11.5-15.0       [facility]  
   
                                        Comment on above:   Performed By: #### RAPHAEL HERNANDEZ, 2857-1, CMP, 31587-0 ####  
[facility] ([id number])  
[address], [address]  
[place] [address]   
   
                                                    Hematocrit (Bld)   
[Volume fraction] 36.0 %          Low             39-49           [facility]  
   
                                        Comment on above:   Performed By: #### RAPHAEL  
BCA, 2857-1, CMP, 27632-9 ####  
[facility] ([id number])  
[address], [address]  
[place] [address]   
   
                                                    Hemoglobin (Bld)   
[Mass/Vol]      12.4 g/dL       Low             13.0-17.0       [facility]  
   
                                        Comment on above:   Performed By: #### RAPHAEL  
BCA, 2857-1, CMP, 95788-5 ####  
[facility] ([id number])  
[address], [address]  
[place] [address]   
   
                                                    Lymphocytes (Bld)   
[#/Vol]         1.5 10*3/uL     Normal          1.0-3.5         [facility]  
   
                                        Comment on above:   Performed By: #### RAPHAEL  
BCA, 2857-1, CMP, 18351-2 ####  
[facility] ([id number])  
[address], [address]  
[place] [address]   
   
                                                    Lymphocytes/100 WBC   
(Bld)           16.1 %          Normal                          [facility]  
   
                                        Comment on above:   Performed By: #### RAPHAEL  
BCA, 2857-1, CMP, 69397-4 ####  
[facility] ([id number])  
[address], [address]  
[place] [address]   
   
                                                    MCH (RBC) [Entitic   
mass]           31.0 pg         Normal          27-34           [facility]  
   
                                        Comment on above:   Performed By: #### RAPHAEL  
BCA, 2857-1, CMP, 68630-9 ####  
[facility] ([id number])  
[address], [address]  
[place] [address]   
   
                      MCHC (RBC) [Mass/Vol] 34.4 g/dL  Normal     32-36      [facility]  
   
                                        Comment on above:   Performed By: #### C  
BCA, 2857-1, CMP, 68623-2 ####  
[facility] ([id number])  
[address], [address]  
[place] [address]   
   
                                                    MCV (RBC) [Entitic   
vol]            90 fL           Normal                    [facility]  
   
                                        Comment on above:   Performed By: #### RAPHAEL  
BCA, 2857-1, CMP, 89480-2 ####  
[facility] ([id number])  
[address], [address]  
[place] [address]   
   
                                                    Monocytes (Bld)   
[#/Vol]         0.6 10*3/uL     Normal          0-0.9           [facility]  
   
                                        Comment on above:   Performed By: #### RAPHAEL  
BCA, 2857-1, CMP, 46845-9 ####  
[facility] ([id number])  
[address], [address]  
[place] [address]   
   
                                                    Monocytes/100 WBC   
(Bld)           6.0 %           Normal                          [facility]  
   
                                        Comment on above:   Performed By: #### RAPHAEL  
BCA, 2857-1, CMP, 91848-8 ####  
[facility] ([id number])  
[address], [address]  
[place] [address]   
   
                                                    Neutrophils/100 WBC   
(Bld)           67.1 %          Normal                          [facility]  
   
                                        Comment on above:   Performed By: #### C  
BCA, 2857-1, CMP, 38681-0 ####  
[facility] ([id number])  
[address]  
[place] [address]   
   
                                                    Platelet mean volume   
(Bld) [Entitic vol] 9.0 fL          Normal          7-12            [facility]  
   
                                        Comment on above:   Performed By: #### RAPHAEL  
BCA, 2857-1, CMP, 91791-4 ####  
[facility] ([id number])  
[address]  
[place] [address]   
   
                                                    Platelets (Bld)   
[#/Vol]         216 10*3/uL     Normal          150-450         [facility]  
   
                                        Comment on above:   Performed By: #### RAPHAEL  
BCA, 2857-1, CMP, 90286-3 ####  
[facility] ([id number])  
[address]  
[place] [address]   
   
                      RBC COUNT  3.99 X10E12/L Low        4.10-5.70  [facility]  
   
                                        Comment on above:   Performed By: #### C  
BCA, 2857-1, CMP, 73044-0 ####  
[facility] ([id number])  
[address]  
[place] [address]   
   
                      WBC (Bld) [#/Vol] 9.6 10*3/uL Normal     4.0-11.0   [facility]  
   
                                        Comment on above:   Performed By: #### C  
BCA, 2857-1, CMP, 56133-0 ####  
[facility] ([id number])  
[address], [address]  
[place] [address]   
   
                                                    COMPREHENSIVE METABOLIC PANE  
Julio 2024   
   
                      Albumin [Mass/Vol] 4.1 g/dL   Normal     3.2-5.3    [facility]  
   
                                        Comment on above:   Performed By: #### RAPHAEL  
BCA, 2857-1, CMP, 08367-6 ####  
[facility] ([id number])  
[address]  
[place] [address]   
   
                                                    ALP [Catalytic   
activity/Vol]   208 U/L         High                      [facility]  
   
                                        Comment on above:   Performed By: #### C  
BCA, 2857-1, CMP, 72874-3 ####  
[facility] ([id number])  
[address], [address]  
[place] [address]   
   
                                                    ALT [Catalytic   
activity/Vol]   65 U/L          High            0-40            [facility]  
   
                                        Comment on above:   Performed By: #### C  
BCA, 2857-1, CMP, 74180-3 ####  
[facility] ([id number])  
[address], [address]  
[place] [address]   
   
                      Anion gap [Moles/Vol] 16 mmol/L  High       5-15       [facility]  
   
                                        Comment on above:   Performed By: #### C  
BCA, 2857-1, CMP, 52151-6 ####  
[facility] ([id number])  
[address], [address]  
[place] [address]   
   
                                                    AST [Catalytic   
activity/Vol]   36 U/L          Normal          0-41            [facility]  
   
                                        Comment on above:   Performed By: #### C  
BCA, 2857-1, CMP, 41359-0 ####  
[facility] ([id number])  
[address], [address]  
[place] [address]   
   
                      Bilirubin [Mass/Vol] 0.5 mg/dL  Normal     0.3-1.2    [facility]  
   
                                        Comment on above:   Performed By: #### C  
BCA, 2857-1, CMP, 10997-9 ####  
[facility] ([id number])  
[address], [address]  
[place] [address]   
   
                      Calcium [Mass/Vol] 9.1 mg/dL  Normal     8.5-10.5   [facility]  
   
                                        Comment on above:   Performed By: #### C  
BCA, 2857-1, CMP, 33454-7 ####  
[facility] ([id number])  
[address], [address]  
[place] [address]   
   
                      Chloride [Moles/Vol] 102 mmol/L Normal          [facility]  
   
                                        Comment on above:   Performed By: #### C  
MARY, 2857-1, KRISTIAN, 01929-3 ####  
[facility] ([id number])  
[address], [address]  
[place] [address]   
   
                      CO2 [Moles/Vol] 23 mmol/L  Normal     22-32      [facility]  
   
                                        Comment on above:   Performed By: #### C  
MARY, 2857-1, KRISTIAN, 78115-5 ####  
[facility] ([id number])  
[address], [address]  
[place] [address]   
   
                      Creatinine [Mass/Vol] 4.47 mg/dL High       0.60-1.30  [facility]  
   
                                        Comment on above:   Result Comment: METH  
OD TRACEABLE TO IDMS STANDARD   
   
                                                            Performed By: #### RAPHAEL HERNANDEZ, 2857-1, KRISTIAN, 86020-9 ####  
[facility] ([id number])  
[address], [address]  
[place] [address]   
   
                                                    GFR/1.73 sq   
M.predicted among   
non-blacks MDRD   
(S/P/Bld) [Vol   
rate/Area]      13 mL/min/{1.73_m2} Low             >59             [facility]  
   
                                        Comment on above:   Result Comment:  
Reported eGFR is based on the  
CKD-EPI  equation that does  
not use a race coefficient.   
   
                                                            Performed By: #### C  
MARY, 2857-1, KRISTIAN, 76037-7 ####  
[facility] ([id number])  
[address], [address]  
[place] [address]   
   
                      Glucose [Mass/Vol] 140 mg/dL  High       65-99      [facility]  
   
                                        Comment on above:   Performed By: #### RAPHAEL HERNANDEZ, 2857-1, KRISTIAN, 38046-8 ####  
[facility] ([id number])  
[address], [address]  
[place] [address]   
   
                      Potassium [Moles/Vol] 4.1 mmol/L Normal     3.5-5.0    [facility]  
   
                                        Comment on above:   Performed By: #### C  
BCA, 2857-1, CMP, 58839-9 ####  
[facility] ([id number])  
[address], [address]  
[place] [address]   
   
                      Protein [Mass/Vol] 7.1 g/dL   Normal     6.0-8.0    [facility]  
   
                                        Comment on above:   Performed By: #### RAPHAEL  
BCA, 2857-1, CMP, 91959-2 ####  
[facility] ([id number])  
[address], [address]  
[place] [address]   
   
                      Sodium [Moles/Vol] 141 mmol/L Normal     134-146    [facility]  
   
                                        Comment on above:   Performed By: #### RAPHAEL  
BCA, 2857-1, CMP, 37552-9 ####  
[facility] ([id number])  
[address], [address]  
[place] [address]   
   
                                                    Urea nitrogen   
[Mass/Vol]      45 mg/dL        High            5-27            [facility]  
   
                                        Comment on above:   Performed By: #### RAPHAEL  
BCA, 2857-1, CMP, 97335-6 ####  
[facility] ([id number])  
[address], [address]  
[place] [address]   
   
                                                    HGB A1C (GLYCO-HGB)on 2024   
   
                      Glucose [Mass/Vol] 197 mg/dL  Normal                [facility]  
   
                                        Comment on above:   Performed By: #### RAPHAEL  
BCA, 2857-1, CMP, 94455-0 ####  
[facility] ([id number])  
[address], [address]  
[place] [address]   
   
                                                    HbA1c (Bld) [Mass   
fraction]       8.5 %           High            4.4-5.6         [facility]  
   
                                        Comment on above:   Result Comment: NOTE  
ADA Guidelines  
Result HgbA1c  
------------ ---------------  
Normal : less than 5.7 %  
Prediabetes : 5.7 % to 6.4 %  
Diabetes : > 6.4 %  
Use with caution in patients with abnormal hemoglobin variants   
as  
the half-life of red blood cells and in vivo glycation rates   
are  
affected.   
   
                                                            Performed By: #### C  
MARY, 2857-1, KRISTIAN, 63402-8 ####  
[facility] ([id number])  
[address]  
[place] [address]   
   
                                                    Lipid 1996 panelon   
4   
   
                      Cholesterol [Mass/Vol] 147 mg/dL  Low        150-200    Pr  
[facility]  
   
                                        Comment on above:   Performed By: #### C  
MARY, 2857-1, CMP, 30554-3 ####  
[facility] ([id number])  
[address], [address]  
[place] [address]   
   
                                                    Cholesterol in HDL   
[Mass/Vol]      43 mg/dL        Normal          >39             [facility]  
   
                                        Comment on above:   Result Comment:  
HDL <40 mg/dL - High Risk  
HDL > or = 40mg/dL- Desirable  
HDL >60 mg/dL - Negative Risk  
---------------------------------------------   
   
                                                            Performed By: #### RAPHAEL HERNANDEZ, 2857-1, CMP, 05985-6 ####  
[facility] ([id number])  
[address]  
[place] [address]   
   
                                                    Cholesterol in LDL   
[Mass/Vol]      48 mg/dL        Normal          <130            [facility]  
   
                                        Comment on above:   Result Comment:  
LDL <100 mg/dL - Desirable  
LDL >160 mg/dL - High Risk  
---------------------------------------------   
   
                                                            Performed By: #### RAPHAEL HERNANDEZ, 2857-1, CMP, 20947-1 ####  
[facility] ([id number])  
[address], [address]  
[place] [address]   
   
                                                    Cholesterol in VLDL   
[Mass/Vol]      56 mg/dL        High            0-30            [facility]  
   
                                        Comment on above:   Performed By: #### C  
BCA, 2857-1, CMP, 17015-7 ####  
[facility] ([id number])  
[address] [address]   
   
                      CHOLESTEROL:HDL 3.4        Normal     1.0-5.0    [facility]  
   
                                        Comment on above:   Performed By: #### C  
BCA, 2857-1, CMP, 39000-8 ####  
[facility] ([id number])  
[address], [address] [address]   
   
                                                    Triglyceride   
[Mass/Vol]      278 mg/dL       High                      [facility]  
   
                                        Comment on above:   Performed By: #### C  
BCA, 2857-1, CMP, 60566-1 ####  
[facility] ([id number])  
[address], [address] [address]   
   
                                                    Prostate specific Ag [Mass/V  
ol]on 2024   
   
                      PSA SCREEN 0.98 ng/mL Normal     0.00-4.00  [facility]  
   
                                        Comment on above:   Result Comment:  
The method used for this test is Liliane  
Nataly DXI chemiluminescent immunoassay.  
Values obtained by different assay methods  
cannot be used interchangeably.   
   
                                                            Performed By: #### C  
BCA, 2857-1, CMP, 08084-1 ####  
[facility] ([id number])  
[address] [address]   
   
                                                    Basophils Auto (Bld) [#/Vol]  
Ordered By: Cristian Raymond on 2023   
   
                                                    Basophils (Bld)   
[#/Vol]         0.1 10*3/uL                     0.0-0.2         [facility]  
   
                                                    Basophils/100 WBC Auto (Bld)  
Ordered By: Cristian Raymond on 2023   
   
                                                    Basophils/100 WBC   
(Bld)           0.8 %                           .               [facility]  
   
                                                    Calcium [Mass/volume] in Ser  
um or PlasmaOrdered By: ROXANNA CARR on 2023   
   
                      Calcium [Mass/Vol] 8.9 mg/dL             8.6-10.3   [facility]  
   
                                                    Carbon dioxide, total [Moles  
/volume] in Serum or PlasmaOrdered By: ROXANNA CARR  
 on   
2023   
   
                      CO2 [Moles/Vol] 21.0 mmol/L            21.0-31.0  [facility]  
   
                                                    Chloride [Moles/volume] in S  
israel or PlasmaOrdered By: ROXANNA CARR on   
2023   
   
                      Chloride [Moles/Vol] 107 mmol/L                 [facility]  
   
                                                    Creatinine [Mass/volume] in   
Serum or PlasmaOrdered By: ROXANNA CARR on   
2023   
   
                      Creatinine [Mass/Vol] 4.71 mg/dL            0.70-1.30  [facility]  
   
                                                    Eosinophils Auto (Bld) [#/Vo  
l]Ordered By: Cristian Raymond on 2023   
   
                                                    Eosinophils (Bld)   
[#/Vol]         1.2 10*3/uL                     0.0-0.45        [facility]  
   
                                                    Eosinophils/100 WBC Auto (Bl  
d)Ordered By: Cristian Raymond on 2023   
   
                                                    Eosinophils/100 WBC   
(Bld)           12.5 %                          .               [facility]  
   
                                                    Erythrocyte distribution wid  
th Auto (RBC) [Ratio]Ordered By: Cristian Raymond on   
2023   
   
                                                    Erythrocyte   
distribution width   
(RBC) [Ratio]   13.4 %                          12.0-14.8       [facility]  
   
                                                    Glucose Glucometer (BldC) [M  
ass/Vol]Ordered By: Guicho Bronson on 2023   
   
                      Glucose [Mass/Vol] 143 mg/dL                        [facility]  
   
                                        Comment on above:   Random Glucose Refer  
ence Range is dependent on time and   
content   
of last meal. Glucose of more than 200 mg/dL in a nonstressed,   
ambulatory subject supports the diagnosis of Diabetes Mellitus.   
   
                                                    Glucose [Mass/volume] in Ser  
um or PlasmaOrdered By: ROXANNA CARR on 2023   
   
                      Glucose [Mass/Vol] 130 mg/dL                  [facility]  
   
                                        Comment on above:   ADA recommended refe  
rence rangeRandom Glucose Reference Range   
is dependent on time and content of last meal. Glucose of more   
than 200 mg/dL in a nonstressed, ambulatory subject supports   
the diagnosis of Diabetes Mellitus.   
   
                                                    Hematocrit Auto (Bld) [Volum  
e fraction]Ordered By: Cristian Raymond on 2023  
  
   
                                                    Hematocrit (Bld)   
[Volume fraction] 27.6 %                          38.8-50.0       [facility]  
   
                                                    Hemoglobin [Mass/volume] in   
BloodOrdered By: Cristian Raymond on 2023   
   
                                                    Hemoglobin (Bld)   
[Mass/Vol]      9.4 g/dL                        13.0-17.0       [facility]  
   
                                                    Leukocytes [#/volume] correc  
rafaela for nucleated erythrocytes in Blood by Automated  
   
counOrdered By: Cristian Raymond on 2023   
   
                                                    WBC corrected for nucl   
RBC Auto (Bld) [#/Vol] 9.9 10*3/uL                     4.1-10.5        [facility]  
   
                                                    Lymphocytes Auto (Bld) [#/Vo  
l]Ordered By: Cristian Raymond on 2023   
   
                                                    Lymphocytes (Bld)   
[#/Vol]         1.7 10*3/uL                     1.00-4.8        [facility]  
   
                                                    Lymphocytes/100 WBC Auto (Bl  
d)Ordered By: Cristian Raymond on 2023   
   
                                                    Lymphocytes/100 WBC   
(Bld)           17.6 %                          .               [facility]  
   
                                                    MCH Auto (RBC) [Entitic mass  
]Ordered By: Cristian Raymond on 2023   
   
                                                    MCH (RBC) [Entitic   
mass]           32.6 pg                         27.5-35.2       [facility]  
   
                                                    MCHC Auto (RBC) [Mass/Vol]Or  
dered By: Cristian Raymond on 2023   
   
                      MCHC (RBC) [Mass/Vol] 33.9 g/dL             32.5-35.6  [facility]  
   
                                                    MCV Auto (RBC) [Entitic vol]  
Ordered By: Cristian Raymond on 2023   
   
                                                    MCV (RBC) [Entitic   
vol]            96.1 fL                         83.5-101        [facility]  
   
                                                    Monocytes Auto (Bld) [#/Vol]  
Ordered By: Cristian Raymond on 2023   
   
                                                    Monocytes (Bld)   
[#/Vol]         0.7 10*3/uL                     0.0-0.8         [facility]  
   
                                                    Monocytes/100 WBC Auto (Bld)  
Ordered By: Cristian Raymond on 2023   
   
                                                    Monocytes/100 WBC   
(Bld)           6.8 %                           .               [facility]  
   
                                                    Neutrophils Auto (Bld) [#/Vo  
l]Ordered By: Cristian Raymond on 2023   
   
                                                    Neutrophils (Bld)   
[#/Vol]         6.2 10*3/uL                     1.8-7.7         [facility]  
   
                                                    Neutrophils/100 WBC Auto (Bl  
d)Ordered By: Cristian Raymond on 2023   
   
                                                    Neutrophils/100 WBC   
(Bld)           62.3 %                          .               [facility]  
   
                                                    No Panel InformationOrdered   
By: ROXANNA CARR on 2023   
   
                                                    Estimated GFR   
(CKD-EPI)       12.376 mL/Min                                   [facility]  
   
                                                    Pharmacy Creatinine   
Clearance (Chem 16.59                                           [facility]  
   
                                                    No Panel InformationOrdered   
By: Guicho Bronson on 2023   
   
                                                    Bedside Glucose   
Comment         Glu2: cleaned meter                                 [facility]  
   
                                                    Nucleated erythrocytes [Pres  
ence] in Blood by Automated countOrdered By: Cristian Raymond on 2023   
   
                                                    Nucleated RBC Auto Ql   
(Bld)           0.0 /100{WBC}                   0-0.5           [facility]  
   
                                                    Platelet mean volume Auto (B  
ld) [Entitic vol]Ordered By: Cristian Raymond on   
2023   
   
                                                    Platelet mean volume   
(Bld) [Entitic vol] 8.2 fL                          6.6-10.1        [facility]  
   
                                                    Platelets Auto (Bld) [#/Vol]  
Ordered By: Cristian Raymond on 2023   
   
                                                    Platelets (Bld)   
[#/Vol]         239 10*3/uL                     150-450         [facility]  
   
                                                    Potassium [Moles/volume] in   
Serum or PlasmaOrdered By: ROXANNA CARR on   
2023   
   
                      Potassium [Moles/Vol] 4.2 mmol/L            3.5-5.1    [facility]  
   
                                                    RBC Auto (Bld) [#/Vol]Ordere  
d By: Cristian Raymond on 2023   
   
                      RBC (Bld) [#/Vol] 2.88 10*6/uL            3.90-5.60  [facility]  
   
                                                    Serum or plasma anion gap de  
terminationOrdered By: ROXANNA CARR on 2023   
   
                      Anion gap [Moles/Vol] 16.2 mmol/L            6.0-15.0   [facility]  
   
                                                    Sodium [Moles/volume] in Ser  
um or PlasmaOrdered By: ROXANNA CARR on 2023   
   
                      Sodium [Moles/Vol] 140 mmol/L            136-145    [facility]  
   
                                                    Urea nitrogen [Mass/volume]   
in Serum or PlasmaOrdered By: ROXANNA CARR on   
2023   
   
                                                    Urea nitrogen   
[Mass/Vol]      53 mg/dL                        7-25            [facility]  
   
                                                    WBC Auto (Bld) [#/Vol]Ordere  
d By: Cristian Raymond on 2023   
   
                      WBC (Bld) [#/Vol] 9.9 10*3/uL            4.1-10.5   [facility]  
   
                                                    Basophils Auto (Bld) [#/Vol]  
Ordered By: Cristian Raymond on 2022   
   
                                                    Basophils (Bld)   
[#/Vol]         0.1 10*3/uL                     0.0-0.2         [facility]  
   
                                                    Basophils/100 WBC Auto (Bld)  
Ordered By: Cristian Raymond on 2022   
   
                                                    Basophils/100 WBC   
(Bld)           1.0 %                           .               [facility]  
   
                                                    Blood hemoglobin measurement  
 (mass/volume)Ordered By: Cristian Raymond on   
2022   
   
                                                    Hemoglobin (Bld)   
[Mass/Vol]      10.2 g/dL                       13.0-17.0       [facility]  
   
                                                    Blood leukocytes automated c  
ount (number/volume)Ordered By: Cristian Raymond on   
2022   
   
                      WBC (Bld) [#/Vol] 12.2 10*3/uL            4.5-11.0   [facility]  
   
                                                    Creatinine and Glomerular fi  
ltration rate.predicted panel (S/P/Bld)Ordered By:   
Cristian Raymond on 2022   
   
                      Creatinine [Mass/Vol] 4.36 mg/dL            0.64-1.27  [facility]  
   
                                                    Eosinophils Auto (Bld) [#/Vo  
l]Ordered By: Cristian Raymond on 2022   
   
                                                    Eosinophils (Bld)   
[#/Vol]         0.9 10*3/uL                     0.0-0.45        [facility]  
   
                                                    Eosinophils/100 WBC Auto (Bl  
d)Ordered By: Cristian Raymond on 2022   
   
                                                    Eosinophils/100 WBC   
(Bld)           7.4 %                           .               [facility]  
   
                                                    Erythrocyte distribution wid  
th Auto (RBC) [Ratio]Ordered By: Cristian Raymond on   
2022   
   
                                                    Erythrocyte   
distribution width   
(RBC) [Ratio]   12.3 %                          12.0-14.8       [facility]  
   
                                                    Estimated glomerular filtrat  
ion rate (GFR) non- AmericanOrdered By:   
Cristian Raymond on 2022   
   
                                                    GFR/1.73 sq   
M.predicted among   
non-blacks MDRD   
(S/P/Bld) [Vol   
rate/Area]      13 mL/Min                                       [facility]  
   
                                                    Glucose Glucometer (BldC) [M  
ass/Vol]Ordered By: Ronald Sánchez on 2022  
   
   
                      Glucose [Mass/Vol] 182 mg/dL                        [facility]  
   
                                        Comment on above:   Random Glucose Refer  
ence Range is dependent on time and   
content   
of last meal. Glucose of more than 200 mg/dL in a nonstressed,   
ambulatory subject supports the diagnosis of Diabetes Mellitus.   
   
                                                    Hematocrit Auto (Bld) [Volum  
e fraction]Ordered By: Cristian Raymond on 2022  
  
   
                                                    Hematocrit (Bld)   
[Volume fraction] 29.3 %                          38.8-50.0       [facility]  
   
                                                    Laboratory - Hematology and   
Cell countsOrdered By: Cristian Raymond on 2022  
  
   
                                                    Nucleated RBC/100 WBC   
(Bld) [Ratio]   0.0 %                           0-0.5           [facility]  
   
                                                    Lymphocytes Auto (Bld) [#/Vo  
l]Ordered By: Cristian Raymond on 2022   
   
                                                    Lymphocytes (Bld)   
[#/Vol]         1.7 10*3/uL                     1.00-4.8        [facility]  
   
                                                    Lymphocytes/100 WBC Auto (Bl  
d)Ordered By: Cristian Raymond on 2022   
   
                                                    Lymphocytes/100 WBC   
(Bld)           13.8 %                          .               [facility]  
   
                                                    MCH Auto (RBC) [Entitic mass  
]Ordered By: Cristian Raymond on 2022   
   
                                                    MCH (RBC) [Entitic   
mass]           31.5 pg                         27.5-35.2       [facility]  
   
                                                    MCHC Auto (RBC) [Mass/Vol]Or  
dered By: Cristian Raymond on 2022   
   
                      MCHC (RBC) [Mass/Vol] 34.7 g/dL             32.5-35.6  [facility]  
   
                                                    MCV Auto (RBC) [Entitic vol]  
Ordered By: Cristian Raymond on 2022   
   
                                                    MCV (RBC) [Entitic   
vol]            90.9 fL                         83.5-101        [facility]  
   
                                                    Monocytes Auto (Bld) [#/Vol]  
Ordered By: Cristian Raymond on 2022   
   
                                                    Monocytes (Bld)   
[#/Vol]         0.7 10*3/uL                     0.0-0.8         [facility]  
   
                                                    Monocytes/100 WBC Auto (Bld)  
Ordered By: Cristian Raymond on 2022   
   
                                                    Monocytes/100 WBC   
(Bld)           6.1 %                           .               [facility]  
   
                                                    Neutrophils Auto (Bld) [#/Vo  
l]Ordered By: Cristian Raymond on 2022   
   
                                                    Neutrophils (Bld)   
[#/Vol]         8.8 10*3/uL                     1.8-7.7         [facility]  
   
                                                    Neutrophils/100 WBC Auto (Bl  
d)Ordered By: Cristian Raymond on 2022   
   
                                                    Neutrophils/100 WBC   
(Bld)           71.7 %                          .               [facility]  
   
                                                    No Panel InformationOrdered   
By: Ronald Sánchez on 2022   
   
                                                    Bedside Glucose   
Comment         Glu2: cleaned meter                                 [facility]  
   
                                                    No Panel InformationOrdered   
By: Cristian Raymond on 2022   
   
                                                    Estimated GFR (   
American)       16 mL/Min                                       [facility]  
   
                                        Comment on above:   GFR estimated refere  
nce range: According to KDOQI guidelines,   
<60 ml/min/1.73m2 is sufficient to diagnose a patient with   
chronic kidney disease.   
   
                                                    Pharmacy Creatinine   
Clearance (Chem 18.20                                           [facility]  
   
                                                    Platelet mean volume Auto (B  
ld) [Entitic vol]Ordered By: Cristian Raymond on   
2022   
   
                                                    Platelet mean volume   
(Bld) [Entitic vol] 8.3 fL                          6.6-10.1        [facility]  
   
                                                    Platelets Auto (Bld) [#/Vol]  
Ordered By: Cristian Raymond on 2022   
   
                                                    Platelets (Bld)   
[#/Vol]         316 10*3/uL                     150-450         [facility]  
   
                                                    RBC Auto (Bld) [#/Vol]Ordere  
d By: Cristian Raymond on 2022   
   
                      RBC (Bld) [#/Vol] 3.22 10*6/uL            3.90-5.60  [facility]  
   
                                                    Serum or plasma calcium ramya  
urement (mass/volume)Ordered By: Cristian Raymond on   
2022   
   
                      Calcium [Mass/Vol] 9.0 mg/dL             8.2-10.2   [facility]  
   
                                                    Serum or plasma chloride roberto  
surement (moles/volume)Ordered By: Cristian Raymond   
on   
2022   
   
                      Chloride [Moles/Vol] 99 mmol/L                  [facility]  
   
                                                    Serum or plasma glucose ramya  
urement (mass/volume)Ordered By: Cristian Raymond on   
2022   
   
                      Glucose [Mass/Vol] 178 mg/dL                  [facility]  
   
                                        Comment on above:   ADA recommended refe  
rence range  
Random Glucose Reference Range is dependent on time and content   
of last meal. Glucose of more than 200 mg/dL in a nonstressed,   
ambulatory subject supports the diagnosis of Diabetes Mellitus.   
   
                                                    Serum or plasma potassium me  
asurement (moles/volume)Ordered By: Cristian Raymond   
on   
2022   
   
                      Potassium [Moles/Vol] 3.5 mmol/L            3.5-5.1    [facility]  
   
                                                    Serum or plasma sodium measu  
rement (moles/volume)Ordered By: Cristian Raymond on   
2022   
   
                      Sodium [Moles/Vol] 136 mmol/L            136-146    [facility]  
   
                                                    Serum or plasma total carbon  
 dioxide measurement (moles/volume)Ordered By:   
Cristian Raymond on 2022   
   
                      CO2 [Moles/Vol] 19.9 mmol/L            22.0-30.0  [facility]  
   
                                                    Serum or plasma urea nitroge  
n measurement (mass/volume)Ordered By: Cristian Raymond on   
2022   
   
                                                    Urea nitrogen   
[Mass/Vol]      50 mg/dL                        9-23            [facility]  
   
                                                    Glucose Glucometer (BldC) [M  
ass/Vol]Ordered By: Ronald Sánchez on 2022  
   
   
                      Glucose [Mass/Vol] 117 mg/dL                        [facility]  
   
                                        Comment on above:   Random Glucose Refer  
ence Range is dependent on time and   
content   
of last meal. Glucose of more than 200 mg/dL in a nonstressed,   
ambulatory subject supports the diagnosis of Diabetes Mellitus.   
   
                                                    No Panel InformationOrdered   
By: Ronald Sánchez on 2022   
   
                                                    Bedside Glucose   
Comment         Glu2: cleaned meter                                 [facility]  
  
  
  
Vital Signs  
  
  
                          Date Time    Vital Sign   Value        Performing   
Clinician                               Facility  
   
                                                    10-   
13:          Body height         180.3 cm            Nancy Torrez DPM  
Work Phone:   
[phone]                          [facility]  
   
                                                    10-   
13:                              Body mass index   
(BMI) [Ratio]             30.93 kg/m2               Nancy Torrez DPM  
Work Phone:   
[phone]                          [facility]  
   
                                                    10-   
13:          Body weight         100.61 kg           Nancy Torrez DPM  
Work Phone:   
[phone]                          [facility]  
   
                                                    10-   
09:          Body height         180.3 cm            Pepper Caterinaz NP  
Work Phone:   
[phone]                          [facility]  
   
                                                    10-   
09:                              Body mass index   
(BMI) [Ratio]             30.93 kg/m2               Pepper Aichholz NP  
Work Phone:   
[phone]                          [facility]  
   
                                                    10-   
09:          Body temperature    98.49 [degF]        Pepper Palmerhanayz NP  
Work Phone:   
[phone]                          [facility]  
   
                                                    10-   
09:          Body weight         100.61 kg           Pepper Aichholz NP  
Work Phone:   
[phone]                          [facility]  
   
                                                    10-   
09:                              Diastolic blood   
pressure                  68 mm[Hg]                 Pepper Aichholz NP  
Work Phone:   
[phone]                          [facility]  
   
                                                    10-   
09:          Heart rate          98 /min             Pepper Aichholz NP  
Work Phone:   
[phone]                          [facility]  
   
                                                    10-   
09:          Respiratory rate    19 /min             Pepper Aichholz NP  
Work Phone:   
[phone]                          [facility]  
   
                                                    10-   
09:                              SaO2% (BldA) [Mass   
fraction]                 98 %                      Pepper Aichholz NP  
Work Phone:   
[phone]                          [facility]  
   
                                                    10-   
09:                              Systolic blood   
pressure                  140 mm[Hg]                Pepper Aichholz NP  
Work Phone:   
[phone]                          [facility]  
   
                                                    2024   
12:                              Diastolic blood   
pressure                  90 mm[Hg]                 Pepper Aichholz  
Work Phone:   
[phone]                          [facility]  
   
                                                    2024   
12:          Heart rate          71 /min             Pepper Aichholz  
Work Phone:   
[phone]                          [facility]  
   
                                                    2024   
12:          Respiratory rate    16 /min             Pepper Aichholz  
Work Phone:   
[phone]                          [facility]  
   
                                                    2024   
12:                              SaO2% (BldA) [Mass   
fraction]                 100 %                     Pepper Aichholz  
Work Phone:   
[phone]                          [facility]  
   
                                                    2024   
12:                              Systolic blood   
pressure                  162 mm[Hg]                Pepper Aichholz  
Work Phone:   
[phone]                          [facility]  
   
                                                    2024   
10:          Body height         180.34 cm           Pepper Aichholz  
Work Phone:   
[phone]                          [facility]  
   
                                                    2024   
10:          Body weight         100.51 kg           Pepper Aichholz  
Work Phone:   
[phone]                          [facility]  
   
                                                    2024   
13:          Body height         180.34 cm           Pepper Aichholz  
Work Phone:   
[phone]                          [facility]  
   
                                                    2024   
13:                              Body mass index   
(BMI) [Ratio]             31.1 kg/m2                Pepper Aichholz  
Work Phone:   
[phone]                          [facility]  
   
                                                    2024   
13:          Body weight         101.15 kg           Pepper Aichholz  
Work Phone:   
[phone]                          [facility]  
   
                                                    2024   
11:          Body height         180.34 cm           Pepper Aichholz  
Work Phone:   
[phone]                          [facility]  
   
                                                    2024   
11:                              Body mass index   
(BMI) [Ratio]             30.4 kg/m2                Pepper Magdalenoholz  
Work Phone:   
[phone]                          [facility]  
   
                                                    2024   
11:          Body weight         98.88 kg            Pepperlorenzo Chakrabortyhholz  
Work Phone:   
[phone]                          [facility]  
   
                                                    2024   
11:                              Diastolic blood   
pressure                  73 mm[Hg]                 Pepper Magdalenoholz  
Work Phone:   
[phone]                          [facility]  
   
                                                    2024   
11:                              Systolic blood   
pressure                  142 mm[Hg]                Pepper Chakrabortyhholz  
Work Phone:   
[phone]                          [facility]  
   
                                                    2024   
13:          Body height         180.3 cm            Beverly Zirker   
APRN-CNP  
Work Phone:   
[phone]                          [place]   
[facility]  
   
                                                    2024   
13:                              Body mass index   
(BMI) [Ratio]             29.85 kg/m2               Beverly Zirker   
APRN-CNP  
Work Phone:   
[phone]                          [facility]  
   
                                                    2024   
13:          Body temperature    97.7 [degF]         Beverly Zirker   
APRN-CNP  
Work Phone:   
[phone]                          [employer]  
   
                                                    2024   
13:          Body weight         97.07 kg            Beverly Zirker   
APRN-CNP  
Work Phone:   
[phone]                          [facility]  
   
                                                    2024   
13:                              Diastolic blood   
pressure                  70 mm[Hg]                 Beverly Zirker   
APRN-CNP  
Work Phone:   
[phone]                          [facility]  
   
                                                    Comment on   
above:                                  [employer]   
   
                                                    2024   
13:          Heart rate          90 /min             Beverly Zirker   
APRN-CNP  
Work Phone:   
[phone]                          [facility]  
   
                                                    2024   
13:          Respiratory rate    22 /min             Beverly Zirker   
APRN-CNP  
Work Phone:   
[phone]                          [facility]  
   
                                                    2024   
13:                              SaO2% (BldA) [Mass   
fraction]                 98 %                      Beverly Ottorangelia   
APRN-CNP  
Work Phone:   
[phone]                          [facility]   
[facility]  
   
                                                    2024   
13:                              Systolic blood   
pressure                  162 mm[Hg]                Beverly Yusuf   
APRN-CNP  
Work Phone:   
[phone]                          [facility] [email or web address]   
[facility]  
   
                                                    Comment on   
above:                                  [employer]   
   
                                                    2023   
09:                              Diastolic blood   
pressure                  70 mm[Hg]                 Pepper Aichholz  
Work Phone:   
[phone]                          [facility]  
   
                                                    2023   
09:          Heart rate          72 /min             Pepper Aichholz  
Work Phone:   
[phone]                          [facility]  
   
                                                    2023   
09:          Respiratory rate    16 /min             Pepper Aichholz  
Work Phone:   
[phone]                          [facility]  
   
                                                    2023   
09:                              SaO2% (BldA) [Mass   
fraction]                 96 %                      Pepper Aichholz  
Work Phone:   
[phone]                          [facility]  
   
                                                    2023   
09:                              Systolic blood   
pressure                  149 mm[Hg]                Pepper Aichholz  
Work Phone:   
[phone]                          [facility]  
   
                                                    2023   
09:          Body temperature    98 [degF]           Pepper Aichholz  
Work Phone:   
[phone]                          [facility]  
   
                                                    2023   
08:                              Inhaled oxygen flow   
rate                      8 L/min                   Pepper Aichholz  
Work Phone:   
[phone]                          [facility]  
   
                                                    2023   
06:          Body height         180.34 cm           Pepper Aichholz  
Work Phone:   
[phone]                          [facility]  
   
                                                    2023   
06:                              Body mass index   
(BMI) [Ratio]             29.8 kg/m2                Pepper Aichholz  
Work Phone:   
[phone]                          [facility]  
   
                                                    2023   
06:          Body weight         97 kg               Pepper Mendoza  
Work Phone:   
[phone]                          [facility]  
   
                                                    2023   
12:          Body height         180.34 cm           Ronald Sánchez  
Other Phone:   
[phone]                           [place]   
[employer]  
Other Phone:   
[phone]  
   
                                                    2023   
12:                              Body mass index   
(BMI) [Ratio]             29.56 kg/m2               Ronald Sánchez  
Other Phone:   
[phone]                           [facility]   
[employer]   
[employer]  
Other Phone:   
[phone]  
   
                                                    2023   
12:          Body temperature    97.8 [degF]         Ronald Sánchez  
Other Phone:   
[phone]                           [facility]   
[employer]   
[employer]  
Other Phone:   
[phone]  
   
                                                    2023   
12:          Body weight         96.16 kg            Ronald Sánchez  
Other Phone:   
[phone]                           [facility]   
[employer]   
[employer]  
Other Phone:   
[phone]  
   
                                                    2023   
12:                              Diastolic blood   
pressure                  78 mm[Hg]                 Ronald Sánchez  
Other Phone:   
[phone]                           [facility]   
[employer]   
[employer]  
Other Phone:   
[phone]  
   
                                                    2023   
12:                              SaO2% (BldA) [Mass   
fraction]                 99 %                      Ronald Sánchez  
Other Phone:   
[phone]                           [facility]   
[employer]   
[employer]  
Other Phone:   
[phone]  
   
                                                    2023   
12:                              Systolic blood   
pressure                  158 mm[Hg]                Ronald Sánchez  
Other Phone:   
[phone]                           [facility]   
[employer]   
[employer]  
Other Phone:   
[phone]  
   
                                                    2023   
12:                              Diastolic blood   
pressure                  61 mm[Hg]                 [facility] Shantell   
Kell  
Work Phone:   
[phone]                          [facility]  
   
                                                    2023   
12:          Heart rate          75 /min             [facility] Shantell   
Kell  
Work Phone:   
[phone]                          [facility]  
   
                                                    2023   
12:          Respiratory rate    16 /min             [facility] Shantell   
Kell  
Work Phone:   
[phone]                          [facility]  
   
                                                    2023   
12:                              SaO2% (BldA) [Mass   
fraction]                 100 %                     [facility] Shantell Castorena  
Work Phone:   
[phone]                          [facility]  
   
                                                    2023   
12:                              Systolic blood   
pressure                  164 mm[Hg]                [facility] Shantell Castorena  
Work Phone:   
[phone]                          [facility]  
   
                                                    2023   
11:          Body height         180.34 cm           JEANETTE-CHERYL Castorena  
Work Phone:   
[phone]                          [facility]  
   
                                                    2023   
11:          Body weight         96.16 kg            JEANETTE-CHERYL Castorena  
Work Phone:   
[phone]                          [facility]  
   
                                                    2022   
13:          Body height         180.34 cm           Carolee Cuevas  
Other Phone:   
[phone]                           [facility]   
[employer]   
[employer]  
Other Phone:   
[phone]  
   
                                                    2022   
13:                              Body mass index   
(BMI) [Ratio]             29.56 kg/m2               Carolee Cuevas  
Other Phone:   
[phone]                           [facility]   
[employer]   
[employer]  
Other Phone:   
[phone]  
   
                                                    2022   
13:          Body temperature    97.8 [degF]         Carolee Cuevas  
Other Phone:   
[phone]                           [facility]   
[employer]   
[employer]  
Other Phone:   
[phone]  
   
                                                    2022   
13:          Body weight         96.16 kg            Carolee Cuevas  
Other Phone:   
[phone]                           [facility]   
[employer]   
[employer]  
Other Phone:   
[phone]  
   
                                                    2022   
13:                              Diastolic blood   
pressure                  68 mm[Hg]                 Carolee Cuevas  
Other Phone:   
[phone]                           [facility]   
[employer]   
[employer]  
Other Phone:   
[phone]  
   
                                                    2022   
13:                              SaO2% (BldA) [Mass   
fraction]                 99 %                      Carolee Cuevas  
Other Phone:   
[phone]                           [facility]   
[employer]   
[employer]  
Other Phone:   
[phone]  
   
                                                    2022   
13:                              Systolic blood   
pressure                  146 mm[Hg]                Carolee Cuevas  
Other Phone:   
[phone]                           [facility]   
[employer]   
[employer]  
Other Phone:   
[phone]  
   
                                                    2022   
12:          Body height         180.34 cm           Carolee Cuevas  
Other Phone:   
[phone]                           [facility]   
[employer]   
[employer]  
Other Phone:   
[phone]  
   
                                                    2022   
12:                              Body mass index   
(BMI) [Ratio]             29.15 kg/m2               Carolee Cuevas  
Other Phone:   
[phone]                           [facility]   
[employer]   
[employer]  
Other Phone:   
[phone]  
   
                                                    2022   
12:          Body temperature    97.7 [degF]         Carolee Cuevas  
Other Phone:   
[phone]                           [facility]   
[employer]   
[employer]  
Other Phone:   
[phone]  
   
                                                    2022   
12:          Body weight         94.8 kg             Carolee Cuevas  
Other Phone:   
[phone]                           [facility]   
[employer]   
[employer]  
Other Phone:   
[phone]  
   
                                                    2022   
12:                              Diastolic blood   
pressure                  94 mm[Hg]                 Carolee Cuevas  
Other Phone:   
[phone]                           [facility]   
[employer]   
[employer]  
Other Phone:   
[phone]  
   
                                                    2022   
12:                              SaO2% (BldA) [Mass   
fraction]                 99 %                      Carolee Cuevas  
Other Phone:   
[phone]                           [facility]   
[employer]   
[employer]  
Other Phone:   
[phone]  
   
                                                    2022   
12:                              Systolic blood   
pressure                  176 mm[Hg]                Carolee Cuevas  
Other Phone:   
[phone]                           [facility]   
[employer]   
[employer]  
Other Phone:   
[phone]  
   
                                                    2022   
17:                              Diastolic blood   
pressure                  73 mm[Hg]                 [facility] Shantell Castorena  
Work Phone:   
[phone]                          [facility]  
   
                                                    2022   
17:          Heart rate          70 /min             [facility] Shantell   
[facility]ell  
Work Phone:   
[phone]                          [facility]  
   
                                                    2022   
17:          Respiratory rate    10 /min             F[facility] Shantellonel Castorena  
Work Phone:   
[phone]                          [facility]  
   
                                                    2022   
17:                              SaO2% (BldA) [Mass   
fraction]                 100 %                     [facility] Shantellonel Castorena  
Work Phone:   
[phone]                          [facility]  
   
                                                    2022   
17:                              Systolic blood   
pressure                  187 mm[Hg]                [facility] Shantellonel Castorena  
Work Phone:   
[phone]                          [facility]  
   
                                                    2022   
13:          Body height         180.34 cm           [facility] Shantell Castorena  
Work Phone:   
[phone]                          [facility]  
   
                                                    2022   
13:          Body weight         96.61 kg            [facility] Shantell Castorena  
Work Phone:   
[phone]                          [facility]  
   
                                                    2022   
12:          Body height         180.34 cm           Ronald Sánchez  
Other Phone:   
[phone]                           [facility]   
[employer]   
[employer]  
Other Phone:   
[phone]  
   
                                                    2022   
12:                              Body mass index   
(BMI) [Ratio]             29.15 kg/m2               Ronald Sánchez  
Other Phone:   
[phone]                           [facility]   
[employer]   
[employer]  
Other Phone:   
[phone]  
   
                                                    2022   
12:          Body temperature    97.3 [degF]         Ronald Sánchez  
Other Phone:   
[phone]                           [facility]   
[employer]   
[employer]  
Other Phone:   
[phone]  
   
                                                    2022   
12:          Body weight         94.8 kg             Ronald Sánchez  
Other Phone:   
[phone]                           [facility]   
[employer]   
[employer]  
Other Phone:   
[phone]  
   
                                                    2022   
12:                              Diastolic blood   
pressure                  52 mm[Hg]                 Ronald Sánchez  
Other Phone:   
[phone]                           [facility]   
[employer]   
[employer]  
Other Phone:   
[phone]  
   
                                                    2022   
12:                              SaO2% (BldA) [Mass   
fraction]                 99 %                      Ronald Finnlisa  
Other Phone:   
[phone]                           [facility]   
[employer]   
[employer]  
Other Phone:   
[phone]  
   
                                                    2022   
12:                              Systolic blood   
pressure                  146 mm[Hg]                Ronald Finnlisa  
Other Phone:   
[phone]                           [facility]   
[employer]   
[employer]  
Other Phone:   
[phone]  
   
                                                    2022   
12:          Body height         180.34 cm           Ronald Finnlisa  
Other Phone:   
[phone]                           [facility]   
[employer]   
[employer]  
Other Phone:   
[phone]  
   
                                                    2022   
12:                              Body mass index   
(BMI) [Ratio]             29.15 kg/m2               Ronald Finnlisa  
Other Phone:   
[phone]                           [facility]   
[employer]   
[employer]  
Other Phone:   
[phone]  
   
                                                    2022   
12:          Body temperature    97.7 [degF]         Ronald Finnlisa  
Other Phone:   
[phone]                           [facility]   
[employer]  
Other Phone:   
[phone]  
   
                                                    2022   
12:          Body weight         94.8 kg             Ronald Finnlisa  
Other Phone:   
[phone]                           [facility]   
[employer]   
[employer]  
Other Phone:   
[phone]  
   
                                                    2022   
12:                              Diastolic blood   
pressure                  60 mm[Hg]                 Ronald Princess  
Other Phone:   
[phone]                           [facility]   
[employer]  
Other Phone:   
[phone]  
   
                                                    2022   
12:                              SaO2% (BldA) [Mass   
fraction]                 98 %                      Ronald Princess  
Other Phone:   
[phone]                           [facility]   
[employer]  
Other Phone:   
[phone]  
   
                                                    2022   
12:                              Systolic blood   
pressure                  144 mm[Hg]                Ronald Sánchez  
Other Phone:   
[phone]                           [facility]   
[employer]   
[employer]  
Other Phone:   
[phone]  
   
                                                    2022   
12:                              Diastolic blood   
pressure                  73 mm[Hg]                 [facility] Shantell Castorena  
Work Phone:   
[phone]                          [facility]  
   
                                                    2022   
12:          Heart rate          61 /min             [facility] Shantell   
[place]  
Work Phone:   
[phone]                          [facility]  
   
                                                    2022   
12:          Respiratory rate    16 /min             [facility] Shantell   
Kell  
Work Phone:   
[phone]                          [facility]  
   
                                                    2022   
12:                              SaO2% (BldA) [Mass   
fraction]                 100 %                     [facility] Shantell   
Kell  
Work Phone:   
[phone]                          [facility]  
   
                                                    2022   
12:                              Systolic blood   
pressure                  151 mm[Hg]                [facility] Shantell   
Kell  
Work Phone:   
[phone]                          [facility]  
   
                                                    2022   
10:                              Inhaled oxygen flow   
rate                      8 L/min                   [facility] Shantell   
Kell  
Work Phone:   
[phone]                          [facility]  
   
                                                    2022   
08:          Body height         180.34 cm           FNP[facility] Shantell   
Kell  
Work Phone:   
[phone]                          [facility]  
   
                                                    2022   
08:                              Body mass index   
(BMI) [Ratio]             29.8 kg/m2                [facility] Shantell   
Kell  
Work Phone:   
[phone]                          [facility]  
   
                                                    2022   
08:          Body weight         97.06 kg            [facility] Shantell   
Kell  
Work Phone:   
[phone]                          [facility]  
   
                                                    2022   
07:          Body temperature    98.3 [degF]         [facility] Shantell   
Kell  
Work Phone:   
[phone]                          [facility]  
   
                                                    2022   
13:          Body height         180.34 cm           Ronald Sánchez  
Other Phone:   
[phone]                           [facility]   
[employer]   
[employer]  
Other Phone:   
[phone]  
   
                                                    2022   
13:                              Body mass index   
(BMI) [Ratio]             29.84 kg/m2               Ronald Sánchez  
Other Phone:   
[phone]                           [facility]   
[employer]   
[employer]  
Other Phone:   
[phone]  
   
                                                    2022   
13:          Body temperature    97.9 [degF]         Ronald Finnlisa  
Other Phone:   
[phone]                           [facility]   
[employer]  
Other Phone:   
[phone]  
   
                                                    2022   
13:          Body weight         97.07 kg            Ronald Finnlisa  
Other Phone:   
[phone]                           [facility]   
[employer]  
Other Phone:   
[phone]  
   
                                                    2022   
13:                              Diastolic blood   
pressure                  68 mm[Hg]                 Ronald Princess  
Other Phone:   
[phone]                           [facility]   
[employer]  
Other Phone:   
[phone]  
   
                                                    2022   
13:                              SaO2% (BldA) [Mass   
fraction]                 99 %                      Ronald Finnlisa  
Other Phone:   
[phone]                           [facility]   
[employer]  
Other Phone:   
[phone]  
   
                                                    2022   
13:                              Systolic blood   
pressure                  140 mm[Hg]                Ronald Finnlisa  
Other Phone:   
[phone]                           [facility]   
[employer]  
Other Phone:   
[phone]  
   
                                                    2022   
14:                              Diastolic blood   
pressure                  80 mm[Hg]                 [facility] Shantell   
Kell  
Work Phone:   
[phone]                          [facility]  
   
                                                    2022   
14:          Heart rate          86 /min             [facility] Shantell   
[facility]ell  
Work Phone:   
[phone]                          [facility]  
   
                                                    2022   
14:          Respiratory rate    16 /min             [facility] Shantell   
[facility]ell  
Work Phone:   
[phone]                          [facility]  
   
                                                    2022   
14:                              SaO2% (BldA) [Mass   
fraction]                 97 %                      [facility] Shantell   
[facility]ell  
Work Phone:   
[phone]                          [facility]  
   
                                                    2022   
14:                              Systolic blood   
pressure                  169 mm[Hg]                [facility] Shantell   
[facility]ell  
Work Phone:   
[phone]                          [facility]  
   
                                                    2022   
13:          Body height         180.34 cm           [facility] Shantell Castorena  
Work Phone:   
[phone]                          [facility]  
   
                                                    2022   
13:                              Body mass index   
(BMI) [Ratio]             29.8 kg/m2                [facility] Shantell Castorena  
Work Phone:   
[phone]                          [facility]  
   
                                                    2022   
13:          Body weight         97.06 kg            [facility] Shantell Castorena  
Work Phone:   
[phone]                          [facility]  
   
                                                    2022   
13:          Body height         180.34 cm           Carolee Cuevas  
Other Phone:   
[phone]                           [facility]   
[employer]  
Other Phone:   
[phone]  
   
                                                    2022   
13:                              Body mass index   
(BMI) [Ratio]             29.98 kg/m2               Carolee Cuevas  
Other Phone:   
[phone]                           [facility]   
[employer]  
Other Phone:   
[phone]  
   
                                                    2022   
13:          Body temperature    96.1 [degF]         Carolee Cuevas  
Other Phone:   
[phone]                           [facility]   
[employer]  
Other Phone:   
[phone]  
   
                                                    2022   
13:          Body weight         97.52 kg            Carolee Faith  
Other Phone:   
[phone]                           [facility]   
[employer]  
Other Phone:   
[phone]  
   
                                                    2022   
13:                              Diastolic blood   
pressure                  74 mm[Hg]                 Carolee Cuevas  
Other Phone:   
[phone]                           [facility]   
[employer]  
Other Phone:   
[phone]  
   
                                                    2022   
13:                              SaO2% (BldA) [Mass   
fraction]                 98 %                      Carolee Cuevas  
Other Phone:   
[phone]                           [facility]   
[employer]  
Other Phone:   
[phone]  
   
                                                    2022   
13:                              Systolic blood   
pressure                  138 mm[Hg]                Carolee Cuevas  
Other Phone:   
[phone]                           [facility]  
Other Phone:   
[phone]  
   
                                                    2022   
13:          Body height         180.34 cm           Carolee Cuevas  
Other Phone:   
[phone]                           [facility]   
[employer]  
Other Phone:   
[phone]  
   
                                                    2022   
13:                              Body mass index   
(BMI) [Ratio]             29.98 kg/m2               Carolee Cuevas  
Other Phone:   
[phone]                           [facility]   
[employer]  
Other Phone:   
[phone]  
   
                                                    2022   
13:          Body temperature    96.8 [degF]         Carolee Cuevas  
Other Phone:   
[phone]                           [facility]  
Other Phone:   
[phone]  
   
                                                    2022   
13:          Body weight         97.52 kg            Carolee Cuevas  
Other Phone:   
[phone]                           [facility]  
Other Phone:   
[phone]  
   
                                                    2022   
13:                              Diastolic blood   
pressure                  50 mm[Hg]                 Carolee Cuevas  
Other Phone:   
[phone]                           [facility]  
Other Phone:   
[phone]  
   
                                                    2022   
13:                              SaO2% (BldA) [Mass   
fraction]                 99 %                      Carolee Cuevas  
Other Phone:   
[phone]                           [facility]   
[employer]   
[employer]  
Other Phone:   
[phone]  
   
                                                    2022   
13:                              Systolic blood   
pressure                  130 mm[Hg]                Carolee Cuevas  
Other Phone:   
[phone]                           [facility]   
[employer]   
[employer]  
Other Phone:   
[phone]  
   
                                                    2022   
13:          Body height         180.34 cm           Ronald Sánchez  
Other Phone:   
[phone]                           [facility]   
[employer]   
[employer]  
Other Phone:   
[phone]  
   
                                                    2022   
13:                              Body mass index   
(BMI) [Ratio]             29.98 kg/m2               Ronald Sánchez  
Other Phone:   
[phone]                           [facility]   
[employer]   
[employer]  
Other Phone:   
[phone]  
   
                                                    2022   
13:          Body temperature    97.2 [degF]         Ronald Sánchez  
Other Phone:   
[phone]                           [facility]   
[employer]   
[employer]  
Other Phone:   
[phone]  
   
                                                    2022   
13:          Body weight         97.52 kg            Ronald Princess  
Other Phone:   
[phone]                           [facility]   
[employer]   
[employer]  
Other Phone:   
[phone]  
   
                                                    2022   
13:                              Diastolic blood   
pressure                  60 mm[Hg]                 Ronald Princess  
Other Phone:   
[phone]                           [facility]   
[employer]   
[employer]  
Other Phone:   
[phone]  
   
                                                    2022   
13:                              SaO2% (BldA) [Mass   
fraction]                 98 %                      Ronald Princess  
Other Phone:   
[phone]                           [facility]   
[employer]   
[employer]  
Other Phone:   
[phone]  
   
                                                    2022   
13:                              Systolic blood   
pressure                  160 mm[Hg]                Ronald Princess  
Other Phone:   
[phone]                           [place] [facility]   
[employer]   
[employer]  
Other Phone:   
[phone]  
   
                                                    2022   
14:                              Diastolic blood   
pressure                  80 mm[Hg]                 FNP-BC Shantell   
Kell  
Work Phone:   
[phone]                          [facility]  
   
                                                    2022   
14:          Heart rate          53 /min             FNP-BC Shantell   
Kell  
Work Phone:   
[phone]                          [facility]  
   
                                                    2022   
14:          Respiratory rate    16 /min             FNP-BC Shantell   
Kell  
Work Phone:   
[phone]                          [facility]  
   
                                                    2022   
14:                              SaO2% (BldA) [Mass   
fraction]                 98 %                      FNP-BC Shantell   
Kell  
Work Phone:   
[phone]                          [facility]  
   
                                                    2022   
14:                              Systolic blood   
pressure                  168 mm[Hg]                FNP-BC Shantell   
Kell  
Work Phone:   
[phone]                          [facility]  
   
                                                    2022   
12:          Body height         180.34 cm           FNP-BC Shantell   
Kell  
Work Phone:   
[phone]                          [facility]  
   
                                                    2022   
12:                              Body mass index   
(BMI) [Ratio]             29.8 kg/m2                [facility] Shantell Castorena  
Work Phone:   
[phone]                          [facility]  
   
                                                    2022   
12:          Body weight         97.06 kg            [facility] Shantell Castorena  
Work Phone:   
[phone]                          [facility]  
   
                                                    2022   
10:          Body temperature    98.1 [degF]         [facility] Shantell Castorena  
Work Phone:   
[phone]                          [facility]  
   
                                                    2022   
16:          Body height         180.34 cm           Ronald Sánchez  
Other Phone:   
[phone]                           [facility]   
Professional   
Corporation  
Other Phone:   
[phone]  
   
                                                    2022   
16:                              Body mass index   
(BMI) [Ratio]             29.84 kg/m2               Ronald Sánchez  
Other Phone:   
[phone]                           [facility]   
[employer]   
[employer]  
Other Phone:   
[phone]  
   
                                                    2022   
16:          Body weight         97.07 kg            Ronald Sánchez  
Other Phone:   
[phone]                           [facility]   
[employer]   
[employer]  
Other Phone:   
[phone]  
   
                                                    2022   
16:                              Diastolic blood   
pressure                  60 mm[Hg]                 Ronald Sánchez  
Other Phone:   
[phone]                           [facility]   
[employer]   
[employer]  
Other Phone:   
[phone]  
   
                                                    2022   
16:                              SaO2% (BldA) [Mass   
fraction]                 98 %                      Ronald Sánchez  
Other Phone:   
[phone]                           [facility]   
[employer]   
[employer]  
Other Phone:   
[phone]  
   
                                                    2022   
16:                              Systolic blood   
pressure                  150 mm[Hg]                Ronald Sánchez  
Other Phone:   
[phone]                           [facility]   
[employer]   
[employer]  
Other Phone:   
[phone]  
  
  
  
Encounters  
  
  
                          Encounter Date Encounter Type Care Provider Facility  
   
                                                    Start: 2024  
End: 2024     ambulatory          PEPPER MENDOZA    [facility]  
   
                                                    Start: 2024  
End: 2024           ambulatory                Janette Borja MD                           [facility]  
   
                                                    Start: 10-  
End: 10-           ambulatory                Janette Borja MD                           [facility]  
   
                                                    Start: 10-  
End: 10-           ambulatory                Janette Borja MD                           Facility:[facility]  
   
                                                    Start: 10-  
End: 10-           Bamboo flowsheet          Nancy Torrez DPM  
Work Phone:   
[phone]                          [facility]  
   
                                                    Start: 10-  
End: 10-           Bamboo flowsheet          Nancy Torrez DPM  
Work Phone:   
[phone]                          [facility]  
   
                                                    Start: 10-  
End: 10-                         Patient encounter   
procedure                               Nancy Torrez DPM  
Work Phone:   
[phone]                          [facility]  
   
                                        Comment on above:   Dermatophytosis of n  
ail (Primary Dx);  
Dystrophic nail;  
Diabetic polyneuropathy associated with type 2 diabetes mellitus   
(CMS/HCC);  
Encounter for long-term (current) use of insulin (CMS/HCC)   
   
                                                    Start: 10-  
End: 10-     ambulatory          NANCY TORREZ     Not Available  
   
                                                    Start: 10-  
End: 10-     ambulatory          MOHAMMAD SHAIKH [facility]  
   
                                                    Start: 10-  
End: 10-           Refill                    Pepper Mendoza NP  
Work Phone:   
[phone]                          [facility]  
   
                                        Comment on above:   Muscle spasm;  
Spinal stenosis of lumbar region with neurogenic claudication   
   
                                                    Start: 10-  
End: 10-                         Office outpatient visit   
25 minutes                              Pepper Mendoza NP  
Work Phone:   
[phone]                          [facility]  
   
                                        Comment on above:   Degeneration of inte  
rvertebral disc of lumbar region,   
unspecified   
whether pain present (Primary Dx);  
Acute right hip pain;  
Primary hypertension (CMS/HCC);  
Hepatic fibrosis;  
End stage renal disease (CMS/HCC);  
Type 2 diabetes mellitus without complication, with long-term   
current use of insulin (CMS/HCC);  
Tobacco user;  
Mixed hyperlipidemia (CMS/HCC)   
   
                                                    Start: 10-  
End: 10-     ambulatory          PEPPER PALMERHHOLZ       Not Available  
   
                                                    Start: 2024  
End: 2024                         Admission to same day   
surgery center                          Pepperlorenzo Magdalenoholz  
Work Phone:   
[phone]                          [facility]   
[place]  
Work Phone:   
[phone]  
   
                                                    Start: 2024  
End: 2024           ambulatory                Pepper Magdalenoholz  
Work Phone:   
[phone]                          [facility]  
Work Phone:   
[phone]  
   
                                                    Start: 2024  
End: 2024     ambulatory          JIM GRIGSBY   Not Available  
   
                                                    Start: 2024  
End: 2024     ambulatory          PEPPER CHAKRABORTYBOGDANFRANCISCA    [facility]  
   
                                                    Start: 2024  
End: 2024                         Patient encounter   
procedure                               Pepper Palmerhholz  
Work Phone:   
[phone]                          [facility]   
[facility]  
Work Phone:   
[phone]  
   
                                                    Start: 2024  
End: 2024     ambulatory          NANCY TORREZ     Not Available  
   
                                                    Start: 2024  
End: 2024     ambulatory          [facility]  
   
                                                    Start: 2024  
End: 2024                         Patient encounter   
procedure                               Pepper Palmerhholz  
Work Phone:   
[phone]                          [facility]   
[facility]   
Gastroenterology  
Work Phone:   
[phone]  
   
                                                    Start: 2024  
End: 2024     ambulatory          PEPPER AICHHOLZ       Not Available  
   
                                                    Start: 2024  
End: 2024                         Evaluation and   
management of inpatient   CYRIL LEARY           [facility]  
   
                                                    Start: 2024  
End: 2024                         Evaluation and   
management of inpatient   SHABANA [facility]  
   
                                                    Start: 2024  
End: 2024                         Evaluation and   
management of inpatient   SHABANA [facility]  
   
                                                    Start: 2024  
End: 2024     ambulatory          SHABANA BROOKE  [facility]  
   
                                        Start: 2024   Encounter for other   
preprocedural   
examination               [facility]             [facility]  
   
                                                    Start: 2024  
End: 2024     ambulatory          NANCY VENTURA RUSHER     Not Available  
   
                                                    Start: 2024  
End: 2024     ambulatory          NANCY VENTURA RUSHER     Not Available  
   
                                                    Start: 2024  
End: 2024     ambulatory          JOSEF ALEXANDER CASANOVA   [facility]   
Amb[facility]  
   
                                                    Start: 2024  
End: 2024     ambulatory          [facility]  
   
                                                    Start: 2024  
End: 2024                         Patient encounter   
procedure                               Pepper Kelly  
Work Phone:   
[phone]                          [facility]   
[facility]  
Work Phone:   
[phone]  
   
                                                    Start: 2024  
End: 2024           ambulatory                Pepper BANKS Kelly  
Work Phone:   
[phone]                          [facility]  
Work Phone:   
[phone]  
   
                                                    Start: 2024  
End: 2024     ambulatory          [facility]  
   
                                                    Start: 2024  
End: 2024     ambulatory          PEPPER BANKS [facility]  
   
                                                    Start: 2024  
End: 2024                         Patient encounter   
procedure                               Pepper Kelly  
Work Phone:   
[phone]                          [facility]   
[facility]   
Gastroenterology  
Work Phone:   
[phone]  
   
                                        Start: 2024   Patient encounter   
procedure                               Pepper Kelly NP  
Work Phone:   
[phone]                          [facility]  
   
                                                    Start: 2024  
End: 2024     ambulatory          PEPPER MENDOZA       Not Available  
   
                                                    Start: 2024  
End: 2024     ambulatory          NANCY A RUSHER     Not Available  
   
                                                    Start: 2024  
End: 2024     ambulatory          PEPPER JOCELYN [facility]  
   
                                                    Start: 2024  
End: 2024     ambulatory          PEPPER MINHHOLZ       Not Available  
   
                                                    Start: 2024  
End: 2024     ambulatory          [facility]  
   
                                                    Start: 2024  
End: 2024                         Office outpatient visit   
25 minutes                              Beverly Yusuf   
APRN-CNP  
Work Phone:   
[phone]                          [facility]   
Infectious Disease  
   
                                        Comment on above:   Hospital discharge f  
ollow-up (Primary Dx);  
End-stage renal disease on peritoneal dialysis (CMS-HCC);  
History of community acquired pneumonia;  
Light cigarette smoker;  
Insulin dependent type 2 diabetes mellitus (CMS-HCC);  
Legally blind;  
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   
   
                                                    Start: 2024  
End: 2024     ambulatory          VALERIA [facility]  
   
                                                    Start: 2024  
End: 2024     ambulatory          PEPPER CATERINAZ       Not Available  
   
                                                    Start: 2024  
End: 2024                         Evaluation and   
management of inpatient   BASIA BALDERRAMA [facility]  
   
                                                    Start: 03-  
End: 2024                         Emergency department   
patient visit                           FRANCISCO CALABRESE                             [facility]  
   
                                                    Start: 03-  
End: 2024                         Emergency department   
patient visit             PEPPER [facility]  
   
                                                    Start: 2024  
End: 2024     ambulatory          PEPPER BANKS [facility]  
   
                                                    Start: 2024  
End: 2024     ambulatory          NANCY TORREZ     Not Available  
   
                                                    Start: 2024  
End: 2024     ambulatory          PEPPER BANKS [facility]  
   
                                Start: 2024 Telephone encounter Stacey Batres APRN-CNP  
Work Phone:   
[phone]                          [facility]   
General Surgery  
   
                                                    Start: 2024  
End: 2024     ambulatory          PEPPER AICHHOLZ       Not Available  
   
                                                    Start: 2023  
End: 2023                         Admission to same day   
surgery center                          Pepper Mendoza  
Work Phone:   
[phone]                          [facility]   
[facility] [place]  
   
                                                    Start: 2023  
End: 2023           ambulatory                Pepper Mendoza  
Work Phone:   
[phone]                          [facility]  
Work Phone:   
[phone]  
   
                                                    Start: 2023  
End: 2023                         Patient encounter   
procedure                               Pepper Mendoza  
Work Phone:   
[phone]                          [facility]-Surgical   
Testing  
Work Phone:   
[phone]  
   
                                                    Start: 2023  
End: 2023           ambulatory                Ronald Sánchez  
Other Phone:   
[phone]                           [facility] [employer]   
[employer]  
Other Phone:   
[phone]  
   
                                        Start: 2023   Office outpatient vi  
sit   
10 minutes                Ronald Sánchez        [facility]  
   
                                                    Start: 2023  
End: 2023                         Admission to same day   
surgery center                          FNP-BC Shantell Castorena  
Work Phone:   
[phone]                          [facility]Interventional   
Radiology  
Work Phone:   
[phone]  
   
                                                    Start: 2023  
End: 2023           ambulatory                FNP-BC Shantell Castorena  
Work Phone:   
[phone]                          [facility]  
Work Phone:   
[phone]  
   
                                                    Start: 2022  
End: 2022           ambulatory                Carolee Cuevas  
Other Phone:   
[phone]                           [facility]   
[employer]  
Other Phone:   
[phone]  
   
                                                    Start: 2022  
End: 2022                         Patient encounter   
procedure                               FNP-BC Shantell Castorena  
Work Phone:   
[phone]                          [facility]   
[facility]  
   
                                                    Start: 2022  
End: 2022           ambulatory                Carolee Cuevas  
Other Phone:   
[phone]                           [facility]   
[employer]  
Other Phone:   
[phone]  
   
                          Start: 2022 FQ visit, estab pt Carolee Cuevas   
[facility]  
   
                                                    Start: 10-  
End: 10-           ambulatory                Ronald Sánchez  
Other Phone:   
[phone]                           [facility]   
[employer]  
Other Phone:   
[phone]  
   
                                        Start: 10-   Encounter for other   
preprocedural   
examination               Ronald Sánchez        [facility]  
   
                          Start: 10- Telephone encounter Ronaldash Hinton  
karely [facility]  
   
                                                    Start: 2022  
End: 2022                         Admission to same day   
surgery center                          [facility] Shantell Castorena  
Work Phone:   
[phone]                          [facility]-Interventional   
Radiology  
   
                                                    Start: 2022  
End: 2022           ambulatory                [facility] Shantell Castorena  
Work Phone:   
[phone]                          [facility]  
Work Phone:   
[phone]  
   
                                                    Start: 2022  
End: 2022           ambulatory                Ronald Sánchez  
Other Phone:   
[phone]                           [facility]   
[employer]  
Other Phone:   
[phone]  
   
                                        Start: 2022   Office outpatient vi  
sit   
15 minutes                Ronald Sánchez        [facility]  
   
                                                    Start: 2022  
End: 2022                         Patient encounter   
procedure                               [facility] Shantell Castorena  
Work Phone:   
[phone]                          [facility]   
[facility]  
   
                                                    Start: 2022  
End: 2022           ambulatory                Ronald Sánchez  
Other Phone:   
[phone]                           [facility]   
[employer]  
Other Phone:   
[phone]  
   
                                        Start: 2022   Postop follow up vis  
it   
related to original px    Ronald Princess        [facility]  
   
                                                    Start: 2022  
End: 2022                         Admission to same day   
surgery center                          [facility] Shantell Castorena  
Work Phone:   
[phone]                          [facility]-Surgery   
[place]  
   
                                                    Start: 2022  
End: 2022           ambulatory                Ronald Sánchez  
Other Phone:   
[phone]                           [facility]   
[employer]  
Other Phone:   
[phone]  
   
                                        Start: 2022   Office outpatient vi  
sit   
15 minutes                Ronald Sánchez        [facility]  
   
                                                    Start: 2022  
End: 2022                         Admission to same day   
surgery center                          FN-BC Shantell Castorena  
Work Phone:   
[phone]                          [facility]   
Ctr-Interventional   
Radiology  
   
                                                    Start: 2022  
End: 2022           ambulatory                Carolee Cuevas  
Other Phone:   
[phone]                           [facility] [employer]   
[employer]  
Other Phone:   
[phone]  
   
                          Start: 2022 Follow-up encounter Carolee Rosadogovindrahat F  
[facility]  
   
                                                    Start: 2022  
End: 2022                         Patient encounter   
procedure                               [facility] Shantell   
Kell  
Work Phone:   
[phone]                          [facility]   
[facility]  
   
                                                    Start: 2022  
End: 2022           ambulatory                Carolee Cuevas  
Other Phone:   
[phone]                           [facility]   
[facility]  
Other Phone:   
[phone]  
   
                                        Start: 2022   Postop follow up vis  
it   
related to original px    Carolee Cuevas            [facility]  
   
                                                    Start: 2022  
End: 2022           ambulatory                Ronald Sánchez  
Other Phone:   
[phone]                           [facility] [employer]   
[employer]  
Other Phone:   
[phone]  
   
                                        Start: 2022   Postop follow up vis  
it   
related to original px    Ronald Sánchez        [facility]  
   
                                                    Start: 2022  
End: 2022                         Patient encounter   
procedure                               [facility]   
[facility]  
Work Phone:   
[phone]                          [facility] Center  
   
                                        Comment on above:   Pre-transplant evalu  
ation for kidney transplant (Primary Dx)   
   
                                                    Start: 2022  
End: 2022                         Patient encounter   
status                                  [facility]   
[facility]ordinators  
Work Phone:   
[phone]                          [facility]  
   
                                                    Start: 2022  
End: 2022                         Patient encounter   
procedure                               [facility]   
[facility]ordinators  
Work Phone:   
[phone]                          [facility]  
   
                                        Comment on above:   Pre-transplant evalu  
ation for kidney transplant (Primary Dx)   
   
                                                    Start: 2022  
End: 2022                         Patient encounter   
status                                  [facility]   
[facility]ordinators  
Work Phone:   
[phone]                          [facility]  
   
                                                    Start: 2022  
End: 2022                         Admission to same day   
surgery center                          FNP-BC Shantell Castorena  
Work Phone:   
[phone]                          [facility]   
[facility] [place]  
   
                                                    Start: 2022  
End: 2022           ambulatory                Ronald Princess  
Other Phone:   
[phone]                           [facility]   
[facility]  
Other Phone:   
[phone]  
   
                                        Start: 2022   Encounter by compute  
r   
link                      Ronald Sánchez        [facility]  
   
                                        Start: 2022   Office outpatient vi  
sit   
15 minutes                Ronald Sánchez        [facility]  
  
  
  
Procedures  
  
  
                          Date         Procedure    Procedure Detail Performing   
Clinician  
   
                          Start: 2024 Follow-up visit Follow-up    MONO CASANOVA  
   
                                        Start: 2024   Ultrasound elastogra  
phy of   
liver                                               Pepper Kelly  
Work Phone:   
[phone]  
   
                          Start: 2024 Ultrasonography of liver              
  Pepper Kelly  
Work Phone:   
[phone]  
   
                                        Start: 2023   Laparoscopic inserti  
on of   
peritoneal dialysis   
catheter                                            Pepper Kelly  
Work Phone:   
[phone]  
   
                                        Start: 2023   IR Fistulogram/TLA S  
tent   
(Right)                                             FNP-BC Shantell Kell  
Work Phone:   
[phone]  
   
                                        Start: 2022   Ultrasonography of   
arteriovenous fistula                               FNP-BC Shantell Castorena  
Work Phone:   
[phone]  
   
                                        Start: 2022   IR Fistulogram/TLA S  
tent   
(Right)                                             FNP-BC Shantell Kell  
Work Phone:   
[phone]  
   
                                        Start: 2022   Ultrasonography of   
arteriovenous fistula                               FNP-BC Shantell Kell  
Work Phone:   
[phone]  
   
                                        Start: 2022   Microalbumin [Mass/v  
olume]   
in Urine by Test strip                              Stacey Batres APRN-CNP  
Work Phone:   
[phone]  
   
                                        Start: 2022   Adult depression scr  
eening   
assessment                                          Stacey Batres APRN-CNP  
Work Phone:   
[phone]  
   
                          Start: 2022 Arteriovenous fistulization           
     FNP-BC Shantell Kell  
Work Phone:   
[phone]  
   
                                        Start: 2022   IR Fistulogram/TLA S  
tent   
(Right)                                             FNP-BC Shantell Castorena  
Work Phone:   
[phone]  
   
                                        Start: 2022   Ultrasonography of   
arteriovenous fistula                               FNP-BC Shantell Castorena  
Work Phone:   
[phone]  
   
                          Start: 2022 Arteriovenous fistulization           
     FNP-BC Shantell Castorena  
Work Phone:   
[phone]  
   
                          Start: 2021 Colonoscopy               [facility]   
[facility]ators  
Work Phone:   
[phone]  
  
  
  
Plan of Treatment  
  
  
                          Date         Care Activity Detail       Author  
   
                                        Start: 2033   DTaP,Tdap and Td Vac  
cines   
(2 - Td or Tdap)                        DTaP,Tdap and Td   
Vaccines (2 - Td or   
Tdap)                                   [facility]  
   
                          Start: 2026 LIPID SCREEN LIPID SCREEN [facility]  
   
                                        Start: 2025   Urine screening for   
protein                                 Diabetes: Urine Protein   
Screening                               [facility]  
   
                          Start: 2025 Adult BMI Screening Adult BMI Screen  
[facility]  
   
                          Start: 03- Tobacco Screening Tobacco Screening   
[facility]  
   
                                        Start: 2025   Screening for malign  
ant   
neoplasm of colon                       Colorectal Cancer   
Screening                               [facility]  
   
                                        Comment on above:   Postponed from  (Patient Refused)   
   
                                                    Start: 2025  
End: 2025                         Patient encounter   
procedure                               2025 1:00 PM EST   
Procedure Visit [facility]   
PODIATRY [address] [place] [address] [phone] Nancy Torrez [facility] [address]   
[place]   
[address] [phone]   
(Work) [phone]   
(Fax)                                   [facility] PODIATRY  
   
                          Start: 2024 DIABETES SCREEN DIABETES SCREEN [facility]  
   
                                                    Start: 2024  
End: 2024                         Patient encounter   
procedure                               2024 2:00 PM EST   
Office Visit [facility] [address] [place] [id number] [phone] Pepper Mendoza  [address]   
[place]   
[address] [phone]   
(Work) [phone]   
(Fax)                                   [facility]  
   
                                Start: 2024 Glaucoma screening Diabetes: R  
etinopathy   
Screening                               [facility]  
   
                                        Start: 2024   Hemoglobin A1c   
measurement                             Diabetes: Hemoglobin   
A1C                                     [facility]  
   
                          Start: 2024 Adult BMI Screening Adult BMI Screen  
ing [facility]  
   
                          Start: 2024 Tobacco Screening Tobacco Screening   
[facility]  
   
                          Start: 10- Influenza vaccination Influenza Vacc  
ine (#1) [facility]  
   
                                        Comment on above:   Postponed from  (Patient Does Not Have Time)   
   
                                                    Start: 10-  
End: 10-                         Patient encounter   
procedure                                           [facility]  
   
                                        Comment on above:   Arrived   
   
                          Start: 2024                           [facility]  
   
                                        Start: 2024   Ultrasonic guidance   
for   
needle biopsy                                       [facility]  
   
                          Start: 2024                           [facility]  
   
                                        Start: 2024   Actin smooth muscle   
IgG   
Ab [Units/volume] in   
Serum                                               [facility]  
   
                                        Start: 2024   Ceruloplasmin   
[Mass/volume] in Serum or   
Plasma                                              [facility]  
   
                                        Start: 2024   Hepatitis A virus Ab  
   
[Presence] in Serum by   
Immunoassay                                         [facility]  
   
                                        Start: 2024   Hepatitis A virus   
antibody, IgM type                                  [facility]  
   
                                        Start: 2024   Hepatitis B core ant  
ibody   
measurement                                         [facility]  
   
                                        Start: 2024   Hepatitis B core ant  
ibody   
measurement, IgM type                               [facility]  
   
                                        Start: 2024   Hepatitis B virus metcalf  
rface   
Ab [Presence] in Serum                              [facility]  
   
                                        Start: 2024   IgG [Mass/volume] in  
   
Serum or Plasma                                     [facility]  
   
                                        Start: 2024   Lipoprotein a   
[Moles/volume] in Serum   
or Plasma                                           [facility]  
   
                                        Start: 2024   Mitochondria M2 IgG   
Ab   
[Units/volume] in Serum                             [facility]  
   
                          Start: 2024                           [facility]  
   
                                        Start: 2024   Screening for malign  
ant   
neoplasm of colon         Colonoscopy               [facility]  
   
                                        Start: 2023   Administration of   
varicella zoster vaccine                Zoster (Shingles)   
Vaccine (2 of 2)                        [facility]  
   
                          Start: 10- Fall Risk Screening Fall Risk Screen  
ing [facility]  
   
                                        Start: 2023   Urine screening for   
protein                   Urine Microalbumin        [facility]  
   
                          Start: 2023 Depression Screening Depression Scre  
ening [facility]  
   
                                                    Start: 2023  
End: 2023                                             [facility]  
   
                          Start: 2023 Diabetic foot examination Diabetic F  
oot Exam [facility]  
   
                          Start: 2023 Influenza vaccination LUNG CANCER SC  
REENING [facility]  
   
                                        Start: 2023   Medicare Annual Well  
ness   
Visit                                   Medicare Annual   
Wellness Visit                          [facility]  
   
                          Start: 2023                           [facility]  
   
                          Start: 2022                           [facility]  
   
                          Start: 2022                           [facility]  
Work Phone:   
[phone]  
   
                          Start: 2022                           [facility]  
Work Phone:   
[phone]  
   
                                        Start: 2022   Ultrasonography of   
arteriovenous fistula     US AV Fistula             [facility]  
   
                          Start: 2022 Colonoscopy  COLONOSCOPY  [facility]  
   
                                        Start: 2022   COLORECTAL CANCER   
SCREENING                               COLORECTAL CANCER   
SCREENING                               [facility]  
   
                                        Start: 2022   ADVANCE DIRECTIVE   
DISCUSSION                              ADVANCE DIRECTIVE   
DISCUSSION                              [facility]  
   
                          Start: 2015 ADULT PREVNAR-13 ADULT PREVNAR-13 [facility]  
   
                                        Start: 2015   PNEUMOVAX AGE 65 AND  
 OVER   
WITH 5YR LOOKBACK (#1)                  PNEUMOVAX AGE 65 AND   
OVER WITH 5YR LOOKBACK   
(#1)                                    [facility]  
   
                                Start: 2000 SHINGRIX VACCINE (1 of 2) SHIN  
GRIX VACCINE (1 of   
2)                                      [facility]  
   
                          Start: 1995 COLOGUARD (FIT-DNA) COLOGUARD (FIT-D  
NA) [facility]  
   
                          Start: 1995 CT COLONOGRAPHY CT COLONOGRAPHY [facility]  
   
                          Start: 1995 FECAL OCCULT BLOOD FECAL OCCULT BLOO  
D [facility]  
   
                          Start: 1995 [facility]  
   
                                        Start: 1969   HEPATITIS B (1 of 3   
-   
Risk 3-dose series)                     HEPATITIS B (1 of 3 -   
Risk 3-dose series)                     [facility]  
   
                                        Start: 1969   Urine microalbumin   
profile                   DTAP,TDAP,TD (1 - Tdap)   [facility]  
   
                                Start: 1968 Adult BMI Follow Up Plan Adult  
 BMI Follow Up   
Plan                                    [facility]  
   
                          Start: 1968 HEPATITIS C SCREENING HEPATITIS C SC  
REENING [facility]  
   
                                        Start: 1962   Adult depression   
screening assessment      DEPRESSION SCREENING      [facility]  
   
                          Start: 1955 COVID-19 VACCINE (1) COVID-19 VACCIN  
E (1) [facility]  
   
                                        Start: 1951   HEPATITIS A (1 of 2   
-   
Risk 2-dose series)                     HEPATITIS A (1 of 2 -   
Risk 2-dose series)                     [facility]  
   
                                        Start: 1950   ABDOMINAL AORTIC ANE  
URYSM   
SCREENING                               ABDOMINAL AORTIC   
ANEURYSM SCREENING                      [facility]  
   
                                Start: 1950 Glaucoma screening Diabetic Op  
hthalmology   
Exam                                    [facility]  
   
                                        Start: 1950   Screening for malign  
ant   
neoplasm of colon                                   [facility]  
   
                          Start: 1950 Tobacco Counseling Tobacco Counselin  
g [facility]  
   
                                                            Alpha 1 antitrypsin   
[Mass/volume] in Serum or   
Plasma                                              [facility]  
   
                                                            Alpha 1 antitrypsin   
phenotyping [Identifier]   
in Serum or Plasma by   
Immunofixation                                      [facility]  
   
                                                            Hepatitis B virus metcalf  
rface   
Ag [Presence] in Serum or   
Plasma by Immunoassay                               [facility]  
   
                                                            Hepatitis C virus Ig  
G Ab   
[Presence] in Serum or   
Plasma by Immunoassay                               [facility]  
   
                                                            HFE gene mutations f  
ound   
[Identifier] in Blood or   
Tissue by Molecular   
genetics method Nominal                             [facility]  
   
                                                            Homogenous nuclear A  
b   
pattern [Titer] in Serum                            [facility]  
   
                                                            Nuclear Ab [Titer] i  
n   
Serum                                               [facility]  
   
                                       Patient Education              [facility]  
Work Phone:   
[phone]  
   
                                       Patient referral              [facility]  
Work Phone:   
[phone]  
   
                                                                 [facility]  
  
  
  
Immunizations  
  
  
                      Immunization Date Immunization Notes      Care Provider Talha jarrett  
   
                                        2024          hepatitis B vaccine,  
   
dialysis patient dosage                             Pepper Mendoza NP  
Work Phone:   
[phone]                          [facility]  
   
                                        2024          hepatitis B vaccine,  
   
dialysis patient dosage                             Pepper Mendoza NP  
Work Phone:   
[phone]                          [facility]  
   
                                        2024          hepatitis B vaccine,  
   
dialysis patient dosage                             Pepper Aicbogdanholz NP  
Work Phone:   
[phone]                          [facility]  
   
                                        2023          hepatitis B vaccine,  
   
dialysis patient dosage                             Pepper Aicbogdanholz NP  
Work Phone:   
[phone]                          [facility]  
   
                                        11-          Pneumococcal Conjuga  
te   
PCV 20                                              Pepper Aichholz NP  
Work Phone:   
[phone]                          [facility]  
   
                                        11-          zoster vaccine   
recombinant                                         Pepper Aichholz NP  
Work Phone:   
[phone]                          [facility]  
   
                                        2023          Influenza, High-dose  
   
Seasonal, Quadrivalent,   
Preservative Free                                   Pepper Aichholz NP  
Work Phone:   
[phone]                          [facility]  
   
                                        2023          tetanus toxoid, redu  
oskar   
diphtheria toxoid, and   
acellular pertussis   
vaccine, adsorbed                                   Pepper Aichholz NP  
Work Phone:   
[phone]                          [facility]  
   
                                        2023          zoster vaccine   
recombinant                                         Pepper Aichholz NP  
Work Phone:   
[phone]                          [facility]  
   
                                        2023          influenza virus vacc  
ine,   
unspecified formulation                             Pepper Aichholz NP  
Work Phone:   
[phone]                          [facility]  
   
                                        2023          zoster vaccine,   
unspecified formulation                             Stacey Batres   
APRN-CNP  
Work Phone:   
[phone]                          [facility]  
   
                                        10-          Influenza Vaccine,   
Quadrivalent, Adjuvanted                            Stacey Batres   
APRN-CNP  
Work Phone:   
[phone]                          [facility]  
   
                                        2022          pneumococcal conjuga  
te   
vaccine, 13 valent                                  Stacey Batres   
APRN-CNP  
Work Phone:   
[phone]                          [facility]  
   
                                        2021          influenza, high dose  
   
seasonal,   
preservative-free                                   Stacey Batres   
APRN-CNP  
Work Phone:   
[phone]                          [facility]  
   
                                        2020          influenza, high dose  
   
seasonal,   
preservative-free                                   Staceyruben Batres   
APRN-CNP  
Work Phone:   
[phone]                          [facility]  
   
                                        2020          pneumococcal   
polysaccharide vaccine,   
23 valent                                           Stacey Batres   
APRN-CNP  
Work Phone:   
[phone]                          [facility]  
  
  
  
Payers  
  
  
                          Date         Payer Category Payer        Policy ID  
   
                          2024   Medicare                    
   
                          2023   Medicaid                  [id number]  
[id number]  
[id number]  
   
                          2023   Medicaid                  [id number]  
   
                                2023      Medicare (Managed Care) [facility]   
HEALTH Member   
Subscriber Plan / Payer   
(Effective   
2023-Present)   
Name: Arun Moon   
Member ID: [id number]   
Relation to Subscriber:   
Self Name: Arun Moon LORENZO Subscriber ID:   
[id number] Payer ID: Not on   
file Group ID: [id number]   
Type: Not on file   
Address: [address]   
[place] [address]                    [id number]  
[id number]  
   
                          2023   Unknown                   [id number].  
[id number].  
[id number]  
   
                          2023   Medicare                  [id number]   
[id number]  
[id number]  
   
                                2022      Medicare        AETNA MEDICARE A  
ETNA   
MEDICARE PPO   
[id number]   
2022-Present   
[phone] [address]   
[address] [place]   
[address] PPO                          [id number]   
[id number].  
[id number]  
   
                          1950   Unknown                   [id number]   
[id number].  
1286  
   
                          1950   Unknown                   [id number]   
[id number].  
1286  
   
                          1950   Unknown                   [id number]   
[id number].  
[id number]   Unknown                   [id number]   
[id number].  
1950   Unknown                   [id number]   
2.16.840.1.333165.3.579.2.  
1950   Unknown                   [id number]   
2.16.840.1.765774.3.579.2.  
1950   Unknown                   [id number]   
2.16.840.1.836352.3.579.2.  
1950   Unknown                   [id number]   
2.16.840.1.009654.3.579.2.  
1950   Unknown                   [id number]   
2.16.840.1.174660.3.579.2.  
1950   Unknown                   [id number]   
2.16.840.1.223003.3.579.2.  
1950   Unknown                   [id number]   
2.16.840.1.004287.3.579.2.  
1950   Unknown                   [id number]   
2.16.840.1.843406.3.579.2.  
1950   Unknown                   [id number]   
2.16.840.1.425827.3.579.2.  
1950   Unknown                   [id number]   
2.16.840.1.936014.3.579.2.  
1950   Unknown                   [id number]   
2.16.840.1.263607.3.579.2.  
1950   Unknown                   [id number]   
2.16.840.1.394986.3.579.2.  
1950   Unknown                   [id number]   
2.16.840.1.374249.3.579.2.  
1950   Unknown                   [id number]   
2.16.840.1.487376.3.579.2.  
1950   Unknown                   [id number]   
2.16.840.1.685386.3.579.2.  
1950   Unknown                   [id number]   
2.16.840.1.526029.3.579.2.  
1286  
   
                          1950   Unknown                   [id number]   
[id number].  
1286  
   
                          1950   Unknown                   [id number]   
[id number].  
1286  
   
                          1950   Unknown                   [id number]   
[id number].  
1259  
   
                          1950   Unknown                   [id number]   
2.16.840.1.367318.3.579.2.  
1259  
   
                          1950   Unknown                   [id number]   
[id number].  
1950   Unknown                   [id number]   
2.16.840.1.204787.3.579.2.  
1259  
   
                          1950   Unknown                   [id number]   
[id number].  
1259  
   
                          1950   Unknown                   [id number]   
[id number].  
1950   Unknown                   [id number]   
[id number].  
1950   Unknown                   [id number]   
2.16.840.1.515039.3.579.2.  
1259  
   
                          1950   Unknown                   [id number]   
[id number].  
1251950   Unknown                   [id number]   
[id number].  
1251950   Unknown                   [id number]   
2.16.840.1.533791.3.579.2.  
1251950   Unknown                   [id number]   
2.16.840.1.774614.3.579.2.  
1251950   Unknown                   [id number]   
[id number].  
1251950   Unknown                   [id number]   
2.16.840.1.718837.3.579.2.  
196  
   
                          1950   Unknown                   [id number]   
[id number].  
196  
   
                          1950   Unknown                   [id number]   
[id number].  
196  
   
                          1950   Unknown                   [id number]   
[id number].  
93  
   
                          1950   Unknown                   [id number]   
[id number].  
93  
   
                          1950   Unknown                   [id number]   
[id number].  
93  
   
                                       Private Health Insurance              101  
[id number]   
[id number]  
[id number]  
   
                                       Self-pay     Self Pay     [id number]  
[id number]  
[id number]  
   
                                       Unknown      HCAP/HFA/FAP Active [id number]   
[id number]  
[id number]  
  
  
  
Social History  
  
  
                          Date         Type         Detail       Facility  
   
                                                    Start:   
2021  
End: 2023                         Tobacco smoking status   
[facility]                      Smokes tobacco daily      [facility]  
Work Phone:   
[phone]  
   
                                                    Start:   
1963          History of tobacco use Cigarette Smoker    [facility]  
Work Phone:   
[phone]  
   
                                                    Start:   
2021  
End: 2024                         Cigarettes smoked   
current (pack per day)   
- Reported                1                         [facility]  
   
                                                    Start:   
2021  
End: 2023                         Tobacco use and   
exposure                                Smokeless tobacco   
non-user                                [facility]  
Work Phone:   
[phone]  
   
                                                    Start:   
1950          Sex Assigned At Birth Not on file         [facility]  
   
                                                    Start:   
2022  
End: 2022                         Exposure to SARS-CoV-2   
(event)                   Not sure                  [facility]  
   
                                                    Start:   
2022  
End: 2023                         Tobacco smoking status   
[facility]IS                      Ex-smoker (finding)       [facility]  
   
                                                    Start:   
1950          Sex Assigned At Birth Male                [facility]  
   
                                                    Start:   
2022  
End: 2024                         Tobacco smoking status   
[facility]IS                      Smoker (finding)          [facility]  
   
                                                    Start:   
2020  
End: 2024     Sex Assigned At Birth                     [facility]   
[employer]  
Other Phone:   
[phone]  
   
                                                    Start:   
2023  
End: 2024     Alcohol intake      Ex-drinker (finding) [email or web address]  
stem  
   
                                                            How often to you hav  
e a   
drink containing   
alcohol?                  Never                     [email or web address]  
   
                                                            How many standard   
drinks containing   
alcohol do you have on   
a typical day?            Not asked                 [facility]  
   
                                                    Start:   
2023          Tobacco Comment     6 cigarettes daily  [facility]  
tem  
   
                                                    Start:   
10-          Alcohol Comment     occ                 [facility]  
tem  
   
                                                    Start:   
2023                Gender identity           Identifies as male   
gender (finding)                        [facility]  
   
                                                    Start:   
2023          Sexual orientation  Heterosexual (finding) [email or web address]   
[facility]  
   
                                                            Has the electric, ga  
s,   
oil, or water company   
threatened to shut off   
services in your home   
in past 12Mo              No                        [facility]  
   
                                                            Are you now ,  
   
, ,   
, never   
 or living with   
a partner?                                 [email or web address]  
   
                                                            Do you feel stress -  
   
tense, restless,   
nervous, or anxious, or   
unable to sleep at   
night because your mind   
is troubled all the   
time - these days [OSQ]   Not at all                [facility]  
   
                                                    Start:   
10-  
End: 10-                         Alcoholic beverage   
intake                                  Lifetime non-drinker   
(finding)                               [facility]  
   
                                                    Start:   
2023          Tobacco Comment     Quit 3-6 months ago [facility]  
   
                                                    Start:   
2023                Alcohol Comment           Caffeine: 2-3 cups/day   
coffee                                  [facility]  
  
  
  
Medical Equipment  
  
  
                                Procedure Code  Equipment Code  Equipment Origin  
al   
Text                                    Equipment   
Identifier                              Dates  
   
                                                    Insertion, catheter,   
dialysis, peritoneal,   
laparoscopic                                        Peritoneal   
dialysis catheter,   
chronic                                 ()[id number]  
[id number] FDA                              Start:   
2023  
   
                                                       [id number]  Start:   
2022  
   
                                                                Pen Needle,   
Diabetic (Bd Lizzy   
2nd Gen Pen   
Needle) 32 gauge x   
5/32  needle                                        Start:   
2024  
   
                                                                Uses up to 4   
needles per day.   
Use as instructed         [id number]                  Start:   
2024  
End:   
2025  
  
  
  
Goals  
  
  
                                Date            Patient Goal    Desired Activity  
/State  
   
                                                Personal health goal   
   
                                        Comment on above:   Formatting of this n  
ote might be different from the original.  
Evaluation of progress towards goal: Pt will return home with his   
daughter who is his caregiver.   
   
                                                                  
  
  
  
Clinical Notes 2022 to 10-  
Nancy Torrez, JV - 10/16/2024 1:00 PM EDTPatient InstructionsLisa Mendoza,   
NP - 10/09/2024 11:00 AM EDColleen Mendoza, NP - 10/09/2024 11:00 AM Genny Mendoza, NP - 10/09/2024 10:59 AM EDT  
  
                                Note Date & Type Note            Facility  
   
                                                    10- History of Presen  
t   
illness Narrative                       Formatting of this note is different   
from the original.  
Images from the original note were   
not included.  
  
  
Subjective  
Patient ID: Keiht Moon is a 74   
y.o. male who presents for DM Foot   
Care (PCP: Dr. Carlo GARZA 10/09/24,   
A1C: 9.1, BS: 239).  
  
HPI  
HPI  
Constitutional:  
patient returns to clinic requesting   
nail care and diabetic foot   
assessment.  
Onychomycosis/Toenail Fungus:  
Location: multiple digits with   
thickened, deformed and discolored   
toenails. Multiple digits are   
problematic/symptomatic.  
Duration: chronic toenail deformity,   
multiple years duration.  
Severity of symptoms: mild;   
acknowledging diabetic neuropathy.  
Onset: gradual, without known injury   
or trauma.  
Status: problematic/mildly   
symptomatic over the past several   
weeks or so.  
Context: hard to trim, hard to reach;   
self-care is difficult, ineffective   
and not practical; particularly in   
light of diabetic retinopathy   
(legally blind); significantly   
increasing risk exposure. Family   
members unable to provide effective   
care.  
Characteristics: discolored,   
thickened, pain , pressure ,   
elongated , /lifting ,   
ingrowing , crusty; without bleeding   
or drainage.  
Relieved by: patient along with   
family members are well satisfied   
with conservative and palliative care   
measures.  
Previous Treatment: palliative care   
as noted.  
Risk factors: type II diabetes.   
Peripheral neuropathy. Medical   
comorbidities. Retinopathy (legally   
blind). Polypharmacy. Aspirin   
therapy. Toenail deformity. Digital   
and/or shoe trauma and related   
complications.  
Aggravated by: shoe gear , pressure ,   
walking; catching and snagging on   
clothing etc..  
Medications  
  
Current Outpatient Medications:  
albuterol HFA 90 mcg/act inhaler,   
Inhale 2 puffs every 4 (four) hours   
if needed for shortness of breath or   
wheezing, Disp: , Rfl:  
amLODIPine (Norvasc) 5 MG tablet,   
Take 5 mg by mouth in the morning.,   
Disp: , Rfl:  
atorvastatin (Lipitor) 40 MG tablet,   
Take 40 mg by mouth in the morning.,   
Disp: , Rfl:  
B Complex-C-Zn-Folic Acid   
(Dialyvite/Zinc) tablet, Take 1   
tablet by mouth at bedtime, Disp: ,   
Rfl:  
benzonatate (Tessalon) 200 MG   
capsule, Take 200 mg by mouth 3   
(three) times a day as needed, Disp:   
, Rfl:  
bumetanide (Bumex) 2 MG tablet, Take   
2 mg by mouth Daily, Disp: , Rfl:  
cefdinir (Omnicef) 300 MG capsule,   
Take 300 mg by mouth, Disp: , Rfl:  
cetirizine (ZyrTEC) 10 MG tablet,   
Take 10 mg by mouth in the morning.,   
Disp: , Rfl:  
cholecalciferol (Vitamin D-3) 125 MCG   
(5000 UT) capsule, Take 5,000 Units   
by mouth in the morning., Disp: ,   
Rfl:  
Continuous Blood Gluc    
(FreeStyle Yudi 2 Pepin) device, 1   
Device Daily as needed, Disp: , Rfl:  
Continuous Glucose Sensor (FreeStyle   
Yudi 2 Sensor) mis, 1 each by Other   
route Every 10 (ten) days, Disp: ,   
Rfl:  
cyclobenzaprine (Flexeril) 10 MG   
tablet, Take 1 tablet (10 mg) by   
mouth every 12 (twelve) hours, Disp:   
60 tablet, Rfl: 2  
furosemide (Lasix) 40 MG tablet, Take   
1 tablet by mouth Daily, Disp: , Rfl:  
gentamicin (Garamycin) 0.1 %   
ointment, Apply 1 Application   
topically in the morning., Disp: ,   
Rfl:  
heparin 1000 units/mL injection,   
Infuse 1,000 Units/hr into a venous   
catheter every other day, Disp: ,   
Rfl:  
hydrOXYzine pamoate (Vistaril) 25 MG   
capsule, Take 25 mg by mouth every 8   
(eight) hours if needed for anxiety   
or itching, Disp: , Rfl:  
insulin aspart, with niacinamide,   
(Fiasp FlexTouch) 100 UNIT/ML   
injection, 5 units before meals and   
8-10 units at bedtime. For total max   
dose of 25 units daily, Disp: 23 mL,   
Rfl: 3  
insulin glargine (Basaglar KwikPen)   
100 UNIT/ML pen, 25 units daily,   
Disp: 27 mL, Rfl: 1  
Lokelma 5 g packet, 5 g, Disp: , Rfl:  
loratadine (Claritin) 10 MG tablet,   
Take 10 mg by mouth if needed for   
allergies, Disp: , Rfl:  
magnesium oxide (Mag-Ox) 400 (240 Mg)   
MG tablet, Take 400 mg by mouth.,   
Disp: , Rfl:  
Melatonin 1 MG sublingual tablet,   
Place 1 mg under the tongue at   
bedtime, Disp: , Rfl:  
omeprazole (PriLOSEC) 20 MG DR   
capsule, Take 20 mg by mouth in the   
morning., Disp: , Rfl:  
OneTouch Verio test strip, 1 each by   
Other route if needed, Disp: , Rfl:  
pen needle 32G x 4 mm misc, Uses up   
to 4 needles per day. Use as   
instructed, Disp: 400 each, Rfl: 3  
sevelamer carbonate (Renvela) 800 MG   
tablet, Take 800 mg by mouth in the   
morning and 800 mg at noon and 800 mg   
in the evening. Take with meals.,   
Disp: , Rfl:  
  
Allergies  
Thiopental  
  
Past Surgical History  
Past Surgical History:  
Procedure Laterality Date  
AV FISTULA PLACEMENT 10/2022  
Fistula  
CATHETERIZATION URETHRAL  
CYSTOSCOPY 2024  
GALLBLADDER SURGERY  
HEART CATH  
IR FISTULAGRAM 03/15/2023  
KNEE CARTILAGE SURGERY  
RT knee cartilage repair  
LIVER BIOPSY 2024  
NASAL SEPTUM SURGERY  
deviated septum  
VASECTOMY  
WISDOM TOOTH EXTRACTION  
wisdom teeth  
  
Family History  
Family History  
Adopted: Yes  
Problem Relation Name Age of Onset  
No Known Problems Mother  
No Known Problems Father  
  
Objective  
General Examination:  
GENERAL EXAMINATION: alert and   
oriented. Pleasant disposition.   
Accompanied by his daughter.  
FOOT EXAM:  
Date of Last Foot Exam 10/16/2024  
Sensory testing performed: sensations   
diminished  
Sensory and motor testing performed:   
strength diminished  
Pedal pulse taking performed: 1+  
Vascular:  
DORSALIS PEDIS PULSE: bilaterally,   
2/4.  
POSTERIOR TIBIAL PULSE: bilaterally,   
1/4.  
TEMPERATURE GRADIENT: warm to   
slightly cool.  
EDEMA: unremarkable for ankle edema.  
CAPILLARY FILLING TIME(sec):   
capillary fill intact bilateral   
digits less than 3 secs.  
Neurologic:  
VIBRATORY: absent at the MTP joints.  
SHARP SENSATION: tactile and light   
touch sensation intact.  
SEMMES-VANDANA 5.07 MONOFILAMENT:   
unable to localize multiple points   
plantarly.  
Dermatologic:  
SKIN FINDINGS: intact. Skin turgor is   
good.  
HYPERKERATOSIS: no forefoot or   
digital keratotic pressure lesions   
are noted.  
NAIL PATHOLOGY: Right great toe:   
Stable DSO deformity: Toenail   
dystrophy, thickening, elongation,   
discoloration, residual pincer   
deformity, periungual hyperkeratotic   
debris, without drainage.  
All remaining digits: Varying degrees   
of toenail dystrophy, thickening,   
elongation, discoloration, subtotal   
detachment, periungual hyperkeratotic   
and mycotic debris; without drainage.  
INGROWN NAIL PATHOLOGY: Satisfactory   
outcome lateral matricectomy/phenol   
technique right great toe.  
INTERDIGITAL MACERATION: clean, dry,   
non-inflamed.  
ULCER: no sign of ulceration or open   
wound.  
Ankle / Foot:  
RANGE OF MOTION: functional but   
limited ankle, subtalar and first MTP   
joint range of motion.  
  
Radiology:  
  
Assessment/Plan  
1. Onychodystrophy/mycosis multiple   
digits.  
2. Satisfactory outcome lateral   
marginal matricectomy right great toe   
(2024).  
3. Type II diabetes/IDDM.  
4. Diabetic peripheral neuropathy   
(Q9).  
5. Diabetic retinopathy (legally   
blind).  
6. CKD requiring dialysis  
  
Plan: Clinical Notes: conservative   
and palliative care measures are   
preferred, understood and again   
indicated; expressing no interest in   
oral or topical therapy.  
Continue B complex daily (Metanx cost   
prohibitive).  
Diabetic education and assessment   
relative to the high risk condition.  
  
Procedure:  
Toenail Debridement: Aseptic   
technique: power/manual   
instrumentation: onychodebridement   
length and thickness, curretage of   
offending crypotic margins,   
corby-ungual debris, providing   
effective pressure relief, reducing   
shoe and digital trauma; reducing   
potential risks associated with   
diabetic neuropathic foot condition   
and related complications.  
  
  
This note was created with the   
assistance of a speech recognition   
program. While intending to generate   
a timely document that accurately   
reflects the content of the visit, no   
guarantee can be provided that every   
grammatical or spelling mistake has   
been or will be identified or   
corrected. Thank you for your   
understanding.  
  
Nancy Torrez DPM  
Electronically signed by Nancy Torrez DPM at 10/16/2024 1:37 PM EDT  
documented in this encounter            [facility]  
   
                                        10- Instructions   
  
  
Nancy Torrez DPM - 10/16/2024   
1:00 PM EDTFormatting of this note   
might be different from the original.  
As noted  
Electronically signed by Nancy Torrez DPM at 10/16/2024 1:36 PM EDT  
  
documented in this encounter            [facility]  
   
                                                    10- History of Presen  
t   
illness Narrative                       Associated Problem(s): HLD   
(hyperlipidemia) (CMS/HCC)  
Formatting of this note might be   
different from the original.  
Please ask GI when OK to start   
atorvastatin  
Electronically signed by Pepper Mendoza NP at 10/09/2024 11:00 AM   
EDT  
Associated Problem(s): Type 2   
diabetes mellitus without   
complication, with long-term current   
use of insulin (CMS/HCC)  
Formatting of this note might be   
different from the original.  
Continue with Endo  
Electronically signed by Pepper Mendoza NP at 10/09/2024 11:00 AM   
EDT  
Associated Problem(s): DDD   
(degenerative disc disease), lumbar  
Formatting of this note might be   
different from the original.  
Will need to get copy of xrays from   
[facility]  
Requests referral to Pain Mgmt at   
[place]  
Electronically signed by Pepper Mendoza NP at 10/09/2024 10:59 AM   
EDT  
Associated Problem(s): End stage   
renal disease (CMS/HCC)  
Formatting of this note might be   
different from the original.  
Continue with nephrology  
And PD  
Electronically signed by Pepper Mendoza NP at 10/09/2024 10:58 AM   
EDT  
Associated Problem(s): Hepatic   
fibrosis  
Formatting of this note might be   
different from the original.  
Follow with GI next week  
  
Electronically signed by Pepper Mendoza NP at 10/09/2024 10:58 AM   
EDT  
Associated Problem(s): HTN   
(hypertension) (CMS/HCC)  
Formatting of this note might be   
different from the original.  
Stable no med dose changes  
Electronically signed by Pepper Mendoza NP at 10/09/2024 10:58 AM   
EDT  
Formatting of this note might be   
different from the original.  
241  
Right hip  
Pt would like a referral to [place]   
pain management  
Long acting 15u in the morning 10 at   
night  
Short acting is 6 un and sliding   
scale  
4x daily, add 1 un per every 50  
  
7day average-180  
14day average-196  
30 day average-198  
90 day average-209  
Electronically signed by TAMI RAMIREZ at 10/09/2024 11:01 AM EDT  
Formatting of this note is different   
from the original.  
Images from the original note were   
not included.  
  
Arun Moon is a 74 y.o. male   
presents with chief complaint of No   
chief complaint on file.  
  
HPI:  
  
Had a fall about 2 weeks ago, in the   
shower, slipped. Twisting his back   
and leg went out. Was evaluated at   
[facility], had xray negative. Would   
like to see Pain Mgmt at [place].  
Low back pain: 5/10 tooth ache type,   
constant, burning in the right knee,   
getting some charley horse type   
symptoms. No cauda equina symptoms.  
Blood sugar: 10 units BID, 6 units   
each meal and HS, plus sliding scale   
1 unit for every 50 units over 150.  
  
Still holding statin , had a liver   
biopsy, still waiting on results for   
this, Jeanie on 10/22/24.  
  
  
  
SUBJECTIVE:  
  
MEDICATIONS:  
Current Outpatient Medications  
Medication Instructions  
albuterol HFA 90 mcg/act inhaler 2   
puffs, Inhalation, Every 4 hours PRN  
amLODIPine (NORVASC) 5 mg, Oral,   
Daily  
atorvastatin (LIPITOR) 40 mg, Oral,   
Daily  
B Complex-C-Zn-Folic Acid   
(Dialyvite/Zinc) tablet 1 tablet,   
Oral, Nightly  
benzonatate (TESSALON) 200 mg, Oral,   
3 times daily PRN  
bumetanide (BUMEX) 2 mg, Oral, Daily  
cefdinir (OMNICEF) 300 mg, Oral  
cetirizine (ZYRTEC) 10 mg, Oral,   
Daily RT  
cholecalciferol (VITAMIN D-3) 5,000   
Units, Oral, Daily  
Continuous Blood Gluc    
(FreeStyle Yudi 2 Pepin) device 1   
Device, Does not apply, Daily PRN   
Continuous Glucose Sensor (FreeStyle   
Yudi 2 Sensor) misc 1 each, Other,   
Every 10 days  
cyclobenzaprine (FLEXERIL) 10 mg,   
Oral, Every 12 hours  
furosemide (Lasix) 40 MG tablet 1   
tablet, Oral, Daily  
gentamicin (Garamycin) 0.1 % ointment   
1 Application, Topical, Daily RT  
heparin 1,000 Units/hr, Intravenous,   
Every other day  
hydrOXYzine pamoate (VISTARIL) 25 mg,   
Oral, Every 8 hours PRN  
insulin aspart, with niacinamide,   
(Fiasp FlexTouch) 100 UNIT/ML   
injection 5 units before meals and   
8-10 units at bedtime. For total max   
dose of 25 units daily  
insulin glargine (Basaglar KwikPen)   
100 UNIT/ML pen 25 units daily  
Lokelma 5 g  
loratadine (CLARITIN) 10 mg, Oral, As   
needed  
magnesium oxide (MAG-OX) 400 mg, Oral  
Melatonin 1 mg, Sublingual, Nightly  
omeprazole (PRILOSEC) 20 mg, Oral,   
Daily RT  
OneTouch Verio test strip 1 each,   
Other, As needed  
pen needle 32G x 4 mm misc Uses up to   
4 needles per day. Use as instructed  
sevelamer carbonate (RENVELA) 800 mg,   
Oral, 3 times daily with meals  
  
  
ALLERGIES:  
Allergies  
Allergen Reactions  
Thiopental  
Other Reaction(s): anger/rage  
  
  
REVIEW OF SYMPTOMS:  
  
Review of Systems  
Constitutional: Negative for activity   
change, appetite change and   
unexpected weight change.  
HENT: Negative for ear pain,   
nosebleeds, sneezing, trouble   
swallowing and voice change.  
Eyes: Negative for pain, discharge   
and visual disturbance.  
Respiratory: Negative for apnea,   
chest tightness and wheezing.  
Cardiovascular: Negative for leg   
swelling.  
Gastrointestinal: Negative for   
abdominal distention, blood in stool,   
constipation and diarrhea.  
Genitourinary: Negative for decreased   
urine volume, difficulty urinating,   
dysuria and hematuria.  
Musculoskeletal: Positive for   
arthralgias and back pain.  
Skin: Negative for color change.  
Neurological: Negative for dizziness,   
tremors and seizures.  
Psychiatric/Behavioral: Negative for   
agitation, decreased concentration,   
hallucinations, self-injury and   
suicidal ideas. The patient is not   
nervous/anxious.  
Hematological: Negative for   
adenopathy. Does not bruise/bleed   
easily.  
Endocrine: Negative for cold   
intolerance, heat intolerance,   
polydipsia and polyuria.  
Allergic/Immunologic: Negative for   
environmental allergies and food   
allergies.  
  
  
PAST MEDICAL HISTORY  
  
Past Medical History:  
Diagnosis Date  
Anxiety, generalized (CMS/[facility])   
3/19/2024  
Arthritis 2024  
Asthma (CMS/[facility]) 2024  
Chronic back pain, unspecified back   
location, unspecified back pain   
laterality 2024  
COPD (chronic obstructive pulmonary   
disease) (CMS/HCC) 2024  
Diverticulosis 2024  
Elevated liver function tests   
2024  
ESRD (end stage renal disease)   
(CMS/HCC) 2024  
Gall stones 2024  
Heart disease 2024  
HLD (hyperlipidemia) (CMS/HCC)   
2024  
HTN (hypertension) (CMS/HCC)   
2024  
Legally blind 2024  
Migraines (CMS/HCC) 2024  
Muscle spasm 2023  
Myocardial infarct (CMS/HCC)   
2024  
Neuropathy  
Tobacco user 2024  
Type 2 diabetes mellitus with ESRD   
(end-stage renal disease) (CMS/HCC)   
2024  
  
  
Past Surgical History:  
Procedure Laterality Date  
AV FISTULA PLACEMENT 10/2022  
Fistula  
CATHETERIZATION URETHRAL  
CYSTOSCOPY 2024  
GALLBLADDER SURGERY  
HEART CATH  
IR FISTULAGRAM 03/15/2023  
KNEE CARTILAGE SURGERY  
RT knee cartilage repair  
NASAL SEPTUM SURGERY  
deviated septum  
VASECTOMY  
WISDOM TOOTH EXTRACTION  
wisdom teeth  
  
family history includes No Known   
Problems in his father and mother. He   
was adopted.  
  
OBJECTIVE:  
  
Visit Vitals  
/68 (BP Location: Left arm,   
Patient Position: Sitting, BP Cuff   
Size: Adult long)  
Pulse 98  
Temp 98.5 F (Temporal)  
Resp 19  
Ht 5' 11   
Wt 221 lb 12.8 oz  
SpO2 98%  
BMI 30.93 kg/m  
Smoking Status Former  
BSA 2.25 m  
  
  
Physical Exam  
Vitals and nursing note reviewed.  
Constitutional:  
Appearance: Normal appearance.  
HENT:  
Head: Normocephalic.  
Right Ear: External ear normal.  
Left Ear: External ear normal.  
Nose: Nose normal.  
Mouth/Throat:  
Mouth: Mucous membranes are moist.  
Pharynx: Oropharynx is clear.  
Eyes:  
Extraocular Movements: Extraocular   
movements intact.  
Conjunctiva/sclera: Conjunctivae   
normal.  
Cardiovascular:  
Rate and Rhythm: Normal rate and   
regular rhythm.  
Pulses: Normal pulses.  
Heart sounds: Normal heart sounds.  
Pulmonary:  
Effort: Pulmonary effort is normal.  
Breath sounds: Normal breath sounds.  
Abdominal:  
General: Bowel sounds are normal.  
Palpations: Abdomen is soft.  
Musculoskeletal:  
General: Tenderness (right knee)   
present.  
Cervical back: Neck supple.  
Right lower leg: No edema.  
Left lower leg: No edema.  
Lymphadenopathy:  
Cervical: No cervical adenopathy.  
Skin:  
General: Skin is warm and dry.  
Capillary Refill: Capillary refill   
takes 2 to 3 seconds.  
Neurological:  
General: No focal deficit present.  
Mental Status: He is alert.  
Psychiatric:  
Mood and Affect: Mood normal.  
Behavior: Behavior normal.  
Thought Content: Thought content   
normal.  
Judgment: Judgment normal.  
  
  
  
ASSESSMENT AND PLAN:  
  
Follow up in about 2 months (around   
2024) for Recheck.  
Problem List Items Addressed This   
Visit  
  
HTN (hypertension) (CMS/HCC)  
Stable no med dose changes  
  
  
End stage renal disease (CMS/HCC)  
Continue with nephrology  
And PD  
  
  
HLD (hyperlipidemia) (CMS/HCC)  
Please ask GI when OK to start   
atorvastatin  
  
  
Type 2 diabetes mellitus without   
complication, with long-term current   
use of insulin (CMS/HCC)  
Continue with Endo  
  
  
Tobacco user  
Hepatic fibrosis  
Follow with GI next week  
  
  
  
DDD (degenerative disc disease),   
lumbar - Primary  
Will need to get copy of xrays from   
[facility]  
Requests referral to Pain Mgmt at   
[place]  
  
  
Relevant Orders  
Ambulatory referral to Pain Medicine  
Acute right hip pain  
Relevant Orders  
Ambulatory referral to Pain Medicine  
  
  
Electronically signed by Pepper Mendoza NP at 10/09/2024 11:01 AM   
EDT  
documented in this encounter            [facility]  
  
  
  
                                                    2024 Evaluation note   
   
                                           
   
                                        Authored            2024 1:5  
6pm  
   
                                                    74-year-old man with end-sta  
ge renal disease referred   
to the liver clinic for evaluation of elevated  
liver enzymes  
  
JAMIE and SMA are positive Otherwise laboratory work up   
for infectious, autoimmune and metabolic  
etiologies of liver diseases were unremarkable.  
Patient has obesity, HTN, DM which are risk factors   
associated with MASLD. Patient does not have  
secondary causes of hepatic fat accumulation such as   
significant alcohol consumption, viral  
hepatitis, steatogenic medications (e.g., tamoxifen,   
amiodarone, methotrexate), or lipodystrophy.  
Ultrasound liver showed hepatic steatosis  
Fibroscan on 2024 showed LSM: 10.8 and   
- Will arrange for liver biopsy to confirm etiology   
AIH vs MASH and to confirm staging  
- Will check [facility]  
Work Phone: [phone]- NoteMR THORACIC SPINE WO CONT  
History: Mid back pain for several years  
Procedure:  
Multiplanar multisequence MR imaging performed through the thoracic spine  
Findings:  
Disc space narrowing and posterior disc protrusions and facet hypertrophy T9-10 
and T10-11 level resulting in moderate central canal stenosis at those levels. 
Abnormal signal with edema within the cord at T9-10 level may represent gliosis 
and/or myelopathy  
More modest posterior disc bulging without stenosis at T11-12 level and within 
the mid and upper thoracic spine  
Appropriate signal within the posterior elements and remainder of the thoracic 
cord  
No paraspinal masses  
No foraminal narrowing  
IMPRESSION:  
Disc space narrowing and posterior disc protrusions and facet hypertrophy T9-10 
and T10-11 level resulting in moderate central canal stenosis at those levels. 
Abnormal signal with edema within the cord at T9-10 level may represent gliosis 
and/or myelopathy. I recommend contrast MRI for further assessment.  
More modest posterior disc bulging without stenosis at T11-12 level and within 
the mid and upper thoracic spine  
Workstation:[id number]  
Finalized by John Martinez MD on 2024 10:15 [facility]
2024 Evaluation note*   
  
                                        Author              [facility]  
   
                                        Authored            May 14th, 2024 12:05  
pm  
   
                                                    74-year-old man with end-sta  
ge renal disease referred to the liver clinic for   
evaluation of elevated  
liver enzymes  
  
Will get laboratory work up for infectious, autoimmune and metabolic etiologies 
of   
liver diseases.  
Patient has obesity, HTN, DM which are risk factors associated with MASLD. 
Patient   
does not have  
secondary causes of hepatic fat accumulation such as significant alcohol 
consumption,   
viral  
hepatitis, steatogenic medications (e.g., tamoxifen, amiodarone, methotrexate), 
or   
lipodystrophy.  
Will arrange for fibroscan  
Will arrange for ultrasound liver   
  
  
  
[facility]  
Work Phone: [phone]- History of Present illness Narrative* 
  Beverly Yusuf, APRN-CNP - 2024 1:00 PM EDTFormatting of this note is 
  different from the original.  
Subjective  
  
Patient ID: Arun Moon is a 74 y.o. male.  
  
Chief Complaint: Hospital follow-up  
  
Keith is here today in the Infectious Disease Clinic for hospital follow-up 
appointment. He was hospitalized from  through 2024. He 
originally presented to the emergency center with complaints of difficulty 
urinating and rapid heart rate. He has a medical history significant forend-
stage renal disease and currently does peritoneal dialysis at home, diabetes, 
blindness, and enedelia current smoker who is working on cutting down. He reports 
currently smoking 6-7 cigarettes daily.  
  
Fortunately at today's appointment, patient reports peritoneal dialysis is going
 well and he is having no difficulty urinating additionally he denies any 
unusual pain.  
  
Hospital diagnosis as follows  
Pneumonia  
Leukocytosis  
Elevated peritoneal fluid cell counts  
End-stage renal disease-on CAPD  
Diabetes mellitus type 2  
Legally blind  
  
Imaging from patient's hospital stay as follows:  
Chest x-ray March 15, 2024  
IMPRESSION:  
1. Hazy opacities in the left mid to lower lung and right lung base suspicious 
for pneumonia versusedema, possibly atypical pneumonia.  
2. Cardiac silhouette is nonenlarged. No convincing pulmonary vascular 
congestion. No pneumothorax or pleural effusion.  
  
CT abdomen pelvis March 15, 2024  
IMPRESSION:  
* Multiple renal calcifications are present bilaterally which appear to be 
vascular in nature. No ureteric calculi or definite evidence for obstruction.  
* Peritoneal dialysis catheter with small amount of fluid about liver and and 
pelvis with simple attenuation.  
* Mild atrophy of right kidney unchanged.  
* Mild prominence of left hepatic lobe with possible nodularity of liver 
surface. Some cirrhosis isnot excluded. The spleen is not enlarged.  
  
  
The following portions of the patient's history were reviewed and updated as 
appropriate: allergies, current medications, past family history, past medical 
history, past social history, past surgicalhistory, problem list, and medication
 reconciliation was completed including current medication andpost discharge 
medication.  
  
Review of Systems  
Constitutional: Negative for fever, chills and fatigue.  
HENT: Negative for trouble swallowing.  
Eyes: Negative.  
Respiratory: Negative for cough and shortness of breath.  
Cardiovascular: Negative for chest pain and leg swelling.  
Gastrointestinal: Negative for nausea, vomiting, abdominal pain, diarrhea and 
constipation.  
4 exchanges per night  
Endocrine: Negative.  
Genitourinary: Negative for hematuria and difficulty urinating.  
Pain with urination has resolved  
Musculoskeletal: Positive for gait problem (cane, walkers, walker (near fall)). 
Negative for myalgias, arthralgias and neck stiffness.  
Chronic pain: Back of neck to shoulders, Darrian hips, occasionally knees  
Skin: Negative for color change, pallor, rash and wound.  
Allergic/Immunologic: Negative.  
Neurological: Negative for speech difficulty, weakness and headaches.  
Hematological: Negative.  
Psychiatric/Behavioral: Negative for confusion.  
  
  
Objective  
  
Physical Exam  
Constitutional He is oriented to person, place, and time. No distress.  
Vitals reviewed.  
HENT  
Head Normocephalic.  
Eyes: Pupils are equal, round, and reactive to light.  
Neck Normal range of motion.  
Cardiovascular:  
Normal rate and regular rhythm.  
Pulses:  
intact distal pulses  
Pulmonary/Chest: Effort normal and breath sounds normal. No respiratory 
distress.  
Abdominal: Bowel sounds are normal. He exhibits no distension. Soft. There is no
 abdominal tenderness. Musculoskeletal:  
General: No edema. Normal range of motion.  
Cervical back: Normal range of motion.  
  
Neurological He is alert and oriented to person, place, and time.  
Skin: Skin is warm and dry.  
Psychiatric:  
He has a normal mood and affect. His behavior is normal.  
  
  
Assessment/Plan  
  
1. Hospital discharge follow-up  
2. End-stage renal disease on peritoneal dialysis (CMS-HCC)  
3. History of community acquired pneumonia  
4. Light cigarette smoker  
5. Insulin dependent type 2 diabetes mellitus (CMS-HCC)  
6. Legally blind  
7. 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  
  
Finalized Blood cultures 2024 with no growth  
Finalized Body fluid culture from 2024 with no growth  
Patient tested negative for influenza A, B, RSV, COVID during hospital stay  
  
Peritoneal fluid cell count from 2024, nucleated cell 91, 52 
neutrophils, repeat peritoneal fluid cell count 28 nucleated cells, 59% 
neutrophil  
He was treated with azithromycin and ceftriaxone while hospitalized  
He was discharged on Omnicef to complete his antibiotic course of treatment  
Patient's breathing and vital signs are stable at today's appointment  
  
Patient declines referral to GI for elevated liver enzymes, PT advised to 
follow-up with his primary care provider  
  
Patient advised not to smoke. Smoking education provided, including, there was 
no safe level of exposure to tobacco smoke, even secondhand smoke is harmful, 
tobacco smoke contains chemicals ago from the lungs to the blood in can damage 
the lungs and can cause asthma attacks, emphysema, chronic bronchitis, and 
cancer. Additionally the chemicals damage blood vessels, which can lead to heart
 attack and strokes. Patient encouraged to follow-up with PCP for smoking 
cessation programs.  
  
Follow-up in the infectious disease clinic as needed  
  
Patient encouraged to maintain a healthy lifestyle including eating a well-
balanced healthy diet, maintaining a healthy weight, appropriate periods of 
rest, exercise as approved by primary care provider, and routine follow-up with 
primary care provider for vaccinations and preventative care.  
  
Verbally discussed instructions and plan of care, questions were answered and 
patient and daughter Mara verbalized understanding.  
  
Total time spent was 35 minutes:  
Preparing to see the patient (e.g., review of tests)  
Obtaining and/or reviewing separately obtained history  
Performing a medically appropriate examination and/or evaluation  
Additional time spent with patient and daughter counseling and answering 
questions regarding recenthospital stay. Patient and daughter both telling me 
they felt rushed out of the hospital and did not understand diagnosis or 
treatment. All questions answered to their satisfaction.  
  
ADRY Sidhu  
[id number]  
  
Electronically signed by ADRY Sidhu at 2024 5:07 PM EDT  
  
[facility] NoteXR CHEST 2 VWS  
PROCEDURE: CHEST, TWO VIEWS (PA and Lateral), 3/15/2024 11:50 AM  
CLINICAL INDICATION: sob  
COMPARISON: Chest 2 views 8/15/2023  
IMPRESSION:  
1. Hazy opacities in the left mid to lower lung and right lung base suspicious 
for pneumonia versusedema, possibly atypical pneumonia.  
2. Cardiac silhouette is nonenlarged. No convincing pulmonary vascular 
congestion. No pneumothorax or pleural effusion.  
Workstation:[id number]  
Finalized by Iker Smalls MD on 3/15/2024 11:55 [facility]01- Miscellaneous Notes* Telephone Encounter - Natalia Abad - 
  2024 2:42 PM ESTFormatting of this note might be different from the 
  original.  
Called Keith regarding the history of colon polyps referral that our office 
received from Pepper Mendoza NP, I left a message for him to call the office 
back to schedule an appointment.  
Electronically signed by Natalia Abad at 2024 2:44 PM EST  
  
* Telephone Encounter - Cherrie Ivey - 2024 2:42 PM ESTFormatting of this 
  note might be different from the original.  
Spoke to patient's daughter, Liz who stated they would not like to schedule 
anything as Keith does not wish to have this procedure done.  
Electronically signed by Cherrie Ivey at 2024 3:05 PM EST  
  
[facility] in [facility]- Telephone 
encounter Note* Telephone Encounter - Natalia Abad - 2024 2:42 PM EST
  Formatting of this note might be different from the original.  
Called Keith regarding the history of colon polyps referral that our office 
received from Pepper Mendoza NP, I left a message for him to call the office 
back to schedule an appointment.  
Electronically signed by Natalia Abad at 2024 2:44 PM EST  
  
[facility]01- Telephone encounter Note* Telephone Encounter 
  - Cherrie Uche - 2024 2:42 PM ESTFormatting of this note might be 
  different from the original.  
Spoke to patient's daughter, Liz who stated they would not like to schedule 
anything as Keith does not wish to have this procedure done.  
Electronically signed by Cherrie Ivey at 2024 3:05 PM EST  
  
[facility]02- Evaluation note*   
  
                      Encounter Date Diagnosis  Assessment Notes Treatment Notes  
 Treatment   
Clinical Notes  
   
                                                AV fistula (ICD-10   
- I77.0)                                            This patient has a   
well-functioning   
right upper   
extremity, end to   
side, distal,   
Lonnie AV fistula.   
I think is ready   
for use. I have   
asked the patient   
to call me with   
any issues or   
concerns with the   
fistula. Otherwise   
we will see him as   
needed in the   
future.  
                                          
  
  
[facility]  
Other Phone: [phone]- [facility]*   
  
                      Encounter Date Diagnosis  Assessment Notes Treatment Notes  
 Treatment   
Clinical Notes  
   
                                        29 Dec, 2022        AV fistula   
(ICD-10 - I77.0)                                    We reviewed today's   
graft flow scan of   
his right arm AV   
fistula which shows   
area of narrowing in   
the proximal region   
of the fistula. The   
fistula measures   
0.91 cm at the   
origin and decreases   
to 0.22 cm and   
returns to 0.43 cm   
at the mid region.   
Flows at the origin   
are 561 mL/min   
decreasing to 137   
mL/min after the   
area of stenoses.   
This patient will   
require fistulogram   
with balloon   
angioplasty to   
enable this fistula   
to mature further.   
Procedure, risk,   
benefits were   
discussed with him   
at length and all of   
his questions were   
addressed. Time is   
on our side as this   
patient does not yet   
require   
hemodialysis. He   
will continue   
following closely   
with his nephrology   
specialist and we   
will get this on the   
schedule in the near   
future. He   
verbalizes   
understanding of all   
discussion, agrees   
with this plan, and   
denies any   
questions.  
                                          
   
                                        29 Dec, 2022        CKD (chronic   
kidney disease)   
(ICD-10 - N18.9)                                              
  
  
[facility]  
Other Phone: [phone]- Evaluation note*   
  
                      Encounter Date Diagnosis  Assessment Notes Treatment Notes  
 Treatment   
Clinical Notes  
   
                                                AV fistula   
(ICD-10 - I77.0)                                    This patient's   
surgical sites are   
healing nicely and   
his fistula has a   
good thrill and   
bruit today. He is   
not currently in   
need of hemodialysis   
and follows closely   
with his nephrology   
specialist for   
routine monitoring   
of his kidney   
function. Patient   
tells me his most   
recent creatinine   
clearance was around   
14. We will continue   
to follow him along   
closely and have him   
back in 6 or 8 weeks   
time with a graft   
flow scan of the   
right arm AV fistula   
to monitor for   
maturation of his   
access. If he were   
to require   
hemodialysis prior   
to this point, he   
knows to call us   
prior to any   
attempts at access   
of the fistula. He   
verbalizes   
understanding of all   
discussion today,   
agrees with this   
plan, denies any   
questions.  
                                          
   
                                                Chronic kidney   
disease, stage IV   
(severe) (ICD-10   
- N18.4)                                                      
  
  
[facility]  
Other Phone: [phone]- Evaluation note*   
  
                      Encounter Date Diagnosis  Assessment Notes Treatment Notes  
 Treatment Clinical   
Notes  
   
                                        03 Oct, 2022        Preop testing   
(ICD-10 - Z01.818)                                            
  
  
[facility]  
Other Phone: [phone]- Procedure [facility]09- Evaluation note*   
  
                      Encounter Date Diagnosis  Assessment Notes Treatment Notes  
 Treatment   
Clinical Notes  
   
                                        07 Sep, 2022        Malfunction of   
arteriovenous   
dialysis fistula,   
initial encounter   
(ICD-10 - T82.590A)                                 I did review the   
fistula duplex   
with the patient   
today in the   
office. He does   
have low   
velocities that   
are less than 300.   
Therefore we will   
need to perform a   
fistulogram to try   
and increase the   
flow. Looks like   
he might have a   
proximal stenoses.   
This is explained   
to the patient. He   
understands the   
risks and benefits   
and wishes to   
proceed consent   
was obtained we   
will schedule this   
in the near   
future.  
                                          
  
  
[facility]  
Other Phone: [phone]- Evaluation note*   
  
                      Encounter Date Diagnosis  Assessment Notes Treatment Notes  
 Treatment   
Clinical Notes  
   
                                                AV fistula   
(ICD-10 - I77.0)                                    I am pleased with   
appearance of this   
fistula. We will   
see him back in   
about 8 weeks with   
a duplex to   
evaluate the   
maturation process.   
The patient   
understands agrees   
the plan all his   
questions were   
answered.  
                                          
  
  
[employer]  
Other Phone: [phone]- [facility]- [facility]*   
  
                      Encounter Date Diagnosis  Assessment Notes Treatment Notes  
 Treatment   
Clinical Notes  
   
                                                Thrombosis of   
arteriovenous   
fistula, initial   
encounter (ICD-10 -   
T82.868A)                                           This patient will   
need a more proximal   
AV fistula in the   
right arm. I did   
review the vein   
mapping today in my   
office. It appears   
that he has adequate   
cephalic vein in the   
right arm. We will   
proceed with a   
brachiocephalic AV   
fistula right upper   
extremity. We will   
schedule this in the   
near future. I did   
explain the risks and   
benefits as well as   
medical surgical   
tenderness. The   
patient or stands   
wishes to proceed.  
                                          
  
  
[employer]  
Other Phone: [phone]- Evaluation note*   
  
                      Encounter Date Diagnosis  Assessment Notes Treatment Notes  
 Treatment   
Clinical Notes  
   
                                        19 May, 2022        CKD (chronic   
kidney disease)   
(ICD-10 - N18.9)                                    We reviewed today's   
graft flow scan which   
shows velocities   
within the right arm   
fistula ranging from   
213 mL/min to as low   
as 69 mL/min. Fistula   
diameter is 3.1 to   
4.8 mm. This fistula   
is not maturing quite   
like it should be at   
this point. This   
patient is not   
currently requiring   
hemodialysis. Time is   
on her side.   
Recommendation for   
fistulogram was   
discussed with the   
patient. Procedure,   
risk, benefits were   
discussed at length   
and all of his   
questions were   
addressed. Dr. Sánchez in room to   
discuss further with   
him. All of his   
questions were   
addressed. We will   
plan for right arm   
fistulogram using   
retrograde approach.   
We will get this on   
the schedule in the   
near future. Patient   
verbalizes   
understanding of all   
discussion, agrees   
with this plan,   
denies any further   
questions.  
                                          
  
  
[employer]  
Other Phone: [phone]- Evaluation note*   
  
                      Encounter Date Diagnosis  Assessment Notes Treatment Notes  
 Treatment   
Clinical Notes  
   
                                                Chronic kidney   
disease, stage IV   
(severe) (ICD-10 -   
N18.4)                                              The wound looks   
fine today. I am   
not concerned   
about any   
infection. We   
dressed the wound   
and we will see   
him back next   
month.  
                                          
  
  
[employer]  
Other Phone: [phone]- Evaluation note*   
  
                      Encounter Date Diagnosis  Assessment Notes Treatment Notes  
 Treatment   
Clinical Notes  
   
                                        24 Mar, 2022        Chronic kidney   
disease, stage IV   
(severe) (ICD-10 -   
N18.4)                                                
  
  
This patient is   
doing well after   
creation of a   
right upper   
extremity snuffbox   
AV fistula. We   
will see him back   
in 8 weeks for a   
ultrasound of the   
fistula to   
evaluate the   
maturation   
process. Currently   
the patient is not   
on dialysis he has   
CKD 4.                                    
  
  
  
  
[employer]  
Other Phone: [phone]- Evaluation note*   
  
                      Encounter Date Diagnosis  Assessment Notes Treatment Notes  
 Treatment   
Clinical Notes  
   
                                                CKD (chronic   
kidney disease)   
(ICD-10 - N18.9)                                      
  
  
I did review the   
patient's vein   
mapping with him   
today. Although he   
is right-handed the   
right cephalic vein   
in the forearm looks   
like a good   
possibility for AV   
fistula creation.   
This will be better   
than a more proximal   
fistula in the   
nondominant arm.   
Therefore I am   
recommending right   
upper extremity   
distal, and decide   
radiocephalic AV   
fistula creation.   
The risks and   
benefits were   
explained as well as   
medical surgical   
alternatives we also   
discussed potential   
complications as   
well as their   
management. He   
understands wished   
to proceed. We also   
reviewed the   
hemodialysis access   
handout together   
each page   
individually. All   
other questions were   
addressed we will   
schedule as the near   
future and I do   
prefer a regional   
block. Thank you                          
  
  
  
  
[employer]  
Other Phone: [phone] note*   
  
                                                    Diagnosis  
   
                                                      
  
  
Pre-transplant evaluation for kidney transplant- Primary  
  
  
Other specified pre-operative examination  
  
documented in this encounter  
[facility]Evaluation noteNo assessment information availableMagruder Hospital Ctr  
Work Phone: [phone] noteNo InformationNortSouth County Hospital Professional 
Corporation  
Other Phone: [phone] note*   
  
                                                    Diagnosis  
   
                                                      
  
  
Hospital discharge follow-up- Primary  
  
  
Other follow-up examination  
   
                                                      
  
  
End-stage renal disease on peritoneal dialysis (CMS-HCC)  
   
                                                      
  
  
History of community acquired pneumonia  
   
                                                      
  
  
Light cigarette smoker  
   
                                                      
  
  
Insulin dependent type 2 diabetes mellitus (CMS-HCC)  
   
                                                      
  
  
Legally blind  
   
                                                      
  
  
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  
  
documented in this encounter  
[facility]   
  
                                                    Diagnosis  
   
                                                      
  
  
Degeneration of intervertebral disc of lumbar region, unspecified whether pain   
present- Primary  
   
                                                      
  
  
Acute right hip pain  
   
                                                      
  
  
Primary hypertension (CMS/HCC)  
  
  
Unspecified essential hypertension  
   
                                                      
  
  
Hepatic fibrosis  
  
  
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  
   
                                                      
  
  
End stage renal disease (CMS/HCC)  
  
  
End stage renal disease  
   
                                                      
  
  
Type 2 diabetes mellitus without complication, with long-term current use of 
insulin   
(CMS/HCC)  
   
                                                      
  
  
Tobacco user  
  
  
Tobacco use disorder  
   
                                                      
  
  
Mixed hyperlipidemia (CMS/HCC)  
  
  
Mixed hyperlipidemia  
  
documented in this encounter  
[facility]   
  
                                                    Diagnosis  
   
                                                      
  
  
Muscle spasm  
  
  
Spasm of muscle  
   
                                                      
  
  
Spinal stenosis of lumbar region with neurogenic claudication  
  
documented in this encounter  
[facility]   
  
                                                    Diagnosis  
   
                                                      
  
  
Type 2 diabetes mellitus with diabetic polyneuropathy, without long-term current
 use   
of insulin (CMS/HCC)- Primary  
   
                                                      
  
  
Primary hypertension (CMS/HCC)  
  
  
Unspecified essential hypertension  
   
                                                      
  
  
ESRD (end stage renal disease) (CMS/HCC)  
  
  
End stage renal disease  
   
                                                      
  
  
Mixed hyperlipidemia (CMS/HCC)  
  
  
Mixed hyperlipidemia  
   
                                                      
  
  
Legally blind  
   
                                                      
  
  
Chronic obstructive pulmonary disease, unspecified COPD type (CMS/)  
   
                                                      
  
  
Screening for prostate cancer  
  
  
Special screening for malignant neoplasm of prostate  
   
                                                      
  
  
Polyp of colon, unspecified part of colon, unspecified type  
   
                                                      
  
  
BMI 29.0-29.9,adult  
   
                                                      
  
  
Pneumonia due to infectious organism, unspecified laterality, unspecified part 
of   
lung- Primary  
   
                                                      
  
  
Primary hypertension (CMS/HCC)  
  
  
Unspecified essential hypertension  
   
                                                      
  
  
Elevated liver function tests  
  
  
Other abnormal blood chemistry  
   
                                                      
  
  
ESRD (end stage renal disease) (CMS/HCC)  
  
  
End stage renal disease  
   
                                                      
  
  
Type 2 diabetes mellitus without complication, with long-term current use of 
insulin   
(CMS/HCC)  
   
                                                      
  
  
ESRD (end stage renal disease) (CMS/HCC)- Primary  
  
  
End stage renal disease  
   
                                                      
  
  
Elevated liver function tests  
  
  
Other abnormal blood chemistry  
   
                                                      
  
  
Type 2 diabetes mellitus without complication, with long-term current use of 
insulin   
(CMS/HCC)  
   
                                                      
  
  
Tobacco user  
  
  
Tobacco use disorder  
   
                                                      
  
  
BMI 29.0-29.9,adult  
   
                                                      
  
  
Primary hypertension (CMS/HCC)  
  
  
Unspecified essential hypertension  
   
                                                      
  
  
Encounter for subsequent annual wellness visit (AWV) in Medicare patient- 
Primary  
   
                                                      
  
  
ESRD (end stage renal disease) (CMS/HCC)  
  
  
End stage renal disease  
   
                                                      
  
  
Type 2 diabetes mellitus without complication, with long-term current use of 
insulin   
(CMS/HCC)  
   
                                                      
  
  
Primary hypertension (CMS/HCC)  
  
  
Unspecified essential hypertension  
   
                                                      
  
  
Elevated liver function tests  
  
  
Other abnormal blood chemistry  
   
                                                      
  
  
Chronic back pain, unspecified back location, unspecified back pain laterality  
   
                                                      
  
  
Routine general medical examination at health care facility  
  
  
Routine general medical examination at a health care facility  
   
                                                      
  
  
Type 2 diabetes mellitus without complication, with long-term current use of 
insulin   
(CMS/HCC)- Primary  
   
                                                      
  
  
Type 2 diabetes mellitus with diabetic chronic kidney disease (CMS/[facility])  
   
                                                      
  
  
Dependence on renal dialysis (CMS/[facility])  
  
  
Renal dialysis status  
   
                                                      
  
  
End stage renal disease (CMS/[facility])  
  
  
End stage renal disease  
   
                                                      
  
  
Immunodeficiency due to conditions classified elsewhere (CMS/[facility])  
   
                                                      
  
  
Atherosclerosis of aorta (CMS/[facility])  
  
  
Atherosclerosis of aorta  
   
                                                      
  
  
Polyneuropathy due to type 2 diabetes mellitus (CMS/[facility])  
   
                                                      
  
  
Primary hypertension (CMS/[facility])  
  
  
Unspecified essential hypertension  
   
                                                      
  
  
Elevated liver function tests  
  
  
Other abnormal blood chemistry  
   
                                                      
  
  
Elevated alkaline phosphatase level  
   
                                                      
  
  
Tobacco user  
  
  
Tobacco use disorder  
   
                                                      
  
  
Impaired mobility and activities of daily living  
   
                                                      
  
  
Legally blind  
   
                                                      
  
  
Degeneration of intervertebral disc of lumbar region, unspecified whether pain   
present- Primary  
   
                                                      
  
  
Acute right hip pain  
   
                                                      
  
  
Primary hypertension (CMS/[facility])  
  
  
Unspecified essential hypertension  
   
                                                      
  
  
Hepatic fibrosis  
  
  
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  
   
                                                      
  
  
End stage renal disease (CMS/[facility])  
  
  
End stage renal disease  
   
                                                      
  
  
Type 2 diabetes mellitus without complication, with long-term current use of 
insulin   
(CMS/[facility])  
   
                                                      
  
  
Tobacco user  
  
  
Tobacco use disorder  
   
                                                      
  
  
Mixed hyperlipidemia (CMS/[facility])  
  
  
Mixed hyperlipidemia  
   
                                                      
  
  
Dermatophytosis of nail- Primary  
   
                                                      
  
  
Dystrophic nail  
  
  
Other specified disease of nail  
   
                                                      
  
  
Diabetic polyneuropathy associated with type 2 diabetes mellitus (CMS/[facility])  
   
                                                      
  
  
Encounter for long-term (current) use of insulin (CMS/HCC)  
  
  
Encounter for long-term (current) use of insulin  
  
documented in this encounter  
[facility] - [facility]orted*   
  
                                Type            Description     Date  
   
                                Medical History ESRD              
   
                                Medical History hypertension      
   
                                Medical History hypercholesterolemia   
   
                                Medical History heart attack      
   
                                Surgical History oral surgery      
   
                                Surgical History vasectomy         
   
                                Surgical History knee arthroscopy   
   
                                Surgical History deviated septum repair   
   
                                Surgical History heart catheterization   
  
  
[facility]  
Other Phone: [phone] general Narrative - Reported*   
  
                                Type            Description     Date  
   
                                Medical History ESRD              
   
                                Medical History hypertension      
   
                                Medical History hypercholesterolemia   
   
                                Medical History heart attack      
   
                                Surgical History oral surgery      
   
                                Surgical History vasectomy         
   
                                Surgical History knee arthroscopy   
   
                                Surgical History deviated septum repair   
   
                                Surgical History heart catheterization   
   
                                Hospitalization History see above         
  
  
[facility]  
Other Phone: [phone] general Narrative - Reported*   
  
                                Type            Description     Date  
   
                                Medical History ESRD              
   
                                Medical History hypertension      
   
                                Medical History hypercholesterolemia   
   
                                Medical History heart attack      
   
                                Surgical History oral surgery      
   
                                Surgical History vasectomy         
   
                                Surgical History knee arthroscopy   
   
                                Surgical History deviated septum repair   
   
                                Surgical History heart catheterization   
   
                                Surgical History RIGHT AVF         
   
                                Hospitalization History see above         
  
  
[facility] [employer] [facility]  
Other Phone: [phone] Discharge instructions  
Additional Instructions  
1. No driving if taking narcotic pain medication.  
  
2. No lifting more than 20 pounds for 2 weeks.[facility]  
Work Phone: [phone] on filedocumented in this encounter
[email or web address]) SystemInstructions* Attachments  
  
The following attachments cannot be sent through Care Everywhere.  
  
* How to Wash Your Hands Properly (English)  
  
documented in this encounterProOhioHealth Marion General Hospital SystemReason for referral 
(narrative)* Consultation (Routine) - Pending Review  
  
                          Specialty    Diagnoses / Procedures Referred By Contac  
t Referred To Contact  
   
                                        Pain Medicine         
  
  
Diagnoses  
  
  
Degeneration of   
intervertebral disc of   
lumbar region, unspecified   
whether pain present  
  
  
Acute right hip pain  
  
  
  
Procedures  
  
  
[place] OFFICE/OUTPATIENT [facility] MDM 60 MINUTES                       
  
  
Pepper Mendoza NP  
  
  
[address] [place] [address]  
  
  
Phone: [phone]  
  
  
Fax: [phone]                         
  
  
Janette Borja MD  
  
  
[address] [place] [address]  
  
  
Phone: [phone]  
  
  
Fax: [phone]  
  
  
  
                          Referral ID  Status       Reason       Start   
Date                                    Expiration   
Date                                    Visits   
Requested                               Visits   
Authorized  
   
                                        [id number]              Pending   
Review                                    
  
  
Specialty   
Services   
Required        10/9/2024       2025        1               1  
  
  
  
                                                    Scheduling Instructions  
   
                                                      
  
  
ASAP if possible  
  
  
  
  
Electronically signed by Pepper Mendoza NP at 10/09/2024 10:40 AM EDT  
  
  
[facility]  
  
Chief Complaint and Reason for Visit  
  
  
                                        Chief Complaint     ESRD  
end stage renal disease  
  
  
  
                                        Chief Complaint     ESRD  
end stage renal disease  
ESRD  
  
  
  
                                        Chief Complaint     end stage renal dise  
ase  
ESRD  
ESRD  
  
  
  
                                        Chief Complaint     ESRD  
ESRD  
  
  
  
                                        Chief Complaint     N18.6  
ESRD  
  
  
  
                                        Chief Complaint     ELEVATED LIVER ENZYM  
ES  
elevated liver enzymes  
   
                                        Reason for Visit    Elevated liver enzym  
es  
  
  
  
                                        Chief Complaint     follow up  
Radiculopathy, lumbar region  
K74.00 R74.8  
   
                                        Reason for Visit    Elevated liver enzym  
es  
Hepatic fibrosis  
Hepatic steatosis  
Arthropathy of left hip  
Arthropathy of right knee  
Low back pain  
Spinal stenosis, lumbar region with neurogenic claudication  
Thoracic stenosis  
Hepatic steatosis  
  
  
  
Family History  
No Family History Records Found  
  
                          Relationship Condition    Age at Onset Recorded Date/T  
van  
   
                          Not Specified Congestive heart failure Unknown        
   
                                       Diabetes mellitus Unknown        
   
                          brother      Myocardial infarction Unknown        
   
                                       Hypertension Unknown        
   
                          sister       Diabetes mellitus Unknown        
   
                                       Malignant neoplasm of breast Unknown       
   
  
  
  
                          Relationship Condition    Age at Onset Recorded Date/T  
van  
   
                          Not Specified Congestive heart failure Unknown        
   
                                       Diabetes mellitus Unknown        
   
                          brother      Myocardial infarction Unknown        
   
                                       Hypertension Unknown        
   
                          sister       Diabetes mellitus Unknown        
   
                                       Malignant neoplasm of breast Unknown       
   
   
                          brother      Diabetes mellitus Unknown        
   
                          daughter     Family history of mental disorder Unknown  
        
   
                          father            Unknown        
   
                          Not Specified      Unknown        
  
  
  
                          Relationship Condition    Age at Onset Recorded Date/T  
van  
   
                          mother       Congestive heart failure Unknown        
   
                                       Diabetes mellitus Unknown        
   
                          brother      Myocardial infarction Unknown        
   
                                       Hypertension Unknown        
   
                          sister       Diabetes mellitus Unknown        
   
                                       Malignant neoplasm of breast Unknown       
   
   
                          brother      Diabetes mellitus Unknown        
   
                          daughter     Family history of mental disorder Unknown  
        
   
                          father            Unknown        
   
                          mother            Unknown        
  
  
  
Advance Directives  
No Advanced Directives Records Found  
  
                                Advance Directive Response        Recorded Date/  
Time  
   
                                Advance Directives Yes              12:24pm  
  
  
  
                                Advance Directive Response        Recorded Date/  
Time  
   
                                Advance Directives Yes              11:24am  
  
                                Documents on File  
  
                          Type         Date Recorded Patient Representative Expl  
anation  
   
                          Advance Directive 2020 2:47 PM              Linda  
r of    
2020  
  
                           Latest Code Status on File  
  
                          Code Status  Date Activated Date Inactivated Comments  
   
                          Full Code    3/16/2024 2:09 AM 3/18/2024 6:44 PM   
  
                                Documents on File  
  
                          Type         Date Recorded Patient Representative Expl  
anation  
   
                                                    Advance Directives and   
Living Will         2024 5:18 PM                       my directives living  
   
will  
  
                                Documents on File  
  
                          Type         Date Recorded Patient Representative Expl  
anation  
   
                                                    Advance Directives and   
Living Will         2024 5:18 PM                       my directives living  
   
will  
  
  
  
Summary Purpose  
  
  
                                                      
  
  
  
Additional Source Comments  
  
  
  
                                                    Source Comments (unrecognize  
d section and content)  
   
                                                    In the event this informatio  
n is protected by the Federal Confidentiality of   
Alcohol   
and Drug Abuse Patient Records regulations: This information has been disclosed 
to   
you from records protected by Federal confidentiality rules (42 CFR Part 2). The
   
Federal rules prohibit you from making any further disclosure of this 
information   
unless further disclosure is expressly permitted by the written consent of the 
person   
to whom it pertains or as otherwise permitted by 42 CFR Part 2. A general   
authorization for the release of medical or other information is NOT sufficient 
for   
this purpose. The Federal rules restrict any use of the information to 
criminally   
investigate or prosecute any alcohol or drug abuse patient.[facility]  
  
  
  
  
                                                    Reason for Visit (unrecogniz  
ed section and content)  
   
                                          
  
  
  
                          Specialty    Diagnoses / Procedures Referred By Contac  
t Referred To Contact  
   
                                        TRANSPLANT            
  
  
Diagnoses  
  
  
Type 2 diabetes mellitus   
with ESRD (end-stage renal   
disease) (HCC)  
  
  
Hypertension, unspecified   
type  
  
  
Pre-transplant evaluation   
for CKD (chronic kidney   
disease)  
  
  
  
Procedures  
  
  
CONSULT TO TRANSPLANT   
CENTER  
  
  
NEW PATIENT VISIT LEVEL 5  
  
  
CHEST X-RAY, FRONT&LAT  
  
  
EKG TRACING(TC)  
  
  
CTA CHEST, W/WO CONTRAST  
  
  
CT ABDOMEN W & W/O   
CONTRAST                                  
  
  
Ranker, John O  
  
  
[address] [place], [place] [address]  
  
  
Phone: [phone]  
  
  
Fax: [phone]                         
  
  
[facility]  
  
  
[address] [place] [address]  
  
  
Phone: [phone]  
  
  
  
                          Referral ID  Status       Reason       Start   
Date                                    Expiration   
Date                                    Visits   
Requested                               Visits   
Authorized  
   
                                [id number]        Authorized        
  
  
Financial   
Clearance   
Required -   
OON Payor  
  
  
Patient   
Cleared   
INN/[facility]   
Payor Auth   
Obtained        2021       99              99  
  
  
  
                                        Reason              Comments  
   
                                        Follow-up             
  
  
  
                                        Reason              Comments  
   
                                        DM Foot [facility]        PCP: Dr. Carlo GARZA   
10/09/24, A1C: 9.1, BS: 239  
  
  
  
  
  
                                                    Care Teams (unrecognized sec  
tion and content)  
   
                                          
  
  
  
                                                    Team Status: Inactive   
   
                          Member       Role         Status       Dates  
   
                          Shantell Castorena [facility] Primary Care Provider Active  
         
   
                          Charlie Matos MD Attending Provider Active         
  
  
  
                                                    Team Status: Inactive   
   
                          Member       Role         Status       Dates  
   
                          Shantell Castorena [facility] Primary Care Provider Active  
         
   
                          Ronald Sánchez MD Attending Provider Active     
      
  
  
  
                                                    Team Status: Active   
   
                          Member       Role         Status       Dates  
   
                          Shantell Castorena [facility] Primary Care Provider Active  
         
  
  
  
                                                    Team Status: Inactive   
   
                          Member       Role         Status       Dates  
   
                          Shantell Castorena [facility] Primary Care Provider Active  
         
   
                          Carolee Cuevas , NP-C Attending Provider Active       
    
  
  
  
                                                    Team Status: Inactive   
   
                          Member       Role         Status       Dates  
   
                          Ronald Sánchez MD Attending Provider Active     
      
   
                          Pepper Mendoza Primary Care Provider Active         
  
  
  
                                                    Team Status: Active   
   
                          Member       Role         Status       Dates  
   
                          Pepper Mendoza Primary Care Provider Active         
  
  
  
                                                    Team Status: Inactive   
   
                          Member       Role         Status       Dates  
   
                          Pepper Mendoza Primary Care Provider Active         
   
                          Guicho Bronson MD Attending Provider Active         
  
  
  
                      Team Member Relationship Specialty  Start Date End Date  
   
                                                      
  
  
Pepper Mendoza APRN-CNP  
  
  
NPI: [id number]  
  
  
[address] [place] [address]  
  
  
[phone] (Work)  
  
  
[phone] (Fax) PCP - General   Nurse Practitioner 23            
  
  
  
                      Team Member Relationship Specialty  Start Date End Date  
   
                                                      
  
  
Pepper Mendoza APRN-CNP  
  
  
NPI: [id number]  
  
  
[address] [place] [address] [phone] (Work)  
  
  
[phone] (Fax) PCP - General   Nurse Practitioner 23            
  
  
  
                                                    Team Status: Inactive   
   
                          Member       Role         Status       Dates  
   
                          Pepper Mendoza Primary Care Provider Active       Sta  
rt: May 14th, 2024  
End: May 14th, 2024  
   
                          Yordy Lewis MD Attending Provider Active       Start:  
 May 14th, 2024  
End: May 14th, 2024  
  
  
  
                                                    Team Status: Inactive   
   
                          Member       Role         Status       Dates  
   
                          Pepper BANKS Palmerbogdanfrancisca Primary Care Provider Active       Sta  
rt: May 28th, 2024  
End: May 28th, 2024  
   
                          Yordy Lewis MD Attending Provider Active       Start:  
 May 28th, 2024  
End: May 28th, 2024  
  
  
  
                                                    Team Status: Inactive   
   
                          Member       Role         Status       Dates  
   
                          Pepper BANKS Palmerbogdanfrancisca Primary Care Provider Active       Sta  
rt: 2024  
End: 2024  
   
                          Yordy Lewis MD Attending Provider Active       Start:  
 2024  
End: 2024  
  
  
  
                                                    Team Status: Inactive   
   
                          Member       Role         Status       Dates  
   
                          Pepper JOCELYN Chakrabortybogdanfrancisca Primary Care Provider Active       Sta  
rt: 2024  
End: 2024  
   
                          Chapincito Mir MD Attending Provider Active       Star  
t: 2024  
End: 2024  
  
  
  
                                                    Team Status: Inactive   
   
                          Member       Role         Status       Dates  
   
                          Pepper JOCELYN Chakrabortybogdanfrancisca Primary Care Provider Active       Sta  
rt: 2024  
End: 2024  
   
                          Yordy Lewis MD Attending Provider Active       Start:  
 2024  
End: 2024  
  
  
  
                      Team Member Relationship Specialty  Start Date End Date  
   
                                                      
  
  
Shoaib Matos MD  
  
  
NPI: [id number]  
  
  
[address] [place] [address] [phone] (Work)  
  
  
[phone] (Fax) PCP - [facility]oted                   23            
   
                                                      
  
  
Shoaib Matos MD  
  
  
NPI: [id number]  
  
  
[address] [place] [id number] [phone] (Work)  
  
  
[phone] (Fax) PCP - General   Family Medicine 3/19/24           
   
                                                      
  
  
Pepper Mendoza NP  
  
  
NPI: [id number]  
  
  
[address] [place] [address] [phone] (Work)  
  
  
[phone] (Fax) Nurse Practitioner Family Medicine 23           
   
                                                      
  
  
Pepper Mendoza NP  
  
  
NPI: [id number]  
  
  
[address] [place] [address] [phone] (Work)  
  
  
[phone] (Fax) Nurse Practitioner Family Medicine 3/19/24           
  
  
  
                      Team Member Relationship Specialty  Start Date End Date  
   
                                                      
  
  
Shoaib Matos MD  
  
  
NPI: [id number]  
  
  
[address] [place] [address] [phone] (Work)  
  
  
[phone] (Fax) PCP - [facility]                   23            
   
                                                      
  
  
Shoaib Matos MD  
  
  
NPI: [id number]  
  
  
[address] [place] [address] [phone] (Work)  
  
  
[phone] (Fax) PCP - General   Family Medicine 3/19/24           
   
                                                      
  
  
Pepper Mendoza NP  
  
  
NPI: [id number]  
  
  
[address] [place] [address] [phone] (Work)  
  
  
[phone] (Fax) Nurse Practitioner Family Medicine 23           
   
                                                      
  
  
Pepper Mendoza NP  
  
  
NPI: [id number]  
  
  
[address] [place] [address] [phone] (Work)  
  
  
[phone] (Fax) Nurse Practitioner Family Medicine 3/19/24           
  
  
  
                      Team Member Relationship Specialty  Start Date End Date  
   
                                                      
  
  
Shoaib Matos MD  
  
  
NPI: [id number]  
  
  
[address] [place] [address] [phone] (Work)  
  
  
[phone] (Fax) PCP - [facility]                   23            
   
                                                      
  
  
Shoaib Matos MD  
  
  
NPI: [id number]  
  
  
[address] [place] [address] [phone] (Work)  
  
  
[phone] (Fax) PCP - General   Family Medicine 3/19/24           
   
                                                      
  
  
Pepper Mendoza NP  
  
  
NPI: [id number]  
  
  
[address] [place] [address] [phone] (Work)  
  
  
[phone] (Fax) Nurse Practitioner Family Medicine 23           
   
                                                      
  
  
Pepper Mendoza NP  
  
  
NPI: [id number]  
  
  
[address] [place] [address] [phone] (Work)  
  
  
[phone] (Fax) Nurse Practitioner Family Medicine 3/19/24           
  
  
  
                      Team Member Relationship Specialty  Start Date End Date  
   
                                                      
  
  
Shoaib Matos MD  
  
  
NPI: [id number]  
  
  
[address] [place] [address] [phone] (Work)  
  
  
[phone] (Fax) PCP - [facility]                   23            
   
                                                      
  
  
Shoaib Matos MD  
  
  
NPI: [id number]  
  
  
[address] [place] [address] [phone] (Work)  
  
  
[phone] (Fax) PCP - General   Family Medicine 3/19/24           
   
                                                      
  
  
Pepper Mendoza NP  
  
  
NPI: [id number]  
  
  
[address] [place] [address] [phone] (Work)  
  
  
[phone] (Fax) Nurse Practitioner Family [facility] 23           
   
                                                      
  
  
Pepper Mendoza NP  
  
  
NPI: [id number]  
  
  
[address] [place] [address] [phone] (Work)  
  
  
[phone] (Fax) Nurse Practitioner Family [facility] 3/19/24           
  
  
  
  
  
                                                    Goals (unrecognized section   
and content)  
   
                                                    Goals may be documented in a  
n alternate section  
  
  
  
  
                                                    PREGNANCY (unrecognized sect  
ion and content)  
   
                                                    No Pregnancy Status Records   
FoundNo Pregnancy Status Records FoundNo Pregnancy   
Status   
Records FoundNo Pregnancy Status Records FoundNo Pregnancy Status Records 
FoundNo   
Pregnancy Status Records FoundNo Pregnancy Status Records FoundNo Pregnancy 
Status   
Records FoundNo Pregnancy Status Records Found  
  
  
  
  
                                                    INFORMATION SOURCE (unrecogn  
ized section and content)  
   
                                          
  
  
  
                                        DATE CREATED        AUTHOR  
   
                                2024                      [facility]  
  
  
  
                                DATE CREATED    AUTHOR          AUTHOR'S ORGANIZ  
ATION  
   
                                2024                      [facility]  
  
  
  
                                DATE CREATED    AUTHOR          AUTHOR'S ORGANIZ  
ATION  
   
                                2024                      [facility]  
[facility]  
  
  
  
                                DATE CREATED    AUTHOR          AUTHOR'S ORGANIZ  
ATION  
   
                                2024                      [facility]  
  
  
  
                                DATE CREATED    AUTHOR          AUTHOR'S ORGANIZ  
ATION  
   
                                10/18/2024                      [facility]  
[facility]  
  
  
  
                                DATE CREATED    AUTHOR          AUTHOR'S ORGANIZ  
ATION  
   
                                2024                      [facility]  
  
  
  
                                DATE CREATED    AUTHOR          AUTHOR'S ORGANIZ  
ATION  
   
                                11/10/2024                      [facility]  
[facility]  
  
  
  
                                DATE CREATED    AUTHOR          AUTHOR'S ORGANIZ  
ATION  
   
                                2024                      [facility]  
  
  
  
                                DATE CREATED    AUTHOR          AUTHOR'S ORGANIZ  
ATION  
   
                                2024                      [facility]  
  
  
FOR RECORDS PERTAINING TO PATIENTS WHO ARE OR HAVE BEEN ENROLLED IN A CHEMICAL 
DEPENDENCY/SUBSTANCEABUSE PROGRAM, SOME INFORMATION MAY BE OMITTED. This 
clinical summary was aggregated from multiple sources. Caution should be 
exercised in using it in the provision of clinical care. This summary normalizes
 information from multiple sources, and as a consequence, information in this 
document may materially change the coding, format and clinical context of 
patient data. In addition, data may be omitted in some cases. CLINICAL DECISIONS
 SHOULD BE BASED ON THE PRIMARY CLINICAL RECORDS. [employer] provides
 no warranty or guarantee of the accuracy or completeness of information in this
 document.

## 2024-12-05 NOTE — P.CN_ITS
Consult Note: HPI    
Data of Consult    
Patient: known to practice within the last 3 years    
Consult date: 10/21/24    
Requesting Physician:     
Lorna Nichols NP    
    
Primary Care Provider:     
Pepper Mendoza NP    
    
Consult Narrative    
Reason for consult: low back, bilateral lower extremity pain    
Narrative:     
74yom who presents for evaluation. longstanding low back pain history with   
radiation into bilateral lower extremities. imaging shows multilevel severe   
stenosis, worst at l3-4. also multilevel facet arthropathy noted. recently   
evaluated by spine surgeon, who did not recommend surgery due to myriad other   
health issues. has continued in a series of provider directed home exercises >6   
weeks, without benefit. has used flexeril with some benefit. denies adverse med   
side effects. recently underwent bilateral L3/4 TFESI and bilateral L4/5 TFESI   
with 75% improvement ongoing.     
cc::     
CC: Lorna Nichols NP    
    
    
Review of Systems    
    
    
ROS      
    
 Status of ROS                          10 or more systems reviewed and unremark    
able except as noted in     
history and below       
    
 Musculoskeletal Reports: back pain, neck pain, extremity pain and joint pain       
    
    
[facility]    
[facility]    
Medical History (Updated 10/21/24 @ 18:09 by Janette Borja MD)    
    
Osteoarthritis    
   ?M19.90 - Unspecified osteoarthritis, unspecified site (ICD-10)    
Fistula    
   ?L98.8 - Other specified disorders of the skin and subcutaneous tissue (ICD-  
   10)    
Trigger finger    
   ?M65.30 - Trigger finger, unspecified finger (ICD-10)    
Blind    
   ?H54.7 - Unspecified visual loss (ICD-10)    
Anxiety    
   ?F41.9 - Anxiety disorder, unspecified (ICD-10)    
Acid reflux    
   ?K21.9 - Gastro-esophageal reflux disease without esophagitis (ICD-10)    
Diabetes    
   ?E11.9 - Type 2 diabetes mellitus without complications (ICD-10)    
Kidney stone    
   ?N20.0 - Calculus of kidney (ICD-10)    
Cirrhosis of liver    
   ?K74.60 - Unspecified cirrhosis of liver (ICD-10)    
Requires peritoneal dialysis    
   ?Z99.2 - Dependence on renal dialysis (ICD-10)    
Kidney failure    
   ?N19 - Unspecified kidney failure (ICD-10)    
COPD (chronic obstructive pulmonary disease)    
   ?J44.9 - Chronic obstructive pulmonary disease, unspecified (ICD-10)    
Asthma    
   ?J45.909 - Unspecified asthma, uncomplicated (ICD-10)    
HTN (hypertension)    
   ?I10 - Essential (primary) hypertension (ICD-10)    
Palpitations    
   ?R00.2 - Palpitations (ICD-10)    
Heart attack    
   ?I21.9 - Acute myocardial infarction, unspecified (ICD-10)    
High cholesterol    
   ?E78.00 - Pure hypercholesterolemia, unspecified (ICD-10)    
    
    
Surgical History (Reviewed 12/05/24 @ 11:43 by Lorna Nichols NP)    
    
Hx of cholecystectomy    
   ?Z90.49 - Acquired absence of other specified parts of digestive tract (ICD-  
   10)    
History of rhinoplasty    
   ?Z98.890 - Other specified postprocedural states (ICD-10)    
History of cardiac cath    
   ?Z98.890 - Other specified postprocedural states (ICD-10)    
History of arthroscopic knee surgery    
   ?Z98.890 - Other specified postprocedural states (ICD-10)    
History of vasectomy    
   ?Z98.52 - Vasectomy status (ICD-10)    
    
    
    
Meds    
Home Medications and Allergies    
                                Home Medications    
    
    
    
?Medication ?Instructions ?Recorded ?Confirmed ?Type    
     
acetaminophen 650 mg 650 mg PO Q12H PRN fever 10/21/24 11/11/24 History    
    
tablet,extended release (Tylenol        
    
Arthritis Pain)        
     
amlodipine 5 mg tablet 5 mg PO DAILY 10/21/24 11/11/24 History    
     
bumetanide 2 mg tablet 2 mg PO DAILY 10/21/24 11/11/24 History    
     
cyclobenzaprine 10 mg tablet 10 mg PO Q12H 10/21/24 11/11/24 History    
     
hydroxyzine pamoate 25 mg capsule 25 mg PO TID 10/21/24 11/11/24 History    
     
insulin aspart 6 unit subcut TID 10/21/24 11/11/24 History    
    
(niacinamide)(U-100) 100 unit/mL(3        
    
mL) subcutaneous pen (Fiasp        
    
FlexTouch U-100 Insulin)        
     
insulin glargine 100 unit/mL (3 10 unit subcut QAM 10/21/24 11/11/24 History    
    
mL) subcutaneous pen (Basaglar        
    
KwikPen U-100 Insulin)        
     
magnesium oxide 400 mg PO DAILY 10/21/24 11/11/24 History    
     
melatonin 10 mg capsule 10 mg PO DAILY 10/21/24 11/11/24 History    
     
omeprazole 20 mg capsule,delayed 20 mg PO BID 10/21/24 11/11/24 History    
    
release        
     
sevelamer carbonate 800 mg tablet 800 mg PO TID 10/21/24 11/11/24 History    
    
(Renvela)        
     
HEPARIN .QOD 10/28/24  History    
    
    
    
                                    Allergies    
    
    
    
Allergy/AdvReac Type Severity Reaction Status Date / Time    
     
thiopental (From Pentothal) Allergy  Agitated Verified 11/11/24 07:40    
    
    
    
    
Exam    
Narrative    
Exam Narrative:     
Psych-alert and oriented x 3. Attentive and appropriate, constitutionally   
normal, displays normal mood and affect per situation. There are no obvious   
deficits in memory, reasoning, or intellect.?    
Skin-no obvious rashes, bruising, erythema noted to the patient's area of pain.?    
Extremities- extremities are warm with minimal edema and palpable pulses.    
Lumbar-tenderness to palpation noted in the lumbar spine and paraspinal   
musculature. Pain is elicited with flexion, extension, and lateral rotation of   
the lumbar spine. Range of motion is diminished with these motions. Facet   
loading maneuvers are positive.?    
Strength-noted to be unremarkable     
Sensory-no notable sensory deficits in the bilateral lower extremities to touch   
or pinprick in all dermatomal distributions    
Sacroiliac - bilateral tenderness at PSIS. Positive Steve's bilaterally.   
Positive thigh thrust bilaterally.    
Coordination remains intact.?    
Gait remains non-antalgic    
    
Results    
Additional Findings    
Additional findings:     
If on a controlled substance or opioids, I have checked an [place] report on this   
patient and there are no aberrancies noted in the prescribing history.??If on a   
controlled substance or opioid a drug screen was completed and reviewed within   
the last year, and if there has not been a drug screen completed we ordered one   
today to monitor higher risk, state monitored pain medication use.     
    
As part of providing excellent, safe, comprehensive care, the following was   
completed at our patient's visit:    
1. A medication reconciliation and review to ensure accurate knowledge of   
current/active medications, including asking our patients to inform us about any  
over-the-counter medications or herbal remedies/nutritional   
supplements/alternative remedies.    
2. A review to specifically ensure our patients have had annual screening for   
screening for depression, screening for tobacco use, and screening for unhealthy  
alcohol use. For concerning screenings had a discussion with the patient,   
provided patient education, and recommended follow-up with primary care provider  
when appropriate. If patient noted with a risk of falling, they received edu  
cation on strength, gait, and balance training to prevent future risk of   
falling.    
    
Assessment and Plan    
Assessment and Plan    
(1) Lumbar stenosis with neurogenic claudication:     
(2) Lumbar spondylosis:     
(3) Sacroiliac joint dysfunction of both sides:     
    
Plan    
consider bilateral SIJ injections in the future    
continue current medications    
continue HEP as tolerated    
f/u 2-3 months, sooner if needed

## 2024-12-17 ENCOUNTER — OFFICE VISIT (OUTPATIENT)
Age: 74
End: 2024-12-17
Payer: COMMERCIAL

## 2024-12-17 VITALS
HEART RATE: 73 BPM | SYSTOLIC BLOOD PRESSURE: 138 MMHG | BODY MASS INDEX: 31.3 KG/M2 | HEIGHT: 71 IN | DIASTOLIC BLOOD PRESSURE: 72 MMHG

## 2024-12-17 DIAGNOSIS — E11.3599 TYPE 2 DIABETES MELLITUS WITH PROLIFERATIVE RETINOPATHY, WITH LONG-TERM CURRENT USE OF INSULIN, MACULAR EDEMA PRESENCE UNSPECIFIED, UNSPECIFIED LATERALITY, UNSPECIFIED PROLIFERATIVE RETINOPATHY* (HCC): Primary | ICD-10-CM

## 2024-12-17 DIAGNOSIS — Z79.4 TYPE 2 DIABETES MELLITUS WITH PROLIFERATIVE RETINOPATHY, WITH LONG-TERM CURRENT USE OF INSULIN, MACULAR EDEMA PRESENCE UNSPECIFIED, UNSPECIFIED LATERALITY, UNSPECIFIED PROLIFERATIVE RETINOPATHY* (HCC): Primary | ICD-10-CM

## 2024-12-17 PROCEDURE — 99214 OFFICE O/P EST MOD 30 MIN: CPT | Performed by: INTERNAL MEDICINE

## 2024-12-17 PROCEDURE — 1123F ACP DISCUSS/DSCN MKR DOCD: CPT | Performed by: INTERNAL MEDICINE

## 2024-12-17 PROCEDURE — 1160F RVW MEDS BY RX/DR IN RCRD: CPT | Performed by: INTERNAL MEDICINE

## 2024-12-17 PROCEDURE — 3051F HG A1C>EQUAL 7.0%<8.0%: CPT | Performed by: INTERNAL MEDICINE

## 2024-12-17 PROCEDURE — 1159F MED LIST DOCD IN RCRD: CPT | Performed by: INTERNAL MEDICINE

## 2024-12-17 RX ORDER — INSULIN GLARGINE 100 [IU]/ML
INJECTION, SOLUTION SUBCUTANEOUS
Qty: 5 ADJUSTABLE DOSE PRE-FILLED PEN SYRINGE | Refills: 3 | Status: SHIPPED | OUTPATIENT
Start: 2024-12-17

## 2024-12-17 RX ORDER — INSULIN ASPART INJECTION 100 [IU]/ML
INJECTION, SOLUTION SUBCUTANEOUS
Qty: 10 ADJUSTABLE DOSE PRE-FILLED PEN SYRINGE | Refills: 3 | Status: SHIPPED | OUTPATIENT
Start: 2024-12-17

## 2024-12-17 NOTE — PROGRESS NOTES
Jefry Cummings is a 74 y.o. , male who comes for Initial visit for Diabetes Mellitus Type 2  PCP: Theresa Espinoza, JERO - NP  The patient (or guardian, if applicable) and other individuals in attendance with the patient were advised that Artificial Intelligence will be utilized during this visit to record, process the conversation to generate a clinical note, and support improvement of the AI technology. The patient (or guardian, if applicable) and other individuals in attendance at the appointment consented to the use of AI, including the recording.                    HPI   History of Present Illness  The patient presents for evaluation of diabetes.    He was diagnosed with diabetes in 2004. He is currently on a regimen of Basaglar, administered at 9 units in the morning and 15 units at night, and Fiasp, taken at 8 units during breakfast, 6 units at lunch, 8 units at dinner, and 8 units at bedtime. He is making efforts towards portion control and carbohydrate counting, although he has not yet started to count carbohydrates precisely. Despite limited physical activity, his weight remains stable. His sensor alerts him to low blood sugar levels between 3:00 AM and 6:00 AM, prompting him to take a couple of tablets and return to sleep. He typically administers 8 units of Fiasp and Basaglar between 7:00 PM and 8:30 PM. His fingerstick readings are consistently within 10 points of the sensor readings.   This patient has history of hyperlipidemia, previously treated.  He is not currently taking any cholesterol medication as he is awaiting approval from his gastroenterologist. He was previously on atorvastatin, but it was discontinued due to adverse effects on his liver. A liver biopsy revealed fatty liver disease.       History reviewed. No pertinent past medical history.   History reviewed. No pertinent surgical history.    History reviewed. No pertinent family history.  Social History     Tobacco Use    Smoking

## 2024-12-17 NOTE — PATIENT INSTRUCTIONS
Take BASAGLAR 9 units in am + 12 units in the PM.      Take mealtime Fiasp as follows:  Breakfast: 8 units  Lunch: 6 Units  Dinner: 8 units  Bed time 8 units     Plus sliding scale Fiasp as follows:  Below 70, treat for hypoglycemia  , no additional insulin  151-200, 1 units  201-250, 2 units  251-300, 3 units  301-350, 4 units  351-400, 5 units  Above 400, call MD

## 2025-02-23 ENCOUNTER — HOSPITAL ENCOUNTER (EMERGENCY)
Age: 75
Discharge: HOME | End: 2025-02-23
Payer: COMMERCIAL

## 2025-02-23 VITALS — HEART RATE: 116 BPM | OXYGEN SATURATION: 99 %

## 2025-02-23 VITALS
OXYGEN SATURATION: 99 % | TEMPERATURE: 98.1 F | SYSTOLIC BLOOD PRESSURE: 176 MMHG | DIASTOLIC BLOOD PRESSURE: 117 MMHG | HEART RATE: 120 BPM

## 2025-02-23 VITALS — SYSTOLIC BLOOD PRESSURE: 176 MMHG | DIASTOLIC BLOOD PRESSURE: 117 MMHG

## 2025-02-23 VITALS — SYSTOLIC BLOOD PRESSURE: 162 MMHG | DIASTOLIC BLOOD PRESSURE: 96 MMHG

## 2025-02-23 VITALS — OXYGEN SATURATION: 98 % | HEART RATE: 109 BPM

## 2025-02-23 VITALS — OXYGEN SATURATION: 99 % | HEART RATE: 115 BPM

## 2025-02-23 VITALS — HEART RATE: 99 BPM | OXYGEN SATURATION: 100 %

## 2025-02-23 VITALS — OXYGEN SATURATION: 99 %

## 2025-02-23 VITALS — HEART RATE: 113 BPM | OXYGEN SATURATION: 98 %

## 2025-02-23 VITALS — HEART RATE: 109 BPM | DIASTOLIC BLOOD PRESSURE: 93 MMHG | SYSTOLIC BLOOD PRESSURE: 160 MMHG | OXYGEN SATURATION: 99 %

## 2025-02-23 DIAGNOSIS — I25.10: ICD-10-CM

## 2025-02-23 DIAGNOSIS — K21.9: ICD-10-CM

## 2025-02-23 DIAGNOSIS — J18.9: Primary | ICD-10-CM

## 2025-02-23 DIAGNOSIS — Z90.49: ICD-10-CM

## 2025-02-23 DIAGNOSIS — Z98.52: ICD-10-CM

## 2025-02-23 DIAGNOSIS — I25.2: ICD-10-CM

## 2025-02-23 DIAGNOSIS — J44.0: ICD-10-CM

## 2025-02-23 DIAGNOSIS — Z99.2: ICD-10-CM

## 2025-02-23 DIAGNOSIS — R06.02: ICD-10-CM

## 2025-02-23 LAB
ADD MANUAL DIFF: NO
ANION GAP: 19.3
BLOOD UREA NITROGEN: 64 MG/DL (ref 7–18)
CALCIUM: 9.2 MG/DL (ref 8.5–10.1)
CARBON DIOXIDE: 23.4 MMOL/L (ref 21–32)
CHLORIDE: 93 MMOL/L (ref 98–107)
CO2 BLD-SCNC: 23.4 MMOL/L (ref 21–32)
ESTIMATED GFR (AFRICAN AMERICA: 8
ESTIMATED GFR (NON-AFRICAN AME: 7
GLUCOSE BLD-MCNC: 168 MG/DL (ref 74–106)
HCT VFR BLD CALC: 33.4 % (ref 42–54)
HEMATOCRIT: 33.4 % (ref 42–54)
HEMOGLOBIN: 11.6 G/DL (ref 14–18)
IMMATURE GRANULOCYTES ABS AUTO: 0.09 10^3/UL (ref 0–0.03)
IMMATURE GRANULOCYTES PCT AUTO: 0.5 % (ref 0–0.5)
LYMPHOCYTES  ABSOLUTE AUTO: 1.3 10^3/UL (ref 1.2–3.8)
MCV RBC: 93.3 FL (ref 80–94)
MEAN CORPUSCULAR HEMOGLOBIN: 32.4 PG (ref 25.9–34)
MEAN CORPUSCULAR HGB CONC: 34.7 G/DL (ref 29.9–35.2)
MEAN CORPUSCULAR VOLUME: 93.3 FL (ref 80–94)
NT PRO B TYPE NATRIURETIC PEPT: 1936 PG/ML (ref ?–900)
PLATELET # BLD: 279 10^3/UL (ref 150–450)
PLATELET COUNT: 279 10^3/UL (ref 150–450)
POTASSIUM SERPLBLD-SCNC: 3.7 MMOL/L (ref 3.5–5.1)
POTASSIUM: 3.7 MMOL/L (ref 3.5–5.1)
RED BLOOD COUNT: 3.58 10^6/UL (ref 4.7–6.1)
SODIUM BLD-SCNC: 132 MMOL/L (ref 136–145)
SODIUM: 132 MMOL/L (ref 136–145)
WBC # BLD: 18.7 10^3/UL (ref 4–11)
WHITE BLOOD COUNT: 18.7 10^3/UL (ref 4–11)

## 2025-02-23 PROCEDURE — 80048 BASIC METABOLIC PNL TOTAL CA: CPT

## 2025-02-23 PROCEDURE — 71045 X-RAY EXAM CHEST 1 VIEW: CPT

## 2025-02-23 PROCEDURE — 36415 COLL VENOUS BLD VENIPUNCTURE: CPT

## 2025-02-23 PROCEDURE — 93005 ELECTROCARDIOGRAM TRACING: CPT

## 2025-02-23 PROCEDURE — 84484 ASSAY OF TROPONIN QUANT: CPT

## 2025-02-23 PROCEDURE — 99285 EMERGENCY DEPT VISIT HI MDM: CPT

## 2025-02-23 PROCEDURE — 85025 COMPLETE CBC W/AUTO DIFF WBC: CPT

## 2025-02-23 PROCEDURE — 83880 ASSAY OF NATRIURETIC PEPTIDE: CPT

## 2025-02-23 NOTE — ED.CHESTPAI1
HPI - Chest Pain
General
Chief Complaint: Chest Pain
Stated Complaint: CHEST PAIN SOB
Time Seen by Provider: 02/23/25 15:28
Source: patient
Mode of arrival: Wheelchair
History of Present Illness
HPI narrative: 
cc = chest pain
Patient states that around 1 PM this afternoon he suddenly developed pain across the anterior chest, worse on the right side underneath the right clavicle, with associated shortness of breath.  No recent fall or injury.  No recent cough or cold 
 that is worse than I normally have  .  He reports chronic cough secondary to COPD.  He also undergoes peritoneal dialysis, has a fistula in his right forearm.
He reported that he has experienced 3 previous acute myocardial infarction's, said that he had cardiac catheterization at the [facility] and in all 3 instances was told  there was no scar so we did not need to put in a stent .
He denies fever or chills.  He denies any nausea, vomiting or diarrhea.  Chest pain is nonradiating.  He denies prior history of DVT or PE
Prior medical history since the knees, COPD, CAD with acute MI, GERD

Related Data
Home Medications

?Medication ?Instructions ?Recorded ?Confirmed
acetaminophen 650 mg 1,950 mg PO Q12H PRN fever or pain 10/21/24 02/23/25
tablet,extended release (Tylenol   
Arthritis Pain)   
amlodipine 5 mg tablet 5 mg PO DAILY 10/21/24 02/23/25
bumetanide 2 mg tablet 2 mg PO DAILY 10/21/24 02/23/25
cyclobenzaprine 10 mg tablet 10 mg PO Q12H 10/21/24 02/23/25
hydroxyzine pamoate 25 mg capsule 25 mg PO TID PRN anxiety 10/21/24 02/23/25
insulin aspart 6 unit subcut QID 10/21/24 02/23/25
(niacinamide)(U-100) 100 unit/mL(3   
mL) subcutaneous pen (Fiasp   
FlexTouch U-100 Insulin)   
insulin glargine 100 unit/mL (3 10 unit subcut QAM 10/21/24 11/11/24
mL) subcutaneous pen (Basaglar   
KwikPen U-100 Insulin)   
magnesium oxide 400 mg PO DAILY 10/21/24 02/23/25
melatonin 10 mg capsule 10 mg PO QPM 10/21/24 02/23/25
omeprazole 20 mg capsule,delayed 20 mg PO BID 10/21/24 02/23/25
release   
sevelamer carbonate 800 mg tablet 800 mg PO TID 10/21/24 02/23/25
(Renvela)   
HEPARIN .QOD 10/28/24 
albuterol sulfate 90 mcg/actuation 2 inh inhalation Q4H PRN shortness 02/23/25 02/23/25
aerosol inhaler of breath or wheezing  
cetirizine 10 mg capsule 10 mg PO DAILY PRN allergy symptoms 02/23/25 02/23/25
docusate sodium 100 mg capsule 100 mg PO DAILY PRN constipation 02/23/25 02/23/25
insulin glargine 100 unit/mL (3 10 unit subcut QAM 02/23/25 02/23/25
mL) subcutaneous pen (Basaglar   
KwikPen U-100 Insulin)   
insulin glargine 100 unit/mL (3 15 unit subcut QPM 02/23/25 02/23/25
mL) subcutaneous pen (Basaglar   
KwikPen U-100 Insulin)   

Previous Rx's

?Medication ?Instructions ?Recorded
albuterol sulfate 90 mcg/actuation 2 inh inhalation Q6H PRN shortness 02/23/25
aerosol inhaler of breath or wheezing #8.5 grams 
doxycycline hyclate 100 mg capsule 100 mg PO BID 7 days #14 caps 02/23/25


Allergies

Allergy/AdvReac Type Severity Reaction Status Date / Time
thiopental (From Pentothal) Allergy  Agitated Verified 11/11/24 07:40



[facility]
Medical History (Updated 02/23/25 @ 17:06 by Marquez Brizuela)

Osteoarthritis
 ?M19.90 - Unspecified osteoarthritis, unspecified site (ICD-10)
Fistula
 ?L98.8 - Other specified disorders of the skin and subcutaneous tissue (ICD-10)
Trigger finger
 ?M65.30 - Trigger finger, unspecified finger (ICD-10)
Blind
 ?H54.7 - Unspecified visual loss (ICD-10)
Anxiety
 ?F41.9 - Anxiety disorder, unspecified (ICD-10)
Acid reflux
 ?K21.9 - Gastro-esophageal reflux disease without esophagitis (ICD-10)
Diabetes
 ?E11.9 - Type 2 diabetes mellitus without complications (ICD-10)
Kidney stone
 ?N20.0 - Calculus of kidney (ICD-10)
Cirrhosis of liver
 ?K74.60 - Unspecified cirrhosis of liver (ICD-10)
Requires peritoneal dialysis
 ?Z99.2 - Dependence on renal dialysis (ICD-10)
Kidney failure
 ?N19 - Unspecified kidney failure (ICD-10)
COPD (chronic obstructive pulmonary disease)
 ?J44.9 - Chronic obstructive pulmonary disease, unspecified (ICD-10)
Asthma
 ?J45.909 - Unspecified asthma, uncomplicated (ICD-10)
HTN (hypertension)
 ?I10 - Essential (primary) hypertension (ICD-10)
Palpitations
 ?R00.2 - Palpitations (ICD-10)
Heart attack
 ?I21.9 - Acute myocardial infarction, unspecified (ICD-10)
High cholesterol
 ?E78.00 - Pure hypercholesterolemia, unspecified (ICD-10)


Surgical History (Reviewed 12/05/24 @ 11:43 by Lorna Nichols NP)

Hx of cholecystectomy
 ?Z90.49 - Acquired absence of other specified parts of digestive tract (ICD-10)
History of rhinoplasty
 ?Z98.890 - Other specified postprocedural states (ICD-10)
History of cardiac cath
 ?Z98.890 - Other specified postprocedural states (ICD-10)
History of arthroscopic knee surgery
 ?Z98.890 - Other specified postprocedural states (ICD-10)
History of vasectomy
 ?Z98.52 - Vasectomy status (ICD-10)



Exam
Narrative
Exam Narrative: 
Nurses notes and vital signs reviewed and patient is not hypoxic.
afebrile
General:  Well-appearing and in no apparent distress.  
Skin:  Warm, dry, no pallor noted.  
No rash.
Head:  Normocephalic, atraumatic.
Eye:  Pupils are equal, round and EOMI. No scleral icterus.
Ears, Nose, Mouth, and Throat:  Oral mucosa is moist
Cardiovascular:  Regular Rate and Rhythm without murmur, gallop or rub.
Respiratory:  No accessory muscle use or respiratory distress.
Lungs are clear to auscultation, no wheezing, rales or rhonchi
Chest Wall: Focal anterior upper chest wall tenderness without crepitus or subcutaneous emphysema
Musculoskeletal:  normal ROM, no calf or popliteal tenderness, no lower extremity edema/swelling
GI:  Abdomen is soft, non-distended.  Normal bowel sounds.  No tenderness to palpation. No rebound, guarding, or rigidity noted.
Neurological:  A&O x4.  No cranial nerve dysfunction observed.  No truncal ataxia. Moves all extremities. Sensation intact.
Psychiatric:  Cooperative and interactive. Normal mood and affect.
Constitutional
Vital Signs, click to edit/add: 

Last Vital Signs

Temp  98.1 F   02/23/25 15:16
Pulse  99 H  02/23/25 17:30
Resp  20   02/23/25 17:30
BP  162/96 H  02/23/25 18:04
Pulse Ox  100   02/23/25 17:30
O2 Del Method  Room Air  02/23/25 15:16




Course
Vital Signs
Vital signs: 

Vital Signs

Temperature  98.1 F   02/23/25 15:16
Pulse Rate  120 H  02/23/25 15:16
Respiratory Rate  24 H  02/23/25 15:16
Blood Pressure  176/117 H  02/23/25 15:16
Pulse Oximetry  99   02/23/25 15:16
Oxygen Delivery Method  Room Air  02/23/25 15:16



Temperature  98.1 F   02/23/25 15:16
Pulse Rate  99 H  02/23/25 17:30
Respiratory Rate  20   02/23/25 17:30
Blood Pressure  162/96 H  02/23/25 18:04
Pulse Oximetry  100   02/23/25 17:30
Oxygen Delivery Method  Room Air  02/23/25 15:16




MDM - Chest Pain
MDM Narrative
Medical decision making narrative: 
Patient was placed on cardiac monitor and EKG obtained. Blood drawn and sent for evaluation.  Portable chest x-ray obtained.
White blood cell count elevated at 18.7.  BMP notable for elevated BUN/creatinine, consistent with the patient's end-stage renal disease and peritoneal dialysis requirement.  His sodium was minimally 1 and chloride was minimally decreased at 93 with 
a normal potassium at 3.7.  Troponin was negative at 29.2.  BNP was elevated at 1936.
CXR with right basilar infiltrate - will start on antibiotics - doxycycline.

Medical Records Data
Attestation: I reviewed the patient's medical records.
Lab Data
Attestation: I reviewed the patient's lab results.
Labs: 

Lab Results

  02/23/25 Range/Units
  15:50 
WBC  18.7 H  (4.0-11.0)  10^3/uL
RBC  3.58 L  (4.70-6.10)  10^6/uL
Hgb  11.6 L  (14.0-18.0)  g/dL
Hct  33.4 L  (42.0-54.0)  %
MCV  93.3  (80.0-94.0)  fL
MCH  32.4  (25.9-34.0)  pg
MCHC  34.7  (29.9-35.2)  g/dL
RDW  11.8  (11.0-15.0)  %
Plt Count  279  (150-450)  10^3/uL
MPV  10.7  (9.5-13.5)  fL
Neut % (Auto)  78.8 H  (43.0-75.0)  %
Lymph % (Auto)  7.0 L  (20.5-60.0)  %
Mono % (Auto)  6.8  (1.7-12.0)  %
Eos % (Auto)  6.1  (0.9-7.0)  %
Baso % (Auto)  0.8  (0.2-2.0)  %
Neut # (Auto)  14.7 H  (1.4-6.5)  10^3/uL
Lymph # (Auto)  1.3  (1.2-3.8)  10^3/uL
Mono # (Auto)  1.3 H  (0.3-0.8)  10^3/uL
Eos # (Auto)  1.1 H  (0.0-0.7)  10^3/uL
Baso # (Auto)  0.1  (0.0-0.1)  10^3/uL
Abs Immat Gran (auto)  0.09 H  (0.00-0.03)  10^3/uL
Imm/Tot Granulo (auto)  0.5  (0.0-0.5)  %
Sodium  132 L  (136-145)  mmol/L
Potassium  3.7  (3.5-5.1)  mmol/L
Chloride  93 L  ()  mmol/L
Carbon Dioxide  23.4  (21.0-32.0)  mmol/L
Anion Gap  19.3  
BUN  64.0 H  (7.0-18.0)  mg/dL
Creatinine  7.70 H*  (0.70-1.30)  mg/dL
Est GFR ( Amer)  8 L  (>=60 mL/min/1.73m^2)  
Est GFR (Non-Af Amer)  7 L  (>=60 mL/min/1.73m^2)  
BUN/Creatinine Ratio  8.3  
Glucose  168 H  ()  mg/dL
Calcium  9.2  (8.5-10.1)  mg/dL
Troponin I High Sens  29.2  (4.0-76.1)  pg/mL
NT-Pro-B Natriuret Pep  1936.0 H*  (<=900.0)  pg/mL



Imaging Data
Chest x-ray: 
      Attestation: I personally reviewed and interpreted this imaging study as follows:
      My impression: 
Cephalization with right basilar infiltrate.  No pneumothorax or large area of consolidation
ECG Data
Attestation: I personally reviewed and interpreted this ECG as follows:
Interpretation: 
EKG interpretation:  Emergency Department physician interpretation.  Sinus tachycardia rhythm at 110bpm with PVCs.  Normal axis, normal intervals and nonspecific T wave changes.  No ST segment elevation or depression.
Heart Score
History: Slightly/Non-Suspicious
ECG: Normal
Age: >65 years
Risk Factors: >3 Risk Factors/ HX of CAD:2
Troponin: <Normal Limit
Total Heart Score Recommendations & Risks:: 4

Discharge Plan
Discharge
Chief Complaint: Chest Pain

Clinical Impression:
 Community acquired pneumonia


Patient Disposition: Home, Self-Care

Time of Disposition Decision: 17:04

Mode of Transportation: Private Vehicle

Prescriptions / Home Meds:
New
  albuterol sulfate 90 mcg/actuation HFA aerosol inhaler 
   2 inh inhalation Q6H PRN (Reason: shortness of breath or wheezing) Qty: 8.5 0RF
  doxycycline hyclate 100 mg capsule 
   100 mg PO BID 7 Days Qty: 14 0RF

No Action
  cyclobenzaprine 10 mg tablet 
   10 mg PO Q12H 
  amlodipine 5 mg tablet 
   5 mg PO DAILY 
  bumetanide 2 mg tablet 
   2 mg PO DAILY 
  magnesium oxide 400 mg magnesium tablet 
   400 mg PO DAILY 
  omeprazole 20 mg capsule,delayed release(DR/EC) 
   20 mg PO BID 
  sevelamer carbonate [Renvela] 800 mg tablet 
   800 mg PO TID 
   Rx Instructions:
   must administer with a meal/food
  insulin glargine [Basaglar KwikPen U-100 Insulin] 100 unit/mL (3 mL) insulin pen 
   10 unit subcut QAM 
  Fiasp FlexTouch U-100 Insulin 100 unit/mL (3 mL) insulin pen 
   6 unit subcut QID 
   Patient Comments:
   before every meal and at bedtime
  melatonin 10 mg capsule 
   10 mg PO QPM 
  hydroxyzine pamoate 25 mg capsule 
   25 mg PO TID PRN (Reason: anxiety) 
  acetaminophen [Tylenol Arthritis Pain] 650 mg tablet extended release 
   1,950 mg PO Q12H PRN (Reason: fever or pain) 
  HEPARIN   
      .QOD 
   Rx Instructions:
   6U  IN 6000CC [id number]; 
  albuterol sulfate 90 mcg/actuation HFA aerosol inhaler 
   2 inh inhalation Q4H PRN (Reason: shortness of breath or wheezing) 
  cetirizine 10 mg capsule 
   10 mg PO DAILY PRN (Reason: allergy symptoms) 
  docusate sodium 100 mg capsule 
   100 mg PO DAILY PRN (Reason: constipation) 
  insulin glargine [Basaglar KwikPen U-100 Insulin] 100 unit/mL (3 mL) insulin pen 
   10 unit subcut QAM 
  insulin glargine [Basaglar KwikPen U-100 Insulin] 100 unit/mL (3 mL) insulin pen 
   15 unit subcut QPM 

Print Language: English

Instructions:  Pneumonia (ED)

Referrals:
Pepper Mendoza NP [Primary Care Provider] - 1 week

Discharge Date/Time: 02/23/25 18:07

## 2025-02-23 NOTE — XMS_ITS
Comprehensive CCD (C-CDA v2.1)  
  
                          Created on: 2025  
  
  
SaraiArun  
External Reference #: CDR,PersonID:[id number]  
: 1950  
Sex: Male  
  
Demographics  
  
  
                                        Address             [address] [place]  [address]  
   
                                        Home Phone          [phone]  
   
                                        Mobile Phone        [phone]  
   
                                        Preferred Language  en  
   
                                        Marital Status        
   
                                        Quaker Affiliation Unknown  
   
                                        Race                Unknown  
   
                                        Ethnic Group        Not  or Lati  
no  
  
  
Author  
  
  
                                        Organization        [facility]  
  
  
Care Team Providers  
  
  
                                Care Team Member Name Role            Phone  
   
                                Unavailable     Primary Care Provider Unavailabl  
e  
   
                                JEANETTE CastorenaP-BC Shantell A Primary Care Provider 1  
[phone]  
   
                                MD Ronald Sánchez Attending Provider [phone]  
[phone]  
   
                                MD Charlie Matos Attending Provider [phone]  
   
                                JEANETTE CastorenaP-BC Shantell A Primary Care Provider [phone]  
[phone]  
   
                                MD Ronald Sánchez Attending Provider [phone]  
[phone]  
   
                                Ronald Sánchez Unavailable     [phone]  
[phone]  
   
                                Carolee Cuevas Unavailable     [phone]  
   
                                REY Castorena-BC Shantell A Primary Care Provider [phone]  
[phone]  
   
                                MD Ronald Sánchez Attending Provider [phone]  
[phone]  
   
                                LAN Cuevas Attending Provider [phone]  
   
                                REY Castorena-BC Shantell A Primary Care Provider [phone]  
[phone]  
   
                                MD Ronald Sánchez Attending Provider [phone]  
[phone]  
   
                                REY Castorena-BC Shantell A Primary Care Provider [phone]  
[phone]  
   
                                MD Ronald Sánchez Attending Provider [phone]  
[phone]  
   
                                Pepper Mendoza Primary Care Provider [phone]  
   
                                Pepper Mendoza Primary Care Provider [phone]  
   
                                MD Guicho Bronson Attending Provider [phone]  
   
                                BASIA SOMMER  Admitting       Unavailable  
   
                                BASIA SOMMER  Attending       Unavailable  
   
                                FRANCISCO CALABRESE Referring       Unavailab  
PEPPER Flynn [facility]    Unavailable  
   
                                ROXANNA SALINAS Consulting      Unavaila  
ble  
   
                                NELLY GREER    Consulting      Unavailable  
   
                                BEVERLY YUSUF Attending       Unavailable  
   
                                AICHHOLZ, PEPPER J Referring       Unavailable  
   
                                AICHHOLZ, PEPPER J Primary Care    Unavailable  
   
                                Aichholz, Pepper J Primary Care Provider [phone]  
   
                                MD Radha Imeliana  Attending Provider [phone]  
   
                                JOSEF CASANOVA Attending       Unavailable  
   
                                AICHHOLZ, EPPPER J Referring       Unavailable  
   
                                AICHHOLZ, PEPPER J Primary Care    Unavailable  
   
                                Aichholz, Pepper J Primary Care Provider [phone]  
   
                                MD Radha Imeliana  Attending Provider [phone]  
   
                                Aichfrancisca NP, Pepper Unavailable     [phone]  
   
                                Carlo HOLDER, Shoaib Unavailable     [phone]  
   
                                Shoaib Matos MD Primary Care Provider [phone]  
   
                                Aicjanusz NP, Pepper Unavailable     [phone]  
   
                                Asaad, Imad     Attending       Unavailable  
   
                                Asaad, Imad     Admitting       Unavailable  
   
                                Aichholz, Pepper J Primary Care    Unavailable  
   
                                Asaad, Imad     Attending       Unavailable  
   
                                Asaad, Imad     Admitting       Unavailable  
   
                                Aichholz, Pepper J Primary Care    Unavailable  
   
                                oJnh HOLDER, Janette Ireland Attending         
Unavailable  
   
                                Jonh HOLDER, Janette Ireland Attending         
Unavailable  
   
                                Jonh HOLDER, Janette Ireland Attending         
Unavailable  
   
                                AICHHOLMANISH, PEPPER J Referring       Unavailable  
   
                                AICHHOLZ, PEPPER J Primary Care    Unavailable  
   
                                AICHHOLZ, PEPPER J Primary Care    Unavailable  
   
                                FRANCISCO CALABRESE Attending       UnavailFRANCISCO Harley Attending       Unavaila  
FRANCISCO Anderson Referring       Unavaila  
ble  
   
                                SHARLAHOLMANISH, PEPPER J Primary Care    Unavailable  
   
                                VALERIA JORDAN  Referring       Unavailable  
   
                                AICHHOLZ, PEPPER J Primary Care    Unavailable  
   
                                AICHHOLZ, PEPPER J Referring       Unavailable  
   
                                AICHHOLZ, PEPPER J Primary Care    Unavailable  
   
                                AICHHOLZ, PEPPER J Referring       Unavailable  
   
                                AICHHOLZ, PEPPER J Primary Care    Unavailable  
   
                                CRISELDA BAIG Attending       Unavailable  
   
                                AICHHOLZ, PEPPER J Referring       Unavailable  
   
                                AICHHOLZ, PEPPER J Primary Care    Unavailable  
   
                                CRISELDA BAIG Attending       Unavailable  
   
                                CRISELDA BAIG Referring       Unavailable  
   
                                AICHHOLZ, PEPPER J Primary Care    Unavailable  
   
                                EDWARDO, SHABANA G Referring       Unavailable  
   
                                AICHHOLZ, PEPPER J Primary Care    Unavailable  
   
                                EDWARDO, SHABANA G Referring       Unavailable  
   
                                AICHHOLZ, PEPPER J Primary Care    Unavailable  
   
                                EDWARDO, SHABANA G Referring       Unavailable  
   
                                AICHHOLZ, PEPPER J Primary Care    Unavailable  
   
                                EDWARDO, SHABANA G Admitting       Unavailable  
   
                                EDWARDO, SHABANA G Attending       Unavailable  
   
                                AICHHOLZ, PEPPER J Primary Care    Unavailable  
   
                                AICHHOLZ, PEPPER J Referring       Unavailable  
   
                                AICHHOLZ, PEPPER J Primary Care    Unavailable  
   
                                CYRIL LEARY Attending       Unavailable  
   
                                AICHHOLZ, PEPPER J Primary Care    Unavailable  
   
                                EDWARDO, SHABANA G Attending       Unavailable  
   
                                EDWARDO, SHABANA G Referring       Unavailable  
   
                                AICHHOLZ, PEPPER J Primary Care    Unavailable  
   
                                ASAAD, IMAD     Referring       Unavailable  
   
                                AICHHOLZ, PEPPER J Primary Care    Unavailable  
   
                                AICHHOLZ, PEPPER J Referring       Unavailable  
   
                                AICHHOLZ, PEPPER J Primary Care    Unavailable  
   
                                AICHHOLZ, PEPPER J. Primary Care    Unavailable  
   
                                AICHHOLZ, PEPPER J. Primary Care    Unavailable  
   
                                No Family, Physician Primary Care    Unavailable  
   
                                ARCHIE VELA Referring       Unavailable  
   
                                No Family, Physician Primary Care    Unavailable  
   
                                AICHHOLZ, PEPPER J. Referring       Unavailable  
   
                                AICHHOLZ, PEPPER J. Primary Care    Unavailable  
   
                                AICHHOLZ, PEPPER  Attending       Unavailable  
   
                                DANKHERNANCY Attending       Unavailable  
   
                                AICHHOLZ, PEPPER  Attending       Unavailable  
   
                                AICHHOLZ, PEPPER  Attending       Unavailable  
   
                                RUSHERNANCY Attending       Unavailable  
   
                                AICHHOLZ, PEPPER  Attending       Unavailable  
   
                                RUSHERNANCY Attending       Unavailable  
   
                                RUSHERNANCY Attending       Unavailable  
   
                                AICHHOLZ, PEPPER  Attending       Unavailable  
   
                                RUSHER, NANCY A Attending       Unavailable  
   
                                JIM GRIGSBY Attending       Unavailable  
   
                                AICHHOLZ, PEPPER  Referring       Unavailable  
   
                                AICHHOLZ, PEPPER  Attending       Unavailable  
   
                                RUSHER, NANCY A Attending       Unavailable  
   
                                AICHHOLZ, PEPPER  Attending       Unavailable  
   
                                RUSHERNANCY Attending       Unavailable  
   
                                Aichholz Pepper RIDER Primary Care Provider   
[phone]  
  
  
  
Allergies  
  
  
                                                    Allergy   
Classification                          Reported   
Allergen(s)               Allergy Type              Date of   
Onset                     Reaction(s)               Facility  
   
                                                    PHENobarbital  
(1 source)          PHENobarbital       Drug Allergy          
4                                       Unknown   
Reaction,   
[place]  
   
                                                      
(20 sources)                            PHENobarbital;   
Translations:   
[PHENOBARBITAL]           Drug Allergy                
2                                       Other (See   
Comments)                               [facility]  
   
                                                      
(4 sources)               sodium pentothal          Propensity to   
adverse   
reactions                                 
2                                       becomes   
violent                                 [facility]  
   
                                                      
(16 sources)                            Thiopental;   
Translations:   
[THIOPENTAL   
SODIUM]                   Drug Allergy                
0                                       Other (See   
Comments)                               [facility]   
Repository  
   
                                                      
(14 sources)        Thiopental          Drug Allergy          
3                                                   [facility]   
[facility]  
  
  
  
Medications  
Current Medications  
  
  
  
                      Medication Drug Class(es) Dates      Sig (Normalized) Sig   
(Original)  
   
                                                    8 hr acetaminophen   
650 mg extended   
release oral tablet  
(20 sources)                                        Start:   
2023                              take 1950 mg by   
mouth every eight   
hours                                   Acetaminophen   
Active 1950 MG PO   
Q8H 2023 12:00am  
  
  
  
                                Start: 2023                 Acetaminophen   
(Tylenol Arthritis) 650   
mg Tablet Extended Release Active   
1300 MG PO Q8H 2023   
12:00am  
   
                                                    Start: 10-  
End: 2023                                     Acetaminophen (Tylenol) 325   
mg Tablet   
Discontinued 650 MG PO As Directed   
2022 12:00am 2023 11:44am  
   
                                                            take 1 tablet by rayne  
th every   
eight hours as needed                   acetaminophen (TYLENOL ARTHRITIS) 650   
mg 8 hr tablet Take 1 tablet (650 mg   
total) by mouth every 8 (eight) hours   
as needed. Active  
  
  
  
                                        Comment on above:   Take 650 mg by mouth  
 every 8 hours as needed.   
   
                                                    [id number] 200   
actuat albuterol   
0.09 mg/actuat   
metered dose   
inhaler  
(20 sources)                            beta2-Adrenergic   
Agonist                                 Start:   
2023                              take 2 puff(s) by   
inhalation every   
four hours for   
wheezing                                albuterol HFA 90   
mcg/act inhaler   
Inhale 2 puffs   
every 4 (four)   
hours if needed for   
shortness of breath   
or wheezing   
2023 Active  
  
  
  
                                                            take 2 puff(s) by in  
halation every six   
hours as needed for wheezing            albuterol (PROVENTIL HFA;VENTOLIN HFA) 9  
0   
mcg/actuation inhaler Inhale 2 puffs every   
6 (six) hours as needed for wheezing.   
Active  
  
  
  
                                                    amLODIPine 10 mg   
oral tablet  
(20 sources)                            Dihydropyridine   
Calcium Channel   
Blocker                   Start: 2022         take 5 mg by   
mouth once   
daily at   
bedtime                                 Amlodipine   
Active 5 MG PO   
Daily at bedtime   
2022 1:00am  
  
  
  
                                        Start: 2022   take 1 tablet by rayne  
th in the   
morning                                 amLODIPine (NORVASC) 10 mg tablet   
Indications: Essential hypertension   
Take 1 tablet (10 mg total) by mouth   
in the morning. 90 tablet 3   
2022 Active  
   
                                                            take 1 tablet by rayne  
th in the   
morning                                 amLODIPine (Norvasc) 5 MG tablet   
Take 5 mg by mouth in the morning.   
Active  
   
                                                                amLODIPine Besyl  
ate Active  
  
  
  
                                                    atorvastatin 40 mg   
oral tablet  
(20 sources)                            HMG-CoA   
Reductase   
Inhibitor                               Start: 2021  
End: 2024                         take 1 tablet   
by mouth in   
the morning                             atorvastatin   
(LIPITOR) 40 mg   
tablet Take 1   
tablet (40 mg   
total) by mouth in   
the morning. 0   
2024 Active  
  
  
  
                                                                Atorvastatin [place]  
cium Active  
  
  
  
                                        Comment on above:   Take 1 tablet by rayne  
th.   
   
                                                    B Complex-C-Zn-Folic   
Acid (Dialyvite/Zinc)   
tablet  
(14 sources)                                        Start:   
20  
24                                      take 1 tablet   
by mouth at   
bedtime                                 B Complex-C-Zn-Folic   
Acid (Dialyvite/Zinc)   
tablet Take 1 tablet by   
mouth at bedtime   
2024 Active  
   
                                                    bumetanide 2 mg oral   
tablet  
(20 sources)              Loop Diuretic             Start:   
10-10-20  
23                                      take 1 tablet   
by mouth once   
daily                                   bumetanide (Bumex) 2 MG   
tablet Take 2 mg by   
mouth Daily 2024   
Active  
   
                                                    cholecalciferol 0.125   
mg oral tablet  
(20 sources)              Vitamin D                 Start:   
20  
22                                      take 1 tablet   
by mouth once   
daily                                   Cholecalciferol   
(Vitamin D3) (Vitamin   
D3) 125 mcg (5,000   
unit) Tablet Active   
46236 UNIT PO Daily   
2022   
1:00am  
  
  
  
                                                take 1 capsule by mouth in the m  
orning cholecalciferol (Vitamin D-3) 125 MCG   
(5000   
UT) capsule Take 5,000 Units by mouth in the   
morning. Active  
   
                                                take 38166 mg by mouth once daniela  
y cholecalciferol, vitamin D3, 10 mcg/drop (400  
  
unit/drop) drops Take 10,000 mg by mouth   
daily. Active  
  
  
  
                                                    cinacalcet 30 mg   
oral tablet  
(6 sources)                             Calcium-sensing Receptor   
Agonist                                             take 1 tablet   
by mouth once   
daily                                   cinacalcet   
(Sensipar) 30 MG   
tablet Take 30 mg   
by mouth Daily   
Active  
   
                                                    Continuous Blood   
Gluc    
(FreeStyle Yudi 2   
[employer]) device  
(14 sources)                                        Start:   
01-  
3                                                   Continuous Blood   
Gluc    
(FreeStyle Yudi 2   
[employer]) device 1   
Device Daily as   
needed 2023   
Active  
   
                                                    Continuous Glucose   
Sensor (FreeStyle   
Yudi 2 Sensor)   
misc  
(17 sources)                                        Start:   
  
End:   
                                                   Continuous Glucose   
Sensor (FreeStyle   
Yudi 2 Sensor)   
misc Indications:   
Type 2 diabetes   
mellitus without   
complication, with   
long-term current   
use of insulin   
(CMS/HCC) 1 each   
by Other route   
every 14   
(fourteen) days 6   
each 3 2024 Active  
  
  
  
                                                    Start: 2024  
End: 2024                                     Continuous Glucose Sensor (F  
reeStyle Yudi 2 Sensor) misc   
Indications: Type 2 diabetes mellitus without complication, with   
long-term current use of insulin (CMS/HCC) 1 each by Other route   
every 14 (fourteen) days for 28 days 2 each 11 2024 Discontinued (Reorder)  
   
                                                    Start: 2024  
End: 2025                                     Continuous Glucose Sensor (F  
reeStyle Yudi 2 Sensor) misc   
Indications: Type 2 diabetes mellitus without complication, with   
long-term current use of insulin (CMS/HCC) 1 each by Other route   
every 14 (fourteen) days for 28 days 2 each 11 2024 Active  
   
                                                    Start: 2024  
End: 2024                                     Continuous Glucose Sensor (F  
reeStyle Yudi 2 Sensor) misc 1   
each   
by Other route Every 10 (ten) days 2024   
Discontinued  
   
                                Start: 2024                 Continuous Glu  
cose Sensor (FreeStyle Yudi 2 Sensor) misc 1   
each   
by Other route Every 10 (ten) days 2024 Active  
  
  
  
                                                    cyclobenzaprine   
hydrochloride 10 mg   
oral tablet  
(20 sources)                            Muscle   
Relaxant                                Start:   
2024  
End: 2025                         take 1   
tablet by   
mouth once                              cyclobenzaprine   
(Flexeril) 10 MG   
tablet Indications:   
Muscle spasm , Spinal   
stenosis of lumbar   
region with   
neurogenic   
claudication Take 1   
tablet (10 mg) by   
mouth every 12   
(twelve) hours 60   
tablet 5 2024 Active  
  
  
  
                                        Start: 2024   take 10 mg by mouth   
every   
twelve hours                            Cyclobenzaprine Active 10 MG PO Every   
12 hours May 14th, 2024 12:00am  
   
                                                            take 1 tablet by rayne  
th three   
times daily as needed for   
muscle spasms                           cyclobenzaprine (FLEXERIL) 10 mg   
tablet Take 1 tablet (10 mg total) by   
mouth 3 (three) times a day as needed   
for muscle spasms. Active  
  
  
  
                                                    Dailyvite  
(3 sources)                             Start: 2023   take 800 mg by   
mouth once daily   
at bedtime                              Dailyvite Active   
800 MG PO Daily at   
bedtime 2023 12:00am   
with zinc  
   
                                                    1 ml epoetin joseph   
3000 unt/ml   
injection  
(8 sources)                             Erythropoiesis  
-stimulating   
Agent                                                       epoetin joseph   
(Epogen,Procrit)   
3000 UNIT/ML   
injection Inject   
as directed as   
needed Active  
   
                                                    Flash Glucose   
Sensor (Freestyle   
Yudi 2 Sensor) kit  
(1 source)                      Start: 2024                 Flash Glucose   
Sensor (Freestyle   
Yudi 2 Sensor)   
kit Active EACH   
.ROUTE .MEDSUPPLY   
   
12:00am As   
directed  
   
                                                    folic acid-B   
iium-Z-xzrog-zinc   
(DIALYVITE) 3-70-15   
mg-mcg-mg tablet  
(3 sources)                                                     folic acid-B   
yrzs-V-rtrfr-zinc   
(DIALYVITE)   
3-70-15 mg-mcg-mg   
tablet Take by   
mouth daily.   
Active  
   
                                                    FREESTYLE YUDI 2   
SENSOR kit  
(12 sources)                    Start: 2023                 FREESTYLE LIBR  
E 2   
SENSOR kit   
2023 Active  
  
  
  
                                Start: 2023                 FREESTYLE LIBR  
E 2 SENSOR kit  
  
  
  
                                                    gentamicin 0.001   
mg/mg topical   
ointment  
(20 sources)                    Start: 2024                 gentamicin   
(Garamycin) 0.1 %   
ointment Apply 1   
Application topically   
in the morning.   
2024 Active  
   
                                                    1 ml heparin sodium,   
porcine 1000 unt/ml   
injection  
(20 sources)                            Unfractionated Heparin,   
Anti-coagulant                                              heparin 1000 units/m  
L   
injection Infuse   
1,000 Units/hr into a   
venous catheter every   
other day Active  
  
  
  
                                                                heparin sodium,p  
orcine (heparin, porcine,) 1,000 unit/mL infusion for dialysis  
  
Infuse 1,000 Units/hr into a venous catheter every other day. 6 until into a 6L   
bag of dextrose solution Active  
   
                                                                heparin sodium,p  
orcine (heparin, porcine,) 1,000 unit/mL infusion for dialysis  
  
Infuse 1,000 Units/hr into a venous catheter every other day. Active  
  
  
  
                                                    hydrOXYzine pamoate   
25 mg oral capsule  
(20 sources)              Antihistamine             Start: 2024  
End: 2025                         take 1 capsule   
by mouth every   
eight hours for   
anxiety                                 hydrOXYzine pamoate   
(Vistaril) 25 MG   
capsule   
Indications:   
Anxiety,   
generalized   
(CMS/HCC) Take 1   
capsule (25 mg) by   
mouth every 8   
(eight) hours if   
needed for anxiety   
or itching 30   
capsule 2   
2024 Active  
  
  
  
                                        Start: 2024   take 1 tablet by rayne  
th three   
times daily as needed                   Hydroxyzine Hcl Active 1 TAB PO   
Three times daily May 14th, 2024   
12:00am FreeTextSi tablet as   
needed Orally Once a day; Note:   
Source Status: Taking; Provider:   
Princess Cardenas (NPI:   
[id number])  
   
                                        Start: 2024   take 1 tablet by rayne  
th once   
daily as needed                         Hydroxyzine Hcl Active 1 TAB PO   
Daily May 14th, 2024 12:00am   
FreeTextSi tablet as needed   
Orally Once a day; Note: Source   
Status: Taking; Provider: Princess Cardenas (NPI: [id number])  
   
                                                    Start: 2023  
End: 2024                                     hydrOXYzine (VISTARIL) 25 mg  
 capsule   
take 1 capsule by mouth every   
morning then 1 capsule AT NOON then   
1 capsule every evening if needed   
for anxiety up to 90 DAYS 270   
capsule 2024   
Discontinued (Duplicate Listing)  
   
                                                    Start: 2022  
End: 2024                         take 25 mg by mouth three times   
daily                                   Hydroxyzine Pamoate Discontinued 25   
MG PO Three times daily 2022 12:00am May 14th, 2024 11:58am  
   
                                                            take 1 tablet by rayne  
th every   
eight hours as needed                   hydrOXYzine (ATARAX) 25 mg tablet   
Take 1 tablet (25 mg total) by mouth   
every 8 (eight) hours as needed.   
Active  
   
                                                                hydrOXYzine HCl   
Active  
  
  
  
                                                    3 ml insulin   
aspart, human   
100 unt/ml pen   
injector  
(20 sources)        Insulin Analog      Start: 2024   inject 6 [IU] by   
subcutaneous   
injection three   
times daily                             Insulin Aspart   
(Niacinamide) (Fiasp   
Flextouch U-100   
Insulin) 100 unit/mL   
(3 mL) insulin pen   
Active 6 UNIT SUBCUT   
Three times daily   
2024   
12:00am  
  
  
  
                                Start: 2024                 insulin aspart  
, with niacinamide, (Fiasp FlexTouch) 100   
UNIT/ML   
injection Indications: Type 2 diabetes mellitus without   
complication, with long-term current use of insulin (CMS/HCC) 5   
units before meals and 8-10 units at bedtime. For total max dose   
of 25 units daily 23 mL 3 2024 Active  
   
                                Start: 2024                 FIASP FLEXTOUC  
H U-100 INSULIN 100 unit/mL (3 mL) insulin pen   
Inject 5 Units under the skin. 5 units with meals of blood sugar   
>150 2024 Active  
  
  
  
                                                    3 ml insulin glargine   
100 unt/ml pen injector  
(16 sources)    Insulin Analog  Start: 2024                 insulin glargi  
ne (Basaglar   
KwikPen) 100 UNIT/ML pen   
Indications: Type 2   
diabetes mellitus without   
complication, with   
long-term current use of   
insulin (CMS/HCC) 25 units   
daily 27 mL 1 2024   
Active  
  
  
  
                                        Start: 2024   inject 10 [IU] by metcalf  
bcutaneous   
injection once daily at bedtime         Insulin Glargine (Basaglar   
Kwikpen U-100 Insulin) 100   
unit/mL (3 mL) insulin pen   
Active 10 UNIT SUBCUT every day   
in the morning and at bedtime   
May 14th, 2024 12:00am  
  
  
  
                                                    insulin glargine,hum.rec.anl  
og   
(BASAGLAR KWIKPEN U-100 INSULIN   
SUBQ)  
(10 sources)                                                inject 25 [IU] by   
subcutaneous   
injection in the   
morning                                 insulin glargine,hum.rec.anlog   
(BASAGLAR KWIKPEN U-100   
INSULIN SUBQ) Inject 25 Units   
under the skin in the morning.   
Active  
  
  
  
                                                            inject 10 [IU] by metcalf  
bcutaneous   
injection in the morning                insulin glargine,hum.rec.anlog (BASAGLAR  
   
KWIKPEN U-100 INSULIN SUBQ) Inject 10 Units   
under the skin in the morning. Active  
   
                                                            inject 10 [IU] by metcalf  
bcutaneous   
injection in the morning                insulin glargine,hum.rec.anlog (BASAGLAR  
   
KWIKPEN U-100 INSULIN SUBQ) Inject 10 Units   
under the skin in the morning. 0 Active  
  
  
  
                                                    loratadine 10 mg oral   
tablet  
(20 sources)                                          
End: 2024                                     loratadine (Claritin)   
10 MG tablet Take 10   
mg by mouth if needed   
for allergies Active  
   
                                                    magnesium oxide 400 mg   
oral tablet  
(20 sources)                    Start: 10-                 magnesium oxid  
e   
(Mag-Ox) 400 (240 Mg)   
MG tablet Take 400 mg   
by mouth. 10/13/2023   
Active  
   
                                                    melatonin 10 mg oral   
tablet  
(20 sources)                            Start: 2024   take 1 tablet by   
mouth once daily at   
bedtime                                 Melatonin Active 1 TAB   
PO Daily at bedtime   
May 14th, 2024 12:00am   
FreeTextSi tablet   
at bedtime as needed   
Orally Once a day;   
Note: Source Status:   
Taking; Provider:   
Princess Cardenas   
(NPI: [id number])  
  
  
  
                                                            take 1 tablet by rayne  
th at   
bedtime                                 melatonin 10 MG tablet Take 10 mg by   
mouth at bedtime Active  
   
                                                      
End: 2024                         take 1 tablet under the tongue   
at bedtime                              Melatonin 1 MG sublingual tablet   
Place 1 mg under the tongue at   
bedtime 2024 Discontinued   
(Therapy completed)  
   
                                                            take 1 tablet by rayne  
th once   
daily at bedtime as needed              Melatonin 10 MG 1 tablet at bedtime   
as needed Orally Once a day Active  
   
                                                                Melatonin Active  
  
  
  
                                                    omeprazole 20 mg   
delayed release   
oral tablet  
(20 sources)                            Proton Pump   
Inhibitor                 Start: 2023         take 20 mg by   
mouth twice   
daily                                   Omeprazole Active   
20 MG PO Twice   
daily 2023 12:00am  
  
  
  
                                        Start: 2023   take 20 mg by mouth   
once daily   
at bedtime                              Omeprazole Active 20 MG PO Daily at   
bedtime 2023 12:00am  
   
                                                            take 1 capsule by mo  
uth in the   
morning                                 omeprazole (PriLOSEC) 20 MG DR   
capsule Take 20 mg by mouth in the   
morning. Active  
   
                                                                Omeprazole Activ  
e  
  
  
  
                                        Comment on above:   Take 20 mg by mouth   
once daily.   
   
                                                    ONETOUCH ULTRA2 METER   
misc  
(12 sources)                                        Start:   
2021                                          ONETOUCH ULTRA2 METER   
misc 2021 Active  
  
  
  
                                Start: 2021                 ONETOUCH ULTRA  
2 METER misc  
  
  
  
                                                    polyethylene glycol   
3350 [id number] mg powder   
for oral solution  
(2 sources)     Osmotic Laxative                                 polyethylene gl  
ycol   
(GLYCOLAX) 17   
gram/dose powder Take   
17 g by mouth in the   
morning. 0 Active  
   
                                                    sodium bicarb  
(10 sources)                                                    sodium bicarb Ac  
tive  
   
                                                    Super B Complex  
(12 sources)                                                    Super B Complex   
Active  
   
                                                    tiZANidine 4 mg oral   
tablet  
(20 sources)                            Central alpha-2   
Adrenergic Agonist                      Start:   
  
End:   
                                       take 1 tablet   
by mouth twice   
daily                                   tiZANidine (ZANAFLEX)   
4 mg tablet   
Indications: Diabetes   
mellitus due to   
underlying condition   
with both eyes   
affected by   
retinopathy and   
macular edema,   
without long-term   
current use of   
insulin, unspecified   
retinopathy severity   
(CMS-HCC) Take 1   
tablet by mouth twice   
daily 180 tablet 0   
2022 Active  
  
  
  
                                                                tiZANidine HCl A  
ctive  
  
  
  
                                        Comment on above:   Take 1 tablet by rayne  
th as needed.   
   
                                                    Vitamin B Complex  
(9 sources)                                         Start:   
2022                              take 1 tablet by   
mouth once daily                        Vitamin B Complex   
Active 1 TAB PO   
Daily 2022 2:58pm  
  
  
  
                                                    Start: 2022  
End: 2023                         take 1 tablet by mouth once   
daily                                   Vitamin B Complex Discontinued 1 TAB   
PO Daily 2022 1:00am   
2023 11:46am  
   
                                        Start: 2022   take 1 tablet by rayne  
th once   
daily                                   Vitamin B Complex Active 1 TAB PO   
Daily 2022 12:00am  
   
                                        Start: 2022   take 1 tablet by rayne  
th once   
daily                                   Vitamin B Complex Active 1 TAB PO   
Daily 2022 1:00am  
  
  
  
                                                    Vitamin D3  
(12 sources)                                                    Vitamin D3 Activ  
e  
  
  
  
Completed/Discontinued Medications  
  
  
  
                      Medication Drug Class(es) Dates      Sig (Normalized) Sig   
(Original)  
   
                                                    acetaminophen 325 mg   
/ HYDROcodone   
bitartrate 5 mg oral   
tablet  
(3 sources)               Opioid Agonist            Start:   
2023  
End:   
2024                              take 1 tablet by   
mouth every six   
hours                                   Hydrocodone-Acetam  
inophen   
Discontinued 1 TAB   
PO Q6H 28 7 May   
2nd, 2023 May   
14th, 2024 11:58am  
   
                                                    aspirin 81 mg   
delayed release oral   
tablet  
(20 sources)                            Platelet   
Aggregation   
Inhibitor,   
Nonsteroidal   
Anti-inflammatory   
Drug                                    Start:   
2022  
End:   
10-                              take 1 tablet by   
mouth once daily                        Aspirin (Aspirin   
Low-Strength) 81   
mg Tablet,Delayed   
Release (Dr/Ec)   
Discontinued 81 MG   
PO Daily 2022 1:00am   
May 14th, 2024   
11:57am  
  
  
  
                                                                Baby Aspirin Act  
randi  
  
  
  
                                        Comment on above:   Take 1 tablet by rayne  
th once daily.   
   
                                                    B complex-vitamin   
C-folic acid   
(NEPHROCAP) 1 mg   
capsule  
(6 sources)                                           
End:   
2024                              take 1 capsule by   
mouth in the   
morning                                 B complex-vitamin   
C-folic acid   
(NEPHROCAP) 1 mg   
capsule Take 1 capsule   
by mouth in the   
morning. 2024   
Discontinued (Therapy   
completed)  
  
  
  
                                                take 1 capsule by mouth in the m  
orning B complex-vitamin C-folic acid   
(NEPHROCAP) 1   
mg capsule Take 1 capsule by mouth in the   
morning. Active  
  
  
  
                                                    benzonatate 200 mg   
oral capsule  
(20 sources)                            Non-narcotic   
Antitussive                             Start: 2024  
End: 2024                                     Benzonatate   
Discontinued MG PO May   
14th, 2024 12:00am   
2024   
1:48pm  
  
  
  
                                                    Start: 2024  
End: 2024                         take 1 capsule by mouth three   
times daily as needed                   benzonatate (Tessalon) 200 MG   
capsule Take 200 mg by mouth 3   
(three) times a day as needed   
2024 Discontinued   
(Therapy completed)  
  
  
  
                                                    cefdinir 300 mg oral   
capsule  
(10 sources)                            Cephalosporin   
Antibacterial                             
End: 2024                                     cefdinir (Omnicef)   
300 MG capsule Take   
300 mg by mouth   
2024   
Discontinued   
(Therapy completed)  
   
                                                    cetirizine   
hydrochloride 10 mg   
oral tablet  
(13 sources)                            Histamine-1   
Receptor   
Antagonist                                
End: 2024                         take 1   
tablet by   
mouth in the   
morning                                 cetirizine (ZyrTEC)   
10 MG tablet Take 10   
mg by mouth in the   
morning. 2024   
Discontinued   
(Therapy completed)  
   
                                                    chlorthalidone 25 mg   
oral tablet  
(11 sources)                            Thiazide-like   
Diuretic                                Start:   
2021  
End: 2023                         take 25 mg   
by mouth   
once daily                              Chlorthalidone   
Discontinued 25 MG   
PO Daily 2022 1:00am   
2023   
11:45am  
   
                                                    cholecalciferol,   
vitamin D3, (VITAMIN   
D3 ORAL)  
(2 sources)                                                 take 40949   
mg by mouth   
once daily                              cholecalciferol,   
vitamin D3, (VITAMIN   
D3 ORAL) Take 10,000   
mg by mouth once   
daily. 0 Active  
   
                                        Comment on above:   Take 10,000 mg by mo  
uth once daily.   
   
                                                    diphenhydrAMINE   
hydrochloride 50 mg   
oral tablet  
(3 sources)                             Histamine-1   
Receptor   
Antagonist                              Start:   
2023  
End: 2024                         take 50 mg   
by mouth   
once daily   
at bedtime                              Diphenhydramine Hcl   
Discontinued 50 MG   
PO Daily at bedtime   
2023   
12:00am May 14th,   
2024 11:57am  
   
                                                    0.5 ml dulaglutide 3   
mg/ml auto-injector  
(16 sources)                            GLP-1 Receptor   
Agonist                                 Start:   
2022  
End: 2023                                     Dulaglutide   
(Trulicity) 1.5   
mg/0.5 mL pen   
injector   
Discontinued 1.5 MG   
SUBCUT every week   
2022   
1:00am 2023 11:45am takes   
on Sat.  
  
  
  
                                                                Trulicity Active  
  
  
  
                                        Comment on above:   as directed.   
   
                                                    furosemide 40 mg oral   
tablet  
(17 sources)              Loop Diuretic               
End:   
2024                              take 1 tablet by   
mouth once daily                        furosemide (Lasix) 40   
MG tablet Take 1   
tablet by mouth Daily   
2024   
Discontinued (Therapy   
completed)  
  
  
  
                                                      
End: 2024                         take 1 tablet by mouth twice   
daily                                   furosemide (LASIX) 40 mg tablet Take   
1 tablet (40 mg total) by mouth 2   
(two) times a day. 2024   
Discontinued (Therapy completed)  
  
  
  
                                                    [id number]-algal oil  
   
(METANX, ALGAL OIL,) 3 mg-35   
mg-2 mg -90.314 mg cap  
(2 sources)                                         Start:   
2022                                          [id number]   
oil (METANX, ALGAL OIL,) 3   
mg-35 mg-2 mg -90.314 mg   
cap See Admin Instructions.   
0 2022 Active  
   
                                        Comment on above:   See Admin Instructio  
ns.   
   
                                                    lidocaine 25 mg/ml /   
prilocaine 25 mg/ml topical   
cream  
(3 sources)                             Antiarrhythmic  
, Amide Local   
Anesthetic                              Start:   
2023  
End:   
2024                                          Lidocaine-Prilocaine   
Discontinued 1 APPLIC   
TOPICAL 3 Times a week   
2023 12:00am   
May 14th, 2024 11:58am  with dialysis  
   
                                                    Ozempic  
(4 sources)                                                     Ozempic Not-Taki  
ng  
  
  
  
                                                                Ozempic Active  
  
  
  
                                                    Semaglutide  
(3 sources)                                         Start: 2023  
End: 2024                                     Semaglutide (Ozempic) 0.25 m  
g or 0.5 mg (2 mg/3   
mL) pen injector Discontinued 0.5 MG SUBCUT every   
week 2023 12:00am May 14th, 2024   
11:59am   
  
  
  
                                Start: 2023                 Semaglutide (O  
zempic) 0.25 mg or 0.5 mg (2 mg/3 mL) pen   
injector   
Active 0.5 MG SUBCUT every week 2023 12:00am   
  
  
  
  
                                                    0.25 mg, 0.5 mg   
dose 1.5 ml   
semaglutide 1.34   
mg/ml pen injector  
(10 sources)                                          
End: 2024                                     semaglutide   
(OZEMPIC) 0.25 mg   
or 0.5 mg(2 mg/1.5   
mL) pen injector   
Inject under the   
skin. 2024   
Discontinued   
(Therapy completed)  
   
                                                    sevelamer carbonate   
800 mg oral tablet  
(20 sources)        Phosphate Binder    Start: 2022   take 2 tablets   
by mouth three   
times daily                             Sevelamer Carbonate   
(Renvela) 800 mg   
Tablet Active 1600   
MG PO Three times   
daily 2022 1:00am  
  
  
  
                                                    Start: 2022  
End: 2024                         take 1 tablet by mouth three   
times daily at mealtime                 Sevelamer Carbonate (Renvela) 800   
mg tablet Discontinued 800 MG PO   
Three times daily May 14th, 2024   
12:00am 2024   
10:19am must administer with a   
meal/food  
   
                                                                Renvela Active  
  
  
  
                                        Comment on above:   1 tablet with meals   
   
                                                    sodium bicarbonate 325   
mg oral tablet  
(13 sources)                                        Start:   
2022  
End:   
2023                              take 325 mg by   
mouth twice   
daily                                   Sodium Bicarbonate   
Discontinued 325 MG PO   
Twice daily 2022 1:00am   
2023   
11:46am  
  
  
  
                                                                Sodium Bicarbona  
te 4.2 % as directed Intravenous Active  
  
  
  
                                                    sodium zirconium   
cyclosilicate 5000 mg   
powder for oral   
suspension  
(17 sources)                                          
End: 2024                                     Lokelma 5 g packet 5 g   
2024 Discontinued   
(Therapy completed)  
   
                                                    UNABLE TO FIND  
(10 sources)                                          
End: 2024                         take 1 tablet by   
mouth in the   
morning                                 UNABLE TO FIND Take 1   
tablet by mouth in the   
morning. DailyVite with   
Zinc. 2024   
Discontinued (Duplicate   
Listing)  
  
  
  
                                                take 1 tablet by mouth in the mo  
rning UNABLE TO FIND Take 1 tablet by mouth in   
the   
morning. DailyVite with Zinc. Active  
   
                                                take 1 tablet by mouth in the mo  
rning UNABLE TO FIND Take 1 tablet by mouth in   
the   
morning. DailyVite with Zinc. 0 Active  
  
  
  
Problems  
Active Problems  
  
  
                                                    Problem   
Classification  Problem         Date            Documented Date Episodic/Chronic  
   
                                                    Acquired foot   
deformities  
(20 sources)                            Acquired hammer toe   
of left foot;   
Translations: [Other   
hammer toe(s)   
(acquired), left   
foot]                                   Onset:   
2022                Chronic  
   
                                                    Acquired foot   
deformities  
(20 sources)                            Acquired hammer toe   
of right foot;   
Translations: [Other   
hammer toe(s)   
(acquired), right   
foot]                                   Onset:   
2022                Chronic  
   
                                                    Acute and unspecified   
renal failure  
(4 sources)                             Renal failure   
syndrome;   
Translations:   
[Unspecified kidney   
failure]                                10-          Chronic  
   
                                                    Acute myocardial   
infarction  
(14 sources)                            Myocardial   
infarction;   
Translations: [Acute   
myocardial   
infarction,   
unspecified]                            Onset:   
2024                Chronic  
   
                                                    Anxiety disorders  
(17 sources)                            Generalized anxiety   
disorder;   
Translations:   
[Generalized anxiety   
disorder]                               Onset:   
2024                Chronic  
   
                                                    Asthma  
(14 sources)                            Asthma; Translations:   
[Unspecified asthma,   
uncomplicated]                          Onset:   
2024                Chronic  
   
                                                    Blindness and vision   
defects  
(16 sources)                            Legal blindness;   
Translations: [Legal   
blindness, as defined   
in [place]]                                 Onset:   
2024                Chronic  
   
                                                    Cardiac dysrhythmias  
(10 sources)                            Atrial fibrillation;   
Translations:   
[Unspecified atrial   
fibrillation]                           Onset:   
2024                Chronic  
   
                                                    Chronic kidney   
disease  
(20 sources)                            Chronic kidney   
disease;   
Translations:   
[Chronic kidney   
disease, unspecified]                   Onset:   
2021  
Resolved:   
2022                                          Chronic  
   
                                                    Chronic obstructive   
pulmonary disease and   
bronchiectasis  
(15 sources)                            Chronic obstructive   
lung disease;   
Translations:   
[Chronic obstructive   
pulmonary disease,   
unspecified]                            Onset:   
2024                Chronic  
   
                                                    Coagulation and   
hemorrhagic disorders  
(8 sources)                             Thrombophilia;   
Translations: [Other   
thrombophilia]                          Onset:   
2024                Chronic  
   
                                                    Complication of   
device; implant or   
graft  
(8 sources)                             Arteriovenous fistula   
thrombosis;   
Translations:   
[Thrombosis due to   
vascular prosthetic   
devices, implants and   
grafts, initial   
encounter]                              Onset:   
2022  
Resolved:   
2022                                          Chronic  
   
                                                    Complication of   
device; implant or   
graft  
(5 sources)                             Other mechanical   
complication of   
surgically created   
arteriovenous   
fistula, initial   
encounter;   
Translations:   
[Mechanical   
complication of   
arteriovenous   
surgical fistula]                       Onset:   
2022  
Resolved:   
2022                                          Episodic  
   
                                                    Coronary   
atherosclerosis and   
other heart disease  
(14 sources)                            Old myocardial   
infarction;   
Translations:   
[History of   
myocardial   
infarction]                             Onset:   
2021                Chronic  
   
                                                    Diabetes mellitus   
with complications  
(20 sources)                            Polyneuropathy due to   
type 2 diabetes   
mellitus;   
Translations: [Type 2   
diabetes mellitus   
with diabetic   
polyneuropathy]                         Onset:   
2020  
Resolved:   
2024                Chronic  
   
                                                    Diabetes mellitus   
without complication  
(20 sources)                            Type 2 diabetes   
mellitus without   
complication;   
Translations: [Type 2   
diabetes mellitus   
without   
complications]                          Onset:   
03-                10-                Chronic  
   
                                                    Disorders of lipid   
metabolism  
(17 sources)                            Mixed hyperlipidemia;   
Translations: [Mixed   
hyperlipidemia]                         Onset:   
01-                10-                Chronic  
   
                                                    Diverticulosis and   
diverticulitis  
(14 sources)                            Diverticular disease;   
Translations:   
[Diverticulosis of   
intestine, part   
unspecified, without   
perforation or   
abscess without   
bleeding]                               Onset:   
2024                Chronic  
   
                                                    Essential   
hypertension  
(20 sources)                            Essential   
hypertension;   
Translations:   
[Essential (primary)   
hypertension]                           Onset:   
12-                10-                Chronic  
   
                                                    Headache; including   
migraine  
(14 sources)                            Migraine;   
Translations:   
[Migraine,   
unspecified, not   
intractable, without   
status migrainosus]                     Onset:   
2024                Chronic  
   
                                                    Immunity disorders  
(14 sources)                            Secondary immune   
deficiency disorder;   
Translations:   
[Immunodeficiency due   
to conditions   
classified elsewhere]                   Onset:   
2024                Chronic  
   
                                                    Mycoses  
(16 sources)                            Onychomycosis due to   
dermatophyte ;   
Translations: [Tinea   
unguium]                                Onset:   
2023                Episodic  
   
                                                    Osteoarthritis  
(20 sources)                            Arthropathy of left   
hip joint;   
Translations:   
[Unilateral primary   
osteoarthritis, left   
hip]                                    Onset:   
2024  
Resolved:   
2024                Chronic  
   
                                                    Other aftercare  
(2 sources)                             Long-term current use   
of insulin;   
Translations: [Long   
term (current) use of   
insulin]                                10-          Episodic  
   
                                                    Other aftercare  
(2 sources)                             Long term (current)   
use of insulin;   
Translations: [Long   
term (current) use of   
insulin]                                Onset:   
2024                                          Episodic  
   
                                                    Other circulatory   
disease  
(6 sources)                             Arteriovenous   
fistula;   
Translations:   
[Arteriovenous   
fistula, acquired]                                          Chronic  
   
                                                    Other circulatory   
disease  
(4 sources)                             Arteriovenous   
fistula, acquired                       Onset:   
2022  
Resolved:   
2022                                          Chronic  
   
                                                    Other circulatory   
disease  
(1 source)                              Arteriovenous fistula   
of right upper   
extremity;   
Translations:   
[Arteriovenous   
fistula, acquired]                      2024          Chronic  
   
                                                    Other liver diseases  
(15 sources)                            Steatosis of liver;   
Translations: [Fatty   
(change of) liver,   
not elsewhere   
classified]                             Onset:   
2024                Chronic  
   
                                                    Other liver diseases  
(20 sources)                            Hepatic fibrosis;   
Translations:   
[Hepatic fibrosis]                      Onset:   
2024                Chronic  
   
                                                    Other liver diseases  
(3 sources)                             Fatty (change of)   
liver, not elsewhere   
classified;   
Translations: [Other   
chronic nonalcoholic   
liver disease]                          Onset:   
2024                Chronic  
   
                                                    Other liver diseases  
(2 sources)                             Elevated liver   
enzymes level;   
Translations:   
[Abnormal levels of   
other serum enzymes]                     2024          Episodic  
   
                                                    Other nervous system   
disorders  
(14 sources)                            Chronic pain   
syndrome;   
Translations:   
[Chronic pain   
syndrome]                               Onset:   
2024                Chronic  
   
                                                    Other nervous system   
disorders  
(1 source)                              Other chronic pain;   
Translations: [Other   
chronic pain]                           Onset:   
2024                                          Chronic  
   
                                                    Other nervous system   
disorders  
(12 sources)                            Mononeuropathy of   
lower limb;   
Translations:   
[Unspecified   
mononeuropathy of   
unspecified lower   
limb]                                   Onset:   
2022                Chronic  
   
                                                    Other nervous system   
disorders  
(3 sources)                             Acute postoperative   
pain; Translations:   
[Other acute   
postprocedural pain]                     2023          Episodic  
   
                                                    Other non-traumatic   
joint disorders  
(15 sources)                            Hip pain;   
Translations: [Pain   
in right hip]                           Onset:   
10-                10-                Episodic  
   
                                                    Other nutritional;   
endocrine; and   
metabolic disorders  
(12 sources)                            Body mass index 30+ -   
obesity;   
Translations:   
[Obesity,   
unspecified]                            Onset:   
2021                Chronic  
   
                                                    Other skin disorders  
(16 sources)                            Dystrophia unguium;   
Translations: [Nail   
dystrophy]                              Onset:   
2023                Episodic  
   
                                                    Peripheral and   
visceral   
atherosclerosis  
(14 sources)                            Atherosclerosis of   
aorta; Translations:   
[Atherosclerosis of   
aorta]                                  Onset:   
2024                Chronic  
   
                                                    Spondylosis;   
intervertebral disc   
disorders; other back   
problems  
(15 sources)                            Degeneration of   
lumbar intervertebral   
disc; Translations:   
[Degeneration of   
intervertebral disc   
of lumbar region,   
unspecified whether   
pain present]                           Onset:   
10-                10-                Chronic  
   
                                                    Spondylosis;   
intervertebral disc   
disorders; other back   
problems  
(20 sources)                            Spinal stenosis of   
thoracic region;   
Translations: [Spinal   
stenosis, thoracic   
region]                                 Onset:   
2024  
Resolved:   
2024                Episodic  
   
                                                    Substance-related   
disorders  
(1 source)                              Light cigarette   
smoker; Translations:   
[Nicotine dependence,   
cigarettes,   
uncomplicated]                          2024          Chronic  
   
                                                    Unclassified  
(2 sources)                             Elevation of levels   
of liver transaminase   
levels; Translations:   
[Elevation of levels   
of liver transaminase   
levels]                                 Onset:   
2024                                            
   
                                                    Unclassified  
(1 source)                              Hepatic fibrosis,   
unspecified;   
Translations:   
[Hepatic fibrosis,   
unspecified]                            Onset:   
2024                                            
   
                                                    Unclassified  
(1 source)                Rapid Heart Rate          Onset:   
03-                                            
   
                                                    Unclassified  
(1 source)                              rapid heart rate,   
vomiting, flank pain                    Onset:   
03-                                            
  
  
Past or Other Problems  
  
  
                                                    Problem   
Classification  Problem         Date            Documented Date Episodic/Chronic  
   
                                                    Administrative/social   
admission  
(20 sources)                            Other reduced   
mobility;   
Translations: [Other   
specified conditions   
influencing health   
status]                                 Onset:   
2020                Episodic  
   
                                                    Biliary tract disease  
(14 sources)                            Gallstone;   
Translations:   
[Calculus of   
gallbladder without   
cholecystitis without   
obstruction]                            Onset:   
2024  
Resolved:   
2024                Episodic  
   
                                                    Calculus of urinary   
tract  
(13 sources)                            Kidney stone;   
Translations:   
[Calculus of kidney]                    Onset:   
2021                Episodic  
   
                                                    Cardiac dysrhythmias  
(1 source)                              Tachycardia,   
unspecified;   
Translations:   
[Tachycardia,   
unspecified]                            Onset:   
03-                                          Episodic  
   
                                                    Fluid and electrolyte   
disorders  
(12 sources)                            Hyperkalemia;   
Translations:   
[Hyperkalemia]                          Onset:   
2022                Episodic  
   
                                                    Genitourinary   
symptoms and   
ill-defined   
conditions  
(20 sources)                            Other microscopic   
hematuria;   
Translations:   
[Microscopic   
hematuria]                              Onset:   
2022  
Resolved:   
2024                Episodic  
   
                                                    Malaise and fatigue  
(14 sources)                            Asthenia;   
Translations:   
[Weakness]                              Onset:   
2024                Episodic  
   
                                                    Mood disorders  
(20 sources)              Mood disorders            Onset:   
2022  
Resolved:   
2024                  
   
                                                    Other aftercare  
(16 sources)                            Marijuana user;   
Translations: [Other   
long term (current)   
drug therapy]                           Onset:   
2024                Episodic  
   
                                                    Other aftercare  
(1 source)                              Other long term   
(current) drug   
therapy;   
Translations: [Other   
long term (current)   
drug therapy]                           Onset:   
2024                                          Episodic  
   
                                                    Other aftercare  
(1 source)                              Post-discharge   
follow-up;   
Translations:   
[Encounter for   
follow-up examination   
after completed   
treatment for   
conditions other than   
malignant neoplasm]                     2024          Episodic  
   
                                                    Other and ill-defined   
heart disease  
(14 sources)                            Heart disease;   
Translations: [Heart   
disease, unspecified]                   Onset:   
2024  
Resolved:   
2024                Chronic  
   
                                                    Other and unspecified   
benign neoplasm  
(14 sources)                            Polyp of colon;   
Translations: [Polyp   
of colon]                               Onset:   
2024                Episodic  
   
                                                    Other connective   
tissue disease  
(14 sources)                            Pain in toe;   
Translations: [Pain   
in unspecified   
toe(s)]                                 Onset:   
2023                Episodic  
   
                                                    Other connective   
tissue disease  
(17 sources)                            Spasm; Translations:   
[Other muscle spasm]                    Onset:   
2023                Episodic  
   
                                                    Other connective   
tissue disease  
(14 sources)                            Myofascial pain;   
Translations:   
[Myalgia, other site]                   Onset:   
2024                Episodic  
   
                                                    Other liver diseases  
(4 sources)                             Abnormal levels of   
other serum enzymes;   
Translations: [Other   
nonspecific abnormal   
serum enzyme levels]                    Onset:   
2024                Episodic  
   
                                                    Other liver diseases  
(14 sources)                            Alkaline phosphatase   
raised; Translations:   
[Abnormal levels of   
other serum enzymes]                    Onset:   
2024                Episodic  
   
                                                    Other lower   
respiratory disease  
(1 source)                              Shortness of breath;   
Translations:   
[Shortness of breath]                   Onset:   
03-                                          Episodic  
   
                                                    Other lower   
respiratory disease  
(1 source)                              H/O: pneumonia;   
Translations:   
[Personal history of   
pneumonia   
(recurrent)]                            2024          Episodic  
   
                                                    Other nutritional;   
endocrine; and   
metabolic disorders  
(16 sources)                            Overweight in   
adulthood with body   
mass index of 25 or   
more but less than   
30; Translations:   
[Body mass index   
(BMI) 29.0-29.9,   
adult]                                  Onset:   
2024                Episodic  
   
                                                    Other screening for   
suspected conditions   
(not mental disorders   
or infectious   
disease)  
(20 sources)                            Patient encounter   
status; Translations:   
[Encounter for   
screening for   
malignant neoplasm of   
prostate]                               Onset:   
2024                Episodic  
   
                                                    Pneumonia (except   
that caused by   
tuberculosis or   
sexually transmitted   
disease)  
(20 sources)                            Pneumonia,   
unspecified organism;   
Translations:   
[Pneumonia]                             Onset:   
03-  
Resolved:   
2024                Episodic  
   
                                                    Residual codes;   
unclassified  
(15 sources)                            Past history of   
procedure;   
Translations: [Other   
specified   
postprocedural   
states]                                 Onset:   
2024  
Resolved:   
2024                Episodic  
   
                                                    Residual codes;   
unclassified  
(18 sources)                            Tobacco user;   
Translations:   
[Tobacco use]                           Onset:   
03-                10-                Episodic  
  
  
  
Results  
  
  
                          Test Name    Value        Interpretation Reference   
Range                                   Facility  
   
                                                    BASIC METABOLIC PANLon    
   
                      Anion gap [Moles/Vol] 13 mmol/L  Normal     5-15       [facility]  
   
                                        Comment on above:   Performed By: #### C  
BCA, 2857-1, CMP, 70203-5 ####  
[facility] ([id number])  
[address], [address]  
[place] [address]   
   
                      Calcium [Mass/Vol] 9.2 mg/dL  Normal     8.5-10.5   [facility]  
   
                                        Comment on above:   Performed By: #### C  
BCA, 2857-1, CMP, 32052-3 ####  
[facility] ([id number])  
[address], [address]  
[place] [address]   
   
                      Chloride [Moles/Vol] 100 mmol/L Normal          [facility]  
   
                                        Comment on above:   Performed By: #### C  
BCA, 2857-1, CMP, 32530-3 ####  
[facility] ([id number])  
[address], [address]  
[place] [address]   
   
                      CO2 [Moles/Vol] 24 mmol/L  Normal     22-32      [facility]  
   
                                        Comment on above:   Performed By: #### C  
BCA, 2857-1, CMP, 34444-8 ####  
[facility] ([id number])  
[address], [address]  
[place] [address]   
   
                      Creatinine [Mass/Vol] 6.26 mg/dL High       0.60-1.30  [facility]  
   
                                        Comment on above:   Result Comment: METH  
OD TRACEABLE TO IDMS STANDARD   
   
                                                            Performed By: #### C  
BCA, 2857-1, CMP, 80311-7 ####  
[facility] ([id number])  
[address], [address]  
[place] [address]   
   
                                                    GFR/1.73 sq   
M.predicted among   
non-blacks MDRD   
(S/P/Bld) [Vol   
rate/Area]      9 mL/min/{1.73_m2} Low             >59             [facility]  
   
                                        Comment on above:   Result Comment:  
Reported eGFR is based on the  
CKD-EPI  equation that does  
not use a race coefficient.   
   
                                                            Performed By: #### C  
BCA, 2857-1, CMP, 35594-7 ####  
[facility] ([id number])  
[address], [address]  
[place] [address]   
   
                      Glucose [Mass/Vol] 114 mg/dL  High       65-99      [facility]  
   
                                        Comment on above:   Performed By: #### C  
BCA, 2857-1, CMP, 07034-9 ####  
[facility] ([id number])  
[address], [address]  
[place] [address]   
   
                      Potassium [Moles/Vol] 3.9 mmol/L Normal     3.5-5.0    [facility]  
   
                                        Comment on above:   Performed By: #### RAPHAEL  
BCA, 2857-1, CMP, 92743-4 ####  
[facility] ([id number])  
[address], [address]  
[place] [address]   
   
                      Sodium [Moles/Vol] 137 mmol/L Normal     134-146    [facility]  
   
                                        Comment on above:   Performed By: #### RAPHAEL  
BCA, 2857-1, CMP, 25036-6 ####  
[facility] ([id number])  
[address], [address]  
[place] [address]   
   
                                                    Urea nitrogen   
[Mass/Vol]      66 mg/dL        High            5-27            [facility]  
   
                                        Comment on above:   Performed By: #### RAPHAEL  
BCA, 2857-1, CMP, 36204-3 ####  
[facility] ([id number])  
[address], [address]  
[place] [address]   
   
                                                    HGB A1C (GLYCO-HGB)on 2024   
   
                      Glucose [Mass/Vol] 177 mg/dL  Normal                [facility]  
   
                                        Comment on above:   Performed By: #### RAPHAEL  
BCA, 2857-1, CMP, 89077-0 ####  
[facility] ([id number])  
[address], [address]  
[place] [address]   
   
                                                    HbA1c (Bld) [Mass   
fraction]       7.8 %           High            4.4-5.6         [facility]  
   
                                        Comment on above:   Result Comment: NOTE  
ADA Guidelines  
Result HgbA1c  
------------ ---------------  
Normal : less than 5.7 %  
Prediabetes : 5.7 % to 6.4 %  
Diabetes : > 6.4 %  
Use with caution in patients with abnormal hemoglobin variants   
as  
the half-life of red blood cells and in vivo glycation rates   
are  
affected.   
   
                                                            Performed By: #### C  
BCA, 2857-1, CMP, 87176-4 ####  
[facility] ([id number])  
[address], [address]  
[place] [address]   
   
                                                    HEMOGLOBIN A1C POCon 25-2  
024   
   
                                                    HbA1c (Bld) [Mass   
fraction]       7.8 %           High            4.3-5.7         [facility]  
   
                                        Comment on above:   Performed By: #### P  
OA1C ####  
[facility]  
[address] [place] [address]   
   
                                                    SURGICAL PATHOLOGY REFERENCE  
 LAB CONSULTon 10-   
   
                      CASE REPORT            Normal                [facility]  
   
                                        Comment on above:   Order Comment: Speci  
men Type: FORMALIN-FIXED PARAFFIN-EMBEDDED  
  
TISSUE SPECIMEN  
Ordering Facility: [facility]  
Address: [address] [place] [address] [place] [id number]   
   
                                                            Result Comment: Surg  
ical Pathology Report Case: [id number]  
Authorizing Provider: Yordy Garcia MD Collected: 10/16/2024   
01:25 PM  
Ordering Location: [facility] Received: 10/16/2024   
01:24 PM  
[facility]  
Pathologist: Jorge L Boone MD  
Specimen: Slide(s), 2 SLIDES [id number]   
   
                                                            Performed By: #### L  
[id number] ####  
[facility]  
CLIA [id number]  
[address] [place] [address] [place]   
   
                      CLINICAL HISTORY CONSULT REQUESTED Normal                C  
[facility]  
   
                                        Comment on above:   Order Comment: Speci  
men Type: FORMALIN-FIXED PARAFFIN-EMBEDDED  
  
TISSUE SPECIMEN  
Ordering Facility: [facility]  
Address: [address] [place], [place] [address] [address]   
   
                                                            Performed By: #### L  
[id number] ####  
[facility]  
CLIA [id number]  
[address] [place] [address] [place]   
   
                      DIAGNOSIS COMMENT            Normal                [facility]  
   
                                        Comment on above:   Order Comment: Speci  
men Type: FORMALIN-FIXED PARAFFIN-EMBEDDED  
  
TISSUE SPECIMEN  
Ordering Facility: [facility]  
Address: [address] [place], [place] [address] [address]   
   
                                                            Result Comment: Jorge  
k you for the opportunity to review this   
case in consultation. The patient is a 74-year-old man who   
underwent liver biopsy.  
This liver biopsy shows preserved hepatic architecture. Portal   
tracts contain mild chronic inflammation without significant   
interface activity. Bile ducts show minimal injury, and no   
significant ductular reaction is present. The lobular   
parenchyma demonstrates moderate steatosis (approximately 35%   
of sampled parenchyma) with scattered ballooning degeneration.   
Trichrome stain highlights periportal and centrizonal   
pericellular fibrosis. No cytoplasmic globules are present on   
PAS-D stain, and an iron stain highlights scattered Kupffer   
cell siderosis (2+).  
Overall, steatosis with ballooned hepatocytes and widespread   
pericellular fibrosis is indicative of steatohepatitis, and the   
presence of periportal fibrosis specifies stage 2 disease   
(scale 0-4, FRANCES method). I note the patient's positive JAMIE and   
SMA, but there are no features of autoimmune hepatitis in this   
biopsy. Notably, fatty liver disease can be associated with   
nonspecific autoantibody elevation.  
The following stains were performed at the [facility]:   
trichrome, PAS-D, iron.  
Thank you for sending this case in consultation. Please do not   
hesitate to contact the GI/[facility] Pathology consultation Service   
at [phone] with questions or if additional follow-up   
information becomes available regarding this patient.   
   
                                                            Performed By: #### L  
[id number] ####  
[facility]  
CLIA [id number]  
[address] [place] [address]   
   
                      FINAL DIAGNOSIS Liver, biopsy: Normal                [facility]  
   
                                        Comment on above:   Order Comment: Speci  
men Type: FORMALIN-FIXED PARAFFIN-EMBEDDED  
  
TISSUE SPECIMEN  
Ordering Facility: [facility]  
Address: [place] [address]   
   
                                                            Result Comment: - St  
eatohepatitis with periportal and   
centrizonal pericellular fibrosis.  
Electronically signed by Jorge L Boone MD on 2024 at   
3:54 PM   
   
                                                            Performed By: #### L  
[id number] ####  
[facility]  
CLIA [id number]  
[address] [place] [address] OF [facility]   
   
                      FINAL PERFORMING LAB            Normal                [facility]  
   
                                        Comment on above:   Order Comment: Speci  
men Type: FORMALIN-FIXED PARAFFIN-EMBEDDED  
  
TISSUE SPECIMEN  
Ordering Facility: [facility]  
Address: [address] [place], [place] [address] [address]   
   
                                                            Result Comment: Diag  
nostic interpretation performed at:   
[facility], [address] [place] [address], [place] [address] CLIA# [id number]  
: Andrea Hernandez MD   
   
                                                            Performed By: #### L  
[id number] ####  
[facility]  
CLIA [id number]  
[address] [place] [address] [place]   
   
                                                    Activated partial thrombopla  
stin time (aPTT) in platelet poor plasma by   
coagulation   
aOrdered By: Yordy Garcia on 2024   
   
                      aPTT Coag (PPP) [Time] 31.5 s                25.1-36.5  [facility]  
   
                                        Comment on above:   A hematocrit value g  
reater than 55% may lead to inaccurate   
results in coagulation testing. Patients having hematocrit   
values >55% require a special collection tube for coagulation   
studies. Please contact the laboratory at [phone] for   
redraw instructions.   
   
                                                    Coagulation Profileon 2024   
   
                      aPTT Coag (Bld) [Time] 31.5 s     Normal     25.1-36.5  Th  
e   
[facility]   
Physician   
Group  
   
                                        Comment on above:   Order Comment: STAT   
FOR BX   
   
                                                            Result Comment: A he  
matocrit value greater than 55% may lead to   
inaccurate  
results in coagulation testing. Patients having hematocrit  
values >55% require a special collection tube for  
coagulation studies.  
Please contact the laboratory at [phone] for redraw  
instructions.  
PERFORMED BY:  
[address] [place]  
[place] [address]  
[phone]  
PATHOLOGIST MEDICAL DIRECTOR  
PAUL RODRIGUEZ M.D.   
   
                                                            Performed By: #### P  
LT, PP ####  
[address]   
   
                                                    INR in Platelet poor plasma   
by Coagulation assayOrdered By: Yordy Garcia on   
2024   
   
                                                    INR Coag (PPP)   
[Relative time] 0.9 {INR}       Normal                          [facility]  
   
                                        Comment on above:   INR Therapeutic Rang  
e A) Pre- and Peroperative OAT started two  
  
weeks before surgery. NOT HIP SURGERY: 1.5 - 2.5 HIP SURGERY: 2   
- 3B) Primary and secondary prevention of venous THROMBOSIS: 2   
- 3C) Active venous thrombosis, pulmonary embolismand   
prevention of recurrent venous thrombosis: 2 - 3D) Prevention   
of arterial thromboembolismincluding patients with mechanical   
heart valves: 3 - 4.5   
   
                                                            Order Comment: STAT   
FOR BX   
   
                                                            Result Comment: INR   
Therapeutic Range  
A) Pre- and Peroperative OAT started two weeks before  
surgery. NOT HIP SURGERY: 1.5 - 2.5  
HIP SURGERY: 2 - 3  
B) Primary and secondary prevention of venous  
THROMBOSIS: 2 - 3  
C) Active venous thrombosis, pulmonary embolism  
and prevention of recurrent venous thrombosis: 2 - 3  
D) Prevention of arterial thromboembolism  
including patients with mechanical heart valves: 3 - 4.5   
   
                                                            Performed By: #### P  
LT, PP ####  
[address] 2024   
   
                                        L                   --------------------  
------  
--------------------------  
--------------------------  
--------------  
Specimen: [id number]   
Received:    
Status: NIRAJ Bales Num:   
[id number]  
Spec Type: Surgical Subm   
Dr: Charlie Bermudez DO  
  
Tissues: A Liver - Needle   
Biopsy (RANDOM LIVER BX)  
Procedures: HE/2,   
Gross/Micro L5  
--------------------------  
--------------  
Age/  
Patient Birth Sex Location   
Account Attending   
Physician  
--------------------------  
--------------  
Arun Moon 74/M    
[id number] Yordy Garcia MD  
--------------------------  
--------------  
  
SPEC NUM: [id number] RECD:   
 STATUS: NIRAJ BALES NUM: [id number]  
[facility]LIN:  [facility]   
DR: Charlie Bermudez DO  
  
ENTERED:    
OTHR DR: Yordy Garcia MD  
  
SPEC TYPE: Surgical DEPT:   
S  
ENTERED BY: [id number] RECV   
BY: [id number]  
  
ORDERED: HE/2, Gross/Micro   
L5  
ORDERED: HE/2, Gross/Micro   
L5  
  
Supplemental Report  
  
  
Addendum 1 Entered:   
  
  
Supplemental for findings   
of consultation report   
from [facility]  
  
-Steatohepatitis with   
periportal and centrizonal   
pericellular fibrosis  
  
  
Addendum Signed   
______(signature on   
file)______ Nina Medrano MD 24 1341  
  
--------------------------  
--------------  
  
Pathological Diagnosis  
  
Random liver biopsies:  
  
-3 tan hepatic core   
segments for gross   
processing only. Pending   
further consultation  
services in reference   
laboratory.  
  
Clinical Information  
  
Elevated liver enzymes,   
hepatic fibrosis, fatty   
liver, hepatic steatosis  
  
  
  
--------------------------  
--------------  
Specimen: [id number]   
Received:    
Status: NIRAJ Bales Num:   
[id number]  
Spec Type: Surgical Subm   
Dr: Charlie Bermudez DO  
  
Tissues: A Liver - Needle   
Biopsy (RANDOM LIVER BX)  
Procedures: HE/2,   
Gross/Micro L5  
--------------------------  
--------------  
Patient: Arun Moon   
[id number] (Continued)  
--------------------------  
--------------  
  
  
Specimen: [id number]   
Received:    
(Continued)  
  
  
Signed   
__________(signature on   
file)___________ Nina Medrano MD 10/11/24 1032  
  
  
--------------------------  
--------------  
Specimen: [id number]   
Received:    
Status: NIRAJ Bales Num:   
[id number]  
Spec Type: Surgical Subm   
Dr: Charlie Bermudez DO  
  
Tissues: A Liver - Needle   
Biopsy (RANDOM LIVER BX)  
Procedures: HE/2,   
Gross/Micro L5  
--------------------------  
--------------  
Patient: Arun Moon   
[id number] (Continued)  
--------------------------  
--------------  
  
  
Specimen: [id number]   
Received:    
(Continued)  
  
  
Gross Description  
  
The specimen was received   
in formalin with the   
patient's name and  random   
liver biopsy  and  
consists of 3 tan needle   
core biopsies ranging in   
length from 1.3 to 1.6 cm   
each with an  
average diameter of 0.1   
cm. The specimen is   
entirely submitted in   
cassette A1.  
  
CPT Codes  
  
36242  
--------------------------  
--------------  
  
  
  
  
  
  
  
  
  
  
  
  
  
  
  
  
  
  
  
  
  
  
  
  
  
  
  
  
  
  
  
  
  
--------------------------  
--------------  
Specimen: [id number]   
Received:    
Status: NIRAJ Bales Num:   
[id number]  
Spec Type: Surgical Subm   
Dr: Charlie Bermudez DO  
  
Tissues: A Liver - Needle   
Biopsy (RANDOM LIVER BX)  
Procedures: HE/2,   
Gross/Micro L5  
--------------------------  
--------------  
Patient: SaraiArun   
[id number] (Continued)  
--------------------------  
--------------  
  
  
Signed   
__________(signature on   
file)___________ Nina Medrano MD 10/11/24 1032 Normal                                  The   
[facility]   
Physician   
Group  
   
                                                    Platelets [#/volume] in Bloo  
d by Automated countOrdered By: Yordy Garcia on   
2024   
   
                                                    Platelets (Bld)   
[#/Vol]         243 10*3/uL     Normal          150-450         [facility]  
   
                                        Comment on above:   Order Comment: STAT   
FOR BX   
   
                                                            Result Comment: PERF  
ORMED BY:  
[place] [address]  
[phone]  
PATHOLOGIST MEDICAL DIRECTOR  
PAUL RODRIGUEZ M.D.   
   
                                                            Performed By: #### P  
LT, PP ####  
[address]   
   
                                                    Prothrombin time (PT)Ordered  
 By: Yordy Garcia on 2024   
   
                      PT Coag (PPP) [Time] 10.8 s     Normal     9.0-12.9   [facility]  
   
                                        Comment on above:   A hematocrit value g  
reater than 55% may lead to inaccurate   
results in coagulation testing. Patients having hematocrit   
values >55% require a special collection tube for coagulation   
studies. Please contact the laboratory at [phone] for   
redraw instructions.   
   
                                                            Order Comment: STAT   
FOR BX   
   
                                                            Result Comment: A he  
matocrit value greater than 55% may lead to   
inaccurate  
results in coagulation testing. Patients having hematocrit  
values >55% require a special collection tube for  
coagulation studies.  
Please contact the laboratory at [phone] for redraw  
instructions.   
   
                                                            Performed By: #### P  
LT, PP ####  
[place] [id number] [facility]   
   
                                                    US guide needle placementon   
2024   
   
                                                    US guide needle   
placement                               [facility]  
[address] [place] [address]  
  
Ultrasound Report  
Signed  
  
Patient: Arun Moon   
MR#: [id number]  
[id number]  
: 1950   
Acct:[id number]  
  
Age/Sex: 74 / M ADM Date:   
24  
  
Loc:  Room: Type: [facility]  
Attending Dr: Yordy Garcia MD  
  
Ordering Provider: Yordy Garcia MD  
Date of Service: 24  
Accession #: ([id number])   
US/US needle biopsy:   
K74.00 - Hepatic fibrosis,   
unspecified  
([id number]) US/US guide   
needle placement: ,  
  
  
Copies to: Yordy Garcia MD  
  
  
Ultrasound-guided Random   
Liver Biopsy  
  
HISTORY: Hepatic steatosis  
  
The biopsy device:   
18-gauge by 10 cm Bard   
biopsy gun utilized.  
  
Core samples: 3 core   
samples were obtained.  
  
Informed consent was   
obtained discussing the   
procedure and risks.   
Patient agreed. The skin   
entry  
site was localized with   
ultrasound. Skin entry   
prepped and draped in   
sterile fashion with local  
lidocaine administered.   
The biopsy device was   
administered into the   
liver with ultrasound   
guidance.  
Adequate core biopsy   
samples obtained. Samples   
sent to pathology for   
further assessment.   
Patient  
states no immediate   
complications. No active   
bleeding identified with   
ultrasound.  
  
  
  
ORDER #: [id number] US/US   
needle biopsy  
IMPRESSION: Successful   
ultrasound-guided random   
liver biopsy.  
  
  
Ultrasound the liver   
obtained 1 hour post   
biopsy.  
  
No hematoma or active   
bleeding identified.  
  
IMPRESSION: No active   
bleeding or hematoma.  
  
Impression dictated by:   
Charlie Bermudez M.D.2024 2:48 PM  
  
  
Dictation Location:   
[facility]  
  
Tech: Gabriela Guaman  
  
Transcribed By: PWS   
24 1448  
Dictated By: Charlie Bermudez DO 24 1447  
  
Signed By:  
24 1448       Normal                                  The   
[facility]   
Physician   
Group  
   
                                                    GLUCOSEon 2024   
   
                      Glucose [Mass/Vol] 185 mg/dL  High       65-99      [facility]  
   
                                        Comment on above:   Performed By: #### C  
BCA, 2857-1, CMP, 23302-8 ####  
[facility] ([id number])  
[address], [address]  
[place] [address]   
   
                                                    HGB A1C (GLYCO-HGB)on 2024   
   
                      Glucose [Mass/Vol] 214 mg/dL  Normal                [facility]  
   
                                        Comment on above:   Performed By: #### C  
BCA, 2857-1, CMP, 37383-1 ####  
[facility] ([id number])  
[address], [address]  
[place] [address]   
   
                                                    HbA1c (Bld) [Mass   
fraction]       9.1 %           High            4.4-5.6         [facility]  
   
                                        Comment on above:   Result Comment: NOTE  
ADA Guidelines  
Result HgbA1c  
------------ ---------------  
Normal : less than 5.7 %  
Prediabetes : 5.7 % to 6.4 %  
Diabetes : > 6.4 %  
Use with caution in patients with abnormal hemoglobin variants   
as  
the half-life of red blood cells and in vivo glycation rates   
are  
affected.   
   
                                                            Performed By: #### C  
BCA, 2857-1, CMP, 49689-1 ####  
[facility] ([id number])  
[address], [address]  
[place] [address]   
   
                                                    Lipid 1996 panelon    
   
                      Cholesterol [Mass/Vol] 269 mg/dL  High       150-200    [facility]  
   
                                        Comment on above:   Performed By: #### C  
BCA, 2857-1, CMP, 11499-5 ####  
[facility] ([id number])  
[address], [address]  
[place] [address]   
   
                                                    Cholesterol in HDL   
[Mass/Vol]      47 mg/dL        Normal          >39             [facility]  
   
                                        Comment on above:   Result Comment:  
HDL <40 mg/dL - High Risk  
HDL > or = 40mg/dL- Desirable  
HDL >60 mg/dL - Negative Risk  
---------------------------------------------   
   
                                                            Performed By: ###Patrizia LIM BCA, 2857-1, CMP, 87064-7 ####  
[facility] ([id number])  
[address], [address]  
[place] [address]   
   
                                                    Cholesterol in LDL   
[Mass/Vol]      148 mg/dL       High            <130            [facility]  
   
                                        Comment on above:   Result Comment:  
LDL <100 mg/dL - Desirable  
LDL >160 mg/dL - High Risk  
---------------------------------------------   
   
                                                            Performed By: #### RAPHAEL  
BCA, 2857-1, CMP, 34796-0 ####  
[facility] ([id number])  
[address], [address]  
[place] [address]   
   
                                                    Cholesterol in VLDL   
[Mass/Vol]      74 mg/dL        High            0-30            [facility]  
   
                                        Comment on above:   Performed By: #### RAPHAEL HERNANDEZ, 2857-1, CMP, 52481-9 ####  
[facility] ([id number])  
[address], [address]  
[place] [address]   
   
                      CHOLESTEROL:HDL 5.7        High       1.0-5.0    [facility]  
   
                                        Comment on above:   Performed By: ###Patrizia LIM BCA, 2857-1, CMP, 23681-0 ####  
[facility] ([id number])  
[address], [address]  
[place] [address]   
   
                                                    Triglyceride   
[Mass/Vol]      368 mg/dL       High                      [facility]  
   
                                        Comment on above:   Performed By: #### RAPHAEL  
BCA, 2857-1, CMP, 75125-3 ####  
[facility] ([id number])  
[address], [address]  
[place] [address]   
   
                                                    POTASSIUMon 2024   
   
                      Potassium [Moles/Vol] 3.6 mmol/L Normal     3.5-5.0    [facility]  
   
                                        Comment on above:   Performed By: #### C  
BCA, 2857-1, CMP, 47850-7 ####  
[facility] ([id number])  
 [address]  
[place] [address]   
   
                                                    APTTon 2024   
   
                      aPTT Coag (PPP) [Time] 37 s                             Pr  
[facility]  
   
                                        Comment on above:   [facility]   
   
                                                    BASIC METABOLIC PANLon    
   
                      Anion gap [Moles/Vol] 16 mmol/L  High       5-15       [facility]  
   
                                        Comment on above:   Performed By: #### C  
BCA, 2857-1, CMP, 52474-6 ####  
[facility] ([id number])  
 [address]  
[place] [address]   
   
                      Calcium [Mass/Vol] 9.5 mg/dL  Normal     8.5-10.5   [facility]  
   
                                        Comment on above:   Performed By: #### C  
BCA, 2857-1, CMP, 87953-0 ####  
[facility] ([id number])  
[address]  
[place] [address]   
   
                      Chloride [Moles/Vol] 99 mmol/L  Normal          [facility]  
   
                                        Comment on above:   Performed By: #### C  
BCA, 2857-1, CMP, 24018-5 ####  
[facility] ([id number])  
[address], [address]  
[place] [address]   
   
                      CO2 [Moles/Vol] 24 mmol/L  Normal     22-32      [facility]  
   
                                        Comment on above:   Performed By: #### C  
BCA, 2857-1, CMP, 34871-6 ####  
[facility] ([id number])  
[address], [address]  
[place] [address]   
   
                      Creatinine [Mass/Vol] 5.56 mg/dL High       0.60-1.30  [facility]  
   
                                        Comment on above:   Result Comment: METH  
OD TRACEABLE TO IDMS STANDARD   
   
                                                            Performed By: #### C  
MARY, 2857-1, CMP, 56457-9 ####  
[facility] ([id number])  
[address], [address]  
[place] [address]   
   
                                                    GFR/1.73 sq   
M.predicted among   
non-blacks MDRD   
(S/P/Bld) [Vol   
rate/Area]      10 mL/min/{1.73_m2} Low             >59             [facility]  
   
                                        Comment on above:   Result Comment:  
Reported eGFR is based on the  
CKD-EPI  equation that does  
not use a race coefficient.   
   
                                                            Performed By: #### C  
MARY, 2857-1, CMP, 40071-2 ####  
[facility] ([id number])  
[address]  
[place] [address]   
   
                      Glucose [Mass/Vol] 175 mg/dL  High       65-99      [facility]  
   
                                        Comment on above:   Performed By: #### C  
MARY, 2857-1, CMP, 28891-5 ####  
[facility] ([id number])  
[address]  
[place] [address]   
   
                      Potassium [Moles/Vol] 3.6 mmol/L Normal     3.5-5.0    [facility]  
   
                                        Comment on above:   Performed By: #### C  
BCA, 2857-1, CMP, 87046-9 ####  
[facility] ([id number])  
[address]  
[place] [address]   
   
                      Sodium [Moles/Vol] 139 mmol/L Normal     134-146    [facility]  
   
                                        Comment on above:   Performed By: #### C  
BCA, 2857-1, CMP, 35554-3 ####  
[facility] ([id number])  
[address], [address]  
[place] [address]   
   
                                                    Urea nitrogen   
[Mass/Vol]      55 mg/dL        High            5-27            [facility]  
   
                                        Comment on above:   Performed By: #### C  
BCA, 2857-1, CMP, 70925-6 ####  
[facility] ([id number])  
[address], [address]  
[place] [address]   
   
                                                    Basic Metabolic Panelon 07-0  
   
   
                          Anion gap [Moles/Vol] 16 mmol/L    High         5 - 15  
   
mmol/L                                  [facility]  
   
                          Calcium [Mass/Vol] 9.5 mg/dL                 8.5 - 10.  
5   
mg/dL                                   [facility]  
   
                          Chloride [Moles/Vol] 99 mmol/L                 98 - 10  
9   
mmol/L                                  [facility]  
   
                          CO2 [Moles/Vol] 24 mmol/L                 22 - 32   
mmol/L                                  [facility]  
   
                          Creatinine [Mass/Vol] 5.56 mg/dL   High         0.60 -  
   
1.30 mg/dL                              [facility]  
   
                                        Comment on above:   METHOD TRACEABLE TO   
[facility] STANDARD   
   
                                                    eGFR (CKD-EPI)non-race   
dependent       10              Low             - PINF          [facility]  
   
                                        Comment on above:     
Reported eGFR is based on the  
CKD-EPI  equation that does  
not use a race coefficient.  
  
  
   
   
                          Glucose [Mass/Vol] 175 mg/dL    High         65 - 99   
mg/dL                                   [facility]  
   
                          Potassium [Moles/Vol] 3.6 mmol/L                3.5 -   
5.0   
mmol/L                                  [facility]  
   
                          Sodium [Moles/Vol] 139 mmol/L                134 - 146  
   
mmol/L                                  [facility]  
   
                                                    Urea nitrogen   
[Mass/Vol]          55 mg/dL            High                5 - 27   
mg/dL                                   [facility]  
   
                                                    CBC AND AUTO DIFFon 20  
24   
   
                      ABSOLUTE BASOPHIL 0.2 X10E9/L Normal     0.0-0.2    [facility]  
   
                                        Comment on above:   Performed By: #### C  
BCA, 2857-1, CMP, 74876-9 ####  
[facility] ([id number])  
[address], [address]  
[place] [address]   
   
                      ABSOLUTE NEUTROPHIL 7.3 X10E9/L High       1.5-6.6    [facility]  
   
                                        Comment on above:   Performed By: #### C  
BCA, 2857-1, CMP, 30748-0 ####  
[facility] ([id number])  
[address], [address]  
[place] [address]   
   
                                                    Basophils/100 WBC   
(Bld)           2.2 %           Normal                          [facility]  
   
                                        Comment on above:   Performed By: #### C  
BCA, 2857-1, CMP, 46100-7 ####  
[facility] ([id number])  
[address], [address]  
[place] [address]   
   
                                                    Eosinophils (Bld)   
[#/Vol]         1.3 10*3/uL     High            0.0-0.4         [facility]  
   
                                        Comment on above:   Performed By: #### C  
BCA, 2857-1, CMP, 90406-9 ####  
[facility] ([id number])  
[address], [address]  
[place] [address]   
   
                                                    Eosinophils/100 WBC   
(Bld)           12.1 %          Normal                          [facility]  
   
                                        Comment on above:   Performed By: #### RAPHAEL  
BCA, 2857-1, CMP, 01897-6 ####  
[facility] ([id number])  
[address]  
[place] [address]   
   
                                                    Erythrocyte   
distribution width   
(RBC) [Ratio]   13.6 %          Normal          11.5-15.0       [facility]  
   
                                        Comment on above:   Performed By: #### C  
BCA, 2857-1, CMP, 29303-2 ####  
[facility] ([id number])  
[address], [address]  
[place] [address]   
   
                                                    Hematocrit (Bld)   
[Volume fraction] 35.6 %          Low             39-49           [facility]  
   
                                        Comment on above:   Performed By: #### C  
BCA, 2857-1, CMP, 81859-1 ####  
[facility] ([id number])  
[address], [address]  
[place] [address]   
   
                                                    Hemoglobin (Bld)   
[Mass/Vol]      12.1 g/dL       Low             13.0-17.0       [facility]  
   
                                        Comment on above:   Performed By: #### C  
BCA, 2857-1, CMP, 80999-7 ####  
[facility] ([id number])  
[address]  
[place] [address]   
   
                                                    Lymphocytes (Bld)   
[#/Vol]         1.3 10*3/uL     Normal          1.0-3.5         [facility]  
   
                                        Comment on above:   Performed By: #### RAPHAEL HERNANDEZ, 2857-1, CMP, 31243-4 ####  
[facility] ([id number])  
[address]  
[place] [address]   
   
                                                    Lymphocytes/100 WBC   
(Bld)           11.9 %          Normal                          [facility]  
   
                                        Comment on above:   Performed By: #### RAPHAEL HERNANDEZ, 2857-1, CMP, 40869-7 ####  
[facility] ([id number])  
[address]  
[place] [address]   
   
                                                    MCH (RBC) [Entitic   
mass]           32.4 pg         Normal          27-34           [facility]  
   
                                        Comment on above:   Performed By: #### RAPHAEL HERNANDEZ, 2857-1, CMP, 97086-4 ####  
[facility] ([id number])  
[address]  
[place] [address]   
   
                      MCHC (RBC) [Mass/Vol] 33.9 g/dL  Normal     32-36      [facility]  
   
                                        Comment on above:   Performed By: #### RAPHAEL HERNANDEZ, 2857-1, CMP, 51126-2 ####  
[facility] ([id number])  
[address] [address]   
   
                                                    MCV (RBC) [Entitic   
vol]            95 fL           Normal                    [facility]  
   
                                        Comment on above:   Performed By: #### RAPHAEL HERNANDEZ, 2857-1, CMP, 57451-0 ####  
[facility] ([id number])  
[address]  
[place] [address]   
   
                                                    Monocytes (Bld)   
[#/Vol]         0.7 10*3/uL     Normal          0-0.9           [facility]  
   
                                        Comment on above:   Performed By: #### RAPHAEL  
BCA, 2857-1, CMP, 49368-4 ####  
[facility] ([id number])  
[address], [address]  
[place] [address]   
   
                                                    Monocytes/100 WBC   
(Bld)           6.2 %           Normal                          [facility]  
   
                                        Comment on above:   Performed By: #### RAPHAEL  
BCA, 2857-1, CMP, 12033-6 ####  
[facility] ([id number])  
[address], [address]  
[place] [address]   
   
                                                    Neutrophils/100 WBC   
(Bld)           67.6 %          Normal                          [facility]  
   
                                        Comment on above:   Performed By: #### RAPHAEL  
BCA, 2857-1, CMP, 67090-4 ####  
[facility] ([id number])  
[address]  
[place] [address]   
   
                                                    Platelet mean volume   
(Bld) [Entitic vol] 9.7 fL          Normal          7-12            [facility]  
   
                                        Comment on above:   Performed By: #### RAPHAEL  
BCA, 2857-1, CMP, 15889-8 ####  
[facility] ([id number])  
[address]  
[place] [address]   
   
                                                    Platelets (Bld)   
[#/Vol]         229 10*3/uL     Normal          150-450         [facility]  
   
                                        Comment on above:   Performed By: #### RAPHAEL  
BCA, 2857-1, CMP, 35506-3 ####  
[facility] ([id number])  
[address], [address]  
[place] [address]   
   
                      RBC COUNT  3.73 X10E12/L Low        4.10-5.70  [facility]  
   
                                        Comment on above:   Performed By: #### RAPHAEL  
BCA, 2857-1, CMP, 89561-4 ####  
[facility] ([id number])  
[address], [address]  
[place] [address]   
   
                      WBC (Bld) [#/Vol] 10.8 10*3/uL Normal     4.0-11.0   [facility]  
   
                                        Comment on above:   Performed By: #### C  
BCA, 2857-1, CMP, 07966-9 ####  
[facility] ([id number])  
[address]  
[place] [address]   
   
                                                    CBC auto differentialon 07-0  
   
   
                                                    Basophils (Bld)   
[#/Vol]         0.2 10*3/uL                                     [facility]  
   
                                                    Basophils/100 WBC   
(Bld)           2.2 %                                           [facility]  
   
                                                    Eosinophils (Bld)   
[#/Vol]         1.3 10*3/uL     High                            [facility]  
   
                                                    Eosinophils/100 WBC   
(Bld)           12.1 %                                          [facility]  
   
                                                    Erythrocyte   
distribution width   
(RBC) [Ratio]       13.6 %                                  11.5 -   
15.0 %                                  [facility]  
   
                                                    Hematocrit (Bld)   
[Volume fraction] 35.6 %          Low             39 - 49 %       [facility]  
   
                                                    Hemoglobin (Bld)   
[Mass/Vol]          12.1 g/dL           Low                 13.0 -   
17.0 g/dL                               [facility]  
   
                                                    Interpretation and   
review of laboratory   
results         Abnormal                                        [facility]  
   
                                                    Lymphocytes (Bld)   
[#/Vol]         1.3 10*3/uL                                     [facility]  
   
                                                    Lymphocytes/100 WBC   
(Bld)           11.9 %                                          [facility]  
   
                                                    MCH (RBC) [Entitic   
mass]           32.4 pg                         27 - 34 pg      [facility]  
   
                          MCHC (RBC) [Mass/Vol] 33.9 g/dL                 32 - 3  
6   
g/dL                                    [facility]  
   
                                                    MCV (RBC) [Entitic   
vol]                95 fL                                   80 - 100   
fL                                      [facility]  
   
                                                    Monocytes (Bld)   
[#/Vol]         0.7 10*3/uL                                     [facility]  
   
                                                    Monocytes/100 WBC   
(Bld)           6.2 %                                           [facility]  
   
                                                    Neutrophils (Bld)   
[#/Vol]         7.3 10*3/uL     High                            [facility]  
   
                                                    Neutrophils/100 WBC   
(Bld)           67.6 %                                          [facility]  
   
                                                    Platelet mean volume   
(Bld) [Entitic vol] 9.7 fL                          7 - 12 fL       [facility]  
   
                                                    Platelets (Bld)   
[#/Vol]         229 10*3/uL                                     [facility]  
   
                      RBC (Bld) [#/Vol] 3.73 10*6/uL Low                   [facility]  
   
                                                    WBC corrected for nucl   
RBC Auto (Bld) [#/Vol] 10.8                                            [facility]  
   
                                                    LIVER PANELon 2024   
   
                      Albumin [Mass/Vol] 4.1 g/dL   Normal     3.2-5.3    [facility]  
   
                                        Comment on above:   Performed By: #### C  
BCA, 2857-1, CMP, 94728-1 ####  
[facility] ([id number])  
[address], [address]  
[place] [address]   
   
                                                    ALP [Catalytic   
activity/Vol]   161 U/L         High                      [facility]  
   
                                        Comment on above:   Performed By: #### C  
BCA, 2857-1, CMP, 26126-1 ####  
[facility] ([id number])  
[address], [address]  
[place] [address]   
   
                                                    ALT [Catalytic   
activity/Vol]   68 U/L          High            0-40            [facility]  
   
                                        Comment on above:   Performed By: #### C  
BCA, 2857-1, CMP, 92654-4 ####  
[facility] ([id number])  
[address], [address]  
[place] [address]   
   
                                                    AST [Catalytic   
activity/Vol]   46 U/L          High            0-41            [facility]  
   
                                        Comment on above:   Performed By: #### C  
BCA, 2857-1, CMP, 44430-6 ####  
[facility] ([id number])  
[address], [address]  
[place] [address]   
   
                      Bilirubin [Mass/Vol] 0.5 mg/dL  Normal     0.3-1.2    [facility]  
   
                                        Comment on above:   Performed By: #### C  
BCA, 2857-1, CMP, 78823-5 ####  
[facility] ([id number])  
[address], [address]  
[place] [address]   
   
                                                    Bilirubin.direct   
[Mass/Vol]      0.2 mg/dL       Normal          0.0-0.4         [facility]  
   
                                        Comment on above:   Performed By: #### C  
BCA, 2857-1, CMP, 21800-5 ####  
[facility] ([id number])  
[address], [address]  
[place] [address]   
   
                      Protein [Mass/Vol] 7.5 g/dL   Normal     6.0-8.0    [facility]  
   
                                        Comment on above:   Performed By: #### C  
MARY, 2857-1, CMP, 75841-8 ####  
[facility] ([id number])  
[address], [address]  
[place] [address]   
   
                                                    Liver panelon 2024   
   
                          Albumin [Mass/Vol] 4.1 g/dL                  3.2 - 5.3  
   
g/dL                                    [facility]  
   
                                                    ALP [Catalytic   
activity/Vol]       161 U/L             High                39 - 130   
U/L                                     [facility]  
   
                                                    ALT No additional   
P-5'-P [Catalytic   
activity/Vol]   68 U/L          High            0 - 40 U/L      [facility]  
   
                                                    AST [Catalytic   
activity/Vol]   46 U/L          High            0 - 41 U/L      [facility]  
   
                          Bilirubin [Mass/Vol] 0.5 mg/dL                 0.3 - 1  
.2   
mg/dL                                   [facility]  
   
                                                    Bilirubin.direct   
[Mass/Vol]          0.2 mg/dL                               0.0 - 0.4   
mg/dL                                   [facility]  
   
                          Protein [Mass/Vol] 7.5 g/dL                  6.0 - 8.0  
   
g/dL                                    [facility]  
   
                                                    No Panel Informationon    
   
                                                    Interpretation and   
review of laboratory   
results         Abnormal                                        [facility]  
   
                                                    PROTIME AND INRon 2024  
   
   
                                                    INR Coag (PPP)   
[Relative time] 0.9 {INR}       Normal          0.8-1.1         [facility]  
   
                                        Comment on above:   Performed By: #### C  
MARY, 2857-1, CMP, 00424-9 ####  
[facility] ([id number])  
[address]  
[place] [address]   
   
                      PT Coag (PPP) [Time] 10.4 s     Normal     9.8-13.2   [facility]  
   
                                        Comment on above:   Result Comment: NEW   
REFERENCE RANGE   
   
                                                            Performed By: #### RAPHAEL HERNANDEZ, 2857-1, CMP, 60426-1 ####  
[facility] ([id number])  
[address], [address]  
[place] [address]   
   
                                                    Protime & INRon 2024   
   
                                                    INR Coag (PPP)   
[Relative time] 0.9 {INR}                                       [facility]  
   
                      PT Coag (PPP) [Time] 10.4 s                           [facility]  
   
                                        Comment on above:   NEW REFERENCE RANGE   
   
                                                    URINALYSISon 2024   
   
                      Bilirubin Ql (U) Negative   Normal     NEG        [facility]  
   
                                        Comment on above:   Performed By: #### RAPHAEL HERNANDEZ, 2857-1, CMP, 94835-7 ####  
[facility] ([id number])  
[address], [address]  
[place] [address]   
   
                      BLOOD/HGB  Small      Abnormal   NEG        [facility]  
   
                                        Comment on above:   Performed By: #### RAPHAEL HERNANDEZ, 2857-1, CMP, 46285-2 ####  
[facility] ([id number])  
[address], [address]  
[place] [address]   
   
                      Color (U)  YELLOW     Normal     YELLOW     [facility]  
   
                                        Comment on above:   Performed By: #### RAPHAEL HERNANDEZ, 2857-1, CMP, 36994-1 ####  
[facility] ([id number])  
[address]  
[place] [address]   
   
                      Glucose Ql (U) 500 mg/dL  Abnormal   NEG        [facility]  
   
                                        Comment on above:   Performed By: #### RAPHAEL HERNANDEZ, 2857-1, CMP, 05980-3 ####  
[facility] ([id number])  
[address], [address]  
[place] [address]   
   
                                                    Hyaline casts LM Ql   
(Urine sed)     4 /lpf          High            0-2             [facility]  
   
                                        Comment on above:   Performed By: #### RAPHAEL HERNANDEZ, 2857-1, CMP, 30610-4 ####  
[facility] ([id number])  
[address]  
[place] [address]   
   
                      Ketones Ql (U) Negative   Normal     NEG        [facility]  
   
                                        Comment on above:   Performed By: #### C  
BCA, 2857-1, CMP, 52792-0 ####  
[facility] ([id number])  
[address]  
[place] [address]   
   
                                                    Leukocyte esterase   
Test strip Ql (U) Negative        Normal          NEG             [facility]  
   
                                        Comment on above:   Performed By: #### C  
BCA, 2857-1, CMP, 84264-4 ####  
[facility] ([id number])  
[address], [facility]  
[place] [address]   
   
                      MUCOUS     PRESENT    Abnormal   NONE       [facility]  
   
                                        Comment on above:   Performed By: #### C  
BCA, 2857-1, CMP, 14436-8 ####  
[facility] ([id number])  
[address], [facility]  
[place] [address]   
   
                      Nitrite Ql (U) Negative   Normal     NEG        [facility]  
   
                                        Comment on above:   Performed By: #### C  
BCA, 2857-1, CMP, 63772-2 ####  
[facility] ([id number])  
[address], [facility] [address] [facility] [address]  
[place] [address]   
   
                      pH (U)     6.0 [pH]   Normal     5.0-8.5    [facility]  
   
                                        Comment on above:   Performed By: #### C  
BCA, 2857-1, CMP, 95752-6 ####  
[facility] ([id number])  
[address], [address]  
[place] [address]   
   
                      Protein Ql (U) 300 mg/dL  Abnormal   NEG        [facility]  
   
                                        Comment on above:   Performed By: #### C  
BCA, 2857-1, CMP, 65462-4 ####  
[facility] ([id number])  
[address], [address]  
[place] [address]   
   
                      R.B.CELLS  <1         Normal     0-5        [facility]  
   
                                        Comment on above:   Performed By: #### C  
BCA, 2857-1, CMP, 90759-0 ####  
[facility] ([id number])  
[address], [address]  
[place] [address]   
   
                                                    Specific gravity (U)   
[Rel density]       1.022               Normal              1.003-1.03  
5                                       [facility]  
   
                                        Comment on above:   Performed By: #### C  
BCA, 2857-1, CMP, 84452-2 ####  
[facility] ([id number])  
[address], [facility] [address]  
[place] [address]   
   
                      SQUAMOUS EPITHELIUM <1         Normal     0-5        [facility]  
   
                                        Comment on above:   Performed By: #### C  
BCA, 2857-1, CMP, 88070-2 ####  
[facility] ([id number])  
[address]  
[place] [address]   
   
                      TURBIDITY  CLEAR      Normal     CLEAR      [facility]  
   
                                        Comment on above:   Performed By: #### RAPHAEL  
BCA, 2857-1, CMP, 80179-4 ####  
[facility] ([id number])  
[address], [address]  
[place] [address]   
   
                                                    Urobilinogen (U)   
[Mass/Vol]      mg/dL           Normal          <1.1            [facility]  
   
                                        Comment on above:   Performed By: #### RAPHAEL  
BCA, 2857-1, CMP, 21366-6 ####  
[facility] ([id number])  
[address]  
[place] [address]   
   
                      W.B.CELLS  4 /hpf     Normal     0-5        [facility]  
   
                                        Comment on above:   Performed By: #### C  
BCA, 2857-1, CMP, 70881-9 ####  
[facility] ([id number])  
[address]  
[place] [address]   
   
                                                    URINE CULTUREon 2024   
   
                                                    Bacteria identified Cx   
Nom (U)                                 CULTURE RESULTS  
NO GROWTH AT <1000 CFU/mL Normal                                  [facility]  
   
                                        Comment on above:   Performed By: #### RAPHAEL  
BCA, 2857-1, CMP, 07399-4 ####  
[facility] ([id number])  
[address]  
[place] [address]   
   
                                                    XR CHEST 2 VWSon 2024   
   
                                        XR CHEST 2 VWS      XR CHEST 2 VWS  
Chest 2 views  
History: Anesthesia   
clearance. Preadmission   
testing. Preop   
examination; Hypertension,   
unspecified type; Type 2   
diabetes mellitus with   
other specified   
complication, with   
long-term current use of   
insulin (CMS-HCC);   
Personal history of MI   
(myocardial infarction);   
Stage 4 chronic kidney   
disease (CMS-HCC)  
Comparison: 3/15/2024  
Findings:  
Chest 2 views.  
Stable cardiomediastinal   
silhouette. No focal   
opacity, effusion or   
pneumothorax. Degenerative   
changes of the thoracic   
spine.  
Impression:  
No evident acute   
cardiopulmonary process.  
Workstation:[id number]  
Finalized by Francisco Verma MD on 2024 3:52   
PM                  [facility]  
   
                                                    XR Chest PA and Lateralon    
   
                                                            Chest 2 views  
  
History: Anesthesia   
clearance. Preadmission   
testing. Preop   
examination; Hypertension,   
unspecified type; Type 2   
diabetes mellitus with   
other specified   
complication, with   
long-term current use of   
insulin (CMS-HCC);   
Personal history of MI   
(myocardial infarction);   
Stage 4 chronic kidney   
disease  
(CMS-HCC)  
  
Comparison: 3/15/2024  
  
Findings:  
  
Chest 2 views.  
  
Stable cardiomediastinal   
silhouette. No focal   
opacity, effusion or   
pneumothorax. Degenerative   
changes of the thoracic   
spine.  
  
Impression:  
  
No evident acute   
cardiopulmonary process.  
  
Workstation:[id number]  
  
Finalized by Francisco Verma MD on 2024 3:52   
PM                                                          [facility]  
   
                                                            Francisco Verma MD  
 -   
2024 Formatting of   
this note might be   
different from the   
original.  
Chest 2 views  
  
History: Anesthesia   
clearance. Preadmission   
testing. Preop   
examination; Hypertension,   
unspecified type; Type 2   
diabetes mellitus with   
other specified   
complication, with   
long-term current use of   
insulin (CMS-[facility]);   
Personal history of MI   
(myocardial infarction);   
Stage 4 chronic kidney   
disease  
(CMS-HCC)  
  
Comparison: 3/15/2024  
  
Findings:  
  
Chest 2 views.  
  
Stable cardiomediastinal   
silhouette. No focal   
opacity, effusion or   
pneumothorax. Degenerative   
changes of the thoracic   
spine.  
  
Impression:  
  
No evident acute   
cardiopulmonary process.  
  
Workstation:[id number]  
  
Finalized by Francisco Verma MD on 2024 3:52   
PM  
                                                            [facility]  
   
                                                    Radiology Study   
observation   
(narrative)                                                     [facility]  
   
                                                    XR Chest PA and LateralOrder  
ed By: Francisco Verma on 2024   
   
                                                                  [facility]  
Work Phone:   
[phone]  
   
                                                    aPTT Coag (PPP) [Time]on    
   
                      aPTT Coag (Bld) [Time] 37 s       Normal     26-37      Pr  
[facility]  
   
                                        Comment on above:   Result Comment: NEW   
REFERENCE RANGE   
   
                                                            Performed By: #### C  
BCA, 2857-1, CMP, 30326-8 ####  
[facility] ([id number])  
[address], [address]  
[place] [address]   
   
                                                    CT ABDOMEN AND PELVIS WO CON  
Ton 2024   
   
                                                    CT ABDOMEN AND PELVIS   
WO CONT                                 CT ABDOMEN AND PELVIS WO   
CONT  
*ADDENDUM*Addendum: This   
addendum is made as per   
referring service request.   
Small lesion lower pole   
left kidney shows simple   
attenuation and is   
unchanged in size since   
2023 consistent with   
small benign cyst. Small   
upper pole lesion also   
noted unchanged with   
simple attenuation. No   
further follow-up   
warranted.  
Workstation:[id number]  
Finalized by Zee Sanchez MD on 2024   
2:08 PM             Normal                                  [facility]  
   
                                                    MR LUMBAR SPINE WO CONTon    
   
                                                    MR LUMBAR SPINE WO   
CONT                                    MR LUMBAR SPINE WO CONT  
History: Lumbar   
radiculopathy  
Exam/Technique:   
Multiplanar multisequence   
images of the lumbar spine   
obtained without IV dye.  
Comparison: CT abdomen   
pelvis 3/15/2024  
Findings: There is focal   
upper endplate defect at   
the posterior aspect of L3   
most consistent with small   
Schmorl's node with   
minimal edema. The   
remaining vertebral body   
heights are well-preserved   
with no gross acute   
osseous injury. There is   
subtle grade 1   
anterolisthesis of L4 on   
L5  
Conus medullaris is of   
adequate configuration   
with the tip at L1-L2   
level.  
At T11-T12, there is mild   
degenerative disc disease   
with mild loss of disc   
height and diffuse disc   
bulge. There is mild   
spinal canal stenosis with   
mild effacement of the   
ventral nerve roots.   
Neuroforamina are patent   
bilaterally.  
At T12-L1, there is   
moderate degenerative disc   
disease with moderate loss   
of disc height and diffuse   
disc bulge. There is no   
significant spinal canal   
stenosis or neural   
foraminal narrowing.  
At L1-L2, intervertebral   
disc is well-preserved.   
There is no spinal canal   
stenosis or neural   
foraminal narrowing.  
At L2-L3, there is mild   
diffuse disc bulge with   
mild bilateral neural   
foraminal narrowing.  
There is moderate   
bilateral facet joint   
disease with mild joint   
effusion.  
At L3-L4, there is severe   
spinal canal stenosis   
secondary to diffuse disc   
bulge with posterior   
annular tear. There is   
severe ligamentum flavum   
hypertrophy and bilateral   
facet joint disease with   
bilateral effusion. There   
is moderate bilateral   
neural foraminal narrowing   
with effacement of the   
exiting nerve roots.  
At L4-L5, there is grade 1   
anterolisthesis of L4 on   
L5.  
There is moderate spinal   
canal stenosis secondary   
to diffuse disc bulge with   
moderate loss of disc   
height and severe   
left-sided neural   
foraminal narrowing. There   
is moderate right-sided   
neural foraminal   
narrowing.  
There are severe bilateral   
facet joint disease left   
worse than right with   
bilateral pleural   
effusion.  
At L5-S1, there is left   
subarticular disc   
protrusion effacing the   
left S1 nerve roots.   
Neuroforamina are grossly   
patent. There is mild   
bilateral facet joint   
disease.  
IMPRESSION: There are   
multilevel disc and facet   
joint disease. Findings   
are worse at L3-L4 with   
severe spinal canal   
stenosis and posterior   
annular tear.  
At L4-L5 there is severe   
left-sided neural   
foraminal narrowing and   
moderate right-sided   
neural foraminal   
narrowing.  
At L5-S1 there is left   
subarticular disc   
protrusion with   
significant effacement of   
the left S1 nerve root.  
There are multilevel facet   
joint disease with joint   
effusion worse at L3-L4   
and L4-L5.  
Workstation:[id number]  
Finalized by Sherry Alexander MD on 2024   
10:11 AM            Normal                                  [facility]  
   
                                                    JAMIE Antinuclear Antibodieson  
 2024   
   
                                Antinuclear Abs, IFA Positive        Critically   
abnormal                  .                         The   
[facility]   
Physician   
Group  
   
                                        Comment on above:   Result Comment: Nega  
tive <1:80  
Borderline 1:80  
Positive >1:80   
   
                                                            Performed By: #### C  
ERULOP, HAAB, L-K MICRO, HBCAB, HCBIGM,   
JAMIE, SMAB, ALPHA PHEN, MITOM2, HBSAG, HEMOCHROM, HAABT, IGG,   
HBSAB, HCV RX PCR ####[facility] Acct#[id number],#### RALPH   
####[facility] [id number] [id number] [facility]   
   
                      Midbody Pattern 1:640      High       .          The   
[facility]   
Physician   
Group  
   
                                        Comment on above:   Result Comment: ICAP  
 nomenclature: AC-27   
   
                                                            Performed By: #### C  
ERULOP, HAAB, L-K MICRO, HBCAB, HCBIGM,   
JAMIE, SMAB, ALPHA PHEN, MITOM2, HBSAG, HEMOCHROM, HAABT, IGG,   
HBSAB, HCV RX PCR ####[facility] Acct#[id number],#### RALPH   
####[facility] [id number] [facility]   
   
                      Note 1                Normal     .          The   
[facility]   
Physician   
Group  
   
                                        Comment on above:   Result Comment: Cici joyce Potential Disease Association  
------------- ---------------------------------------------  
Homogeneous Systemic Lupus Erythematosus, Drug Induced  
Systemic Lupus Erythematosus, Chronic  
Autoimmune hepatitis, Juvenile Idiopathic  
Arthritis  
------------- ---------------------------------------------  
Speckled Sjogren Syndrome, Systemic Lupus  
Erythematosus, Subacute Cutaneous Lupus,  
 Lupus, Congenital Heart Block,  
Mixed Connective Tissue Disease,  
Scleroderma-diffuse, Scleroderma-Autoimmune  
Myositis Overlap Syndrome, Systemic Lupus  
Tseidmmaktsnc-Uxpzhpqoplw-Bgbaontfjf  
Myositis Overlap Syndrome, Systemic  
Autoimmune Rheumatic Disease,  
Undifferentiated Connective Tissue Disease  
------------- ---------------------------------------------  
Nucleolar Systemic Sclerosis, Scleroderma-Autoimmune  
Myositis Overlap Syndrome, Sjogren  
Syndrome, Raynaud phenomenon, Pulmonary  
Arterial Hypertension, Systemic Autoimmune  
Rheumatic Disease, Cancer  
------------- ---------------------------------------------  
Centromere Scleroderma-CREST, Limited Cutaneous SSc,  
Raynaud's Phenomenon, Primary Biliary  
Cholangitis  
------------- ---------------------------------------------  
Nuclear Dot Primary Biliary Cholangitis  
------------- ---------------------------------------------  
Nuclear Primary Biliary Cholangitis, Autoimmune  
Membrane Hepatitis/Liver disease, Systemic Autoimmune  
Rheumatic Disease, Autoimmune Cytopenias,  
Linear Scleroderma, Antiphospholipid Syndrome  
------------- ---------------------------------------------  
Performed at: [facility] [address] [id number]  
: Corky Newsome PhD, Phone: [phone]   
   
                                                            Performed By: #### C  
ERULOP, HAAB, L-K MICRO, HBCAB, HCBIGM,   
JAMIE, SMAB, ALPHA PHEN, MITOM2, HBSAG, HEMOCHROM, HAABT, IGG,   
HBSAB, HCV RX PCR ####[facility] Acct#[id number],#### RALPH   
####[facility]   
   
                      Speckled Pattern 1:160      High       .          The   
[facility]   
Physician   
Group  
   
                                        Comment on above:   Result Comment: ICAP  
 nomenclature: AC-2,4,5,29   
   
                                                            Performed By: #### C  
ERULOP, HAAB, L-K MICRO, HBCAB, HCBIGM,   
JAMIE, SMAB, ALPHA PHEN, MITOM2, HBSAG, HEMOCHROM, HAABT, IGG,   
HBSAB, HCV RX PCR ####[facility] Acct#[id number],#### RALPH   
####[address]   
   
                                                    Spindle Apparatus   
Pattern         1:640           High            .               The   
[facility]   
Physician   
Group  
   
                                        Comment on above:   Result Comment: ICAP  
 nomenclature: AC-25,26   
   
                                                            Performed By: #### C  
ERULOP, HAAB, L-K MICRO, HBCAB, HCBIGM,   
JAMIE, SMAB, ALPHA PHEN, MITOM2, HBSAG, HEMOCHROM, HAABT, IGG,   
HBSAB, HCV RX PCR ####[facility] Acct#[id number],#### RALPH   
####[address]   
   
                                                    Alpha-1-Antitrypsin Phenotyp  
acacia 2024   
   
                      Alpha 1 Anti-Trypsin 163 mg/dL  Normal     101-187    The   
[facility]   
Physician   
Group  
   
                                        Comment on above:   Performed By: #### C  
ERULOP, HAAB, L-K MICRO, HBCAB, HCBIGM,   
JAMIE, SMAB, ALPHA PHEN, MITOM2, HBSAG, HEMOCHROM, HAABT, IGG,   
HBSAB, HCV RX PCR ####[facility] Acct#[id number],#### RALPH   
####[address]   
   
                      Phenotype (P1) MM         Normal     .          The   
[facility]   
Physician   
Group  
   
                                        Comment on above:   Result Comment: Phen  
otype Population A-1-AT Concentration*  
Incidence % % of MM (Typical Range)  
MM 86.5% 100% (96 - 189)  
MS 8.0% 86% (83 - 161)  
MZ 3.9% 61% (60 - 111)  
FM 0.4% 100% (93 - 191)  
SZ 0.3% 41% (42 - 75)  
SS 0.1% 64% (62 - 119)  
ZZ 0.05% 19% (16 - 38)  
FS 0.05% 70% (70 - 128)  
FZ Unknown 46% (44 - 88)  
FF Unknown Unknown  
*A-1-AT concentration in the homozygous MM phenotype  
is taken as the reference normal. Percent deficiency  
in each phenotype is reported relative to this  
reference. Ranges used to confirm phenotype.  
Performed at: [facility] [id number]  
: Corky Newsome PhD, Phone: [phone]  
Performed at: [facility] [id number]  
: Rossana Thompson MD, Phone: [phone]   
   
                                                            Performed By: #### C  
ERULOP, HAAB, L-K MICRO, HBCAB, HCBIGM,   
JAMIE, SMAB, ALPHA PHEN, MITOM2, HBSAG, HEMOCHROM, HAABT, IGG,   
HBSAB, HCV RX PCR ####[facility] Acct#[id number],#### RALPH   
####[facility]   
   
                                                    Ceruloplasminon 2024   
   
                      Ceruloplasmin 23.1 mg/dL Normal     16.0-31.0  The   
[facility]   
Physician   
Group  
   
                                        Comment on above:   Result Comment: Perf  
ormed at: [facility] [id number]  
: Corky Newsome PhD, Phone: [phone]  
PERFORMED BY:  
[place] [address]  
[phone]  
PATHOLOGIST MEDICAL DIRECTOR  
PAUL RODRIGUEZ M.D.   
   
                                                            Performed By: #### C  
ERULOP, HAAB, L-K MICRO, HBCAB, HCBIGM,   
JAMIE, SMAB, ALPHA PHEN, MITOM2, HBSAG, HEMOCHROM, HAABT, IGG,   
HBSAB, HCV RX PCR ####  
[facility] Acct#[id number]  
,  
#### RALPH ####  
[address]   
   
                                                    Ferritin [Mass/volume] in Se  
rum or PlasmaOrdered By: Yordy Garcia on 2024   
   
                      Ferritin [Mass/Vol] 572.1 ng/mL High       23.9-336.2 [facility]  
   
                                        Comment on above:   Result Comment: PERF  
ORMED BY:  
[place] [address]  
[phone]  
PATHOLOGIST MEDICAL DIRECTOR  
PAUL RODRIGUEZ M.D.   
   
                                                            Performed By: #### C  
ERULOP, HAAB, L-K MICRO, HBCAB, HCBIGM,   
JAMIE, SMAB, ALPHA PHEN, MITOM2, HBSAG, HEMOCHROM, HAABT, IGG,   
HBSAB, HCV RX PCR ####  
[facility] Acct#[id number]  
,  
#### RALPH ####  
[address]   
   
                                                    Hep C Ab wRfx to Qnt PCRon 0  
2024   
   
                                                    Hepatitis C Virus   
Antibody            Non-Reactive        Normal              Non   
Reactive                                The   
[facility]   
Physician   
Group  
   
                                        Comment on above:   Performed By: #### C  
ERULOP, HAAB, L-K MICRO, HBCAB, HCBIGM,   
JAMIE, SMAB, ALPHA PHEN, MITOM2, HBSAG, HEMOCHROM, HAABT, IGG,   
HBSAB, HCV RX PCR ####  
[facility] Acct#[id number]  
,  
#### RALPH ####  
[address]   
   
                                                    Interpretation   
Hepatitis C                     Normal          .               The   
[facility]   
Physician   
Group  
   
                                        Comment on above:   Result Comment: Not   
infected with HCV unless early or acute   
infection is  
suspected (which may be delayed in an immunocompromised  
individual), or other evidence exists to indicate HCV  
infection.   
   
                                                            Performed By: #### C  
ERULOP, HAAB, L-K MICRO, HBCAB, HCBIGM,   
JAMIE, SMAB, ALPHA PHEN, MITOM2, HBSAG, HEMOCHROM, HAABT, IGG,   
HBSAB, HCV RX PCR ####  
[facility] Acct#[id number]  
,  
#### RALPH ####  
[address]   
   
                                                    Hepatitis A Antibody IgMon 0  
2024   
   
                                                    Hepatitis A Antibody   
IgM             Negative        Normal          Negative        The   
[facility]   
Physician   
Group  
   
                                        Comment on above:   Performed By: #### C  
ERULOP, HAAB, L-K MICRO, HBCAB, HCBIGM,   
JAMIE, SMAB, ALPHA PHEN, MITOM2, HBSAG, HEMOCHROM, HAABT, IGG,   
HBSAB, HCV RX PCR ####  
[facility] Acct#[id number]  
,  
#### RALPH ####  
[address]   
   
                                                    Hepatitis A Antibody Totalon  
 2024   
   
                                                    Hepatitis A Antibody   
Total           Negative        Normal          Negative        The   
[facility]   
Physician   
Group  
   
                                        Comment on above:   Result Comment: Comm  
ent: The HAV total antibody assay detects   
both IgG and  
IgM but does not differentiate between them. A negative  
result suggests susceptibility to infection. A positive  
result could be due to vaccination, previously resolved  
infection or active infection. Testing for HAV IgM should  
be performed if active HAV infection is suspected. [facility]  
offers profiles that will automatically reflex positive HAV  
total antibody results to IgM (e.g., panel #[id number] HAV  
Antibody w/ Rfx).   
   
                                                            Performed By: #### C  
ERULOP, HAAB, L-K MICRO, HBCAB, HCBIGM,   
JAMIE, SMAB, ALPHA PHEN, MITOM2, HBSAG, HEMOCHROM, HAABT, IGG,   
HBSAB, HCV RX PCR ####[facility] Acct#[id number],#### RALPH   
####[facility] [place] [address]   
   
                                                    Hepatitis B Core Antibodyon   
2024   
   
                                                    Hepatitis B Core   
Antibody        Negative        Normal          Negative        The   
[facility]   
Physician   
Group  
   
                                        Comment on above:   Performed By: #### C  
ERULOP, HAAB, L-K MICRO, HBCAB, HCBIGM,   
JAMIE, SMAB, ALPHA PHEN, MITOM2, HBSAG, HEMOCHROM, HAABT, IGG,   
HBSAB, HCV RX PCR ####[facility]#[id number],#### RALPH   
####[address]   
   
                                                    Hepatitis B Core Antibody Ig  
Mon 2024   
   
                                                    Hepatitis B Core   
Antibody IgM    Negative        Normal          Negative        The   
[facility]   
Physician   
Group  
   
                                        Comment on above:   Result Comment: Perf  
ormed at: [facility] [id number]  
: Corky Newsome PhD, Phone: [phone]   
   
                                                            Performed By: #### C  
ERULOP, HAAB, L-K MICRO, HBCAB, HCBIGM,   
JAMIE, SMAB, ALPHA PHEN, MITOM2, HBSAG, HEMOCHROM, HAABT, IGG,   
HBSAB, HCV RX PCR ####  
[facility]ct#[id number]  
,  
#### RALPH ####  
[facility]  
[address]   
   
                                                    Hepatitis B Surface Antibody  
on 2024   
   
                                                    Hepatitis B Surface   
Antibody        Non-Reactive    Normal          .               The   
[facility]   
Physician   
Group  
   
                                        Comment on above:   Result Comment: Non   
Reactive: Inconsistent with immunity,  
less than 10 mIU/mL  
Reactive: Consistent with immunity,  
greater than 9.9 mIU/mL   
   
                                                            Performed By: #### C  
ERULOP, HAAB, L-K MICRO, HBCAB, HCBIGM,   
JAMIE, SMAB, ALPHA PHEN, MITOM2, HBSAG, HEMOCHROM, HAABT, IGG,   
HBSAB, HCV RX PCR ####[facility]ct#[id number],#### RALPH   
####[place] [address] [address]   
   
                                                    Hepatitis B Surface Antigeno  
n 2024   
   
                      HBsAg Screen Negative   Normal     Negative   The   
[facility]   
Physician   
Group  
   
                                        Comment on above:   Result Comment: PERF  
ORMED BY:  
[facility]  
[address]  
[place] [address]  
[phone]  
PATHOLOGIST MEDICAL DIRECTOR  
PAUL RODRIGUEZ M.D.   
   
                                                            Performed By: #### C  
ERULOP, HAAB, L-K MICRO, HBCAB, HCBIGM,   
JAMIE, SMAB, ALPHA PHEN, MITOM2, HBSAG, HEMOCHROM, HAABT, IGG,   
HBSAB, HCV RX PCR ####[facility] Acct#[id number],#### RALPH   
####[address]   
   
                                                    Hereditary Hemochromatosis,D  
Blair 2024   
   
                                                    Hereditary   
Hemochromatosis                 Normal          .               The   
[facility]   
Physician   
Group  
   
                                        Comment on above:   Result Comment: Resu  
lts:  
c.845G>A (p.Fnu978Cxo) - Not Detected  
c.187C>G (p.Ybn03Siu) - Detected, heterozygous  
c.193A>T (p.Klr44Jjc) - Not Detected  
Not associated with increased risk to develop clinical  
symptoms of Hereditary Hemochromatosis. In symptomatic  
individuals, other causes of iron overload should be  
evaluated. See Additional Information and Comments.  
Additional Clinical Information:  
Hereditary hemochromatosis (HFE related) is an autosomal  
recessive iron storage disorder. Patients may have a  
genetic diagnosis of hereditary hemochromatosis and never  
show clinical symptoms. Clinical symptoms typically appear  
between 40 to 60 years in males and after menopause in  
females. Signs and symptoms may include organ damage,  
primarily in the liver, risk for hepatocellular  
carcinoma, diabetes, and heart disease due to iron  
accumulation. Life expectancy may be decreased in  
individuals who develop cirrhosis. Treatment for  
clinically symptomatic individuals may include  
therapeutic phlebotomy. Liver transplant may be used to  
treat end stage liver failure. For preventive care,  
monitoring for iron overload is recommended for patients  
who are homozygous for c.845G>A (p.Hwo538Qcr) and have yet  
to experience clinical symptoms.  
Comments:  
The most common HFE variants associated with hereditary  
hemochromatosis are c.845G>A (p.Gco518Xns), c.187C>G  
(p.Fmr42Qbm), c.193A>T (p.Kpj31Usf). While patients  
homozygous for c.845G>A (p.Snh878Jhi) are the most likely  
to present clinical symptoms, less than 10% develop  
clinically significant iron overload with tissue and organ  
damage.  
Genetic counseling is recommended to discuss the potential  
clinical implications of positive results, as well as  
recommendations for testing family members.  
Genetic Coordinators are available for health care  
providers to discuss results at [phone] [phone]  
Test Details:  
Three variants analyzed:  
c.845G>A (p.Epc423Yme), commonly referred to as C282Y  
c.187C>G (p.Vpe65Xom), commonly referred to as H63D  
c.193A>T (p.Eyw11Dyg), commonly referred to as S65C  
Methods/Limitations:  
DNA Analysis of the HFE gene (NM_000410.4) was performed  
by PCR amplification followed by restriction enzyme  
digestion analyses. Results must be combined with clinical  
information for the most accurate interpretation. Molecular-  
based testing is highly accurate, but as in any laboratory  
test, diagnostic errors may occur. False positive or false  
negative results may occur for reasons that include genetic  
variants, blood transfusions, bone marrow transplantation,  
somatic or tissue-specific mosaicism, mislabeled samples,  
or erroneous representation of family relationships.  
This test was developed and its performance  
characteristics determined by [employer]. It has not been  
cleared or approved by the Food and Drug Administration.  
References:  
Charles BR, [place] PC, Nick KV, Deepak LW, Félix AS;  
American Association for the Study of Liver Diseases.  
Diagnosis and management of hemochromatosis: 2011 practice  
guideline by the American Association for the Study of  
Liver Diseases. Hepatology. 2011 Jul;54(1):328-43. doi:  
10.1002/hep.26027. PMID: 36629537; PMCID: CVW6497749.  
Mundo G, Geovani P, Naomi DW, Manoj H, Flor O,  
Christiano S, Ab I, Julio M, Isma CHOU. [facility] best practice  
guidelines for the molecular genetic diagnosis of  
hereditary hemochromatosis (HH). Eur J Hum Leighann. 2016  
Apr;24(4):479-34. doi: 10.1038/ejhg.2015.128. Epub 2015. PMID: 22065646; PMCID: QAW5726645.   
   
                                                            Performed By: #### C  
ERULOP, HAAB, L-K MICRO, HBCAB, HCBIGM,   
JAMIE, SMAB, ALPHA PHEN, MITOM2, HBSAG, HEMOCHROM, HAABT, IGG,   
HBSAB, HCV RX PCR ####[facility] Acct#[id number],#### RALPH   
####[address]   
   
                      Reviewed by:            Normal     .          The   
[facility]   
Physician   
Group  
   
                                        Comment on above:   Result Comment: Leslee Pedro, Ph.D., FACMG  
Performed at:  - [facility] [id number]  
: Zhane Whipple [facility], Phone: [phone]  
PERFORMED BY:  
[place] [address]  
[phone]  
PATHOLOGIST MEDICAL DIRECTOR  
PAUL RODRIGUEZ M.D.   
   
                                                            Performed By: #### C  
ERULOP, HAAB, L-K MICRO, HBCAB, HCBIGM,   
JAMIE, SMAB, ALPHA PHEN, MITOM2, HBSAG, HEMOCHROM, HAABT, IGG,   
HBSAB, HCV RX PCR ####[facility]ct#[id number],#### RALPH   
####[address]   
   
                                                    Immunoglobulin Bry   
4   
   
                      Immunoglobulin G 838 mg/dL  Normal     [phone]   The   
[facility]   
Physician   
Group  
   
                                        Comment on above:   Result Comment: Perf  
ormed at:  - [facility] [address] [id number]  
: Corky Newsome PhD, Phone: [phone]   
   
                                                            Performed By: #### C  
ERULOP, HAAB, L-K MICRO, HBCAB, HCBIGM,   
JAMIE, SMAB, ALPHA PHEN, MITOM2, HBSAG, HEMOCHROM, HAABT, IGG,   
HBSAB, HCV RX PCR ####[facility]ct#[id number],#### RALPH   
####[address]   
   
                                                    Liver-Kidney Microsomal Abon  
 2024   
   
                                                    Liver-Kidney   
Microsomal Ab   3.5             Normal          0.0-20.0        The   
[facility]   
Physician   
Group  
   
                                        Comment on above:   Result Comment: Nega  
tive 0.0 - 20.0  
Equivocal 20.1 - 24.9  
Positive >24.9  
LKM type 1 antibodies are detected in patients with  
autoimmune hepatitis type 2 and in up to 8% of  
patients with chronic HCV infection.  
Performed at: [address] [id number]  
: Corky Newsome PhD, Phone: [phone]   
   
                                                            Performed By: #### C  
ERULOP, HAAB, L-K MICRO, HBCAB, HCBIGM,   
JAMIE, SMAB, ALPHA PHEN, MITOM2, HBSAG, HEMOCHROM, HAABT, IGG,   
HBSAB, HCV RX PCR ####[facility]ct#[id number],#### RALPH   
####[address]   
   
                                                    Mitochondrial (M2) Antibodyo  
n 2024   
   
                                                    Mitochondrial (M2)   
Antibody        <20.0           Normal          0.0-20.0        The   
[facility]   
Physician   
Group  
   
                                        Comment on above:   Result Comment: Nega  
tive 0.0 - 20.0  
Equivocal 20.1 - 24.9  
Positive >24.9  
Mitochondrial (M2) Antibodies are found in 90-96% of  
patients with primary biliary cirrhosis.  
Performed at: [address] [id number]  
: Corky Newsome PhD, Phone: [phone]   
   
                                                            Performed By: #### C  
ERULOP, HAAB, L-K MICRO, HBCAB, HCBIGM,   
JAMIE, SMAB, ALPHA PHEN, MITOM2, HBSAG, HEMOCHROM, HAABT, IGG,   
HBSAB, HCV RX PCR ####[facility]ct#[id number],#### RALPH   
####[place] [address] [place] [id number] [facility]   
   
                                                    Smooth Muscle Antibodyon    
   
                      Smooth Muscle Antibody 24         High       0-19       Th  
[facility]   
Physician   
Group  
   
                                        Comment on above:   Result Comment: Nega  
tive 0 - 19  
Weak positive 20 - 30  
Moderate to strong positive >30  
Actin Antibodies are found in 52-85% of patients with  
autoimmune hepatitis or chronic active hepatitis and  
in 22% of patients with primary biliary cirrhosis.   
   
                                                            Performed By: #### C  
ERULOP, HAAB, L-K MICRO, HBCAB, HCBIGM,   
JAMIE, SMAB, ALPHA PHEN, MITOM2, HBSAG, HEMOCHROM, HAABT, IGG,   
HBSAB, HCV RX PCR ####[facility]ct#[id number],#### RALPH   
####[address]   
   
                                                    US liveron 2024   
   
                                         liver            [facility] [place] [address]  
  
Ultrasound Report  
Signed  
  
Patient: Arun Moon   
MR#: [id number]  
[id number]  
: 1950   
Acct:[id number]  
  
Age/Sex: 74 / M ADM Date:   
24  
  
Loc:  Room: Type: [facility]  
Attending Dr: Yordy Garcia MD  
  
Ordering Provider: Yordy Garcia MD  
Date of Service: 24  
Accession #: ([id number])   
US/US liver: R74.8 -   
Abnormal levels of other   
serum enzymes  
  
  
Copies to: Yordy Garcia MD  
  
  
LIMITED ABDOMINAL   
ULTRASOUND:  
  
CLINICAL HISTORY: Elevated   
LFTs.  
  
COMPARISON: None  
  
TECHNIQUE: Grayscale and   
color Doppler images of   
the right upper quadrant   
organs were obtained.  
  
FINDINGS:  
  
Pancreas: Visualized   
portions appear   
unremarkable.  
  
Liver: Fatty infiltration.  
  
Gallbladder: Removed.  
  
CBD: 7 mm.  
  
  
ORDER #: [id number] US/US   
liver  
IMPRESSION:  
  
FATTY INFILTRATION OF THE   
LIVER. NO ACUTE PROCESS IS   
SEEN..  
  
Impression dictated by:   
Jordi Mcdowell Jr.   
DAlvinoOAlvino2024 12:31 PM  
  
  
Dictation Location:   
[place] [address]  
  
Tech: Gabriela Guaman  
  
Transcribed By: MICHELLE   
24 1231  
Dictated By: Jordi Mcdowell Jr, DO 24   
1231  
  
Signed By:  
24 1231       Normal                                  The   
[facility]   
Physician   
Group  
   
                                                    HGB A1C (GLYCO-HGB)on 2024   
   
                      Glucose [Mass/Vol] 186 mg/dL  Normal                [facility]  
   
                                        Comment on above:   Performed By: #### C  
BCA, 2857-1, CMP, 51058-3 ####  
[facility] ([id number])  
[address]  
[place] [address]   
   
                                                    HbA1c (Bld) [Mass   
fraction]       8.1 %           High            4.4-5.6         [facility]  
   
                                        Comment on above:   Result Comment: NOTE  
ADA Guidelines  
Result HgbA1c  
------------ ---------------  
Normal : less than 5.7 %  
Prediabetes : 5.7 % to 6.4 %  
Diabetes : > 6.4 %  
Use with caution in patients with abnormal hemoglobin variants   
as  
the half-life of red blood cells and in vivo glycation rates   
are  
affected.   
   
                                                            Performed By: #### C  
BCA, 2857-1, CMP, 99753-1 ####  
[facility] ([id number])  
[address], [address] [address]   
   
                                                    MICROALBUMIN - ALBUMIN:CREAT  
ININE URINE RATIOon 2024   
   
                      ALB/CREAT RATIO 698.1 mg/g creat High       0.0-30.0   [facility]  
   
                                        Comment on above:   Performed By: #### C  
BCA, 2857-1, CMP, 26704-8 ####  
[facility] ([id number])  
[address], [address]  
[place] [address]   
   
                                                    Albumin DL <= 20 mg/L   
(U) [Mass/Vol]  94.1 mg/dL      High            0.0-1.9         [facility]  
   
                                        Comment on above:   Performed By: #### C  
BCA, 2857-1, CMP, 48495-8 ####  
[facility] ([id number])  
[address], [address]  
[place] [address]   
   
                      URINE CREAT 134.79 mg/dL Normal                [facility]  
   
                                        Comment on above:   Performed By: #### RAPHAEL HERNANDEZ, 2857-1, CMP, 88968-4 ####  
[facility] ([id number])  
[address], [address]  
[place] [address]   
   
                                                    URINALYSISon 2024   
   
                      Bilirubin Ql (U) Negative   Normal     NEG        [facility]  
   
                                        Comment on above:   Performed By: #### C  
MARY, 2857-1, CMP, 00237-6 ####  
[facility] ([id number])  
[address], [address]  
[place] [address]   
   
                      BLOOD/HGB  Small      Abnormal   NEG        [facility]  
   
                                        Comment on above:   Performed By: #### RAPHAEL HERNANDEZ, 2857-1, CMP, 56543-6 ####  
[facility] ([id number])  
[address]  
[place] [address]   
   
                      Color (U)  YELLOW     Normal     YELLOW     [facility]  
   
                                        Comment on above:   Performed By: #### RAPHAEL HERNANDEZ, 2857-1, CMP, 26264-4 ####  
[facility] ([id number])  
[address]  
[place] [address]   
   
                      Glucose Ql (U) 300 mg/dL  Abnormal   NEG        [facility]  
   
                                        Comment on above:   Performed By: #### RAPHAEL HERNANDEZ, 2857-1, CMP, 05946-0 ####  
[facility] ([id number])  
[address], [address]  
[place] [address]   
   
                      GRANULAR CASTS 1 /lpf     High       0          [facility]  
   
                                        Comment on above:   Performed By: #### RAPHAEL  
BCA, 2857-1, CMP, 12776-0 ####  
[facility] ([id number])  
[address], [address]  
[place] [address]   
   
                                                    Hyaline casts LM Ql   
(Urine sed)     9 /lpf          High            0-2             [facility]  
   
                                        Comment on above:   Performed By: #### C  
BCA, 2857-1, CMP, 34292-2 ####  
[facility] ([id number])  
[address]  
[place] [address]   
   
                      Ketones Ql (U) Negative   Normal     NEG        [facility]  
   
                                        Comment on above:   Performed By: #### C  
BCA, 2857-1, CMP, 51177-3 ####  
[facility] ([id number])  
[address]  
[place] [address]   
   
                                                    Leukocyte esterase   
Test strip Ql (U) Negative        Normal          NEG             [facility]  
   
                                        Comment on above:   Performed By: #### C  
BCA, 2857-1, CMP, 88873-5 ####  
[facility] ([id number])  
[address]  
[place] [address]   
   
                      MUCOUS     PRESENT    Abnormal   NONE       [facility]  
   
                                        Comment on above:   Performed By: #### C  
BCA, 2857-1, CMP, 17006-8 ####  
[facility] ([id number])  
[address]  
[place] [address]   
   
                      Nitrite Ql (U) Negative   Normal     NEG        [facility]  
   
                                        Comment on above:   Performed By: #### C  
BCA, 2857-1, CMP, 30983-5 ####  
[facility] ([id number])  
[address]  
[place] [address]   
   
                      pH (U)     6.0 [pH]   Normal     5.0-8.5    [facility]  
   
                                        Comment on above:   Performed By: #### C  
BCA, 2857-1, CMP, 89211-7 ####  
[facility] ([id number])  
[address]  
[place] [address]   
   
                      Protein Ql (U) 300 mg/dL  Abnormal   NEG        [facility]  
   
                                        Comment on above:   Performed By: #### C  
BCA, 2857-1, CMP, 15839-9 ####  
[facility] ([id number])  
[address]  
[place] [address]   
   
                      R.B.CELLS  25 /hpf    High       0-5        [facility]  
   
                                        Comment on above:   Performed By: #### RAPHAEL HERNANDEZ, 2857-1, CMP, 35310-3 ####  
[facility] ([id number])  
[address]  
[place] [address]   
   
                                                    Specific gravity (U)   
[Rel density]       1.021               Normal              1.003-1.03  
5                                       [facility]  
   
                                        Comment on above:   Performed By: #### RAPHAEL HERNANDEZ, 2857-1, CMP, 11292-3 ####  
[facility] ([id number])  
[address]  
[place] [address]   
   
                      SQUAMOUS EPITHELIUM 1 /hpf     Normal     0-5        [facility]  
   
                                        Comment on above:   Performed By: #### RAPHAEL HERNANDEZ, 2857-1, CMP, 86351-2 ####  
[facility] ([id number])  
[address]  
[place] [address]   
   
                      TURBIDITY  CLEAR      Normal     CLEAR      [facility]  
   
                                        Comment on above:   Performed By: #### RAPHAEL HERNANDEZ, 2857-1, CMP, 05962-2 ####  
[facility] ([id number])  
[address]  
[place] [address]   
   
                                                    Urobilinogen (U)   
[Mass/Vol]      mg/dL           Normal          <1.1            [facility]  
   
                                        Comment on above:   Performed By: #### RAPHAEL HERNANDEZ, 2857-1, CMP, 57669-3 ####  
[facility] ([id number])  
[address]  
[place] [address]   
   
                      W.B.CELLS  1 /hpf     Normal     0-5        [facility]  
   
                                        Comment on above:   Performed By: #### RAPHAEL HERNANDEZ, 2857-1, CMP, 55620-5 ####  
[facility] ([id number])  
[address]  
[place] [address]   
   
                                                    US ABDOMEN LMTDon 2024  
   
   
                                        US ABDOMEN LMTD     US ABDOMEN LMTD  
HISTORY: A 74-year-old   
male with the history of   
the elevated serum   
alkaline phosphatase.  
TECHNIQUE: Multiple   
real-time images of the   
right upper abdomen are   
obtained. The color   
Doppler study is   
performed.  
COMPARISON: Comparison is   
made with the CT scan of   
the abdomen and pelvis of   
3/15/2024 and the upper   
abdominal ultrasound   
examination of 2023.  
FINDINGS: Liver appears   
irregular in contour.   
There is increase   
echogenicity in liver   
suggestive of fatty   
infiltration. No focal   
mass is identified. Liver   
measures 17.6 cm in   
vertical length.  
There is history of   
cholecystectomy.. The   
common bile duct is normal   
and measures 5.7 mm in   
diameter. No intrahepatic   
biliary ductal dilatation   
is identified. There is a   
negative sonographic   
Casanova's sign.  
The visualized pancreas   
appears normal.  
There is no evidence of   
free fluid in the upper   
abdomen.  
The color Doppler study   
reveals normal hepatopedal   
flow in the main portal   
vein.  
IMPRESSION:  
1. Fatty infiltration of   
the liver without evidence   
of focal mass.  
2. Status post   
cholecystectomy. No   
evidence of biliary ductal   
dilatation.  
3. Visualized pancreas is   
unremarkable.  
4. There is a normal   
directional flow in main   
portal vein.  
Workstation:[id number]  
Finalized by Anthony Chapman MD on 2024 8:54 AM Normal                                  [facility]  
   
                                                    COMPREHENSIVE METABOLIC PANE  
Julio 2024   
   
                      Albumin [Mass/Vol] 3.9 g/dL   Normal     3.2-5.3    [facility]  
   
                                        Comment on above:   Performed By: #### C  
BCA, 2857-1, CMP, 55019-2 ####  
[facility] ([id number])  
[address]  
[place] [address]   
   
                                                    ALP [Catalytic   
activity/Vol]   414 U/L         High                      [facility]  
   
                                        Comment on above:   Performed By: #### C  
BCA, 2857-1, CMP, 91073-4 ####  
[facility] ([id number])  
[address], [address]  
[place] [address]   
   
                                                    ALT [Catalytic   
activity/Vol]   322 U/L         High            0-40            [facility]  
   
                                        Comment on above:   Performed By: #### C  
BCA, 2857-1, CMP, 11521-6 ####  
[facility] ([id number])  
[address], [address]  
[place] [address]   
   
                      Anion gap [Moles/Vol] 15 mmol/L  Normal     5-15       [facility]  
   
                                        Comment on above:   Performed By: #### C  
BCA, 2857-1, CMP, 93590-0 ####  
[facility] ([id number])  
[address], [address]  
[place] [address]   
   
                                                    AST [Catalytic   
activity/Vol]   194 U/L         High            0-41            [facility]  
   
                                        Comment on above:   Performed By: #### C  
BCA, 2857-1, CMP, 82894-4 ####  
[facility] ([id number])  
[address], [address]  
[place] [address]   
   
                      Bilirubin [Mass/Vol] 0.5 mg/dL  Normal     0.3-1.2    [facility]  
   
                                        Comment on above:   Performed By: #### C  
BCA, 2857-1, CMP, 05205-2 ####  
[facility] ([id number])  
[address], [address]  
[place] [address]   
   
                      Calcium [Mass/Vol] 9.2 mg/dL  Normal     8.5-10.5   [facility]  
   
                                        Comment on above:   Performed By: #### C  
BCA, 2857-1, CMP, 84225-8 ####  
[facility] ([id number])  
[address], [address]  
[place] [address]   
   
                      Chloride [Moles/Vol] 96 mmol/L  Low             [facility]  
   
                                        Comment on above:   Performed By: #### C  
BCA, 2857-1, CMP, 91210-1 ####  
[facility] ([id number])  
[address], [address]  
[place] [address]   
   
                      CO2 [Moles/Vol] 25 mmol/L  Normal     22-32      [facility]  
   
                                        Comment on above:   Performed By: #### C  
BCA, 2857-1, CMP, 97484-9 ####  
[facility] ([id number])  
[address], [address]  
[place] [address]   
   
                      Creatinine [Mass/Vol] 5.44 mg/dL High       0.60-1.30  [facility]  
   
                                        Comment on above:   Result Comment: METH  
OD TRACEABLE TO IDMS STANDARD   
   
                                                            Performed By: #### C  
BCA, 2857-1, CMP, 75431-6 ####  
[facility] ([id number])  
[address], [address]  
[place] [address]   
   
                                                    GFR/1.73 sq   
M.predicted among   
non-blacks MDRD   
(S/P/Bld) [Vol   
rate/Area]      10 mL/min/{1.73_m2} Low             >59             [facility]  
   
                                        Comment on above:   Result Comment:  
Reported eGFR is based on the  
CKD-EPI  equation that does  
not use a race coefficient.   
   
                                                            Performed By: #### C  
BCA, 2857-1, CMP, 63160-1 ####  
[facility] ([id number])  
[address], [address]  
[place] [address]   
   
                      Glucose [Mass/Vol] 204 mg/dL  High       65-99      [facility]  
   
                                        Comment on above:   Performed By: #### C  
BCA, 2857-1, CMP, 10743-0 ####  
[facility] ([id number])  
[address], [address]  
[place] [address]   
   
                      Potassium [Moles/Vol] 4.1 mmol/L Normal     3.5-5.0    [facility]  
   
                                        Comment on above:   Performed By: #### C  
BCA, 2857-1, CMP, 60946-8 ####  
[facility] ([id number])  
[address]  
[place] [address]   
   
                      Protein [Mass/Vol] 7.6 g/dL   Normal     6.0-8.0    [facility]  
   
                                        Comment on above:   Performed By: #### C  
BCA, 2857-1, CMP, 15489-2 ####  
[facility] ([id number])  
[address], [address]  
[place] [address]   
   
                      Sodium [Moles/Vol] 136 mmol/L Normal     134-146    [facility]  
   
                                        Comment on above:   Performed By: #### C  
BCA, 2857-1, CMP, 33789-3 ####  
[facility] ([id number])  
[address], [address]  
[place] [address]   
   
                                                    Urea nitrogen   
[Mass/Vol]      69 mg/dL        High            5-27            [facility]  
   
                                        Comment on above:   Performed By: #### C  
BCA, 2857-1, CMP, 91725-0 ####  
[facility] ([id number])  
[address], [address]  
[place] [address]   
   
                                                    CBC AND AUTO DIFFon 20  
24   
   
                      ABSOLUTE BASOPHIL 0.0 X10E9/L Normal     0.0-0.2    [facility]  
   
                                        Comment on above:   Performed By: #### RAPHAEL  
BCA, 92722-0, CMP, 79191-6, 06317-9 ####  
[facility] ([id number])  
[address]  
[place] [address]   
   
                      ABSOLUTE NEUTROPHIL 10.4 X10E9/L High       1.5-6.6    [facility]  
   
                                        Comment on above:   Performed By: #### C  
BCA, 74096-1, CMP, 74154-1, 56764-6 ####  
[facility] ([id number])  
[address] [place] [address]   
   
                                                    Basophils/100 WBC   
(Bld)           0.3 %           Normal                          [facility]  
   
                                        Comment on above:   Performed By: #### C  
BCA, 15198-7, CMP, 62969-7, 57969-8 ####  
[facility] ([id number])  
[address] [place] [address]   
   
                                                    Eosinophils (Bld)   
[#/Vol]         0.5 10*3/uL     High            0.0-0.4         [facility]  
   
                                        Comment on above:   Performed By: #### C  
BCA, 35028-0, CMP, 83690-2, 59641-1 ####  
[facility] ([id number])  
[address]  
[place] [address]   
   
                                                    Eosinophils/100 WBC   
(Bld)           4.2 %           Normal                          [facility]  
   
                                        Comment on above:   Performed By: #### C  
BCA, 60531-6, CMP, 91661-6, 73155-5 ####  
[facility] ([id number])  
[address]  
[place] [address]   
   
                                                    Erythrocyte   
distribution width   
(RBC) [Ratio]   14.7 %          Normal          11.5-15.0       [facility]  
   
                                        Comment on above:   Performed By: #### C  
BCA, 92478-1, CMP, 83671-8, 64503-5 ####  
[facility] ([id number])  
[address]  
[place] [address]   
   
                                                    Hematocrit (Bld)   
[Volume fraction] 25.8 %          Low             39-49           [facility]  
   
                                        Comment on above:   Performed By: #### C  
BCA, 89060-9, CMP, 21335-9, 39675-3 ####  
[facility] ([id number])  
[address]  
[place] [address]   
   
                                                    Hemoglobin (Bld)   
[Mass/Vol]      8.7 g/dL        Low             13.0-17.0       [facility]  
   
                                        Comment on above:   Performed By: #### C  
BCA, 30066-8, CMP, 19249-8, 37262-7 ####  
[facility] ([id number])  
[address]  
[place] [address]   
   
                                                    Lymphocytes (Bld)   
[#/Vol]         1.1 10*3/uL     Normal          1.0-3.5         [facility]  
   
                                        Comment on above:   Performed By: #### C  
BCA, 98600-4, CMP, 02088-8, 16349-4 ####  
[facility] ([id number])  
[address]  
[place] [address]   
   
                                                    Lymphocytes/100 WBC   
(Bld)           8.3 %           Normal                          [facility]  
   
                                        Comment on above:   Performed By: #### C  
BCA, 22368-3, CMP, 31485-4, 24528-9 ####  
[facility] ([id number])  
[address]  
[place] [address]   
   
                                                    MCH (RBC) [Entitic   
mass]           31.6 pg         Normal          27-34           [facility]  
   
                                        Comment on above:   Performed By: #### RAPHAEL  
BCA, 15386-6, CMP, 95219-2, 26360-4 ####  
[facility] ([id number])  
[address]  
[place] [address]   
   
                      MCHC (RBC) [Mass/Vol] 33.9 g/dL  Normal     32-36      [facility]  
   
                                        Comment on above:   Performed By: #### RAPHAEL  
BCA, 91200-7, CMP, 38819-4, 08725-1 ####  
[facility] ([id number])  
[address]  
[place] [address]   
   
                                                    MCV (RBC) [Entitic   
vol]            93 fL           Normal                    [facility]  
   
                                        Comment on above:   Performed By: #### RAPHAEL HERNANDEZ, 96937-1, CMP, 96574-6, 51436-6 ####  
[facility] ([id number])  
[address]  
[place] [address]   
   
                                                    Monocytes (Bld)   
[#/Vol]         0.8 10*3/uL     Normal          0-0.9           [facility]  
   
                                        Comment on above:   Performed By: #### RAPHAEL  
BCA, 04419-7, CMP, 98302-5, 32793-3 ####  
[facility] ([id number])  
[address]  
[place] [address]   
   
                                                    Monocytes/100 WBC   
(Bld)           6.2 %           Normal                          [facility]  
   
                                        Comment on above:   Performed By: #### RAPHAEL  
BCA, 72116-7, CMP, 71973-8, 73549-1 ####  
[facility] ([id number])  
[address]  
[place] [address]   
   
                                                    Neutrophils/100 WBC   
(Bld)           81.0 %          Normal                          [facility]  
   
                                        Comment on above:   Performed By: #### RAPHAEL  
BCA, 57955-8, CMP, 43646-7, 07378-5 ####  
[facility] ([id number])  
[address]  
[place] [address]   
   
                                                    Platelet mean volume   
(Bld) [Entitic vol] 8.6 fL          Normal          7-12            [facility]  
   
                                        Comment on above:   Performed By: #### C  
BCA, 72590-4, CMP, 60978-0, 03527-5 ####  
[facility] ([id number])  
[address]  
[place] [address]   
   
                                                    Platelets (Bld)   
[#/Vol]         186 10*3/uL     Normal          150-450         [facility]  
   
                                        Comment on above:   Performed By: #### C  
BCA, 41846-5, CMP, 44459-6, 15600-2 ####  
[facility] ([id number])  
[address]  
[place] [address]   
   
                      RBC COUNT  2.76 X10E12/L Low        4.10-5.70  [facility]  
   
                                        Comment on above:   Performed By: #### C  
BCA, 57160-3, CMP, 86933-7, 98762-6 ####  
[facility] ([id number])  
[address]  
[place] [address]   
   
                      WBC (Bld) [#/Vol] 12.9 10*3/uL High       4.0-11.0   [facility]  
   
                                        Comment on above:   Performed By: #### C  
BCA, 17694-1, CMP, 31605-4, 26881-6 ####  
[facility] ([id number])  
[address]  
[place] [address]   
   
                                                    COMPREHENSIVE METABOLIC PANE  
Julio 2024   
   
                      Albumin [Mass/Vol] 2.6 g/dL   Low        3.2-5.3    [facility]  
   
                                        Comment on above:   Performed By: #### C  
BCA, 61230-0, CMP, 44735-8, 16717-8 ####  
[facility] ([id number])  
[address]  
[place] [address]   
   
                                                    ALP [Catalytic   
activity/Vol]   222 U/L         High                      [facility]  
   
                                        Comment on above:   Performed By: #### C  
BCA, 21445-6, CMP, 52965-8, 39210-4 ####  
[facility] ([id number])  
[address]  
[place] [address]   
   
                                                    ALT [Catalytic   
activity/Vol]   106 U/L         High            0-40            [facility]  
   
                                        Comment on above:   Performed By: #### C  
BCA, 79611-8, CMP, 57063-3, 00971-7 ####  
[facility] ([id number])  
[address]  
[place] [address]   
   
                      Anion gap [Moles/Vol] 14 mmol/L  Normal     5-15       [facility]  
   
                                        Comment on above:   Performed By: #### C  
BCA, 62746-2, CMP, 96365-1, 54983-1 ####  
[facility] ([id number])  
[address] [place] [address]   
   
                                                    AST [Catalytic   
activity/Vol]   77 U/L          High            0-41            [facility]  
   
                                        Comment on above:   Performed By: #### C  
BCA, 73542-6, CMP, 87431-7, 41672-7 ####  
[facility] ([id number])  
[address]  
[place] [address]   
   
                      Bilirubin [Mass/Vol] 0.8 mg/dL  Normal     0.3-1.2    [facility]  
   
                                        Comment on above:   Performed By: #### C  
BCA, 16042-0, CMP, 51962-5, 58695-6 ####  
[facility] ([id number])  
[address] [place]  
[place] [address]   
   
                      Calcium [Mass/Vol] 8.3 mg/dL  Low        8.5-10.5   [facility]  
   
                                        Comment on above:   Performed By: #### C  
BCA, 81662-6, CMP, 76695-9, 67211-8 ####  
[facility] ([id number])  
[address]  
[place] [address]   
   
                      Chloride [Moles/Vol] 99 mmol/L  Normal          [facility]  
   
                                        Comment on above:   Performed By: #### C  
BCA, 59209-7, CMP, 00865-3, 34821-1 ####  
[facility] ([id number])  
[address] [place] [address]   
   
                      CO2 [Moles/Vol] 22 mmol/L  Normal     22-32      [facility]  
   
                                        Comment on above:   Performed By: #### C  
BCA, 11630-3, CMP, 23591-2, 00680-2 ####  
[facility] ([id number])  
[address]  
[place] [address]   
   
                      Creatinine [Mass/Vol] 4.55 mg/dL High       0.70-1.20  [facility]  
   
                                        Comment on above:   Result Comment: METH  
OD TRACEABLE TO IDMS STANDARD   
   
                                                            Performed By: #### C  
BCA, 80648-2, CMP, 03372-9, 28496-3 ####  
[facility] ([id number])  
[address]  
[place] [address]   
   
                                                    GFR/1.73 sq   
M.predicted among   
non-blacks MDRD   
(S/P/Bld) [Vol   
rate/Area]      13 mL/min/{1.73_m2} Low             >59             [facility]  
   
                                        Comment on above:   Result Comment:  
Reported eGFR is based on the  
CKD-EPI  equation that does  
not use a race coefficient.   
   
                                                            Performed By: #### C  
BCA, 13858-1, CMP, 45174-7, 82238-1 ####  
[facility] ([id number])  
[address]  
[place] [address]   
   
                      Glucose [Mass/Vol] 194 mg/dL  High       65-99      [facility]  
   
                                        Comment on above:   Performed By: #### C  
BCA, 04684-1, CMP, 32397-8, 12031-4 ####  
[facility] ([id number])  
[address] [place] [address]   
   
                      Potassium [Moles/Vol] 3.6 mmol/L Normal     3.5-5.0    [facility]  
   
                                        Comment on above:   Performed By: #### C  
BCA, 99999-0, CMP, 15112-0, 18190-1 ####  
[facility] ([id number])  
[address] [place] [address]   
   
                      Protein [Mass/Vol] 6.5 g/dL   Normal     6.0-8.0    [facility]  
   
                                        Comment on above:   Performed By: #### C  
BCA, 90479-0, CMP, 11607-3, 98874-9 ####  
[facility] ([id number])  
[address]  
[place] [address]   
   
                      Sodium [Moles/Vol] 135 mmol/L Normal     134-146    [facility]  
   
                                        Comment on above:   Performed By: #### C  
BCA, 26819-2, CMP, , 16773-5 ####  
[facility] ([id number])  
[address]  
[place] [address]   
   
                                                    Urea nitrogen   
[Mass/Vol]      65 mg/dL        High            5-27            [facility]  
   
                                        Comment on above:   Performed By: #### C  
BCA, 19788-5, [facility], , 02115-7 ####  
[facility] ([id number])  
[address]  
[place] [address]   
   
                                                    Glucose Glucometer (BldC) [M  
ass/Vol]on 2024   
   
                      Glucose [Mass/Vol] 322 mg/dL  High       65-99      [facility]  
   
                      Glucose [Mass/Vol] 177 mg/dL  High       65-99      [facility]  
   
                                                    MAGNESIUMon 2024   
   
                      Magnesium [Mass/Vol] 2.1 mg/dL  Normal     1.8-2.6    [facility]  
   
                                        Comment on above:   Performed By: #### C  
BCA, 18446-9, [facility], , 11263-7 ####  
[facility] ([id number])  
[address]  
[place] [address]   
   
                                                    POTASSIUMon 2024   
   
                      Potassium [Moles/Vol] 3.8 mmol/L Normal     3.5-5.0    [facility]  
   
                                        Comment on above:   Performed By: #### C  
BCA, 59318-3, [facility], , 38925-0 ####  
[facility] ([id number])  
[address]  
[place] [address]   
   
                      Potassium [Moles/Vol] 3.3 mmol/L Low        3.5-5.0    [facility]  
   
                                        Comment on above:   Performed By: #### C  
BCA, 01008-2, CMP, , 96095-1 ####  
[facility] ([id number])  
[address]  
[place] [address]   
   
                                                    Procalcitonin IA [Mass/Vol]o  
n 2024   
   
                      PROCALCITONIN 1.48 ng/mL High       <0.05      [facility]  
   
                                        Comment on above:   Result Comment: NOTE  
<0.50 ng/mL - Low risk of severe sepsis and/or septic shock.  
<2.00 ng/mL - Recommend retesting within 6-24 hours.  
>2.00 ng/mL - High risk of sepsis and/or septic shock.   
   
                                                            Performed By: #### C  
BCA, 43992-3, CMP, 04205-4, 59869-1 ####  
[facility] ([id number])  
[address] [place] [address]   
   
                                                    BF CELL CT AND DIFFon 2024   
   
                                        BODY FLUID COMMENT  ----------Interpreta  
tion--  
------              Normal                                  [facility]  
   
                                        Comment on above:   Result Comment: Refe  
rence values for this fluid type are  
undefined, as fluid accumulation is  
considered abnormal.   
   
                                                            Performed By: #### C  
BCA, 59354-1, CMP, 74951-6, 64698-4 ####  
[facility] ([id number])  
[address]  
[place] [address]   
   
                      FLUID CLARITY CLEAR      Normal                [facility]  
   
                                        Comment on above:   Performed By: #### C  
BCA, 44571-1, CMP, 59685-3, 64638-6 ####  
[facility] ([id number])  
[address] [place] [address]   
   
                      FLUID COLOR COLORLESS  Normal                [facility]  
   
                                        Comment on above:   Performed By: #### C  
BCA, 57503-3, CMP, 26817-1, 54665-4 ####  
[facility] ([id number])  
[address] [place] [address]   
   
                      FLUID LYMPHOCYTE 10 %       Normal                [facility]  
   
                                        Comment on above:   Performed By: #### C  
BCA, 91561-1, CMP, 11800-7, 30946-7 ####  
[facility] ([id number])  
[address] [place] [address]   
   
                      FLUID NEUTROPHILS 59 %       Normal                [facility]  
   
                                        Comment on above:   Performed By: #### C  
BCA, 00700-1, CMP, 83111-8, 30731-7 ####  
[facility] ([id number])  
[address]  
[place] [address]   
   
                      FLUID RBC CT 1 /uL      Normal                [facility]  
   
                                        Comment on above:   Performed By: #### C  
BCA, 53168-6, CMP, 80543-3, 87335-7 ####  
[facility] ([id number])  
[address]  
[place] [address]   
   
                      FLUID SPECIMEN TYPE PERITONEAL FLUID Normal                 
 [facility]  
   
                                        Comment on above:   Performed By: #### C  
BCA, 98112-1, CMP, 06345-3, 73957-4 ####  
[facility] ([id number])  
[address]  
[place] [address]   
   
                      MACROPHAGES 31 %       Normal                [facility]  
   
                                        Comment on above:   Performed By: #### C  
BCA, 16906-5, CMP, 59965-1, 47016-1 ####  
[facility] ([id number])  
[address]  
[place] [address]   
   
                      NUCLEATED CELL CT 28 /uL     Normal                [facility]  
   
                                        Comment on above:   Performed By: #### C  
BCA, 44147-1, CMP, 58937-5, 61044-4 ####  
[facility] ([id number])  
[address]  
[place] [address]   
   
                                                    BLOOD CULTUREon 2024   
   
                                                    Bacteria identified   
Aer cx Nom (Bld)                        CULTURE RESULTS  
NO GROWTH 5 DAYS    Normal                                  [facility]  
   
                                                    Bacteria identified   
Aer cx Nom (Bld)                        CULTURE RESULTS  
NO GROWTH 5 DAYS    Normal                                  [facility]  
   
                                                    CBC AND AUTO DIFFon 20  
24   
   
                      ABSOLUTE BASOPHIL 0.1 X10E9/L Normal     0.0-0.2    [facility]  
   
                                        Comment on above:   Performed By: #### C  
BCA, 28307-7, CMP, 56311-0, 17493-8 ####  
[facility] ([id number])  
[address]  
[place] [address]   
   
                      ABSOLUTE NEUTROPHIL 14.9 X10E9/L High       1.5-6.6    [facility]  
   
                                        Comment on above:   Performed By: #### C  
BCA, 60483-0, CMP, 72975-0, 20235-2 ####  
[facility] ([id number])  
[address]  
[place] [address]   
   
                                                    Basophils/100 WBC   
(Bld)           0.4 %           Normal                          [facility]  
   
                                        Comment on above:   Performed By: #### RAPHAEL  
BCA, 00303-1, CMP, 38737-9, 90764-1 ####  
[facility] ([id number])  
[address]   
[place] [address]   
   
                                                    Eosinophils (Bld)   
[#/Vol]         0.4 10*3/uL     Normal          0.0-0.4         [facility]  
   
                                        Comment on above:   Performed By: #### RAPHAEL HERNANDEZ, 59028-0, CMP, 63908-6, 21538-0 ####  
[facility] ([id number])  
[address]  
[place] [address]   
   
                                                    Eosinophils/100 WBC   
(Bld)           2.5 %           Normal                          [facility]  
   
                                        Comment on above:   Performed By: #### RAPHAEL HERNANDEZ, 75067-0, [facility], 40814-0, 52591-0 ####  
[facility] ([id number])  
[address]  
[place] [address]   
   
                                                    Erythrocyte   
distribution width   
(RBC) [Ratio]   14.9 %          Normal          11.5-15.0       [facility]  
   
                                        Comment on above:   Performed By: #### RAPHAEL HERNANDEZ, 76985-9, CMP, 48508-9, 27760-8 ####  
[facility] ([id number])  
[address]  
[place] [address]   
   
                                                    Hematocrit (Bld)   
[Volume fraction] 26.5 %          Low             39-49           [facility]  
   
                                        Comment on above:   Performed By: #### C  
BCA, 33600-0, CMP, 14566-2, 99095-1 ####  
[facility] ([id number])  
[address]  
[place] [address]   
   
                                                    Hemoglobin (Bld)   
[Mass/Vol]      9.0 g/dL        Low             13.0-17.0       [facility]  
   
                                        Comment on above:   Performed By: #### C  
MARY, 50047-5, [facility], 99107-0, 64127-7 ####  
[facility] ([id number])  
[address]  
[place] [address]   
   
                                                    Lymphocytes (Bld)   
[#/Vol]         0.9 10*3/uL     Low             1.0-3.5         [facility]  
   
                                        Comment on above:   Performed By: #### RAPHAEL HERNANDEZ, 93439-1, CMP, 20259-8, 59939-4 ####  
[facility] ([id number])  
[address]  
[place] [address]   
   
                                                    Lymphocytes/100 WBC   
(Bld)           5.3 %           Normal                          [facility]  
   
                                        Comment on above:   Performed By: #### RAPHAEL HERNANDEZ, 85558-8, CMP, 33349-6, 26765-5 ####  
[facility] ([id number])  
[address]  
[place] [address]   
   
                                                    MCH (RBC) [Entitic   
mass]           31.5 pg         Normal          27-34           [facility]  
   
                                        Comment on above:   Performed By: #### RAPHAEL HERNANDEZ, 07710-6, [facility], 37625-9, 30860-9 ####  
[facility] ([id number])  
[address]  
[place] [address]   
   
                      MCHC (RBC) [Mass/Vol] 34.0 g/dL  Normal     32-36      [facility]  
   
                                        Comment on above:   Performed By: #### RAPHAEL HERNANDEZ, 01461-6, CMP, 08478-9, 47268-5 ####  
[facility] ([id number])  
[address] [place] [address]   
   
                                                    MCV (RBC) [Entitic   
vol]            93 fL           Normal                    [facility]  
   
                                        Comment on above:   Performed By: #### RAPHAEL HERNANDEZ, 48824-1, CMP, 36371-9, 26823-8 ####  
[facility] ([id number])  
[address] [place] [address]   
   
                                                    Monocytes (Bld)   
[#/Vol]         1.1 10*3/uL     High            0-0.9           [facility]  
   
                                        Comment on above:   Performed By: #### C  
BCA, 08636-0, CMP, 31960-1, 69868-4 ####  
[facility] ([id number])  
[address]  
[place] [address]   
   
                                                    Monocytes/100 WBC   
(Bld)           6.2 %           Normal                          [facility]  
   
                                        Comment on above:   Performed By: #### C  
BCA, 22011-0, CMP, 11032-3, 60264-7 ####  
[facility] ([id number])  
[address]  
[place] [address]   
   
                                                    Neutrophils/100 WBC   
(Bld)           85.6 %          Normal                          [facility]  
   
                                        Comment on above:   Performed By: #### C  
BCA, 35645-6, CMP, 62140-1, 01867-9 ####  
[facility] ([id number])  
[address]  
[place] [address]   
   
                                                    Platelet mean volume   
(Bld) [Entitic vol] 8.4 fL          Normal          7-12            [facility]  
   
                                        Comment on above:   Performed By: #### C  
BCA, 66056-1, CMP, 30156-7, 06167-5 ####  
[facility] ([id number])  
[address]  
[place] [address]   
   
                                                    Platelets (Bld)   
[#/Vol]         170 10*3/uL     Normal          150-450         [facility]  
   
                                        Comment on above:   Performed By: #### C  
BCA, 57343-6, CMP, 02725-2, 18414-0 ####  
[facility] ([id number])  
[address]  
[place] [address]   
   
                      RBC COUNT  2.86 X10E12/L Low        4.10-5.70  [facility]  
   
                                        Comment on above:   Performed By: #### C  
BCA, 58295-5, CMP, 93392-1, 52238-7 ####  
[facility] ([id number])  
[address]  
[place] [address]   
   
                      WBC (Bld) [#/Vol] 17.4 10*3/uL High       4.0-11.0   [facility]  
   
                                        Comment on above:   Performed By: #### C  
BCA, 67929-0, CMP, 75117-4, 09437-9 ####  
[facility] ([id number])  
[address]  
[place] [address]   
   
                                                    COMPREHENSIVE METABOLIC PANE  
Julio 2024   
   
                      Albumin [Mass/Vol] 2.7 g/dL   Low        3.2-5.3    [facility]  
   
                                        Comment on above:   Performed By: #### C  
BCA, 62979-3, CMP, 76903-2, 82659-8 ####  
[facility] ([id number])  
[address]  
[place] [address]   
   
                                                    ALP [Catalytic   
activity/Vol]   186 U/L         High                      [facility]  
   
                                        Comment on above:   Performed By: #### C  
BCA, 97932-0, CMP, 03831-5, 26800-4 ####  
[facility] ([id number])  
[address]  
[place] [address]   
   
                                                    ALT [Catalytic   
activity/Vol]   67 U/L          High            0-40            [facility]  
   
                                        Comment on above:   Performed By: #### C  
BCA, 18019-3, CMP, 61635-5, 32794-0 ####  
[facility] ([id number])  
[address]  
[place] [address]   
   
                      Anion gap [Moles/Vol] 13 mmol/L  Normal     5-15       [facility]  
   
                                        Comment on above:   Performed By: #### C  
BCA, 63954-1, CMP, 09949-1, 81992-4 ####  
[facility] ([id number])  
[address]  
[place] [address]   
   
                                                    AST [Catalytic   
activity/Vol]   35 U/L          Normal          0-41            [facility]  
   
                                        Comment on above:   Performed By: #### C  
BCA, 62554-1, CMP, 43641-7, 31432-1 ####  
[facility] ([id number])  
[address]  
[place] [address]   
   
                      Bilirubin [Mass/Vol] 0.9 mg/dL  Normal     0.3-1.2    [facility]  
   
                                        Comment on above:   Performed By: #### C  
BCA, 90568-4, CMP, 89489-9, 61606-7 ####  
[facility] ([id number])  
[address] [place] [address]   
   
                      Calcium [Mass/Vol] 8.4 mg/dL  Low        8.5-10.5   [facility]  
   
                                        Comment on above:   Performed By: #### C  
BCA, 68540-3, CMP, 21083-3, 49284-3 ####  
[facility] ([id number])  
[address] [place] [address]   
   
                      Chloride [Moles/Vol] 96 mmol/L  Low             [facility]  
   
                                        Comment on above:   Performed By: #### C  
BCA, 91536-9, CMP, 66138-3, 58772-7 ####  
[facility] ([id number])  
[address] [place] [address]   
   
                      CO2 [Moles/Vol] 22 mmol/L  Normal     22-32      [facility]  
   
                                        Comment on above:   Performed By: #### C  
BCA, 71085-2, [facility], 40633-3, 45850-4 ####  
[facility] ([id number])  
[address] [place] [address]   
   
                      Creatinine [Mass/Vol] 5.22 mg/dL High       0.70-1.20  [facility]  
   
                                        Comment on above:   Result Comment: METH  
OD TRACEABLE TO IDMS STANDARD   
   
                                                            Performed By: #### C  
BCA, 28282-6, CMP, 45616-9, 04385-4 ####  
[facility] ([id number])  
[address]  
[place] [address]   
   
                                                    GFR/1.73 sq   
M.predicted among   
non-blacks MDRD   
(S/P/Bld) [Vol   
rate/Area]      11 mL/min/{1.73_m2} Low             >59             [facility]  
   
                                        Comment on above:   Result Comment:  
Reported eGFR is based on the  
CKD-EPI  equation that does  
not use a race coefficient.   
   
                                                            Performed By: #### C  
BCA, 36632-5, CMP, 00151-0, 91992-2 ####  
[facility] ([id number])  
[address] [place] [address]   
   
                      Glucose [Mass/Vol] 207 mg/dL  High       65-99      [facility]  
[facility]  
   
                                        Comment on above:   Performed By: #### C  
BCA, 55496-3, CMP, 59542-2, 17413-1 ####  
[facility] ([id number])  
[address]  
[place] [address]   
   
                      Potassium [Moles/Vol] 2.9 mmol/L Low        3.5-5.0    [facility]  
[facility]  
   
                                        Comment on above:   Performed By: #### C  
BCA, 74665-2, CMP, 65795-5, 51942-6 ####  
[facility] ([id number])  
[address]  
[place] [address]   
   
                      Protein [Mass/Vol] 6.7 g/dL   Normal     6.0-8.0    [facility]  
   
                                        Comment on above:   Performed By: #### C  
BCA, 29445-1, [facility], 94102-6, 40289-2 ####  
[facility] ([id number])  
[address]  
[place] [address]   
   
                      Sodium [Moles/Vol] 131 mmol/L Low        134-146    [facility]  
   
                                        Comment on above:   Performed By: #### C  
BCA, 32453-3, [facility], 48094-1, 41285-8 ####  
[facility] ([id number])  
[address]  
[place] [address]   
   
                                                    Urea nitrogen   
[Mass/Vol]      69 mg/dL        High            5-27            [facility]  
   
                                        Comment on above:   Performed By: #### C  
BCA, 10074-6, CMP, 76616-1, 82742-7 ####  
[facility] ([id number])  
[address]  
[place] [address]   
   
                                                    Glucose Glucometer (BldC) [M  
ass/Vol]on 2024   
   
                      Glucose [Mass/Vol] 255 mg/dL  High       65-99      [facility]  
[facility]  
   
                      Glucose [Mass/Vol] 272 mg/dL  High       65-99      [facility]  
[facility]  
   
                      Glucose [Mass/Vol] 273 mg/dL  High       65-99      [facility]  
[facility]  
   
                      Glucose [Mass/Vol] 169 mg/dL  High       65-99      [facility]  
[facility]  
   
                                                    MAGNESIUMon 2024   
   
                      Magnesium [Mass/Vol] 2.1 mg/dL  Normal     1.8-2.6    [facility]  
   
                                        Comment on above:   Performed By: #### C  
BCA, 80820-6, CMP, 30278-5, 70944-9 ####  
[facility] ([id number])  
[address]  
[place] [address]   
   
                                                    POTASSIUMon 2024   
   
                      Potassium [Moles/Vol] 3.5 mmol/L Normal     3.5-5.0    [facility]  
[facility]  
   
                                        Comment on above:   Performed By: #### C  
BCA, 10418-4, CMP, 77736-6, 78370-8 ####  
[facility] ([id number])  
[address]  
[place] [address]   
   
                                                    BF CELL CT AND DIFFon 2024   
   
                                        BODY FLUID COMMENT  ----------Interpreta  
tion--  
------              Normal                                  [facility]  
   
                                        Comment on above:   Result Comment: Refe  
rence values for this fluid type are  
undefined, as fluid accumulation is  
considered abnormal.   
   
                                                            Performed By: #### B  
FCT ####  
[facility] ([id number])  
[address]  
[place] [address]  
[facility] ([id number])  
[address]  
[place] [address]   
   
                      FLUID CLARITY CLEAR      Normal                [facility]  
   
                                        Comment on above:   Performed By: #### B  
FCT ####  
[facility] ([id number])  
[address]  
[place] [address]  
[facility] ([id number])  
[address]  
[place] [address]   
   
                      FLUID COLOR LIGHT STRAW Normal                [facility]  
   
                                        Comment on above:   Performed By: #### B  
FCT ####  
[facility] ([id number])  
[address] [place]  
[place] [address]  
[facility] ([id number])  
[address]  
[place] [address]   
   
                      FLUID LYMPHOCYTE 10 %       Normal                [facility]  
   
                                        Comment on above:   Performed By: #### B  
FCT ####  
[facility] ([id number])  
[address]  
[place] [address]  
[facility] ([id number])  
[address]  
[place] [address]   
   
                      FLUID MESOTHELIAL 1 %        Normal                [facility]  
   
                                        Comment on above:   Performed By: #### B  
FCT ####  
[facility] ([id number])  
[address]  
[place] [address]  
[facility] ([id number])  
[address]  
[place] [address]   
   
                      FLUID NEUTROPHILS 52 %       Normal                [facility]  
   
                                        Comment on above:   Performed By: #### B  
FCT ####  
[facility] ([id number])  
[address] [place] [place]  
[place] [address]  
[facility] ([id number])  
[address]  
[place] [address]   
   
                      FLUID RBC CT 9 /uL      Normal                [facility]  
   
                                        Comment on above:   Performed By: #### B  
FCT ####  
[facility] ([id number])  
[address]  
[place] [address]  
[facility] ([id number])  
[address]  
[place] [address]   
   
                      FLUID SPECIMEN TYPE PERITONEAL FLUID Normal                 
 [facility]  
   
                                        Comment on above:   Performed By: #### B  
FCT ####  
[facility] ([id number])  
[address] [place]  
[place] [address]  
[facility] ([id number])  
[address]  
[place] [address]   
   
                      MACROPHAGES 37 %       Normal                [facility]  
   
                                        Comment on above:   Performed By: #### B  
FCT ####  
[facility] ([id number])  
[address] [place] [place] [address]  
[place] [address]  
[facility] ([id number])  
[address]  
[place] [address]   
   
                      NUCLEATED CELL CT 91 /uL     Normal                [facility]  
   
                                        Comment on above:   Performed By: #### B  
FCT ####  
[facility] ([id number])  
[address]  
[place] [address]  
[facility] ([id number])  
[address]  
[place] [address]   
   
                                                    CBC AND AUTO DIFFon 20  
24   
   
                      ABSOLUTE BASOPHIL 0.1 X10E9/L Normal     0.0-0.2    [facility]  
   
                                        Comment on above:   Performed By: #### C  
BCA, 71177-2, CMP, 01303-5, 88974-0 ####  
[facility] ([id number])  
[address]  
[place] [address]   
   
                      ABSOLUTE NEUTROPHIL 16.7 X10E9/L High       1.5-6.6    [facility]  
   
                                        Comment on above:   Performed By: #### C  
BCA, 99753-3, CMP, 79578-9, 77586-6 ####  
[facility] ([id number])  
[address]  
[place] [address]   
   
                                                    Basophils/100 WBC   
(Bld)           0.3 %           Normal                          [facility]  
   
                                        Comment on above:   Performed By: #### C  
BCA, 57390-9, CMP, 90983-4, 90669-4 ####  
[facility] ([id number])  
[address]  
[place] [address]   
   
                                                    Eosinophils (Bld)   
[#/Vol]         0.3 10*3/uL     Normal          0.0-0.4         [facility]  
   
                                        Comment on above:   Performed By: #### C  
BCA, 24400-4, CMP, 20438-6, 49683-9 ####  
[facility] ([id number])  
[address]  
[place] [address]   
   
                                                    Eosinophils/100 WBC   
(Bld)           1.4 %           Normal                          [facility]  
   
                                        Comment on above:   Performed By: #### C  
BCA, 80921-4, CMP, 44050-1, 94074-4 ####  
[facility] ([id number])  
[address]  
[place] [address]   
   
                                                    Erythrocyte   
distribution width   
(RBC) [Ratio]   15.4 %          High            11.5-15.0       [facility]  
   
                                        Comment on above:   Performed By: #### C  
BCA, 43445-7, [facility], 12978-5, 57523-8 ####  
[facility] ([id number])  
[address]  
[place] [address]   
   
                                                    Hematocrit (Bld)   
[Volume fraction] 26.7 %          Low             39-49           [facility]  
   
                                        Comment on above:   Performed By: #### RAPHAEL HERNANDEZ, 04071-9, [facility], 44482-4, 95159-3 ####  
[facility] ([id number])  
[address]  
[place] [address]   
   
                                                    Hemoglobin (Bld)   
[Mass/Vol]      9.3 g/dL        Low             13.0-17.0       [facility]  
   
                                        Comment on above:   Performed By: #### RAPHAEL HERNANDEZ, 85056-6, [facility], 25045-3, 60128-8 ####  
[facility] ([id number])  
[address]  
[place] [address]   
   
                                                    Lymphocytes (Bld)   
[#/Vol]         1.1 10*3/uL     Normal          1.0-3.5         [facility]  
   
                                        Comment on above:   Performed By: #### C  
MARY, 10330-6, [facility] [facility], 67562-4, 54202-8 ####  
[facility] ([id number])  
[address]  
[place] [address]   
   
                                                    Lymphocytes/100 WBC   
(Bld)           5.7 %           Normal                          [facility]  
   
                                        Comment on above:   Performed By: #### RAPHAEL HERNANDEZ, 03109-8, [facility], 88116-9, 97995-0 ####  
[facility] ([id number])  
[address]  
[place] [address]   
   
                                                    MCH (RBC) [Entitic   
mass]           32.5 pg         Normal          27-34           [facility]  
   
                                        Comment on above:   Performed By: #### C  
BCA, 13690-7, [facility], 61464-4, 00529-6 ####  
[facility] ([id number])  
[address]  
[place] [address]   
   
                      MCHC (RBC) [Mass/Vol] 35.0 g/dL  Normal     32-36      [facility]  
   
                                        Comment on above:   Performed By: #### C  
BCA, 09449-1, CMP, 97337-5, 74477-1 ####  
[facility] ([id number])  
[address]  
[place] [address]   
   
                                                    MCV (RBC) [Entitic   
vol]            93 fL           Normal                    [facility]  
   
                                        Comment on above:   Performed By: #### C  
BCA, 90283-4, CMP, 91569-5, 54786-9 ####  
[facility] ([id number])  
[address]  
[place] [address]   
   
                                                    Monocytes (Bld)   
[#/Vol]         1.1 10*3/uL     High            0-0.9           [facility]  
   
                                        Comment on above:   Performed By: #### C  
BCA, 88626-3, CMP, 20929-8, 75446-1 ####  
[facility] ([id number])  
[address]  
[place] [address]   
   
                                                    Monocytes/100 WBC   
(Bld)           5.9 %           Normal                          [facility]  
   
                                        Comment on above:   Performed By: #### C  
BCA, 88989-5, CMP, 13963-9, 16312-3 ####  
[facility] ([id number])  
[address]  
[place] [address]   
   
                                                    Neutrophils/100 WBC   
(Bld)           86.7 %          Normal                          [facility]  
   
                                        Comment on above:   Performed By: #### C  
BCA, 32684-3, CMP, 64069-3, 32971-1 ####  
[facility] ([id number])  
[address]  
[place] [address]   
   
                                                    Platelet mean volume   
(Bld) [Entitic vol] 8.4 fL          Normal          7-12            [facility]  
   
                                        Comment on above:   Performed By: #### C  
BCA, 42032-8, CMP, 94795-2, 52050-7 ####  
[facility] ([id number])  
[address]  
[place] [address]   
   
                                                    Platelets (Bld)   
[#/Vol]         197 10*3/uL     Normal          150-450         [facility]  
   
                                        Comment on above:   Performed By: #### C  
BCA, 30009-8, CMP, 46499-9, 24941-1 ####  
[facility] ([id number])  
[address]  
[place] [address]   
   
                      RBC COUNT  2.87 X10E12/L Low        4.10-5.70  [facility]  
   
                                        Comment on above:   Performed By: #### C  
BCA, 40497-0, CMP, 51401-8, 73020-5 ####  
[facility] ([id number])  
[address]  
[place] [address]   
   
                      WBC (Bld) [#/Vol] 19.3 10*3/uL High       4.0-11.0   [facility]  
   
                                        Comment on above:   Performed By: #### C  
BCA, 93055-4, CMP, 78400-3, 35895-2 ####  
[facility] ([id number])  
[address]  
[place] [address]   
   
                                                    COMPREHENSIVE METABOLIC PANE  
Julio 2024   
   
                      Albumin [Mass/Vol] 3.3 g/dL   Normal     3.2-5.3    [facility]  
   
                                        Comment on above:   Performed By: #### C  
BCA, 69229-5, CMP, 06619-4, 56385-4 ####  
[facility] ([id number])  
[address]  
[place] [address]   
   
                                                    ALP [Catalytic   
activity/Vol]   192 U/L         High                      [facility]  
   
                                        Comment on above:   Performed By: #### C  
BCA, 67885-5, CMP, 69854-1, 40871-8 ####  
[facility] ([id number])  
[address]  
[place] [address]   
   
                                                    ALT [Catalytic   
activity/Vol]   83 U/L          High            0-40            [facility]  
   
                                        Comment on above:   Performed By: #### C  
BCA, 69888-6, CMP, 81738-1, 35689-3 ####  
[facility] ([id number])  
[address]  
[place] [address]   
   
                      Anion gap [Moles/Vol] 14 mmol/L  Normal     5-15       [facility]  
   
                                        Comment on above:   Performed By: #### C  
BCA, 43875-5, CMP, 94703-8, 52600-3 ####  
[facility] ([id number])  
[address] [place]  
[place] [address]   
   
                                                    AST [Catalytic   
activity/Vol]   43 U/L          High            0-41            [facility]  
   
                                        Comment on above:   Performed By: #### C  
BCA, 82318-8, CMP, 98598-0, 62741-6 ####  
[facility] ([id number])  
[address]  
[place] [address]   
   
                      Bilirubin [Mass/Vol] 1.1 mg/dL  Normal     0.3-1.2    [facility]  
   
                                        Comment on above:   Performed By: #### C  
BCA, 55005-7, CMP, 28164-1, 42454-9 ####  
[facility] ([id number])  
[address]  
[place] [address]   
   
                      Calcium [Mass/Vol] 8.6 mg/dL  Normal     8.5-10.5   [facility]  
   
                                        Comment on above:   Performed By: #### C  
BCA, 09985-6, CMP, 10908-4, 79736-8 ####  
[facility] ([id number])  
[address]  
[place] [address]   
   
                      Chloride [Moles/Vol] 97 mmol/L  Low             [facility]  
   
                                        Comment on above:   Performed By: #### C  
BCA, 98243-2, CMP, 23074-9, 79456-0 ####  
[facility] ([id number])  
[address]  
[place] [address]   
   
                      CO2 [Moles/Vol] 21 mmol/L  Low        22-32      [facility]  
   
                                        Comment on above:   Performed By: #### C  
BCA, 28131-5, CMP, 10706-3, 86681-5 ####  
[facility] ([id number])  
[address] [place] [address]   
   
                      Creatinine [Mass/Vol] 5.64 mg/dL High       0.70-1.20  [facility]  
   
                                        Comment on above:   Result Comment: METH  
OD TRACEABLE TO IDMS STANDARD   
   
                                                            Performed By: #### C  
BCA, 85471-4, CMP, 15952-8, 15016-4 ####  
[facility] ([id number])  
[address]  
[place] [address]   
   
                                                    GFR/1.73 sq   
M.predicted among   
non-blacks MDRD   
(S/P/Bld) [Vol   
rate/Area]      10 mL/min/{1.73_m2} Low             >59             [facility]  
   
                                        Comment on above:   Result Comment:  
Reported eGFR is based on the  
CKD-EPI  equation that does  
not use a race coefficient.   
   
                                                            Performed By: #### C  
BCA, 91999-2, [facility], 24113-6, 93195-3 ####  
[facility] ([id number])  
[address] [place] [address]   
   
                      Glucose [Mass/Vol] 182 mg/dL  High       65-99      [facility]  
   
                                        Comment on above:   Performed By: #### C  
MARY, 49787-3, [facility], 90510-6, 81470-4 ####  
[facility] ([id number])  
[address] [place] [address]   
   
                      Potassium [Moles/Vol] 3.5 mmol/L Normal     3.5-5.0    [facility]  
   
                                        Comment on above:   Performed By: #### C  
BCA, 86630-2, [facility], 80268-3, 36453-1 ####  
[facility] ([id number])  
[address] [place] [address]   
   
                      Protein [Mass/Vol] 7.1 g/dL   Normal     6.0-8.0    [facility]  
   
                                        Comment on above:   Performed By: #### C  
BCA, 76186-0, [facility], 22360-6, 02179-2 ####  
[facility] ([id number])  
[address] [place] [place]  
[place] [address]   
   
                      Sodium [Moles/Vol] 132 mmol/L Low        134-146    [facility]  
   
                                        Comment on above:   Performed By: #### C  
BCA, 02626-3, [facility], 73964-1, 16743-6 ####  
[facility] ([id number])  
[address] [place] [address]   
   
                                                    Urea nitrogen   
[Mass/Vol]      78 mg/dL        High            5-27            [facility]  
   
                                        Comment on above:   Performed By: #### C  
MARY, 22618-3, [facility], 03827-8, 39199-4 ####  
[facility] ([id number])  
[address] [place] [address]   
   
                                                    FLUID CULTUREon 2024   
   
                                                    Bacteria identified   
Aer cx Nom (Body fld)                   GRAM STAIN  
WHITE BLOOD CELLS PRESENT  
NO ORGANISMS SEEN  
ON CONCENTRATED SMEAR  
  
CULTURE RESULTS  
NO GROWTH 5 DAYS    Normal                                  [facility]  
   
                                        Comment on above:   Performed By: #### C  
MARY, 89781-7, [facility], 91699-5, 52830-8 ####  
[facility] ([id number])  
[address] [place] [address]   
   
                                                    Glucose Glucometer (BldC) [M  
ass/Vol]on 2024   
   
                      Glucose [Mass/Vol] 225 mg/dL  High       65-99      [facility]  
   
                      Glucose [Mass/Vol] 263 mg/dL  High       65-99      [facility]  
   
                      Glucose [Mass/Vol] 317 mg/dL  High       65-99      [facility]  
   
                      Glucose [Mass/Vol] 198 mg/dL  High       65-99      [facility]  
   
                      Glucose [Mass/Vol] 183 mg/dL  High       65-99      [facility]  
   
                                                    Lactate (P dara) [Moles/Vol]o  
n 2024   
   
                      LACTATE W/REFLEX 1.1 mmol/L Normal     0.4-2.0    [facility]  
   
                                        Comment on above:   Result Comment:  
Result did not trigger repeat Lactate,  
re-order if needed.   
   
                                                            Performed By: #### C  
MARY, 02327-7, [facility], 85433-8, 03302-3 ####  
[facility] ([id number])  
[address] [place] [address]   
   
                                                    MAGNESIUMon 2024   
   
                      Magnesium [Mass/Vol] 2.3 mg/dL  Normal     1.8-2.6    [facility]  
   
                                        Comment on above:   Performed By: #### C  
MARY, 60467-1, [facility], 96990-1, 07064-7 ####  
[facility] ([id number])  
[address] [place] [address]   
   
                                                    Procalcitonin IA [Mass/Vol]o  
n 2024   
   
                      PROCALCITONIN 2.68 ng/mL High       <0.05      [facility]  
   
                                        Comment on above:   Result Comment: NOTE  
<0.50 ng/mL - Low risk of severe sepsis and/or septic shock.  
<2.00 ng/mL - Recommend retesting within 6-24 hours.  
>2.00 ng/mL - High risk of sepsis and/or septic shock.   
   
                                                            Performed By: #### C  
BCA, 17812-8, CMP, 42659-5, 50829-7 ####  
[facility] ([id number])  
[address] [facility] [place] [address]   
   
                                                    CBC AND AUTO DIFFon 03-15-20  
24   
   
                      ABSOLUTE BASOPHIL 0.1 X10E9/L Normal     0.0-0.2    [facility]  
   
                                        Comment on above:   Performed By: #### C  
BCA, CMP, 3040-3, 03916-1, 1988, 77754-  
5,   
02051-1, 37830-3, 16609-8 ####  
[facility] ([id number])  
[address] [place] [address]   
   
                      ABSOLUTE NEUTROPHIL 18.6 X10E9/L High       1.5-6.6    [facility]  
   
                                        Comment on above:   Performed By: #### C  
BCA, CMP, 3040-3, 55798-6, 1988, 79773-  
5,   
15403-2, 75625-1, 44410-9 ####  
[facility] ([id number])  
[address] [place] [address]   
   
                                                    Basophils/100 WBC   
(Bld)           0.5 %           Normal                          [facility]  
   
                                        Comment on above:   Performed By: #### C  
BCA, CMP, 3040-3, 26543-2, 1988, 25297-  
5,   
03123-9, 28042-6, 54671-2 ####  
[facility] ([id number])  
[address] [place] [address]   
   
                                                    Eosinophils (Bld)   
[#/Vol]         0.0 10*3/uL     Normal          0.0-0.4         [facility]  
   
                                        Comment on above:   Performed By: #### C  
BCA, CMP, 3040-3, 56882-7, 1988, 54659-  
5,   
48618-1, 00646-6, 09803-9 ####  
[facility] ([id number])  
[address] [place] [address]   
   
                                                    Eosinophils/100 WBC   
(Bld)           0.2 %           Normal                          [facility]  
   
                                        Comment on above:   Performed By: #### C  
BCA, CMP, 3040-3, 39838-0, 1988, 70905-  
5,   
85127-7, 08040-1, 74032-0 ####  
[facility] ([id number])  
[address] [place] [address]   
   
                                                    Erythrocyte   
distribution width   
(RBC) [Ratio]   14.8 %          Normal          11.5-15.0       [facility]  
   
                                        Comment on above:   Performed By: #### C  
BCA, CMP, 3040-3, , 1988, 16807-  
5,   
71355-8, 00669-4, 19972-7 ####  
[facility] ([id number])  
[address] [place] [address]   
   
                                                    Hematocrit (Bld)   
[Volume fraction] 29.4 %          Low             39-49           [facility]  
   
                                        Comment on above:   Performed By: #### C  
BCA, CMP, 3040-3, , 1988, 37086-  
5,   
84745-2, 34261-7, 63903-0 ####  
[facility] ([id number])  
[address] [place] [address]   
   
                                                    Hemoglobin (Bld)   
[Mass/Vol]      10.2 g/dL       Low             13.0-17.0       [facility]  
   
                                        Comment on above:   Performed By: #### C  
BCA, CMP, 3040-3, , 1988, 19873-  
5,   
04722-8, 97614-3, 48897-2 ####  
[facility] ([id number])  
[address] [place] [address]   
   
                                                    Lymphocytes (Bld)   
[#/Vol]         0.7 10*3/uL     Low             1.0-3.5         [facility]  
   
                                        Comment on above:   Performed By: #### C  
BCA, CMP, 3040-3, 65773-2, 1988, 79407-  
5,   
83247-3, 47861-2, 56641-6 ####  
[facility] ([id number])  
[address] [place] [address]   
   
                                                    Lymphocytes/100 WBC   
(Bld)           3.3 %           Normal                          [facility]  
   
                                        Comment on above:   Performed By: #### C  
BCA, CMP, 3040-3, , 1988, -  
5,   
03608-7, 22728-9, 68425-8 ####  
[facility] ([id number])  
[address] [place] [address]   
   
                                                    MCH (RBC) [Entitic   
mass]           31.8 pg         Normal          27-34           [facility]  
   
                                        Comment on above:   Performed By: #### C  
BCA, CMP, 0-3, , 1988, -  
5,   
46130-2, 29585-3, 47936-4 ####  
[facility] ([id number])  
[address] [place] [address]   
   
                      MCHC (RBC) [Mass/Vol] 34.6 g/dL  Normal     32-36      [facility]  
   
                                        Comment on above:   Performed By: #### C  
BCA, CMP, 3040-3, , 1988, 45507-  
5,   
95916-9, 10850-4, 73313-8 ####  
[facility] ([id number])  
[address] [place] [address]   
   
                                                    MCV (RBC) [Entitic   
vol]            92 fL           Normal                    [facility]  
   
                                        Comment on above:   Performed By: #### C  
BCA, CMP, 3040-3, 08037-0, 1988, 03451-  
5,   
22157-4, 57491-5, 72510-0 ####  
[facility] ([id number])  
[address] [place] [address]   
   
                                                    Monocytes (Bld)   
[#/Vol]         1.3 10*3/uL     High            0-0.9           [facility]  
   
                                        Comment on above:   Performed By: #### C  
BCA, CMP, 3040-3, 33093-9, 1988, 27581-  
5,   
09318-4, 41512-2, 60299-2 ####  
[facility] ([id number])  
[address] [place] [address]   
   
                                                    Monocytes/100 WBC   
(Bld)           6.1 %           Normal                          [facility]  
   
                                        Comment on above:   Performed By: #### C  
BCA, CMP, 3040-3, , 1988, 94374-  
5,   
57506-0, 41581-7, 00301-8 ####  
[facility] ([id number])  
[address] [place] [address]   
   
                                                    Neutrophils/100 WBC   
(Bld)           89.9 %          Normal                          [facility]  
   
                                        Comment on above:   Performed By: #### C  
BCA, CMP, 3040-3, 76270-2, 1988, 71872-  
5,   
17032-9, 00696-6, 51570-6 ####  
[facility] ([id number])  
[address] [place] [address]   
   
                                                    Platelet mean volume   
(Bld) [Entitic vol] 8.6 fL          Normal          7-12            [facility]  
   
                                        Comment on above:   Performed By: #### C  
BCA, CMP, 3040-3, 94288-9, 1988, 28876-  
5,   
14109-4, 30135-7, 23591-6 ####  
[facility] ([id number])  
[address] [place] [address]   
   
                                                    Platelets (Bld)   
[#/Vol]         257 10*3/uL     Normal          150-450         [facility]  
   
                                        Comment on above:   Performed By: #### C  
BCA, CMP, 3040-3, 25728-6, 1988, 02785-  
5,   
15777-2, 48603-8, 21506-9 ####  
[facility] ([id number])  
[address] [place] [address]   
   
                      RBC COUNT  3.21 X10E12/L Low        4.10-5.70  [facility]  
   
                                        Comment on above:   Performed By: #### C  
BCA, CMP, 3040-3, , 1988, 61727-  
5,   
48556-5, 50911-1, 49312-1 ####  
[facility] ([id number])  
[address] [place] [address]   
   
                      WBC (Bld) [#/Vol] 20.7 10*3/uL High       4.0-11.0   [facility]  
   
                                        Comment on above:   Performed By: #### C  
BCA, CMP, 3040-3, , 1988, 65603-  
5,   
41286-5, 71457-4, 56895-8 ####  
[facility] ([id number])  
[address] [place] [address]   
   
                                                    COMPREHENSIVE METABOLIC PANE  
Julio 03-   
   
                      Albumin [Mass/Vol] 4.0 g/dL   Normal     3.2-5.3    [facility]  
   
                                        Comment on above:   Performed By: #### C  
BCA, CMP, 3040-3, , 1988, 30445-  
5,   
63708-0, 08481-4, 02164-7 ####  
[facility] ([id number])  
[address] [place] [address]   
   
                                                    ALP [Catalytic   
activity/Vol]   233 U/L         High                      [facility]  
   
                                        Comment on above:   Performed By: #### C  
BCA, CMP, 3040-3, 52452-2, 1988, 74172-  
5,   
66863-5, 41344-2, 43913-3 ####  
[facility] ([id number])  
[address] [place] [address]   
   
                                                    ALT [Catalytic   
activity/Vol]   121 U/L         High            0-40            [facility]  
   
                                        Comment on above:   Performed By: #### C  
BCA, CMP, 3040-3, , 1988, 91699-  
5,   
71498-2, 45896-4, 56833-3 ####  
[facility] ([id number])  
[address] [place] [address]   
   
                      Anion gap [Moles/Vol] 13 mmol/L  Normal     5-15       [facility]  
   
                                        Comment on above:   Performed By: #### C  
BCA, CMP, 3040-3, , 1988, 71489-  
5,   
88236-8, 06101-3, 83430-6 ####  
[facility] ([id number])  
[address] [place] [address]   
   
                                                    AST [Catalytic   
activity/Vol]   79 U/L          High            0-41            [facility]  
   
                                        Comment on above:   Performed By: #### C  
BCA, CMP, 3040-3, , 1988, 51931-  
5,   
19460-9, 07823-0, 79867-4 ####  
[facility] ([id number])  
[address] [place] [address]   
   
                      Bilirubin [Mass/Vol] 1.0 mg/dL  Normal     0.3-1.2    [facility]  
   
                                        Comment on above:   Performed By: #### C  
BCA, CMP, 3040-3, , 1988, 65571-  
5,   
15801-5, 28847-7, 09644-0 ####  
[facility] ([id number])  
[address] [place] [address]   
   
                      Calcium [Mass/Vol] 9.0 mg/dL  Normal     8.5-10.5   [facility]  
   
                                        Comment on above:   Performed By: #### C  
BCA, CMP, 3040-3, 09799-5, -, 24086-  
5,   
27808-1, 15821-2, 01661-6 ####  
[facility] ([id number])  
[address] [place] [address]   
   
                      Chloride [Moles/Vol] 96 mmol/L  Low             [facility]  
   
                                        Comment on above:   Performed By: #### C  
BCA, CMP, 3040-3, 07437-3, 1988, 48082-  
5,   
32891-2, 92688-9, 80491-9 ####  
[facility] ([id number])  
[address] [place] [address]   
   
                      CO2 [Moles/Vol] 20 mmol/L  Low        22-32      [facility]  
   
                                        Comment on above:   Performed By: #### C  
BCA, CMP, 3040-3, , 1988, 02223-  
5,   
73723-4, 98621-1, 17841-3 ####  
[facility] ([id number])  
[address] [place] [address]   
   
                      Creatinine [Mass/Vol] 5.58 mg/dL High       0.70-1.20  [facility]  
   
                                        Comment on above:   Result Comment: METH  
OD TRACEABLE TO IDMS STANDARD   
   
                                                            Performed By: #### C  
BCA, CMP, 3040-3, , 1988, 27779-8,   
26306-4, 01761-0, 43377-7 ####  
[facility] ([id number])  
[address] [place] [address]   
   
                                                    GFR/1.73 sq   
M.predicted among   
non-blacks MDRD   
(S/P/Bld) [Vol   
rate/Area]      10 mL/min/{1.73_m2} Low             >59             [facility]  
   
                                        Comment on above:   Result Comment:  
Reported eGFR is based on the  
CKD-EPI  equation that does  
not use a race coefficient.   
   
                                                            Performed By: #### C  
BCA, CMP, 3040-3, , 1988, 33772-4,   
50727-6, 10632-9, 92469-2 ####  
[facility] ([id number])  
[address] [place] [address]   
   
                      Glucose [Mass/Vol] 265 mg/dL  High       65-99      [facility]  
[facility]  
   
                                        Comment on above:   Performed By: #### C  
BCA, CMP, -3, , 1988, 28050-  
5,   
83592-9, 76266-4, 37077-6 ####  
[facility] ([id number])  
[address] [place] [address]   
   
                      Potassium [Moles/Vol] 3.7 mmol/L Normal     3.5-5.0    [facility]  
   
                                        Comment on above:   Performed By: #### C  
BCA, CMP, -3, , 1988, 10790-  
5,   
29157-5, 34027-6, 26831-4 ####  
[facility] ([id number])  
[address] [place] [address]   
   
                      Protein [Mass/Vol] 8.1 g/dL   High       6.0-8.0    [facility]  
   
                                        Comment on above:   Performed By: #### C  
BCA, CMP, 3040-3, , 1988, 29692-  
5,   
79326-2, 60899-1, 94739-7 ####  
[facility] ([id number])  
[address] [place] [address]   
   
                      Sodium [Moles/Vol] 129 mmol/L Low        134-146    [facility]  
   
                                        Comment on above:   Performed By: #### C  
BCA, CMP, 3040-3, , 1988, 92462-  
5,   
83325-6, 72190-2, 36647-3 ####  
[facility] ([id number])  
[address], [place] [address]   
   
                                                    Urea nitrogen   
[Mass/Vol]      75 mg/dL        High            5-27            [facility]  
   
                                        Comment on above:   Performed By: #### C  
MARY, KRISTIAN, 3040-3, 37808-9, -5, 24902-  
5,   
57541-6, 62154-9, 05159-6 ####  
[facility] ([id number])  
[address] [place] [address]   
   
                                                    CRP [Mass/Vol]on 03-   
   
                          C REACTIVE PROTEIN 6.4 mg/dL    High         0.000-0.7  
4  
4                                       [facility]  
   
                                        Comment on above:   Performed By: #### C  
MARY, 2857-1, KRISTIAN, 78587-8 ####  
[facility] ([id number])  
[address], [address]  
[place] [address]   
   
                                                    Fibrin D-dimer DDU (PPP) [Ma  
ss/Vol]on 03-   
   
                      D DIMER    252 ng/mL DDU Normal     <255       [facility]  
   
                                        Comment on above:   Result Comment:  
Results <255 ng/mL DDU: The presence of a  
VTE can safely be excluded with a negative  
D-Dimer result and Wells score. A negative  
result doesn't exclude the possibility of DIC.  
The test be repeated along with other  
diagnostic tests if the patient's symptoms  
persist or worsen.  
[email or web address]  
[email or web address]   
   
                                                            Performed By: #### C  
MARY, 2857-1, KRISTIAN, 08120-1 ####  
[facility] ([id number])  
[address], [address]  
[place] [address]   
   
                                                    Glucose Glucometer (BldC) [M  
ass/Vol]on 03-   
   
                      Glucose [Mass/Vol] 280 mg/dL  High       65-99      [facility]  
   
                                                    LIPASEon 03-   
   
                                                    Lipase [Catalytic   
activity/Vol]   66 U/L          High            17-40           [facility]  
   
                                        Comment on above:   Performed By: #### C  
MARY, 2857-1, CMP, 62415-3 ####  
[facility] ([id number])  
[address], [address]  
[place] [address]   
   
                                                    Lactate (P dara) [Moles/Vol]o  
n 03-   
   
                      Lactate [Moles/Vol] 2.4 mmol/L High       0.4-2.0    [facility]  
   
                                        Comment on above:   Performed By: #### RAPHAEL HERNANDEZ, 2857-1, CMP, 26468-0 ####  
[facility] ([id number])  
[address], [address]  
[place] [address]   
   
                      LACTATE W/REFLEX 2.7 mmol/L High       0.4-2.0    [facility]  
   
                                        Comment on above:   Performed By: #### RAPHAEL HERNANDEZ, 2857-1, CMP, 17851-9 ####  
[facility] ([id number])  
[address], [address]  
[place] [address]   
   
                                                    MAGNESIUMon 03-   
   
                      Magnesium [Mass/Vol] 2.1 mg/dL  Normal     1.8-2.6    [facility]  
   
                                        Comment on above:   Performed By: #### RAPHAEL HERNANDEZ, 2857-1, CMP, 85677-9 ####  
[facility] ([id number])  
[address], [address]  
[place] [address]   
   
                                                    Natriuretic peptide B [Mass/  
Vol]on 03-   
   
                                                    Natriuretic peptide B   
(Bld) [Mass/Vol] 116 pg/mL       High            <100.0          [facility]  
   
                                        Comment on above:   Performed By: #### RAPHAEL HERNANDEZ, 2857-1, CMP, 49462-5 ####  
[facility] ([id number])  
[address], [address]  
[place] [address]   
   
                                                    Procalcitonin IA [Mass/Vol]o  
n 03-   
   
                      PROCALCITONIN 1.33 ng/mL High       <0.05      [facility]  
   
                                        Comment on above:   Result Comment: NOTE  
<0.50 ng/mL - Low risk of severe sepsis and/or septic shock.  
<2.00 ng/mL - Recommend retesting within 6-24 hours.  
>2.00 ng/mL - High risk of sepsis and/or septic shock.   
   
                                                            Performed By: #### C  
BCA, 2857-1, [facility], 46217-5 ####  
[facility] ([id number])  
[address], [address]  
[place] [address]   
   
                                                    SARS/FLU A+B/RSV by NAAT/Mol  
ecularon 03-   
   
                                                    SARS/FLU A+B/RSV by   
NAAT/Molecular                          FLU A PCR  
Negative (qualifier value)  
FLU B PCR  
Negative (qualifier value)  
RSV by PCR  
Negative (qualifier value)  
SARS CoV 2  
Not detected (qualifier   
value)  
NOTE  
The Xpert Xpress   
SARS-CoV-2/Flu/RSV Plus   
test is a rapid,   
multiplexed  
real-time RT-PCR test   
intended for the   
simultaneous qualitative  
detection and   
differentiation of   
SARS-CoV-2, influenza A,   
influenza B  
and respiratory syncytial   
virus (RSV) viral RNA from   
individuals  
suspected of respiratory   
viral infection consistent   
with COVID-19 by  
their healthcare provider.   
This test has not been   
validated in  
asymptomatic patients. The   
Xpert Xpress SARS-CoV-2   
test is intended  
for use by qualified and   
trained operators who are   
performing tests  
using either [employer]   
or [employer]   
systems and is  
limited to laboratories   
that meet the CLIA   
requirements to perform  
high and moderate   
complexity tests.  
The Xpert Xpress   
SARS-CoV-2/Flu/RSV Plus is   
only for use under the  
Food and Drug   
Administration's Emergency   
Use Authorization.  
Results are for the   
simultaneous detection and   
differentiation of  
SARS-CoV-2, influenza A,   
influenza B and RSV   
nucleic acids in  
clinical specimens.   
SARS-CoV-2, influenza A,   
influenza B and RSV RNA  
identified by this test   
are generally detectable   
in upper respiratory  
samples during the acute   
phase of infection.   
Positive results are  
indicative of the presence   
of the identified virus,   
but do not rule  
out bacterial infection or   
co-infection with other   
pathogens not  
detected by this test.  
Clinical correlation with   
patient history and other   
diagnostic  
information is necessary   
to determine patient   
infection status. The  
agent detected may not be   
the definite cause of   
disease.  
Negative results do not   
preclude SARS-CoV-2,   
influenza A, influenza B  
and RSV infection and   
should not be used as the   
sole basis for  
treatment or other patient   
management decisions.   
Negative results  
must be combined with   
clinical observations,   
patient history and  
epidemiological   
information. An Invalid   
result may occur with  
specimen-associated   
inhibition unable to be   
resolved with specimen  
repeat.  
Fact Sheet for Healthcare   
Providers:  
https://www.fda.gov/media/  
105167/download  
Fact Sheet for Patients:  
https://www.fda.gov/media/  
807348/download     Normal                                  [facility]  
   
                                        Comment on above:   Performed By: #### C  
BCA, 2857-1, CMP, 61100-8 ####  
[facility] ([id number])  
[address], [address]  
[place] [address]   
   
                                                    TROPONIN Ion 03-   
   
                                                    Troponin I.cardiac   
[Mass/Vol]      0.03 ng/mL      Normal          0.00-0.04       [facility]  
   
                                        Comment on above:   Performed By: #### RAPHAEL  
BCA, 2857-1, CMP, 06593-4 ####  
[facility] ([id number])  
[address] [address]   
   
                                                    Troponin I.cardiac   
[Mass/Vol]      0.05 ng/mL      High            0.00-0.04       [facility]  
   
                                        Comment on above:   Result Comment:  
Concentrations greater than or equal  
to 0.05 ng/ml are considered elevated.  
Elevations of Troponin may be due to causes  
other than myocardial ischemia. Recommend  
serial Troponin testing be performed.  
---------------------------------------------   
   
                                                            Performed By: #### C  
BCA, 2857-1, CMP, 43421-0 ####  
[facility] ([id number])  
[address]  
[place] [address]   
   
                                                    URN MACROSCOPIC NURon 03-15-  
2024   
   
                      BILIRUBIN SARAH Negative   Normal     NEG        [facility]  
   
                                        Comment on above:   Performed By: #### C  
BCA, 2857-1, CMP, 73258-7 ####  
[facility] ([id number])  
[address], [address]  
[place] [address]   
   
                      BLOOD/HGB SARAH MODERATE   Abnormal   NEG        [facility]  
   
                                        Comment on above:   Performed By: #### C  
BCA, 2857-1, CMP, 89403-3 ####  
[facility] ([id number])  
[address], [address]  
[place] [address]   
   
                      GLUCOSE SARAH 500 mg/dL  Abnormal   NEG        [facility]  
   
                                        Comment on above:   Performed By: #### C  
BCA, 2857-1, CMP, 60119-1 ####  
[facility] ([id number])  
[address], [address]  
[place] [address]   
   
                      KETONES SARAH Negative   Normal     NEG        [facility]  
   
                                        Comment on above:   Performed By: #### C  
BCA, 2857-1, CMP, 98013-9 ####  
[facility] ([id number])  
[address], [address]  
[place] [address]   
   
                      LEUKOCYTE ESTERASE SARAH Small      Abnormal   NEG        Pr  
[facility]  
   
                                        Comment on above:   Performed By: #### C  
BCA, 2857-1, CMP, 66396-6 ####  
[facility] ([id number])  
[address], [address]  
[place] [address]   
   
                      NITRITE SARAH Negative   Normal     NEG        [facility]  
   
                                        Comment on above:   Performed By: #### C  
BCA, 2857-1, CMP, 05355-6 ####  
[facility] ([id number])  
[address], [address]  
[place] [address]   
   
                      PH SARAH     5.5        Normal     5.0-8.5    [facility]  
   
                                        Comment on above:   Performed By: #### C  
BCA, 2857-1, CMP, 49437-6 ####  
[facility] ([id number])  
[address], [address]  
[place] [address]   
   
                      PROTEIN SARAH >=300      Abnormal   NEG        [facility]  
   
                                        Comment on above:   Performed By: #### C  
BCA, 2857-1, CMP, 98272-1 ####  
[facility] ([id number])  
[address], [address]  
[place] [address]   
   
                          SPECIFIC GRAVITY SARAH 1.020        Normal       1.003-1  
.03  
5                                       [facility]  
   
                                        Comment on above:   Performed By: #### C  
BCA, 2857-1, CMP, 91663-2 ####  
[facility] ([id number])  
[address], [address]  
[place] [address]   
   
                      UROBILINOGEN SARAH 0.2 eu/dL  Normal     <1.1       [facility]  
   
                                        Comment on above:   Performed By: #### C  
BCA, 2857-1, CMP, 86290-6 ####  
[facility] ([id number])  
 [address]  
[place] [address]   
   
                                                    GGTon 2024   
   
                                                    Gamma glutamyl   
transferase [Catalytic   
activity/Vol]   367 U/L         High            9-64            [facility]  
   
                                        Comment on above:   Performed By: #### 2  
324-2, LIVR ####  
[facility] ([id number])  
 [address]  
[place] [address]   
   
                                                    LIVER PANELon 2024   
   
                      Albumin [Mass/Vol] 4.1 g/dL   Normal     3.2-5.3    [facility]  
   
                                        Comment on above:   Performed By: #### 2  
324-2, LIVR ####  
[facility] ([id number])  
[address]  
[place] [address]   
   
                                                    ALP [Catalytic   
activity/Vol]   238 U/L         High                      [facility]  
   
                                        Comment on above:   Performed By: #### 2  
324-2, LIVR ####  
[facility] ([id number])  
[address], [address]  
[place] [address]   
   
                                                    ALT [Catalytic   
activity/Vol]   182 U/L         High            0-40            [facility]  
   
                                        Comment on above:   Performed By: #### 2  
324-2, LIVR ####  
[facility] ([id number])  
[address], [address]  
[place] [address]   
   
                                                    AST [Catalytic   
activity/Vol]   106 U/L         High            0-41            [facility]  
   
                                        Comment on above:   Performed By: #### 2  
324-2, LIVR ####  
[facility] ([id number])  
[address]  
[place] [address]   
   
                      Bilirubin [Mass/Vol] 0.4 mg/dL  Normal     0.3-1.2    [facility]  
   
                                        Comment on above:   Performed By: #### 2  
324-2, LIVR ####  
[facility] ([id number])  
[address]  
[place] [address]   
   
                                                    Bilirubin.direct   
[Mass/Vol]      0.1 mg/dL       Normal          0.0-0.4         [facility]  
   
                                        Comment on above:   Performed By: #### 2  
324-2, LIVR ####  
[facility] ([id number])  
[address]  
[place] [address]   
   
                      Protein [Mass/Vol] 7.6 g/dL   Normal     6.0-8.0    [facility]  
   
                                        Comment on above:   Performed By: #### 2  
324-2, LIVR ####  
[facility] ([id number])  
[address]  
[place] [address]   
   
                                                    CBC AND AUTO DIFFon 20  
24   
   
                      ABSOLUTE BASOPHIL 0.1 X10E9/L Normal     0.0-0.2    [facility]  
   
                                        Comment on above:   Performed By: #### C  
BCA, 2857-1, CMP, 72115-0 ####  
[facility] ([id number])  
[address]  
[place] [address]   
   
                      ABSOLUTE NEUTROPHIL 6.5 X10E9/L Normal     1.5-6.6    [facility]  
   
                                        Comment on above:   Performed By: #### C  
BCA, 2857-1, CMP, 19917-5 ####  
[facility] ([id number])  
[address]  
[place] [address]   
   
                                                    Basophils/100 WBC   
(Bld)           0.9 %           Normal                          [facility]  
   
                                        Comment on above:   Performed By: #### C  
BCA, 2857-1, CMP, 19813-7 ####  
[facility] ([id number])  
[address], [address]  
[place] [address]   
   
                                                    Eosinophils (Bld)   
[#/Vol]         1.0 10*3/uL     High            0.0-0.4         [facility]  
   
                                        Comment on above:   Performed By: #### RAPHAEL HERNANDEZ, 2857-1, CMP, 48264-6 ####  
[facility] ([id number])  
[address]  
[place] [address]   
   
                                                    Eosinophils/100 WBC   
(Bld)           9.9 %           Normal                          [facility]  
   
                                        Comment on above:   Performed By: #### RAPHAEL HERNANDEZ, 2857-1, CMP, 28181-8 ####  
[facility] ([id number])  
[address]  
[place] [address]   
   
                                                    Erythrocyte   
distribution width   
(RBC) [Ratio]   13.9 %          Normal          11.5-15.0       [facility]  
   
                                        Comment on above:   Performed By: #### RAPHAEL HERNANDEZ, 2857-1, CMP, 82731-0 ####  
[facility] ([id number])  
[address]  
[place] [address]   
   
                                                    Hematocrit (Bld)   
[Volume fraction] 36.0 %          Low             39-49           [facility]  
   
                                        Comment on above:   Performed By: #### RAPHAEL  
BCA, 2857-1, CMP, 80965-6 ####  
[facility] ([id number])  
[address]  
[place] [address]   
   
                                                    Hemoglobin (Bld)   
[Mass/Vol]      12.4 g/dL       Low             13.0-17.0       [facility]  
   
                                        Comment on above:   Performed By: #### RPAHAEL  
BCA, 2857-1, CMP, 14426-5 ####  
[facility] ([id number])  
[address]  
[place] [address]   
   
                                                    Lymphocytes (Bld)   
[#/Vol]         1.5 10*3/uL     Normal          1.0-3.5         [facility]  
   
                                        Comment on above:   Performed By: #### C  
BCA, 2857-1, CMP, 89098-4 ####  
[facility] ([id number])  
[address]  
[place] [address]   
   
                                                    Lymphocytes/100 WBC   
(Bld)           16.1 %          Normal                          [facility]  
   
                                        Comment on above:   Performed By: #### C  
BCA, 2857-1, CMP, 77933-6 ####  
[facility] ([id number])  
[address]  
[place] [address]   
   
                                                    MCH (RBC) [Entitic   
mass]           31.0 pg         Normal          27-34           [facility]  
   
                                        Comment on above:   Performed By: #### RAPHAEL HERNANDEZ, 2857-1, CMP, 52689-4 ####  
[facility] ([id number])  
[address]  
[place] [address]   
   
                      MCHC (RBC) [Mass/Vol] 34.4 g/dL  Normal     32-36      [facility]  
   
                                        Comment on above:   Performed By: #### RAPHAEL  
BCA, 2857-1, CMP, 15362-0 ####  
[facility] ([id number])  
[address] [address]   
   
                                                    MCV (RBC) [Entitic   
vol]            90 fL           Normal                    [facility]  
   
                                        Comment on above:   Performed By: #### RAPHAEL  
BCA, 2857-1, CMP, 40783-1 ####  
[facility] ([id number])  
[address]  
[place] [address]   
   
                                                    Monocytes (Bld)   
[#/Vol]         0.6 10*3/uL     Normal          0-0.9           [facility]  
   
                                        Comment on above:   Performed By: #### RAPHAEL  
BCA, 2857-1, CMP, 97721-9 ####  
[facility] ([id number])  
[address] [place] [address]   
   
                                                    Monocytes/100 WBC   
(Bld)           6.0 %           Normal                          [facility]  
   
                                        Comment on above:   Performed By: #### RAPHAEL  
BCA, 2857-1, CMP, 47600-2 ####  
[facility] ([id number])  
[address], [address]  
[place] [address]   
   
                                                    Neutrophils/100 WBC   
(Bld)           67.1 %          Normal                          [facility]  
   
                                        Comment on above:   Performed By: #### RAPHAEL  
BCA, 2857-1, CMP, 34235-4 ####  
[facility] ([id number])  
[address], [address]  
[place] [address]   
   
                                                    Platelet mean volume   
(Bld) [Entitic vol] 9.0 fL          Normal          7-12            [facility]  
   
                                        Comment on above:   Performed By: #### RAPHAEL HENRANDEZ, 2857-1, CMP, 79608-0 ####  
[facility] ([id number])  
[address], [address]  
[place] [address]   
   
                                                    Platelets (Bld)   
[#/Vol]         216 10*3/uL     Normal          150-450         [facility]  
   
                                        Comment on above:   Performed By: #### RAPHAEL  
BCA, 2857-1, CMP, 45245-7 ####  
[facility] ([id number])  
[address], [address]  
[place] [address]   
   
                      RBC COUNT  3.99 X10E12/L Low        4.10-5.70  [facility]  
   
                                        Comment on above:   Performed By: #### RAPHAEL  
BCA, 2857-1, CMP, 47228-2 ####  
[facility] ([id number])  
[address], [address]  
[place] [address]   
   
                      WBC (Bld) [#/Vol] 9.6 10*3/uL Normal     4.0-11.0   [facility]  
   
                                        Comment on above:   Performed By: #### RAPHAEL  
BCA, 2857-1, CMP, 55593-0 ####  
[facility] ([id number])  
[address], [address]  
[place] [address]   
   
                                                    COMPREHENSIVE METABOLIC PANE  
Julio 2024   
   
                      Albumin [Mass/Vol] 4.1 g/dL   Normal     3.2-5.3    [facility]  
   
                                        Comment on above:   Performed By: #### C  
BCA, 2857-1, CMP, 34186-7 ####  
[facility] ([id number])  
[address], [address]  
[place] [address]   
   
                                                    ALP [Catalytic   
activity/Vol]   208 U/L         High                      [facility]  
   
                                        Comment on above:   Performed By: #### C  
BCA, 2857-1, CMP, 13950-4 ####  
[facility] ([id number])  
[address], [address]  
[place] [address]   
   
                                                    ALT [Catalytic   
activity/Vol]   65 U/L          High            0-40            [facility]  
   
                                        Comment on above:   Performed By: #### C  
BCA, 2857-1, CMP, 69823-0 ####  
[facility] ([id number])  
[address], [address]  
[place] [address]   
   
                      Anion gap [Moles/Vol] 16 mmol/L  High       5-15       [facility]  
   
                                        Comment on above:   Performed By: #### C  
BCA, 2857-1, CMP, 22581-5 ####  
[facility] ([id number])  
[address], [address]  
[place] [address]   
   
                                                    AST [Catalytic   
activity/Vol]   36 U/L          Normal          0-41            [facility]  
   
                                        Comment on above:   Performed By: #### C  
BCA, 2857-1, CMP, 26920-7 ####  
[facility] ([id number])  
[address], [address]  
[place] [address]   
   
                      Bilirubin [Mass/Vol] 0.5 mg/dL  Normal     0.3-1.2    [facility]  
   
                                        Comment on above:   Performed By: #### C  
BCA, 2857-1, CMP, 81429-5 ####  
[facility] ([id number])  
[address], [address]  
[place] [address]   
   
                      Calcium [Mass/Vol] 9.1 mg/dL  Normal     8.5-10.5   [facility]  
   
                                        Comment on above:   Performed By: #### C  
BCA, 2857-1, CMP, 11095-0 ####  
[facility] ([id number])  
[address]  
[place] [address]   
   
                      Chloride [Moles/Vol] 102 mmol/L Normal          [facility]  
   
                                        Comment on above:   Performed By: #### C  
BCA, 2857-1, CMP, 70216-7 ####  
[facility] ([id number])  
[address]  
[place] [address]   
   
                      CO2 [Moles/Vol] 23 mmol/L  Normal     22-32      [facility]  
   
                                        Comment on above:   Performed By: #### C  
BCA, 2857-1, CMP, 58289-8 ####  
[facility] ([id number])  
[address], [address]  
[place] [address]   
   
                      Creatinine [Mass/Vol] 4.47 mg/dL High       0.60-1.30  [facility]  
   
                                        Comment on above:   Result Comment: METH  
OD TRACEABLE TO IDMS STANDARD   
   
                                                            Performed By: #### C  
BCA, 2857-1, CMP, 20111-7 ####  
[facility] ([id number])  
[address]  
[place] [address]   
   
                                                    GFR/1.73 sq   
M.predicted among   
non-blacks MDRD   
(S/P/Bld) [Vol   
rate/Area]      13 mL/min/{1.73_m2} Low             >59             [facility]  
   
                                        Comment on above:   Result Comment:  
Reported eGFR is based on the  
CKD-EPI  equation that does  
not use a race coefficient.   
   
                                                            Performed By: #### C  
BCA, 2857-1, CMP, 60734-7 ####  
[facility] ([id number])  
[address]  
[place] [address]   
   
                      Glucose [Mass/Vol] 140 mg/dL  High       65-99      [facility]  
   
                                        Comment on above:   Performed By: #### C  
BCA, 2857-1, CMP, 20164-2 ####  
[facility] ([id number])  
[address]  
[place] [address]   
   
                      Potassium [Moles/Vol] 4.1 mmol/L Normal     3.5-5.0    [facility]  
   
                                        Comment on above:   Performed By: #### C  
BCA, 2857-1, CMP, 33685-4 ####  
[facility] ([id number])  
[address]  
[place] [address]   
   
                      Protein [Mass/Vol] 7.1 g/dL   Normal     6.0-8.0    [facility]  
   
                                        Comment on above:   Performed By: #### C  
BCA, 2857-1, CMP, 24787-0 ####  
[facility] ([id number])  
[address]  
[place] [address]   
   
                      Sodium [Moles/Vol] 141 mmol/L Normal     134-146    [facility]  
   
                                        Comment on above:   Performed By: #### C  
BCA, 2857-1, CMP, 61020-2 ####  
[facility] ([id number])  
[address]  
[place] [address]   
   
                                                    Urea nitrogen   
[Mass/Vol]      45 mg/dL        High            5-27            [facility]  
   
                                        Comment on above:   Performed By: #### C  
BCA, 2857-1, CMP, 50706-0 ####  
[facility] ([id number])  
[address]  
[place] [address]   
   
                                                    HGB A1C (GLYCO-HGB)on 2024   
   
                      Glucose [Mass/Vol] 197 mg/dL  Normal                [facility]  
   
                                        Comment on above:   Performed By: #### C  
BCA, 2857-1, CMP, 42920-7 ####  
[facility] ([id number])  
[address]  
[place] [address]   
   
                                                    HbA1c (Bld) [Mass   
fraction]       8.5 %           High            4.4-5.6         [facility]  
   
                                        Comment on above:   Result Comment: NOTE  
ADA Guidelines  
Result HgbA1c  
------------ ---------------  
Normal : less than 5.7 %  
Prediabetes : 5.7 % to 6.4 %  
Diabetes : > 6.4 %  
Use with caution in patients with abnormal hemoglobin variants   
as  
the half-life of red blood cells and in vivo glycation rates   
are  
affected.   
   
                                                            Performed By: #### RAPHAEL HERNANDEZ, 2857-1, CMP, 22420-5 ####  
[facility] ([id number])  
[address]  
[place] [address]   
   
                                                    Lipid 1996 panelon   
4   
   
                      Cholesterol [Mass/Vol] 147 mg/dL  Low        150-200    Pr  
[facility]  
   
                                        Comment on above:   Performed By: #### RAPHAEL HERNANDEZ, 2857-1, KRISTIAN, 97811-1 ####  
[facility] ([id number])  
[address]  
[place] [address]   
   
                                                    Cholesterol in HDL   
[Mass/Vol]      43 mg/dL        Normal          >39             [facility]  
   
                                        Comment on above:   Result Comment:  
HDL <40 mg/dL - High Risk  
HDL > or = 40mg/dL- Desirable  
HDL >60 mg/dL - Negative Risk  
---------------------------------------------   
   
                                                            Performed By: #### RAPHAEL  
BCA, 2857-1, CMP, 97391-9 ####  
[facility] ([id number])  
[address]  
[place] [address]   
   
                                                    Cholesterol in LDL   
[Mass/Vol]      48 mg/dL        Normal          <130            [facility]  
   
                                        Comment on above:   Result Comment:  
LDL <100 mg/dL - Desirable  
LDL >160 mg/dL - High Risk  
---------------------------------------------   
   
                                                            Performed By: #### C  
BCA, 2857-1, CMP, 03924-8 ####  
[facility] ([id number])  
[address]  
[place] [address]   
   
                                                    Cholesterol in VLDL   
[Mass/Vol]      56 mg/dL        High            0-30            [facility]  
   
                                        Comment on above:   Performed By: #### C  
BCA, 2857-1, CMP, 02788-1 ####  
[facility] ([id number])  
[address] [address]   
   
                      CHOLESTEROL:HDL 3.4        Normal     1.0-5.0    [facility]  
   
                                        Comment on above:   Performed By: #### RAPHAEL  
BCA, 2857-1, CMP, 50733-6 ####  
[facility] ([id number])  
[address] [address]   
   
                                                    Triglyceride   
[Mass/Vol]      278 mg/dL       High                      [facility]  
   
                                        Comment on above:   Performed By: #### C  
BCA, 2857-1, CMP, 94920-2 ####  
[facility] ([id number])  
[address], [address] [address]   
   
                                                    Prostate specific Ag [Mass/V  
ol]on 2024   
   
                      PSA SCREEN 0.98 ng/mL Normal     0.00-4.00  [facility]  
   
                                        Comment on above:   Result Comment:  
The method used for this test is Liliane  
Nataly DXI chemiluminescent immunoassay.  
Values obtained by different assay methods  
cannot be used interchangeably.   
   
                                                            Performed By: #### C  
BCA, 2857-1, CMP, 44003-9 ####  
[facility] ([id number])  
[address] [address]   
   
                                                    Basophils Auto (Bld) [#/Vol]  
Ordered By: Cristian Raymond on 2023   
   
                                                    Basophils (Bld)   
[#/Vol]         0.1 10*3/uL                     0.0-0.2         [facility]  
   
                                                    Basophils/100 WBC Auto (Bld)  
Ordered By: Cristian Raymond on 2023   
   
                                                    Basophils/100 WBC   
(Bld)           0.8 %                           .               [facility]  
   
                                                    Calcium [Mass/volume] in Ser  
um or PlasmaOrdered By: ROXANNA CARR on 2023   
   
                      Calcium [Mass/Vol] 8.9 mg/dL             8.6-10.3   [facility]  
   
                                                    Carbon dioxide, total [Moles  
/volume] in Serum or PlasmaOrdered By: ROXANNA CARR  
 on   
2023   
   
                      CO2 [Moles/Vol] 21.0 mmol/L            21.0-31.0  [facility]  
   
                                                    Chloride [Moles/volume] in S  
israel or PlasmaOrdered By: ROXANNA CARR on   
2023   
   
                      Chloride [Moles/Vol] 107 mmol/L                 [facility]  
   
                                                    Creatinine [Mass/volume] in   
Serum or PlasmaOrdered By: ROXANNA CARR on   
2023   
   
                      Creatinine [Mass/Vol] 4.71 mg/dL            0.70-1.30  [place]  
   
                                                    Eosinophils Auto (Bld) [#/Vo  
l]Ordered By: Cristian Raymond on 2023   
   
                                                    Eosinophils (Bld)   
[#/Vol]         1.2 10*3/uL                     0.0-0.45        [facility]  
   
                                                    Eosinophils/100 WBC Auto (Bl  
d)Ordered By: Cristian Raymond on 2023   
   
                                                    Eosinophils/100 WBC   
(Bld)           12.5 %                          .               [facility]  
   
                                                    Erythrocyte distribution wid  
th Auto (RBC) [Ratio]Ordered By: Cristian Raymond on   
2023   
   
                                                    Erythrocyte   
distribution width   
(RBC) [Ratio]   13.4 %                          12.0-14.8       [facility]  
   
                                                    Glucose Glucometer (BldC) [M  
ass/Vol]Ordered By: Guicho Bronson on 2023   
   
                      Glucose [Mass/Vol] 143 mg/dL                        [facility]  
   
                                        Comment on above:   Random Glucose Refer  
ence Range is dependent on time and   
content   
of last meal. Glucose of more than 200 mg/dL in a nonstressed,   
ambulatory subject supports the diagnosis of Diabetes Mellitus.   
   
                                                    Glucose [Mass/volume] in Ser  
um or PlasmaOrdered By: ROXANNA CARR on 2023   
   
                      Glucose [Mass/Vol] 130 mg/dL                  [facility]  
   
                                        Comment on above:   ADA recommended refe  
rence rangeRandom Glucose Reference Range   
is dependent on time and content of last meal. Glucose of more   
than 200 mg/dL in a nonstressed, ambulatory subject supports   
the diagnosis of Diabetes Mellitus.   
   
                                                    Hematocrit Auto (Bld) [Volum  
e fraction]Ordered By: Cristian Raymond on 2023  
  
   
                                                    Hematocrit (Bld)   
[Volume fraction] 27.6 %                          38.8-50.0       [facility]  
   
                                                    Hemoglobin [Mass/volume] in   
BloodOrdered By: Cristian Raymond on 2023   
   
                                                    Hemoglobin (Bld)   
[Mass/Vol]      9.4 g/dL                        13.0-17.0       [facility]  
   
                                                    Leukocytes [#/volume] correc  
rafaela for nucleated erythrocytes in Blood by Automated  
   
counOrdered By: Cristian Raymond on 2023   
   
                                                    WBC corrected for nucl   
RBC Auto (Bld) [#/Vol] 9.9 10*3/uL                     4.1-10.5        [facility]  
   
                                                    Lymphocytes Auto (Bld) [#/Vo  
l]Ordered By: Cristian Raymond on 2023   
   
                                                    Lymphocytes (Bld)   
[#/Vol]         1.7 10*3/uL                     1.00-4.8        [facility]  
   
                                                    Lymphocytes/100 WBC Auto (Bl  
d)Ordered By: Cristian Raymond on 2023   
   
                                                    Lymphocytes/100 WBC   
(Bld)           17.6 %                          .               [facility]  
   
                                                    MCH Auto (RBC) [Entitic mass  
]Ordered By: Cristian Raymond on 2023   
   
                                                    MCH (RBC) [Entitic   
mass]           32.6 pg                         27.5-35.2       [facility]  
   
                                                    MCHC Auto (RBC) [Mass/Vol]Or  
dered By: Cristian Raymond on 2023   
   
                      MCHC (RBC) [Mass/Vol] 33.9 g/dL             32.5-35.6  [facility]  
   
                                                    MCV Auto (RBC) [Entitic vol]  
Ordered By: Cristian Raymond on 2023   
   
                                                    MCV (RBC) [Entitic   
vol]            96.1 fL                         83.5-101        [facility]  
   
                                                    Monocytes Auto (Bld) [#/Vol]  
Ordered By: Cristian Raymond on 2023   
   
                                                    Monocytes (Bld)   
[#/Vol]         0.7 10*3/uL                     0.0-0.8         [facility]  
   
                                                    Monocytes/100 WBC Auto (Bld)  
Ordered By: Cristian Raymond on 2023   
   
                                                    Monocytes/100 WBC   
(Bld)           6.8 %                           .               [facility]  
   
                                                    Neutrophils Auto (Bld) [#/Vo  
l]Ordered By: Cristian Raymond on 2023   
   
                                                    Neutrophils (Bld)   
[#/Vol]         6.2 10*3/uL                     1.8-7.7         [facility]  
   
                                                    Neutrophils/100 WBC Auto (Bl  
d)Ordered By: Cristian Raymond on 2023   
   
                                                    Neutrophils/100 WBC   
(Bld)           62.3 %                          .               [facility]  
   
                                                    No Panel InformationOrdered   
By: ROXANNA CARR on 2023   
   
                                                    Estimated GFR   
(CKD-EPI)       12.376 mL/Min                                   [facility]  
   
                                                    Pharmacy Creatinine   
Clearance (Chem 16.59                                           [facility]  
   
                                                    No Panel InformationOrdered   
By: Guicho Bronson on 2023   
   
                                                    Bedside Glucose   
Comment         Glu2: cleaned meter                                 [facility]  
   
                                                    Nucleated erythrocytes [Pres  
ence] in Blood by Automated countOrdered By: Cristian Raymond on 2023   
   
                                                    Nucleated RBC Auto Ql   
(Bld)           0.0 /100{WBC}                   0-0.5           [facility]  
   
                                                    Platelet mean volume Auto (B  
ld) [Entitic vol]Ordered By: Cristian Raymond on   
2023   
   
                                                    Platelet mean volume   
(Bld) [Entitic vol] 8.2 fL                          6.6-10.1        [facility]  
   
                                                    Platelets Auto (Bld) [#/Vol]  
Ordered By: Cristian Raymond on 2023   
   
                                                    Platelets (Bld)   
[#/Vol]         239 10*3/uL                     150-450         [facility]  
   
                                                    Potassium [Moles/volume] in   
Serum or PlasmaOrdered By: ROXANNA CARR on   
2023   
   
                      Potassium [Moles/Vol] 4.2 mmol/L            3.5-5.1    [facility]  
   
                                                    RBC Auto (Bld) [#/Vol]Ordere  
d By: Cristian Raymond on 2023   
   
                      RBC (Bld) [#/Vol] 2.88 10*6/uL            3.90-5.60  [facility]  
   
                                                    Serum or plasma anion gap de  
terminationOrdered By: ROXANNA CARR on 2023   
   
                      Anion gap [Moles/Vol] 16.2 mmol/L            6.0-15.0   [facility]  
   
                                                    Sodium [Moles/volume] in Ser  
um or PlasmaOrdered By: ROXANNA CARR on 2023   
   
                      Sodium [Moles/Vol] 140 mmol/L            136-145    [facility]  
   
                                                    Urea nitrogen [Mass/volume]   
in Serum or PlasmaOrdered By: ROXANNA CARR on   
2023   
   
                                                    Urea nitrogen   
[Mass/Vol]      53 mg/dL                        7-25            [facility] [facility]  
   
                                                    WBC Auto (Bld) [#/Vol]Ordere  
d By: Cristian Raymond on 2023   
   
                      WBC (Bld) [#/Vol] 9.9 10*3/uL            4.1-10.5   [facility]  
   
                                                    Basophils Auto (Bld) [#/Vol]  
Ordered By: Cristian Raymond on 2022   
   
                                                    Basophils (Bld)   
[#/Vol]         0.1 10*3/uL                     0.0-0.2         [facility]  
   
                                                    Basophils/100 WBC Auto (Bld)  
Ordered By: Cristian Raymond on 2022   
   
                                                    Basophils/100 WBC   
(Bld)           1.0 %                           .               [facility]  
   
                                                    Blood hemoglobin measurement  
 (mass/volume)Ordered By: Cristian Raymond on   
2022   
   
                                                    Hemoglobin (Bld)   
[Mass/Vol]      10.2 g/dL                       13.0-17.0       [facility]  
   
                                                    Blood leukocytes automated c  
ount (number/volume)Ordered By: Cristian Raymond on   
2022   
   
                      WBC (Bld) [#/Vol] 12.2 10*3/uL            4.5-11.0   [facility]  
   
                                                    Creatinine and Glomerular fi  
ltration rate.predicted panel (S/P/Bld)Ordered By:   
Cristian Raymond on 2022   
   
                      Creatinine [Mass/Vol] 4.36 mg/dL            0.64-1.27  [place]  
   
                                                    Eosinophils Auto (Bld) [#/Vo  
l]Ordered By: Cristian Raymond on 2022   
   
                                                    Eosinophils (Bld)   
[#/Vol]         0.9 10*3/uL                     0.0-0.45        [facility]  
   
                                                    Eosinophils/100 WBC Auto (Bl  
d)Ordered By: Cristian Raymond on 2022   
   
                                                    Eosinophils/100 WBC   
(Bld)           7.4 %                           .               [facility]  
   
                                                    Erythrocyte distribution wid  
th Auto (RBC) [Ratio]Ordered By: Cristian Raymond on   
2022   
   
                                                    Erythrocyte   
distribution width   
(RBC) [Ratio]   12.3 %                          12.0-14.8       [facility]  
   
                                                    Estimated glomerular filtrat  
ion rate (GFR) non- AmericanOrdered By:   
Cristian Raymond on 2022   
   
                                                    GFR/1.73 sq   
M.predicted among   
non-blacks MDRD   
(S/P/Bld) [Vol   
rate/Area]      13 mL/Min                                       [facility]  
   
                                                    Glucose Glucometer (BldC) [M  
ass/Vol]Ordered By: Ronald Sánchez on 2022  
   
   
                      Glucose [Mass/Vol] 182 mg/dL                        [facility]  
   
                                        Comment on above:   Random Glucose Refer  
ence Range is dependent on time and   
content   
of last meal. Glucose of more than 200 mg/dL in a nonstressed,   
ambulatory subject supports the diagnosis of Diabetes Mellitus.   
   
                                                    Hematocrit Auto (Bld) [Volum  
e fraction]Ordered By: Cristian Raymond on 2022  
  
   
                                                    Hematocrit (Bld)   
[Volume fraction] 29.3 %                          38.8-50.0       [facility]  
   
                                                    Laboratory - Hematology and   
Cell countsOrdered By: Cristian Raymond on 2022  
  
   
                                                    Nucleated RBC/100 WBC   
(Bld) [Ratio]   0.0 %                           0-0.5           [facility]  
   
                                                    Lymphocytes Auto (Bld) [#/Vo  
l]Ordered By: Cristian Raymond on 2022   
   
                                                    Lymphocytes (Bld)   
[#/Vol]         1.7 10*3/uL                     1.00-4.8        [facility]  
   
                                                    Lymphocytes/100 WBC Auto (Bl  
d)Ordered By: Cristian Raymond on 2022   
   
                                                    Lymphocytes/100 WBC   
(Bld)           13.8 %                          .               [facility]  
   
                                                    MCH Auto (RBC) [Entitic mass  
]Ordered By: Cristian Raymond on 2022   
   
                                                    MCH (RBC) [Entitic   
mass]           31.5 pg                         27.5-35.2       [facility]  
   
                                                    MCHC Auto (RBC) [Mass/Vol]Or  
dered By: Cristian Raymond on 2022   
   
                      MCHC (RBC) [Mass/Vol] 34.7 g/dL             32.5-35.6  [place]  
   
                                                    MCV Auto (RBC) [Entitic vol]  
Ordered By: Cristian Raymond on 2022   
   
                                                    MCV (RBC) [Entitic   
vol]            90.9 fL                         83.5-101        [facility]  
   
                                                    Monocytes Auto (Bld) [#/Vol]  
Ordered By: Cristian Raymond on 2022   
   
                                                    Monocytes (Bld)   
[#/Vol]         0.7 10*3/uL                     0.0-0.8         [facility]  
   
                                                    Monocytes/100 WBC Auto (Bld)  
Ordered By: Cristian Raymond on 2022   
   
                                                    Monocytes/100 WBC   
(Bld)           6.1 %                           .               [facility]  
   
                                                    Neutrophils Auto (Bld) [#/Vo  
l]Ordered By: Cristian Raymond on 2022   
   
                                                    Neutrophils (Bld)   
[#/Vol]         8.8 10*3/uL                     1.8-7.7         [facility]  
   
                                                    Neutrophils/100 WBC Auto (Bl  
d)Ordered By: Cristian Raymond on 2022   
   
                                                    Neutrophils/100 WBC   
(Bld)           71.7 %                          .               [facility]  
   
                                                    No Panel InformationOrdered   
By: Ronald Sánchez on 2022   
   
                                                    Bedside Glucose   
Comment         Glu2: cleaned meter                                 [facility]  
   
                                                    No Panel InformationOrdered   
By: Cristian Raymond on 2022   
   
                                                    Estimated GFR (   
American)       16 mL/Min                                       [facility]  
   
                                        Comment on above:   GFR estimated refere  
nce range: According to KDOQI guidelines,   
<60 ml/min/1.73m2 is sufficient to diagnose a patient with   
chronic kidney disease.   
   
                                                    Pharmacy Creatinine   
Clearance (Chem 18.20                                           [facility]  
   
                                                    Platelet mean volume Auto (B  
ld) [Entitic vol]Ordered By: Cristian Raymond on   
2022   
   
                                                    Platelet mean volume   
(Bld) [Entitic vol] 8.3 fL                          6.6-10.1        [facility]  
   
                                                    Platelets Auto (Bld) [#/Vol]  
Ordered By: Cristian Raymond on 2022   
   
                                                    Platelets (Bld)   
[#/Vol]         316 10*3/uL                     150-450         [facility]  
   
                                                    RBC Auto (Bld) [#/Vol]Ordere  
d By: Cristian Raymond on 2022   
   
                      RBC (Bld) [#/Vol] 3.22 10*6/uL            3.90-5.60  [facility]  
   
                                                    Serum or plasma calcium ramya  
urement (mass/volume)Ordered By: Cristian Raymond on   
2022   
   
                      Calcium [Mass/Vol] 9.0 mg/dL             8.2-10.2   [facility]  
   
                                                    Serum or plasma chloride roberto  
surement (moles/volume)Ordered By: Cristian Raymond   
on   
2022   
   
                      Chloride [Moles/Vol] 99 mmol/L                  [facility]  
   
                                                    Serum or plasma glucose ramya  
urement (mass/volume)Ordered By: Cristian Raymond on   
2022   
   
                      Glucose [Mass/Vol] 178 mg/dL                  [facility]  
   
                                        Comment on above:   ADA recommended refe  
rence range  
Random Glucose Reference Range is dependent on time and content   
of last meal. Glucose of more than 200 mg/dL in a nonstressed,   
ambulatory subject supports the diagnosis of Diabetes Mellitus.   
   
                                                    Serum or plasma potassium me  
asurement (moles/volume)Ordered By: Cristian Raymond   
on   
2022   
   
                      Potassium [Moles/Vol] 3.5 mmol/L            3.5-5.1    [place]  
   
                                                    Serum or plasma sodium measu  
rement (moles/volume)Ordered By: Cristian Raymond on   
2022   
   
                      Sodium [Moles/Vol] 136 mmol/L            136-146    [facility]  
   
                                                    Serum or plasma total carbon  
 dioxide measurement (moles/volume)Ordered By:   
Cristian Raymond on 2022   
   
                      CO2 [Moles/Vol] 19.9 mmol/L            22.0-30.0  [facility]  
   
                                                    Serum or plasma urea nitroge  
n measurement (mass/volume)Ordered By: Cristian Raymond on   
2022   
   
                                                    Urea nitrogen   
[Mass/Vol]      50 mg/dL                        9-23            [facility]  
   
                                                    Glucose Glucometer (BldC) [M  
ass/Vol]Ordered By: Ronald Sánchez on 2022  
   
   
                      Glucose [Mass/Vol] 117 mg/dL                        [facility]  
   
                                        Comment on above:   Random Glucose Refer  
ence Range is dependent on time and   
content   
of last meal. Glucose of more than 200 mg/dL in a nonstressed,   
ambulatory subject supports the diagnosis of Diabetes Mellitus.   
   
                                                    No Panel InformationOrdered   
By: Ronald Sánchez on 2022   
   
                                                    Bedside Glucose   
Comment         Glu2: cleaned meter                                 [facility]  
  
  
  
Vital Signs  
  
  
                          Date Time    Vital Sign   Value        Performing   
Clinician                               Facility  
   
                                                    2025   
12:          Body height         180.3 cm            Nancydarling Torrez DPM  
Work Phone:   
[phone]                          [facility]  
   
                                                    2025   
12:                              Body mass index   
(BMI) [Ratio]             31.8 kg/m2                Nancydarling Torrez DPM  
Work Phone:   
[phone]                          [facility]  
   
                                                    2025   
12:          Body weight         103.42 kg           Nancydarling Torrez DPM  
Work Phone:   
[phone]                          [facility]  
   
                                                    2024   
13:          Body height         180.3 cm            Pepper Mendoza NP  
Work Phone:   
[phone]                          [facility]  
   
                                                    2024   
13:                              Body mass index   
(BMI) [Ratio]             31.88 kg/m2               Peppernicolette Mendoza NP  
Work Phone:   
[phone]                          [facility]  
   
                                                    2024   
13:          Body temperature    98.8 [degF]         Pepper Kelly NP  
Work Phone:   
[phone]                          [facility]  
   
                                                    2024   
13:          Body weight         103.69 kg           Pepper Kelly NP  
Work Phone:   
[phone]                          [facility]  
   
                                                    2024   
13:                              Diastolic blood   
pressure                  62 mm[Hg]                 Pepper Kelly NP  
Work Phone:   
[phone]                          [facility]  
   
                                                    2024   
13:          Heart rate          88 /min             Pepper Kelly NP  
Work Phone:   
[phone]                          [facility]  
   
                                                    2024   
13:          Respiratory rate    18 /min             Pepper Chakrabortyjanusz NP  
Work Phone:   
[phone]                          [facility]  
   
                                                    2024   
13:                              SaO2% (BldA) [Mass   
fraction]                 98 %                      Pepper Chakrabortykevinz NP  
Work Phone:   
[phone]                          [facility]  
   
                                                    2024   
13:                              Systolic blood   
pressure                  144 mm[Hg]                Pepper Caterinaz NP  
Work Phone:   
[phone]                          [facility]  
   
                                                    10-   
13:          Body height         180.3 cm            Nancy Torrez DPM  
Work Phone:   
[phone]                          [facility]  
   
                                                    10-   
13:                              Body mass index   
(BMI) [Ratio]             30.93 kg/m2               Nancy Torrez DPM  
Work Phone:   
[phone]                          [facility]  
   
                                                    10-   
13:          Body weight         100.61 kg           Nancy Torrez DPM  
Work Phone:   
[phone]                          [facility]  
   
                                                    10-   
09:          Body height         180.3 cm            Pepper Chakrabortykevinz NP  
Work Phone:   
[phone]                          [facility]  
   
                                                    10-   
09:                              Body mass index   
(BMI) [Ratio]             30.93 kg/m2               Pepper Chakrabortykevinz NP  
Work Phone:   
[phone]                          [facility]  
   
                                                    10-   
09:          Body temperature    98.49 [degF]        Pepper Caterinaz NP  
Work Phone:   
[phone]                          [facility]  
   
                                                    10-   
09:          Body weight         100.61 kg           Pepper Palmerhholz NP  
Work Phone:   
[phone]                          [facility]  
   
                                                    10-   
09:                              Diastolic blood   
pressure                  68 mm[Hg]                 Pepper Sharlaholz NP  
Work Phone:   
[phone]                          [facility]  
   
                                                    10-   
09:          Heart rate          98 /min             Pepper Caterinaz NP  
Work Phone:   
[phone]                          [facility]  
   
                                                    10-   
09:          Respiratory rate    19 /min             Pepper Aichholz NP  
Work Phone:   
[phone]                          [facility]  
   
                                                    10-   
09:                              SaO2% (BldA) [Mass   
fraction]                 98 %                      Pepper Aichholz NP  
Work Phone:   
[phone]                          [facility]  
   
                                                    10-   
09:                              Systolic blood   
pressure                  140 mm[Hg]                Pepper Aichholz NP  
Work Phone:   
[phone]                          [facility]  
   
                                                    2024   
12:                              Diastolic blood   
pressure                  90 mm[Hg]                 Pepper Aichholz  
Work Phone:   
[phone]                          [facility]  
   
                                                    2024   
12:          Heart rate          71 /min             Pepper Aichholz  
Work Phone:   
[phone]                          [facility]  
   
                                                    2024   
12:          Respiratory rate    16 /min             Pepper Aichholz  
Work Phone:   
[phone]                          [facility]  
   
                                                    2024   
12:                              SaO2% (BldA) [Mass   
fraction]                 100 %                     Pepper Aichholz  
Work Phone:   
[phone]                          [facility]  
   
                                                    2024   
12:                              Systolic blood   
pressure                  162 mm[Hg]                Pepper Aichholz  
Work Phone:   
[phone]                          [facility]  
   
                                                    2024   
10:          Body height         180.34 cm           Pepper Aichholz  
Work Phone:   
[phone]                          [facility]  
   
                                                    2024   
10:          Body weight         100.51 kg           Pepper Aichholz  
Work Phone:   
[phone]                          [facility]  
   
                                                    2024   
13:          Body height         180.34 cm           Pepper Aichholz  
Work Phone:   
[phone]                          [facility]  
   
                                                    2024   
13:                              Body mass index   
(BMI) [Ratio]             31.1 kg/m2                Pepper Aichholz  
Work Phone:   
[phone]                          [facility]  
   
                                                    2024   
13:          Body weight         101.15 kg           Pepper Mendoza  
Work Phone:   
[phone]                          [facility]  
   
                                                    2024   
10:      Body height     180.3 cm        [facility] 2           [facility]  
   
                                                    2024   
10:                              Body mass index   
(BMI) [Ratio]       30.54 kg/m2         Pm 2               [facility]  
   
                                                    2024   
10:      Body weight     99.34 kg        Pm 2           [facility]  
   
                                                    2024   
15:          Body height         180.3 cm            Josef GERARDO  
Work Phone:   
[phone]                          [facility]  
   
                                                    2024   
15:                              Body mass index   
(BMI) [Ratio]             30.68 kg/m2               Josef GERARDO  
Work Phone:   
[phone]                          [facility]  
   
                                                    2024   
15:          Body weight         99.79 kg            Josef GERARDO  
Work Phone:   
[phone]                          [facility]  
   
                                                    2024   
15:                              Diastolic blood   
pressure                  70 mm[Hg]                 Josef GERARDO  
Work Phone:   
[phone]                          [facility]  
   
                                                    2024   
15:          Heart rate          85 /min             Josef GERARDO  
Work Phone:   
[phone]                          [facility]  
   
                                                    2024   
15:                              Systolic blood   
pressure                  162 mm[Hg]                Josfe GERARDO  
Work Phone:   
[phone]                          [facility]  
   
                                                    2024   
13:          Body height         180.3 cm            Criselda Baig APRN-CNP  
Work Phone:   
[phone]                          [facility]  
   
                                                    2024   
13:                              Body mass index   
(BMI) [Ratio]             30.68 kg/m2               Criselda Baig APRN-CNP  
Work Phone:   
[phone]                          [facility]  
   
                                                    2024   
13:          Body weight         99.79 kg            Criselda Baig   
APRN-CNP  
Work Phone:   
[phone]                          [facility]  
   
                                                    2024   
13:                              Diastolic blood   
pressure                  76 mm[Hg]                 Criselda Baig   
APRN-CNP  
Work Phone:   
[phone]                          [facility]  
   
                                                    2024   
13:          Heart rate          90 /min             Criselda Baig   
APRN-CNP  
Work Phone:   
[phone]                          [facility]  
   
                                                    2024   
13:                              SaO2% (BldA) [Mass   
fraction]                 100 %                     Criselda Baig   
APRN-CNP  
Work Phone:   
[phone]                          [facility]  
   
                                                    2024   
13:                              Systolic blood   
pressure                  147 mm[Hg]                Criselda Baig   
APRN-CNP  
Work Phone:   
[phone]                          [facility]  
   
                                                    2024   
11:          Body height         180.34 cm           Pepper Aichholz  
Work Phone:   
[phone]                          [facility]  
   
                                                    2024   
11:                              Body mass index   
(BMI) [Ratio]             30.4 kg/m2                Pepper Aichholz  
Work Phone:   
[phone]                          [facility]  
   
                                                    2024   
11:          Body weight         98.88 kg            Pepper Aichholz  
Work Phone:   
[phone]                          [facility]  
   
                                                    2024   
11:                              Diastolic blood   
pressure                  73 mm[Hg]                 Pepper Aichholz  
Work Phone:   
[phone]                          [facility]  
   
                                                    2024   
11:                              Systolic blood   
pressure                  142 mm[Hg]                Pepper Aichholz  
Work Phone:   
[phone]                          [facility]  
   
                                                    2024   
13:          Body height         180.3 cm            Beverly Yusuf   
APRN-CNP  
Work Phone:   
[phone]                          [facility]  
   
                                                    2024   
13:                              Body mass index   
(BMI) [Ratio]             29.85 kg/m2               Beverly Ottorker   
APRN-CNP  
Work Phone:   
[phone]                          [facility]  
   
                                                    2024   
13:          Body temperature    97.7 [degF]         Beverly Famrker   
APRN-CNP  
Work Phone:   
[phone]                          [facility]  
   
                                                    2024   
13:          Body weight         97.07 kg            Beverly Ottorker   
APRN-CNP  
Work Phone:   
[phone]                          [facility]  
   
                                                    2024   
13:                              Diastolic blood   
pressure                  70 mm[Hg]                 Beverly Zirker   
APRN-CNP  
Work Phone:   
[phone]                          [facility]  
   
                                                    Comment on   
above:                                  [employer]   
   
                                                    2024   
13:          Heart rate          90 /min             Beverly Ottorker   
APRN-CNP  
Work Phone:   
[phone]                          [facility]  
   
                                                    2024   
13:          Respiratory rate    22 /min             Beverly Ottorker   
APRN-CNP  
Work Phone:   
[phone]                          [facility]  
   
                                                    2024   
13:                              SaO2% (BldA) [Mass   
fraction]                 98 %                      Beverly Ottorker   
APRN-CNP  
Work Phone:   
[phone]                          [facility]  
   
                                                    2024   
13:                              Systolic blood   
pressure                  162 mm[Hg]                Beverly Ottorker   
APRN-CNP  
Work Phone:   
[phone]                          [facility]  
   
                                                    Comment on   
above:                                  [employer]   
   
                                                    2023   
09:                              Diastolic blood   
pressure                  70 mm[Hg]                 Pepper Aichholz  
Work Phone:   
[phone]                          [facility]  
   
                                                    2023   
09:          Heart rate          72 /min             Pepper Aichholz  
Work Phone:   
[phone]                          [facility]  
   
                                                    2023   
09:          Respiratory rate    16 /min             Pepper Aichholz  
Work Phone:   
[phone]                          [facility]  
   
                                                    2023   
09:                              SaO2% (BldA) [Mass   
fraction]                 96 %                      Pepper Sharlaholz  
Work Phone:   
[phone]                          [facility]  
   
                                                    2023   
09:                              Systolic blood   
pressure                  149 mm[Hg]                Pepper Palmerhholz  
Work Phone:   
[phone]                          [facility]  
   
                                                    2023   
09:          Body temperature    98 [degF]           Pepper Palmerhholz  
Work Phone:   
[phone]                          [facility]  
   
                                                    2023   
08:                              Inhaled oxygen flow   
rate                      8 L/min                   Pepper Palmerhholz  
Work Phone:   
[phone]                          [facility]  
   
                                                    2023   
06:          Body height         180.34 cm           Pepper Sharlaholz  
Work Phone:   
[phone]                          [facility]  
   
                                                    2023   
06:                              Body mass index   
(BMI) [Ratio]             29.8 kg/m2                Pepper Sharlaholz  
Work Phone:   
[phone]                          [facility]  
   
                                                    2023   
06:          Body weight         97 kg               Pepper Magdalenoholmanish  
Work Phone:   
[phone]                          [facility]  
   
                                                    2023   
12:          Body height         180.34 cm           Ronald Sánchez  
Other Phone:   
[phone]                           [facility]   
[employer]   
[employer]  
Other Phone:   
[phone]  
   
                                                    2023   
12:                              Body mass index   
(BMI) [Ratio]             29.56 kg/m2               Ronald Sánchez  
Other Phone:   
[phone]                           [facility]   
[employer]   
[employer]  
Other Phone:   
[phone]  
   
                                                    2023   
12:          Body temperature    97.8 [degF]         Ronald Sánchez  
Other Phone:   
[phone]                           [facility]   
[employer]   
[employer]  
Other Phone:   
[phone]  
   
                                                    2023   
12:          Body weight         96.16 kg            Ronald Sánchez  
Other Phone:   
[phone]                           [facility]   
[employer]  
Other Phone:   
[phone]  
   
                                                    2023   
12:                              Diastolic blood   
pressure                  78 mm[Hg]                 Ronald Sánchez  
Other Phone:   
[phone]                           [facility]   
[employer]  
Other Phone:   
[phone]  
   
                                                    2023   
12:                              SaO2% (BldA) [Mass   
fraction]                 99 %                      Ronald Sánchez  
Other Phone:   
[phone]                           [facility]   
[employer]  
Other Phone:   
[phone]  
   
                                                    2023   
12:                              Systolic blood   
pressure                  158 mm[Hg]                Ronald Sánchez  
Other Phone:   
[phone]                           [facility]   
[employer]  
Other Phone:   
[phone]  
   
                                                    2023   
12:                              Diastolic blood   
pressure                  61 mm[Hg]                 FNP-BC Shantell   
Kell  
Work Phone:   
[phone]                          [facility]  
   
                                                    2023   
12:          Heart rate          75 /min             [facility]   
[facility]  
Work Phone:   
[phone]                          [facility]  
   
                                                    2023   
12:          Respiratory rate    16 /min             [facility]   
[facility]  
Work Phone:   
[phone]                          [facility]  
   
                                                    2023   
12:                              SaO2% (BldA) [Mass   
fraction]                 100 %                     [facility]   
[facility]  
Work Phone:   
[phone]                          [facility]  
   
                                                    2023   
12:                              Systolic blood   
pressure                  164 mm[Hg]                [facility] Sh[facility]ell   
[facility]  
Work Phone:   
[phone]                          [facility]  
   
                                                    2023   
11:          Body height         180.34 cm           [facility] Shantell   
[facility]  
Work Phone:   
[phone]                          [facility]  
   
                                                    2023   
11:          Body weight         96.16 kg            [facility] Shantell   
[facility]  
Work Phone:   
[phone]                          [facility]  
   
                                                    2022   
13:          Body height         180.34 cm           Carolee Ruttino  
Other Phone:   
[phone]                           [facility]   
[employer]  
Other Phone:   
[phone]  
   
                                                    2022   
13:                              Body mass index   
(BMI) [Ratio]             29.56 kg/m2               Carolee Cuevas  
Other Phone:   
[phone]                           [facility]  
Other Phone:   
[phone]  
   
                                                    2022   
13:          Body temperature    97.8 [degF]         Carolee Rosadogovindrahat  
Other Phone:   
[phone]                           [facility]  
Other Phone:   
[phone]  
   
                                                    2022   
13:          Body weight         96.16 kg            Carolee Cuevas  
Other Phone:   
[phone]                           [facility]  
Other Phone:   
[phone]  
   
                                                    2022   
13:                              Diastolic blood   
pressure                  68 mm[Hg]                 Carolee Rosadogovindrahat  
Other Phone:   
[phone]                           [facility]  
Other Phone:   
[phone]  
   
                                                    2022   
13:                              SaO2% (BldA) [Mass   
fraction]                 99 %                      Carolee Cuevas  
Other Phone:   
[phone]                           [facility]  
Other Phone:   
[phone]  
   
                                                    2022   
13:                              Systolic blood   
pressure                  146 mm[Hg]                Carolee Cuevas  
Other Phone:   
[phone]                           [facility]  
Other Phone:   
[phone]  
   
                                                    2022   
12:          Body height         180.34 cm           Carolee Cuevas  
Other Phone:   
[phone]                           [facility]  
Other Phone:   
[phone]  
   
                                                    2022   
12:                              Body mass index   
(BMI) [Ratio]             29.15 kg/m2               Carolee Rosadogovindrahat  
Other Phone:   
[phone]                           [facility]  
Other Phone:   
[phone]  
   
                                                    2022   
12:          Body temperature    97.7 [degF]         Carolee Rosadogovindo  
Other Phone:   
[phone]                           [facility]  
Other Phone:   
[phone]  
   
                                                    2022   
12:          Body weight         94.8 kg             Carolee Cuevas  
Other Phone:   
[phone]                           [facility]   
[facility]  
Other Phone:   
[phone]  
   
                                                    2022   
12:                              Diastolic blood   
pressure                  94 mm[Hg]                 Carolee Cuevas  
Other Phone:   
[phone]                           [facility]  
Other Phone:   
[phone]  
   
                                                    2022   
12:                              SaO2% (BldA) [Mass   
fraction]                 99 %                      Carolee Cuevas  
Other Phone:   
[phone]                           [facility]   
[facility]  
Other Phone:   
[phone]  
   
                                                    2022   
12:                              Systolic blood   
pressure                  176 mm[Hg]                Carolee Cuevas  
Other Phone:   
[phone]                           [facility]   
[facility]  
Other Phone:   
[phone]  
   
                                                    2022   
17:                              Diastolic blood   
pressure                  73 mm[Hg]                 [facility]NP-BC Shantell   
[facility]  
Work Phone:   
[phone]                          [facility]  
   
                                                    2022   
17:          Heart rate          70 /min             [facility] Shantell   
[facility]  
Work Phone:   
[phone]                          [facility]  
   
                                                    2022   
17:          Respiratory rate    10 /min             [facility] Sh[facility]ell   
[facility]  
Work Phone:   
[phone]                          [facility]  
   
                                                    2022   
17:                              SaO2% (BldA) [Mass   
fraction]                 100 %                     [facility]   
[facility]  
Work Phone:   
[phone]                          [facility]  
   
                                                    2022   
17:                              Systolic blood   
pressure                  187 mm[Hg]                [facility] Sh[facility]ell   
[facility]  
Work Phone:   
[phone]                          [facility]  
   
                                                    2022   
13:          Body height         180.34 cm           [facility] Shantell   
[facility]  
Work Phone:   
[phone]                          [facility]  
   
                                                    2022   
13:          Body weight         96.61 kg            FNP-BC Shantell   
[facility]  
Work Phone:   
[phone]                          [facility]  
   
                                                    2022   
12:          Body height         180.34 cm           Ronald Sánchez  
Other Phone:   
[phone]                           [facility]  
Other Phone:   
[phone]  
   
                                                    2022   
12:                              Body mass index   
(BMI) [Ratio]             29.15 kg/m2               Ronald Sánchez  
Other Phone:   
[phone]                           [facility]  
Other Phone:   
[phone]  
   
                                                    2022   
12:          Body temperature    97.3 [degF]         Ronald Sánchez  
Other Phone:   
[phone]                           [facility]  
Other Phone:   
[phone]  
   
                                                    2022   
12:          Body weight         94.8 kg             Ronald Sánchez  
Other Phone:   
[phone]                           [facility]  
Other Phone:   
[phone]  
   
                                                    2022   
12:                              Diastolic blood   
pressure                  52 mm[Hg]                 Ronald Sánchez  
Other Phone:   
[phone]                           [facility]  
Other Phone:   
[phone]  
   
                                                    2022   
12:                              SaO2% (BldA) [Mass   
fraction]                 99 %                      Ronald Sánchez  
Other Phone:   
[phone]                           [facility]  
Other Phone:   
[phone]  
   
                                                    2022   
12:                              Systolic blood   
pressure                  146 mm[Hg]                Ronald Sánchez  
Other Phone:   
[phone]                           [facility]  
Other Phone:   
[phone]  
   
                                                    2022   
12:          Body height         180.34 cm           Ronald Sánchez  
Other Phone:   
[phone]                           [facility]  
Other Phone:   
[phone]  
   
                                                    2022   
12:                              Body mass index   
(BMI) [Ratio]             29.15 kg/m2               Ronald Sánchez  
Other Phone:   
[phone]                           [facility]  
Other Phone:   
[phone]  
   
                                                    2022   
12:          Body temperature    97.7 [degF]         Ronald Finnlisa  
Other Phone:   
[phone]                           [facility]  
Other Phone:   
[phone]  
   
                                                    2022   
12:          Body weight         94.8 kg             Ronald Finnlisa  
Other Phone:   
[phone]                           [facility]  
Other Phone:   
[phone]  
   
                                                    2022   
12:                              Diastolic blood   
pressure                  60 mm[Hg]                 Ronald Princess  
Other Phone:   
[phone]                           [facility]  
Other Phone:   
[phone]  
   
                                                    2022   
12:                              SaO2% (BldA) [Mass   
fraction]                 98 %                      Ronald Finnlisa  
Other Phone:   
[phone]                           [facility]  
Other Phone:   
[phone]  
   
                                                    2022   
12:                              Systolic blood   
pressure                  144 mm[Hg]                Ronald Finnlisa  
Other Phone:   
[phone]                           [facility]  
Other Phone:   
[phone]  
   
                                                    2022   
12:                              Diastolic blood   
pressure                  73 mm[Hg]                 FNP-BC Shantell   
[facility]  
Work Phone:   
[phone]                          [facility]  
   
                                                    2022   
12:          Heart rate          61 /min             FNP-BC Shantell   
[facility]  
Work Phone:   
[phone]                          [facility]  
   
                                                    2022   
12:          Respiratory rate    16 /min             FNP-BC Shantell   
[facility]  
Work Phone:   
[phone]                          [facility]  
   
                                                    2022   
12:                              SaO2% (BldA) [Mass   
fraction]                 100 %                     [facility] Shantell   
[facility]  
Work Phone:   
[phone]                          [facility]  
   
                                                    2022   
12:                              Systolic blood   
pressure                  151 mm[Hg]                FNP-BC Shantell   
[facility]  
Work Phone:   
[phone]                          [facility]  
   
                                                    2022   
10:                              Inhaled oxygen flow   
rate                      8 L/min                   [facility]NP[facility]BC Shantell   
[facility]  
Work Phone:   
[phone]                          [facility]  
   
                                                    2022   
08:          Body height         180.34 cm           [facility] Shantell Castorena  
Work Phone:   
[phone]                          [facility]  
   
                                                    2022   
08:                              Body mass index   
(BMI) [Ratio]             29.8 kg/m2                [facility] Shantell Castorena  
Work Phone:   
[phone]                          [facility]  
   
                                                    2022   
08:          Body weight         97.06 kg            [facility] Shantell Castorena  
Work Phone:   
[phone]                          [facility]  
   
                                                    2022   
07:          Body temperature    98.3 [degF]         [facility] Shantell Castorena  
Work Phone:   
[phone]                          [facility]  
   
                                                    2022   
13:          Body height         180.34 cm           Ronald Sánchez  
Other Phone:   
[phone]                           [facility]   
[facility]  
Other Phone:   
[phone]  
   
                                                    2022   
13:                              Body mass index   
(BMI) [Ratio]             29.84 kg/m2               Ronald Sánchez  
Other Phone:   
[phone]                           [facility]  
Other Phone:   
[phone]  
   
                                                    2022   
13:          Body temperature    97.9 [degF]         Ronald Sánchez  
Other Phone:   
[phone]                           [facility]  
Other Phone:   
[phone]  
   
                                                    2022   
13:          Body weight         97.07 kg            Ronald Sánchez  
Other Phone:   
[phone]                           [facility]  
Other Phone:   
[phone]  
   
                                                    2022   
13:                              Diastolic blood   
pressure                  68 mm[Hg]                 Ronald Sánchez  
Other Phone:   
[phone]                           [facility]  
Other Phone:   
[phone]  
   
                                                    2022   
13:                              SaO2% (BldA) [Mass   
fraction]                 99 %                      Ronald Sánchez  
Other Phone:   
[phone]                           [facility]   
Corporation  
Other Phone:   
[phone]  
   
                                                    2022   
13:                              Systolic blood   
pressure                  140 mm[Hg]                Ronald Sánchez  
Other Phone:   
[phone]                           [facility]   
Professional   
Corporation  
Other Phone:   
[phone]  
   
                                                    2022   
14:                              Diastolic blood   
pressure                  80 mm[Hg]                 FNP-BC Shantell   
[place]  
Work Phone:   
[phone]                          [facility]  
   
                                                    2022   
14:          Heart rate          86 /min             FNP-BC Shantell   
[place]  
Work Phone:   
[phone]                          [facility]  
   
                                                    2022   
14:          Respiratory rate    16 /min             FNP-BC Shantell   
[place]  
Work Phone:   
[phone]                          [facility]  
   
                                                    2022   
14:                              SaO2% (BldA) [Mass   
fraction]                 97 %                      FNP-BC Shantell   
[place]  
Work Phone:   
[phone]                          [facility]  
   
                                                    2022   
14:                              Systolic blood   
pressure                  169 mm[Hg]                FNP-BC Shantell   
[place]  
Work Phone:   
[phone]                          [facility]  
   
                                                    2022   
13:          Body height         180.34 cm           FNP-BC Shantell   
[place]  
Work Phone:   
[phone]                          [facility]  
   
                                                    2022   
13:                              Body mass index   
(BMI) [Ratio]             29.8 kg/m2                FNP-BC Shantell   
[place]  
Work Phone:   
[phone]                          [facility]  
   
                                                    2022   
13:          Body weight         97.06 kg            FNP-BC Shantell   
[place]  
Work Phone:   
[phone]                          [facility]  
   
                                                    2022   
13:          Body height         180.34 cm           Carolee Cuevas  
Other Phone:   
[phone]                           [facility]   
Professional   
Corporation  
Other Phone:   
[phone]  
   
                                                    2022   
13:                              Body mass index   
(BMI) [Ratio]             29.98 kg/m2               Carolee Cuevas  
Other Phone:   
[phone]                           [facility]   
Professional   
Corporation  
Other Phone:   
[phone]  
   
                                                    2022   
13:          Body temperature    96.1 [degF]         Carolee Cuevas  
Other Phone:   
[phone]                           [facility]   
Professional   
Corporation  
Other Phone:   
[phone]  
   
                                                    2022   
13:          Body weight         97.52 kg            Carolee Cuevas  
Other Phone:   
[phone]                           [facility]   
Professional   
Corporation  
Other Phone:   
[phone]  
   
                                                    2022   
13:                              Diastolic blood   
pressure                  74 mm[Hg]                 Carolee Bocanegrao  
Other Phone:   
[phone]                           [facility]   
Professional   
Corporation  
Other Phone:   
[phone]  
   
                                                    2022   
13:                              SaO2% (BldA) [Mass   
fraction]                 98 %                      Carolee Cuevas  
Other Phone:   
[phone]                           [facility]   
Professional   
Corporation  
Other Phone:   
[phone]  
   
                                                    2022   
13:                              Systolic blood   
pressure                  138 mm[Hg]                Carolee Bocanegrao  
Other Phone:   
[phone]                           [facility]   
Professional   
Corporation  
Other Phone:   
[phone]  
   
                                                    2022   
13:          Body height         180.34 cm           Carolee Cuevas  
Other Phone:   
[phone]                           [facility]   
Professional   
Corporation  
Other Phone:   
[phone]  
   
                                                    2022   
13:                              Body mass index   
(BMI) [Ratio]             29.98 kg/m2               Carolee Cuevas  
Other Phone:   
[phone]                           [facility]   
Professional   
Corporation  
Other Phone:   
[phone]  
   
                                                    2022   
13:          Body temperature    96.8 [degF]         Carolee Bocanegrao  
Other Phone:   
[phone]                           [facility]   
Professional   
Corporation  
Other Phone:   
[phone]  
   
                                                    2022   
13:          Body weight         97.52 kg            Carolee Bocanegrao  
Other Phone:   
[phone]                           [facility]   
Professional   
Corporation  
Other Phone:   
[phone]  
   
                                                    2022   
13:                              Diastolic blood   
pressure                  50 mm[Hg]                 Carolee Cuevas  
Other Phone:   
[phone]                           [facility]   
Professional   
Corporation  
Other Phone:   
[phone]  
   
                                                    2022   
13:                              SaO2% (BldA) [Mass   
fraction]                 99 %                      Carolee Cuevas  
Other Phone:   
[phone]                           [facility]   
Professional   
Corporation  
Other Phone:   
[phone]  
   
                                                    2022   
13:                              Systolic blood   
pressure                  130 mm[Hg]                Carolee Cuevas  
Other Phone:   
[phone]                           [facility]   
Professional   
Corporation  
Other Phone:   
[phone]  
   
                                                    2022   
13:          Body height         180.34 cm           Ronald Sánchez  
Other Phone:   
[phone]                           [facility]   
Professional   
Corporation  
Other Phone:   
[phone]  
   
                                                    2022   
13:                              Body mass index   
(BMI) [Ratio]             29.98 kg/m2               Ronald Sánchez  
Other Phone:   
[phone]                           [facility]   
Professional   
Corporation  
Other Phone:   
[phone]  
   
                                                    2022   
13:          Body temperature    97.2 [degF]         Ronald Sánchez  
Other Phone:   
[phone]                           [facility]   
Professional   
Corporation  
Other Phone:   
[phone]  
   
                                                    2022   
13:          Body weight         97.52 kg            Ronald Sánchez  
Other Phone:   
[phone]                           [facility]   
Professional   
Corporation  
Other Phone:   
[phone]  
   
                                                    2022   
13:                              Diastolic blood   
pressure                  60 mm[Hg]                 Ronald Sánchez  
Other Phone:   
[phone]                           [facility]   
Professional   
Corporation  
Other Phone:   
[phone]  
   
                                                    2022   
13:                              SaO2% (BldA) [Mass   
fraction]                 98 %                      Ronald Sánchez  
Other Phone:   
[phone]                           [facility]   
Professional   
Corporation  
Other Phone:   
[phone]  
   
                                                    2022   
13:                              Systolic blood   
pressure                  160 mm[Hg]                Ronald Sánchez  
Other Phone:   
[phone]                           [facility]   
Professional   
Corporation  
Other Phone:   
[phone]  
   
                                                    2022   
14:                              Diastolic blood   
pressure                  80 mm[Hg]                 FNP-BC Shantell   
[place]  
Work Phone:   
[phone]                          [facility]  
   
                                                    2022   
14:          Heart rate          53 /min             FNP-BC Shantell   
[place]  
Work Phone:   
[phone]                          [facility]  
   
                                                    2022   
14:          Respiratory rate    16 /min             FNP-BC Shantell   
[place]  
Work Phone:   
[phone]                          [facility]  
   
                                                    2022   
14:                              SaO2% (BldA) [Mass   
fraction]                 98 %                      FNP-BC Shantell   
[place]  
Work Phone:   
[phone]                          [facility]  
   
                                                    2022   
14:                              Systolic blood   
pressure                  168 mm[Hg]                FNP-BC Shantell   
[place]  
Work Phone:   
[phone]                          [facility]  
   
                                                    2022   
12:          Body height         180.34 cm           FNP-BC Shantell   
[place]  
Work Phone:   
[phone]                          [facility]  
   
                                                    2022   
12:                              Body mass index   
(BMI) [Ratio]             29.8 kg/m2                FNP-BC Shantell   
[place]  
Work Phone:   
[phone]                          [facility]  
   
                                                    2022   
12:          Body weight         97.06 kg            FNP-BC Shantell   
[place]  
Work Phone:   
[phone]                          [facility]  
   
                                                    2022   
10:          Body temperature    98.1 [degF]         FNP-BC Shantell   
[place]  
Work Phone:   
[phone]                          [facility]  
   
                                                    2022   
16:          Body height         180.34 cm           Ronald Sánchez  
Other Phone:   
[phone]                           [facility]   
Professional   
Corporation  
Other Phone:   
[phone]  
   
                                                    2022   
16:                              Body mass index   
(BMI) [Ratio]             29.84 kg/m2               Ronald Sánchez  
Other Phone:   
[phone]                           [facility]   
Professional   
Corporation  
Other Phone:   
[phone]  
   
                                                    2022   
16:          Body weight         97.07 kg            Ronald Sánchez  
Other Phone:   
[phone]                           [facility]   
Professional   
Corporation  
Other Phone:   
[phone]  
   
                                                    2022   
16:                              Diastolic blood   
pressure                  60 mm[Hg]                 Ronald Sánchez  
Other Phone:   
[phone]                           [facility]   
Professional   
Corporation  
Other Phone:   
[phone]  
   
                                                    2022   
16:                              SaO2% (BldA) [Mass   
fraction]                 98 %                      Ronald Sánchez  
Other Phone:   
[phone]                           [facility]   
Professional   
Corporation  
Other Phone:   
[phone]  
   
                                                    2022   
16:                              Systolic blood   
pressure                  150 mm[Hg]                Ronald Finnlisa  
Other Phone:   
[phone]                           [facility]   
Professional   
Corporation  
Other Phone:   
[phone]  
  
  
  
Encounters  
  
  
                          Encounter Date Encounter Type Care Provider Facility  
   
                                                    Start: 2025  
End: 2025           Bamboo flowsheet          Nancy Torrez DPM  
Work Phone:   
[phone]                          [facility]  
   
                                                    Start: 2025  
End: 2025           Bamboo flowsheet          Nancy Torrez DPM  
Work Phone:   
[phone]                          [facility]  
   
                                                    Start: 2025  
End: 2025                         Patient encounter   
procedure                               Nancy Torrez DPM  
Work Phone:   
[phone]                          [facility]  
   
                                        Comment on above:   Dermatophytosis of n  
ail (Primary Dx);  
Dystrophic nail;  
Diabetic polyneuropathy associated with type 2 diabetes mellitus   
(CMS/[facility]);  
Encounter for long-term (current) use of insulin (CMS/[facility])   
   
                                                    Start: 2025  
End: 2025     ambulatory          NANCY TORREZ     Not Available  
   
                                                    Start: 2024  
End: 2024           Bamboo flowsheet          Pepper Mendoza NP  
Work Phone:   
[phone]                          [facility]  
   
                                                    Start: 2024  
End: 2024           Bamboo flowsheet          Pepper Mendoza NP  
Work Phone:   
[phone]                          [facility]  
   
                                                    Start: 2024  
End: 2024           Telephone encounter       Pepper Mendoza NP  
Work Phone:   
[phone]                          [facility]  
   
                                                    Start: 2024  
End: 2024     ambulatory          PEPPER MENDZOA       Not Available  
   
                                                    Start: 2024  
End: 2024                         Office outpatient visit   
25 minutes                              Pepper Mendoza NP  
Work Phone:   
[phone]                          [facility]  
   
                                        Comment on above:   Primary hypertension  
 (CMS/HCC) (Primary Dx);  
Unspecified atrial fibrillation (CMS/HCC);  
Other thrombophilia (CMS/HCC);  
Polyneuropathy due to type 2 diabetes mellitus (CMS/HCC);  
Hepatic fibrosis;  
BMI 29.0-29.9,adult;  
Type 2 diabetes mellitus without complication, with long-term   
current use of insulin (CMS/HCC);  
Anxiety, generalized (CMS/HCC);  
Dependence on renal dialysis (CMS/HCC);  
Tobacco user;  
Muscle spasm;  
Spinal stenosis of lumbar region with neurogenic claudication   
   
                                                    Start: 2024  
End: 2024           Refill                    Pepper Mendoza NP  
Work Phone:   
[phone]                          [facility]  
   
                                        Comment on above:   Type 2 diabetes ran  
itus without complication, with long-term   
current use of insulin (CMS/HCC) (Primary Dx)   
   
                                                    Start: 2024  
End: 2024           Refill                    Pepper Mendoza NP  
Work Phone:   
[phone]                          [facility]  
   
                                        Comment on above:   Anxiety, generalized  
 (CMS/HCC) (Primary Dx)   
   
                                                    Start: 2024  
End: 2024     ambulatory          PEPPER MENDOZA    [facility]  
   
                                                    Start: 2024  
End: 2024     ambulatory          PEPPER MENDOZA     [facility]  
   
                                                    Start: 2024  
End: 2024     ambulatory          PEPPER MENDOZA    [facility]  
   
                                                    Start: 2024  
End: 2024           ambulatory                Janette Borja MD                           [facility]  
   
                                                    Start: 10-  
End: 10-           ambulatory                Janette Borja MD                           Facility:[facility]  
   
                                                    Start: 10-  
End: 10-           ambulatory                Janette Borja MD                           Facility:[facility]  
   
                                                    Start: 10-  
End: 10-           Bamboo flowsheet          Nancy Torrez DPM  
Work Phone:   
[phone]                          [facility]  
   
                                                    Start: 10-  
End: 10-           Bamboo flowsheet          Nancy Torrez DPM  
Work Phone:   
[phone]                          [facility]  
   
                                                    Start: 10-  
End: 10-                         Patient encounter   
procedure                               Nancy Torrez DPM  
Work Phone:   
[phone]                          [facility]  
   
                                        Comment on above:   Dermatophytosis of n  
ail (Primary Dx);  
Dystrophic nail;  
Diabetic polyneuropathy associated with type 2 diabetes mellitus   
(CMS/HCC);  
Encounter for long-term (current) use of insulin (CMS/HCC)   
   
                                                    Start: 10-  
End: 10-     ambulatory          NANCY TORREZ     Not Available  
   
                                                    Start: 10-  
End: 10-     ambulatory          Physician [facility] Family [facility]  
   
                                                    Start: 10-  
End: 10-           Refill                    Pepper Mendoza NP  
Work Phone:   
[phone]                          [facility]  
   
                                        Comment on above:   Muscle spasm;  
Spinal stenosis of lumbar region with neurogenic claudication   
   
                                                    Start: 10-  
End: 10-                         Office outpatient visit   
25 minutes                              Pepper Mendoza NP  
Work Phone:   
[phone]                          [facility]  
   
                                        Comment on above:   Degeneration of inte  
rvertebral disc of lumbar region,   
unspecified   
whether pain present (Primary Dx);   
Acute right hip pain;  
Primary hypertension (CMS/HCC);  
Hepatic fibrosis;  
End stage renal disease (CMS/HCC);  
Type 2 diabetes mellitus without complication, with long-term   
current use of insulin (CMS/HCC);  
Tobacco user;  
Mixed hyperlipidemia (CMS/HCC)   
   
                                                    Start: 10-  
End: 10-     ambulatory          PEPPER MAGDALENOANAYMANISH       Not Available  
   
                                                    Start: 2024  
End: 2024                         Admission to same day   
surgery center                          Pepper Magdalenoanaymanish  
Work Phone:   
[phone]                          [facility]   
[facility]  
Work Phone:   
[phone]  
   
                                                    Start: 2024  
End: 2024           ambulatory                Pepper Mendoza  
Work Phone:   
[phone]                          [facility]  
Work Phone:   
[phone]  
   
                                                    Start: 2024  
End: 2024     ambulatory          JIM GRIGSBY   Not Available  
   
                                                    Start: 2024  
End: 2024     ambulatory          Physician [facility]  
   
                                                    Start: 2024  
End: 2024           Clinical Support          Jim Grigsby PT  
Work Phone:   
[phone]                          [facility]  
   
                                        Comment on above:   Primary hypertension  
 (CMS/HCC) (Primary Dx);  
End stage renal disease (CMS/HCC);  
Legally blind;  
Polyneuropathy due to type 2 diabetes mellitus (CMS/HCC);  
Dependence on renal dialysis (CMS/HCC);  
Impaired mobility and activities of daily living   
   
                                                    Start: 2024  
End: 2024                         Patient encounter   
procedure                               Pepper Magdalenofrancisca  
Work Phone:   
[phone]                          [facility]   
[facility]  
Work Phone:   
[phone]  
   
                                                    Start: 2024  
End: 2024     ambulatory          NANCY TORREZ     Not Available  
   
                                                    Start: 2024  
End: 2024     ambulatory          [facility]  
   
                                                    Start: 2024  
End: 2024                         Patient encounter   
procedure                               Pepper Magdalenofrancisca  
Work Phone:   
[phone]                          [facility]   
[facility]   
Gastroenterology  
Work Phone:   
[phone]  
   
                                                    Start: 2024  
End: 2024     ambulatory          PEPPER MAGDALENOFRANCISCA       Not Available  
   
                                                    Start: 2024  
End: 2024     Telephone encounter Lucho Ortega       [facility]  
   
[facility]  
   
                                                    Start: 2024  
End: 2024                         Evaluation and   
management of inpatient   CYRIL LEARY           [facility]  
   
                                                    Start: 2024  
End: 2024                         Evaluation and   
management of inpatient   SHABANA HUTCHINS        [facility]  
   
                                                    Start: 2024  
End: 2024                         Evaluation and   
management of inpatient   SHABANA G [facility]  
   
                                                    Start: 2024  
End: 2024           Shanique Padilla MD  
Work Phone:   
[phone]                          [facility]   
Family Medicine  
   
                                                    Start: 2024  
End: 2024     ambulatory          [facility] RAGINI [facility]  
   
                                        Start: 2024   Encounter for other   
preprocedural   
examination               [facility] [place]             [facility]  
   
                                                    Start: 2024  
End: 2024                         Patient encounter   
procedure                               [facility] Pre-Admission   
Testing 2                               [facility] - Pre   
Admit  
   
                                        Comment on above:   Preop examination (P  
rimary Dx);  
Hypertension, unspecified type;  
Type 2 diabetes mellitus with other specified complication, with   
long-term current use of insulin (CMS-HCC);  
Personal history of MI (myocardial infarction);  
Stage 4 chronic kidney disease (CMS-HCC)   
   
                                                    Start: 2024  
End: 2024                         Preprocedural   
examination done          Pm 2                     [facility]  
   
                                                    Start: 2024  
End: 2024     ambulatory          NANCY TORREZ     Not Available  
   
                                                    Start: 2024  
End: 2024     ambulatory          NANCY A DANKHER     Not Available  
   
                                                    Start: 2024  
End: 2024                         Office outpatient new   
45 minutes                              Josef GERARDO  
Work Phone:   
[phone]                          [facility]   
Genito-Urinary Surgeons  
   
                                        Comment on above:   Kidney calculi (Prim  
ramon Dx);  
Microscopic hematuria   
   
                                                    Start: 2024  
End: 2024     ambulatory          JOSEF CASANOVA   [facility]   
Ambulatory [facility]  
   
                                                    Start: 2024  
End: 2024           Telephone encounter       Josef GERARDO  
Work Phone:   
[phone]                          [facility]   
Genito-Urinary Surgeons  
   
                                                    Start: 06-  
End: 2024           Telephone encounter       Criselda AYESHA Aman   
APRN-CNP  
Work Phone:   
[phone]                          [facility]   
[facility]  
   
                                                    Start: 2024  
End: 2024     ambulatory          [facility]  
   
                                                    Start: 2024  
End: 2024                         Patient encounter   
procedure                               Pepper Mendoza  
Work Phone:   
[phone]                          [facility]   
Health  
Work Phone:   
[phone]  
   
                                                    Start: 2024  
End: 2024           ambulatory                Pepper Mendoza  
Work Phone:   
[phone]                          [facility]  
Work Phone:   
[phone]  
   
                                                    Start: 2024  
End: 2024           Refill                    Pati Cortés   
APRN-CNP  
Work Phone:   
[phone]                          [facility]   
Family Medicine  
   
                                                    Start: 2024  
End: 2024                         Office outpatient new   
45 minutes                              Criselda AYESHA Aman   
APRN-CNP  
Work Phone:   
[phone]                          [facility]   
[facility]  
   
                                        Comment on above:   Thoracic neuritis (P  
rimary Dx);  
Lumbar radiculopathy;  
Chronic bilateral low back pain with bilateral sciatica   
   
                                                    Start: 2024  
End: 2024     ambulatory          CRISELDA BAIG [facility]  
   
                                                    Start: 2024  
End: 2024     ambulatory          PEPPER MENDOZA     [facility]  
   
                                                    Start: 2024  
End: 2024                         Patient encounter   
procedure                               Pepper Mendoza  
Work Phone:   
[phone]                          [facility]   
[facility]   
Gastroenterology  
Work Phone:   
[phone]  
   
                                        Start: 2024   Patient encounter   
procedure                               Jim Grigsby PT  
Work Phone:   
[phone]                          [facility]  
   
                                                    Start: 2024  
End: 2024     ambulatory          PEPPER PALMERHHOLZ       Not Available  
   
                                                    Start: 2024  
End: 2024     ambulatory          NANCY TORREZ     Not Available  
   
                                                    Start: 2024  
End: 2024           Shanique Padilla MD  
Work Phone:   
[phone]                          [facility]   
Family Medicine  
   
                                                    Start: 2024  
End: 2024     ambulatory          PEPPER BANKS [facility]  
   
                                                    Start: 2024  
End: 2024     ambulatory          PEPPER SHARLAHOLZ       Not Available  
   
                                                    Start: 2024  
End: 2024     ambulatory          [facility]  
   
                                                    Start: 2024  
End: 2024                         Office outpatient visit   
25 minutes                              [facility]   
APRN-CNP  
Work Phone:   
[phone]                          [facility]   
Infectious Disease  
   
                                        Comment on above:   Hospital discharge f  
ollow-up (Primary Dx);  
End-stage renal disease on peritoneal dialysis (CMS-HCC);  
History of community acquired pneumonia;  
Light cigarette smoker;  
Insulin dependent type 2 diabetes mellitus (CMS-HCC);  
Legally blind;  
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   
   
                                                    Start: 2024  
End: 2024     ambulatory          [facility]  
   
                                                    Start: 2024  
End: 2024     ambulatory          PEPPER AICHHOLZ       Not Available  
   
                                                    Start: 2024  
End: 2024                         Evaluation and   
management of inpatient   BASIA SOMMER             [facility]  
   
                                                    Start: 03-  
End: 2024                         Emergency department   
patient visit                           FRANCISCO CALABRESE                             [facility]  
   
                                                    Start: 03-  
End: 2024                         Emergency department   
patient visit             PEPPER BANKS [facility]  
   
                                                    Start: 2024  
End: 2024     ambulatory          PEPPER BANKS [facility]  
   
                                                    Start: 2024  
End: 2024     ambulatory          NANCY A RUSHER     Not Available  
   
                                                    Start: 2024  
End: 2024     ambulatory          PEPPER MENDOZA     [facility]  
   
                                Start: 2024 Shanique Padilla MD  
Work Phone:   
[phone]                          [facility]   
Family Medicine  
   
                                Start: 2024 Telephone encounter Stacey Batres   
APRN-CNP  
Work Phone:   
[phone]                          [facility]   
General Surgery  
   
                                                    Start: 2024  
End: 2024     ambulatory          PEPPER MENDOZA       Not Available  
   
                                                    Start: 2023  
End: 2023                         Admission to same day   
surgery center                          Pepper Mendoza  
Work Phone:   
[phone]                          [facility]   
Center [facility]  
   
                                                    Start: 2023  
End: 2023           ambulatory                Pepper Mendoza  
Work Phone:   
[phone]                          [facility]  
Work Phone:   
[phone]  
   
                                                    Start: 2023  
End: 2023                         Patient encounter   
procedure                               Pepper Mendoza  
Work Phone:   
[phone]                          [facility]Pre-Surgical   
Testing  
Work Phone:   
[phone]  
   
                                                    Start: 2023  
End: 2023           ambulatory                Ronald Sánchez  
Other Phone:   
[phone]                           [employer]   
[employer]  
Other Phone:   
[phone]  
   
                                        Start: 2023   Office outpatient vi  
sit   
10 minutes                Ronald Sánchez        [facility]  
   
                                                    Start: 2023  
End: 2023                         Admission to same day   
surgery center                          [facility] Shantell Castorena  
Work Phone:   
[phone]                          [facility]-Interventional   
Radiology  
Work Phone:   
[phone]  
   
                                                    Start: 2023  
End: 2023           ambulatory                [facility] Shantell Castorena  
Work Phone:   
[phone]                          [facility]  
Work Phone:   
[phone]  
   
                                                    Start: 2022  
End: 2022           ambulatory                Carolee Cuevas  
Other Phone:   
[phone]                           [employer]   
[employer]  
Other Phone:   
[phone]  
   
                                                    Start: 2022  
End: 2022                         Patient encounter   
procedure                               FNP[facility]BC Shantell Castorena  
Work Phone:   
[phone]                          [facility]   
[facility]  
   
                                                    Start: 2022  
End: 2022           ambulatory                Carolee Cuevas  
Other Phone:   
[phone]                           [employer]   
[employer]  
Other Phone:   
[phone]  
   
                          Start: 2022 [facility] visit, estab pt Carolee Rosadoximena   
[facility] Vascular Surgery  
   
                                                    Start: 10-  
End: 10-           ambulatory                Ronald Sánchez  
Other Phone:   
[phone]                           [employer]   
[employer]  
Other Phone:   
[phone]  
   
                                        Start: 10-   Encounter for other   
preprocedural   
examination               Ronald Sánchez        [facility] Vascular Surgery  
   
                          Start: 10- Telephone encounter Ronald cali [facility]  
   
                                                    Start: 2022  
End: 2022                         Admission to same day   
surgery center                          FNP[facility]BC Shantell   
Kell  
Work Phone:   
[phone]                          [facility]   
Ctr-Interventional   
Radiology  
   
                                                    Start: 2022  
End: 2022           ambulatory                FNP-BC Shantell A   
Kell  
Work Phone:   
[phone]                          [facility]  
Work Phone:   
[phone]  
   
                                                    Start: 2022  
End: 2022           ambulatory                Ronald Sánchez  
Other Phone:   
[phone]                           [employer]   
[employer]  
Other Phone:   
[phone]  
   
                                        Start: 2022   Office outpatient vi  
sit   
15 minutes                Ronald Sánchez        [facility] Vascular Surgery  
   
                                                    Start: 2022  
End: 2022                         Patient encounter   
procedure                               FNP-BC Shantell Wheelerann  
Work Phone:   
[phone]                          [facility]   
[facility]  
   
                                                    Start: 2022  
End: 2022           ambulatory                Ronald Sánchez  
Other Phone:   
[phone]                           [employer]   
[employer]  
Other Phone:   
[phone]  
   
                                        Start: 2022   Postop follow up vis  
it   
related to original px    Ronald Sánchez        [facility]  
   
                                                    Start: 2022  
End: 2022                         Admission to same day   
surgery center                          [facility] Shantell Castorena  
Work Phone:   
[phone]                          [facility]Surgery   
[facility]  
   
                                                    Start: 2022  
End: 2022           ambulatory                Ronald Langlisa  
Other Phone:   
[phone]                           [employer]   
[employer]  
Other Phone:   
[phone]  
   
                                        Start: 2022   Office outpatient vi  
sit   
15 minutes                Ronald Sánchez        [facility]  
   
                                                    Start: 2022  
End: 2022                         Admission to same day   
surgery center                          [facility] Shantell Castorena  
Work Phone:   
[phone]                          [facility]-Interventional   
Radiology  
   
                                                    Start: 2022  
End: 2022           ambulatory                Carolee Cuevas  
Other Phone:   
[phone]                           [employer]   
[employer]  
Other Phone:   
[phone]  
   
                          Start: 2022 Follow-up encounter Carolee LUCAS  
[facility]  
   
                                                    Start: 2022  
End: 2022                         Patient encounter   
procedure                               [facility] Shantell Castorena  
Work Phone:   
[phone]                          [facility]Ultrasound   
[facility]  
   
                                                    Start: 2022  
End: 2022           ambulatory                Carolee Cuevas  
Other Phone:   
[phone]                           [employer]   
[employer]  
Other Phone:   
[phone]  
   
                                        Start: 2022   Postop follow up vis  
it   
related to original px    Carolee Bocanegrao            [facility]  
   
                                                    Start: 2022  
End: 2022           ambulatory                Ronald Langlisa  
Other Phone:   
[phone]                           [employer]   
[employer]  
Other Phone:   
[phone]  
   
                                        Start: 2022   Postop follow up vis  
it   
related to original px    Ronald Sánchez        [facility]  
   
                                                    Start: 2022  
End: 2022                         Patient encounter   
procedure                               [facility]   
[facility]ordin[facility]  
Work Phone:   
[phone]                          [facility] Center  
   
                                        Comment on above:   Pre-transplant evalu  
ation for kidney transplant (Primary Dx)   
   
                                                    Start: 2022  
End: 2022                         Patient encounter   
status                                  [facility]   
[facility]ordinators  
Work Phone:   
[phone]                          [facility]  
   
                                                    Start: 2022  
End: 2022                         Patient encounter   
procedure                               [facility]   
Coordinators  
Work Phone:   
[phone]                          [facility]  
   
                                        Comment on above:   Pre-transplant evalu  
ation for kidney transplant (Primary Dx)   
   
                                                    Start: 2022  
End: 2022                         Patient encounter   
status                                  [facility]   
Coordinators  
Work Phone:   
[phone]                          [facility]  
   
                                                    Start: 2022  
End: 2022                         Admission to same day   
surgery center                          [facility] Shantell Castorena  
Work Phone:   
[phone]                          [facility]Sur[facility]   
[facility]  
   
                                                    Start: 2022  
End: 2022           ambulatory                Ronald Sánchez  
Other Phone:   
[phone]                           [employer]   
[employer]  
Other Phone:   
[phone]  
   
                                        Start: 2022   Encounter by [facility]   
link                      Ronald Sánchez        [facility]ascular Surgery  
   
                                        Start: 2022   Office outpatient vi  
sit   
15 minutes                Ronald Sánchez        [facility]  
  
  
  
Procedures  
  
  
                          Date         Procedure    Procedure Detail Performing   
Clinician  
   
                          Start: 2024 Follow-up visit Follow-up    MONO CASANOVA  
   
                                        Start: 2024   Ultrasound elastogra  
phy of   
liver                                               Pepper Kelly  
Work Phone:   
[phone]  
   
                          Start: 2024 Ultrasonography of liver              
  Pepper Kelly  
Work Phone:   
[phone]  
   
                                        Start: 2024   Microalbumin [Mass/v  
olume]   
in Urine by Test strip                              Criselda Baig   
APRN-CNP  
Work Phone:   
[phone]  
   
                                        Start: 2023   Laparoscopic inserti  
on of   
peritoneal dialysis   
catheter                                            Pepper Kelly  
Work Phone:   
[phone]  
   
                                        Start: 2023   IR Fistulogram/TLA S  
tent   
(Right)                                             [facility] Shantell Castorena  
Work Phone:   
[phone]  
   
                                        Start: 12-   Ultrasonography of   
arteriovenous fistula                               [facility] Shantell Castorena  
Work Phone:   
[phone]  
   
                                        Start: 2022   IR Fistulogram/TLA S  
tent   
(Right)                                             [facility] Shantell Castorena  
Work Phone:   
[phone]  
   
                                        Start: 2022   Ultrasonography of   
arteriovenous fistula                               [facility] Shantell Castorena  
Work Phone:   
[phone]  
   
                                        Start: 2022   Microalbumin [Mass/v  
olume]   
in Urine by Test strip                              Stacey Batres APRN-CNP  
Work Phone:   
[phone]  
   
                                        Start: 2022   Adult depression scr  
eening   
assessment                                          Stacey Batres APRN-CNP  
Work Phone:   
[phone]  
   
                          Start: 2022 Arteriovenous fistulization           
     FNP-CHERYL Castorena  
Work Phone:   
[phone]  
   
                                        Start: 2022   IR Fistulogram/TLA S  
tent   
(Right)                                             [facility] Shantell Castorena  
Work Phone:   
[phone]  
   
                                        Start: 2022   Ultrasonography of   
arteriovenous fistula                               FNP-CHERYL Castorena  
Work Phone:   
[phone]  
   
                          Start: 2022 Arteriovenous fistulization           
     FNP-CHERYL Castroena  
Work Phone:   
[phone]  
   
                          Start: 2021 Colonoscopy               [facility]   
rdinators  
Work Phone:   
[phone]  
  
  
  
Plan of Treatment  
  
  
                          Date         Care Activity Detail       Author  
   
                                        Start: 2033   DTaP,Tdap and Td Vac  
cines   
(2 - Td or Tdap)                        DTaP,Tdap and Td   
Vaccines (2 - Td or   
Tdap)                                   [facility]  
   
                          Start: 2026 LIPID SCREEN LIPID SCREEN [facility]  
   
                                Start: 2025 Glaucoma screening Diabetes: R  
etinopathy   
Screening                               [facility]  
   
                          Start: 2025 Adult BMI Screening Adult BMI Screen  
ing [facility]  
   
                          Start: 2025 Tobacco Screening Tobacco Screening   
[facility]  
   
                          Start: 2025 Tobacco Screening Tobacco Screening   
[facility]  
   
                          Start: 2025 Adult BMI Screening Adult BMI Screen  
ing [facility]   
System  
   
                          Start: 2025 Tobacco Screening Tobacco Screening   
[facility]  
   
                          Start: 2025 Adult BMI Screening Adult BMI Screen  
ing [facility]  
   
                          Start: 2025 Tobacco Screening Tobacco Screening   
[facility]  
   
                          Start: 2025 Adult BMI Screening Adult BMI Screen  
ing [facility]  
   
                          Start: 2025 Tobacco Screening Tobacco Screening   
[facility]  
   
                          Start: 2025 Urine screening for protein           
     [facility]  
   
                                                    Start: 2025  
End: 2025           Patient encounter procedure 2025 1:00 PM EDT   
Procedure Visit [facility] [address] [place]   
[id number] [phone] Nancy Torrez DPM [address] [place], [place]   
[id number] [phone]   
(Work) [phone]   
(Fax)                                   [facility]  
   
                                                    Start: 2025  
End: 2025           Patient encounter procedure 2025 1:00 PM EDT   
[facility] [address] [place] [address]   
[phone] Pepper Mendoza,  [address]   
[place] [address]   
[phone] (Work)   
[phone] (Fax)                      [facility]  
   
                          Start: 2025 Adult BMI Screening Adult BMI Screen  
ing [facility]  
   
                          Start: 2025 Tobacco Screening Tobacco Screening   
[facility]  
   
                          Start: 2025 Adult BMI Screening Adult BMI Screen  
ing [facility]  
   
                          Start: 03- Tobacco Screening Tobacco Screening   
[facility]  
   
                                Start: 2025 Hemoglobin A1c measurement [place]  
betes: Hemoglobin   
A1C                                     [facility]  
   
                                        Start: 2025   Screening for malign  
ant   
neoplasm of colon                       Colorectal Cancer   
Screening                               [facility]  
   
                                        Comment on above:   Postponed from  (Patient Refused)   
   
                                                    Start: 2025  
End: 2025     Patient encounter procedure                     [facility]  
   
                                        Comment on above:   Arrived   
   
                                                    Start: 2024  
End: 2024           Patient encounter procedure 2024 1:20 PM EST   
Office Visit [facility] [address] [place] [address] [phone] Pepper Mendoza, LUIS FERNANDO [address]   
[place] [address] [phone] (Work)   
[phone] (Fax)                      [facility]  
   
                          Start: 2024 DIABETES SCREEN DIABETES SCREEN [facility]  
   
                                                    Start: 2024  
End: 2024           Patient encounter procedure 2024 2:00 PM EST   
Office Visit [facility] [address] [place] [id number] [phone] Pepper Mendoza, LUIS FERNANDO [address]   
[place] [id number] [phone] (Work)   
[phone] (Fax)                      [facility]  
   
                                Start: 2024 Glaucoma screening Diabetes: R  
etinopathy   
Screening                               [facility]  
   
                                Start: 2024 Hemoglobin A1c measurement [place]  
betes: Hemoglobin   
A1C                                     [facility]  
   
                          Start: 2024 Adult BMI Screening Adult BMI Screen  
ing [facility]  
   
                          Start: 2024 Tobacco Screening Tobacco Screening   
[facility]  
   
                          Start: 10- Influenza vaccination Influenza Vacc  
ine (#1) [facility]  
   
                                        Comment on above:   Postponed from  (Patient Does Not Have Time)   
   
                                                    Start: 10-  
End: 10-           Patient encounter procedure 10/30/2024 1:00 PM EDT   
Office Visit [facility] [address] [place] [address] [phone] Pepper Mendoza, LUIS FERNANDO [address]   
[place] [id number] [phone] (Work)   
[phone] (Fax)                      [facility]  
   
                                                    Start: 10-  
End: 10-     Patient encounter procedure                     [facility]  
   
                                        Comment on above:   Arrived   
   
                          Start: 2024                           [facility]  
   
                                        Start: 2024   Ultrasonic guidance   
for   
needle biopsy                                       [facility]  
   
                          Start: 2024 Influenza vaccination              [facility]  
[facility]  
   
                                                    Start: 2024  
End: 2024                         Admission to same day   
surgery center                          2024 8:30 AM EDT   
- 2024 9:15 AM   
EDT Surgery [facility] [address]   
[place]   
[address] [phone] Shabana Hutchins MD [address] [place] [address] [phone]   
(Work) [phone]   
(Fax) CYSTOSCOPY   
[38808 (CPT )]                          [facility]  
   
                                        Comment on above:   CYSTOSCOPY [17026 (C  
PT )]   
   
                                                    Start: 2024  
End: 2024           Anesthesia consultation   2024 8:30 AM EDT   
Anesthesia Event   
[facility] [address] [place] [id number] [phone] Cyril Leary, DO [address] [place]   
[address] [phone]   
(Work) [phone]   
(Fax)                                   [facility]  
   
                                                    Start: 2024  
End: 2024                         Cysto bladder w/ureteral   
catheterization                         CYSTOSCOPY RETROGRADE   
PYELOGRAM Microscopic   
hematuria History of   
urethral stricture   
2024 8:30 AM EDT                  [facility]  
   
                                                    Start: 2024  
End: 2024                         Cysto calibration dilat   
urtl strix/stenosis                     CYSTOSCOPY DILATATION   
URETHRAL Microscopic   
hematuria History of   
urethral stricture   
2024 8:30 AM EDT                  [facility]  
   
                                                    Start: 2024  
End: 2024           Cystourethroscopy         CYSTOSCOPY Microscopic   
hematuria History of   
urethral stricture   
2024 8:30 AM EDT                  [facility]  
   
                                        Start: 2024   Subsequent hospital   
visit   
by physician                            2024 8:30 AM EDT   
Hospital Encounter   
[facility] [address] [place]   
[place] [id number] [phone] Shabana Hutchins MD [address] [place] [address] [phone]   
(Work) [phone]   
(Fax)                                   [facility]   
[facility]gery  
   
                                                    Start: 2024  
End: 2024           Patient encounter procedure 2024 9:45 AM EDT   
Procedure visit   
[facility]   
Admit [address] [place]   
[place] [id number] [phone]                            [facility]   
Pre Admit  
   
                                                    Start: 2024  
End: 2024     Patient encounter procedure                     [facility]   
[facility] Imaging  
   
                          Start: 2024                           [facility]  
   
                                        Start: 2024   Actin smooth muscle   
IgG Ab   
[Units/volume] in Serum                             [facility]  
   
                                        Start: 2024   Ceruloplasmin [Mass/  
volume]   
in Serum or Plasma                                  [facility]  
   
                                        Start: 2024   Hepatitis A virus Ab  
   
[Presence] in Serum by   
Immunoassay                                         [facility]  
   
                                        Start: 2024   Hepatitis A virus an  
tibody,   
IgM type                                            [facility]  
   
                                        Start: 2024   Hepatitis B core ant  
ibody   
measurement                                         [facility]  
   
                                        Start: 2024   Hepatitis B core ant  
ibody   
measurement, IgM type                               [facility]  
   
                                        Start: 2024   Hepatitis B virus metcalf  
rface   
Ab [Presence] in Serum                              [facility]  
   
                                        Start: 2024   IgG [Mass/volume] in  
 Serum   
or Plasma                                           [facility]  
   
                                        Start: 2024   Lipoprotein a   
[Moles/volume] in Serum or   
Plasma                                              [facility]  
   
                                        Start: 2024   Mitochondria M2 IgG   
Ab   
[Units/volume] in Serum                             [facility]  
   
                          Start: 2024                           [facility]  
   
                                                    Start: 2024  
End: 2025           MR Lumbar spine WO contrast MR lumbar spine   
without contrast   
Imaging Routine Lumbar   
radiculopathy   
Expected: 2024,   
Expires: 2025                     [facility]  
Work Phone:   
[phone]  
   
                                        Comment on above:   Expected: 2024  
, Expires: 2025   
   
                                                    Start: 2024  
End: 2025                         MR Thoracic spine WO   
contrast                                MR thoracic spine   
without contrast   
Imaging Routine   
Thoracic neuritis   
Expected: 2024,   
Expires: 2025                     [facility]   
[facility]  
   
                                        Comment on above:   Expected: 2024  
, Expires: 2025   
   
                                        Start: 2024   Screening for malign  
ant   
neoplasm of colon         Colonoscopy               [facility]  
   
                                        Start: 2023   Administration of va  
ricella   
zoster vaccine                          Zoster (Shingles)   
Vaccine (2 of 2)                        [facility]  
   
                          Start: 10- Fall Risk Screening Fall Risk Screen  
ing [facility]  
   
                          Start: 2023 Urine screening for protein Urine Mi  
croalbumin [facility]  
   
                          Start: 2023 Depression Screening Depression Scre  
ening [facility]  
   
                                                    Start: 2023  
End: 2023                                             [facility]  
   
                          Start: 2023 Diabetic foot examination Diabetic F  
oot Exam [facility]  
   
                          Start: 2023 Influenza vaccination LUNG CANCER SC  
REEN[facility]  
   
                                        Start: 2023   Medicare Annual Well  
ness   
Visit                                   Medicare Annual   
Wellness Visit                          [facility]  
   
                          Start: 2023                           [facility]  
   
                          Start: 2022                           [facility]  
   
                          Start: 2022                           [facility]  
Work Phone:   
[phone]  
   
                          Start: 2022                           [facility]  
Work Phone:   
[phone]  
   
                                        Start: 2022   Ultrasonography of   
arteriovenous fistula     US AV Fistula             [facility]  
   
                          Start: 2022 Colonoscopy  COLONOSCOPY  [facility]  
   
                                Start: 2022 COLORECTAL CANCER SCREENING CO  
LORECTAL CANCER   
SCREENING                               [facility]  
   
                                        Start: 2022   ADVANCE DIRECTIVE   
DISCUSSION                              ADVANCE DIRECTIVE   
DISCUSSION                              [facility]  
   
                          Start: 2015 ADULT PREVNAR-13 ADULT PREVNAR-13 Cl  
[facility]  
   
                                        Start: 2015   PNEUMOVAX AGE 65 AND  
 OVER   
WITH 5YR LOOKBACK (#1)                  PNEUMOVAX AGE 65 AND   
OVER WITH 5YR LOOKBACK   
(#1)                                    [facility]  
   
                                Start: 2000 SHINGRIX VACCINE (1 of 2) SHIN  
GRIX VACCINE (1 of   
2)                                      [facility]  
   
                          Start: 1995 COLOGUARD (FIT-DNA) COLOGUARD (FIT-D  
NA) [facility]  
   
                          Start: 1995 CT COLONOGRAPHY CT COLONOGRAPHY [facility]  
   
                          Start: 1995 FECAL OCCULT BLOOD FECAL OCCULT BLOO  
D [facility]  
   
                          Start: 1995 SIGMOIDOSCOPY SIGMOIDOSCOPY [facility]  
   
                                        Start: 1969   HEPATITIS B (1 of 3   
- Risk   
3-dose series)                          HEPATITIS B (1 of 3 -   
Risk 3-dose series)                     [facility]  
   
                                Start: 1969 Urine microalbumin profile DTA  
P,TDAP,TD (1 -   
Tdap)                                   [facility]  
   
                                Start: 1968 Adult BMI Follow Up Plan Adult  
 BMI Follow Up   
Plan                                    [facility]  
   
                          Start: 1968 HEPATITIS C SCREENING HEPATITIS C SC  
REENING [facility]  
   
                                        Start: 1962   Adult depression scr  
ning   
assessment                DEPRESSION SCREENING      [facility]  
   
                          Start: 1955 COVID-19 VACCINE (1) COVID-19 VACCIN  
E (1) [facility]  
   
                                        Start: 1951   HEPATITIS A (1 of 2   
- Risk   
2-dose series)                          HEPATITIS A (1 of 2 -   
Risk 2-dose series)                     [facility]  
   
                                        Start: 1950   ABDOMINAL AORTIC ANE  
URYSM   
SCREENING                               ABDOMINAL AORTIC   
ANEURYSM SCREENING                      [facility]  
   
                                Start: 1950 Glaucoma screening Diabetic Op  
hthalmology   
Exam                                    [facility]  
   
                                        Start: 1950   Screening for malign  
ant   
neoplasm of colon                                   [facility]  
   
                          Start: 1950 Tobacco Counseling Tobacco Counselin  
g [facility]  
   
                                                            Alpha 1 antitrypsin   
[Mass/volume] in Serum or   
Plasma                                              [facility]  
   
                                                            Alpha 1 antitrypsin   
phenotyping [Identifier] in   
Serum or Plasma by   
Immunofixation                                      [facility]  
   
                                                            Hepatitis B virus metcalf  
rface   
Ag [Presence] in Serum or   
Plasma by Immunoassay                               [facility]  
   
                                                            Hepatitis C virus Ig  
G Ab   
[Presence] in Serum or   
Plasma by Immunoassay                               [facility]  
   
                                                            HFE gene mutations f  
ound   
[Identifier] in Blood or   
Tissue by Molecular   
genetics method Nominal                             [facility]  
   
                                                            Homogenous nuclear A  
b   
pattern [Titer] in Serum                            [facility]  
   
                                       Nuclear Ab [Titer] in Serum                
[facility]  
   
                                       Patient Education              [facility]  
Work Phone:   
[phone]  
   
                                       Patient referral              [facility]  
Work Phone:   
[phone]  
   
                                                                 [facility]  
  
  
  
Immunizations  
  
  
                      Immunization Date Immunization Notes      Care Provider Fa  
jackie  
   
                                        09-          influenza virus vacc  
ine,   
unspecified formulation                             Pepper Mendoza NP  
Work Phone:   
[phone]                          [facility]  
   
                                        2024          hepatitis B vaccine,  
   
dialysis patient dosage                             Pepper Mendoza NP  
Work Phone:   
[phone]                          [facility]  
   
                                        2024          hepatitis B vaccine,  
   
dialysis patient dosage                             Jim Grigsby PT  
Work Phone:   
[phone]                          [facility]  
   
                                        2024          hepatitis B vaccine,  
   
dialysis patient dosage                             Jim Grigsby PT  
Work Phone:   
[phone]                          [facility]  
   
                                        2023          hepatitis B vaccine,  
   
dialysis patient dosage                             Jim Grigsby PT  
Work Phone:   
[phone]                          [facility]  
   
                                        11-          Pneumococcal Conjuga  
te   
PCV 20                                              Jim Grigsby PT  
Work Phone:   
[phone]                          [facility]  
   
                                        11-          zoster vaccine   
recombinant                                         Jim Grigsby PT  
Work Phone:   
[phone]                          [facility]  
   
                                        2023          Influenza, High-dose  
   
Seasonal, Quadrivalent,   
Preservative Free                                   Jim Grigsby PT  
Work Phone:   
[phone]                          [facility]  
   
                                        2023          tetanus toxoid, redu  
oskar   
diphtheria toxoid, and   
acellular pertussis   
vaccine, adsorbed                                   Jim Grigsby PT  
Work Phone:   
[phone]                          [facility]  
   
                                        2023          zoster vaccine   
recombinant                                         Jim Grigsby PT  
Work Phone:   
[phone]                          [facility]  
   
                                        2023          influenza virus vacc  
ine,   
unspecified formulation                             Gurdeep Padilla MD  
Work Phone:   
[phone]                          [facility]  
   
                                        2023          zoster vaccine,   
unspecified formulation                             Stacey RIDER  
Work Phone:   
[phone]                          [facility]  
   
                                        10-          Influenza Vaccine,   
Quadrivalent, Adjuvanted                            Stacey Batres   
APRN-CNP  
Work Phone:   
[phone]                          [facility]  
   
                                        2022          pneumococcal conjuga  
te   
vaccine, 13 valent                                  Stacey Batres   
APRN-CNP  
Work Phone:   
[phone]                          [facility]  
   
                                        2021          influenza, high dose  
   
seasonal,   
preservative-free                                   Stacey Batres   
APRN-CNP  
Work Phone:   
[phone]                          [facility]  
   
                                        2020          influenza, high dose  
   
seasonal,   
preservative-free                                   Stacey Batres   
APRN-CNP  
Work Phone:   
[phone]                          [facility]  
   
                                        2020          pneumococcal   
polysaccharide vaccine,   
23 valent                                           Stacey Batres   
APRN-CNP  
Work Phone:   
[phone]                          [facility]  
  
  
  
Payers  
  
  
                          Date         Payer Category Payer        Policy ID  
   
                          2023   Medicaid                  [id number]  
[id number]  
[id number]  
   
                          2023   Medicaid                  [id number]  
   
                          2023   Medicare                    
   
                                2023      Medicare (Managed Care) DEVOTED   
HEALTH Member   
Subscriber Plan / Payer   
(Effective   
2023-Present)   
Name: Sarai Arun VENTURA   
Member ID: [id number]   
Relation to Subscriber:   
Self Name: SaraiArun Subscriber ID:   
[id number] Payer ID: Not on   
file Group ID: [id number]   
Type: Not on file   
Address: [address]   
[place] [address]                    [id number]  
[id number]  
   
                          2023   Unknown                   [id number]  
[id number]  
[id number]  
   
                          2023   Medicare                  [id number]   
[id number]  
[id number]  
   
                                2022      Medicare        [employer] MEDICARE A  
ETNA   
MEDICARE PPO   
[id number]   
2022-Present   
[phone] [address]   
[address] [place]   
[id number] PPO                          [id number]   
[id number]  
[id number]  
   
                          1950   Unknown                   [id number]   
[id number]  
1950   Unknown                   [id number]   
[id number].  
1950   Unknown                   [id number]   
[id number].  
1950   Unknown                   [id number]   
[id number].  
196  
   
                          1950   Unknown                   [id number]   
[id number].  
196  
   
                          1950   Unknown                   [id number]   
[id number]  
196  
   
                          1950   Unknown                   [id number]   
[id number]  
[id number]   Unknown                   [id number]   
[id number].  
1950   Unknown                   [id number]   
[id number].  
1950   Unknown                   [id number]   
[id number].  
1950   Unknown                   [id number]   
[id number].  
[id number]   Unknown                   [id number]   
[id number].  
1950   Unknown                   [id number]   
[id number].  
1950   Unknown                   [id number]   
[id number].  
1950   Unknown                   [id number]   
[id number].  
1286  
   
                          1950   Unknown                   [id number]   
[id number].  
1950   Unknown                   [id number]   
[id number].  
1286  
   
                          1950   Unknown                   [id number]   
[id number].  
1950   Unknown                   [id number]   
[id number].  
1281950   Unknown                   [id number]   
[id number].  
1950   Unknown                   [id number]   
[id number].  
1950   Unknown                   [id number]   
[id number].  
1950   Unknown                   [id number]   
[id number].  
1281950   Unknown                   [id number]   
[id number].  
1950   Unknown                   [id number]   
[id number].  
1950   Unknown                   [id number]   
[id number]  
1950   Unknown                   [id number]   
[id number]  
93  
   
                          1950   Unknown                   [id number]   
[id number]  
93  
   
                          1950   Unknown                   [id number]   
[id number]  
93  
   
                          1950   Unknown                   [id number]   
[id number]  
93  
   
                          1950   Unknown                   [id number]   
[id number].  
93  
   
                          1950   Unknown                   [id number]   
[id number]  
1259  
   
                          1950   Unknown                   [id number]   
[id number]  
1259  
   
                          1950   Unknown                   [id number]   
[id number]  
[id number]   Unknown                   [id number]   
[id number].  
[id number]   Unknown                   [id number]   
[id number].  
1950   Unknown                   [id number]   
[id number].  
1950   Unknown                   [id number]   
[id number].  
1950   Unknown                   [id number]   
[id number].  
[id number]   Unknown                   [id number]   
[id number].  
1950   Unknown                   [id number]   
[id number].  
1950   Unknown                   [id number]   
[id number].  
[id number]   Unknown                   [id number]   
[id number].  
1251950   Unknown                   [id number]   
[id number].  
[id number]   Unknown                   [id number]   
[id number]  
[id number]   Unknown                   [id number]   
[id number]  
1259  
   
                                       Private Health Insurance              101  
[id number]   
[id number]  
[id number]  
   
                                       Self-pay     Self Pay     [id number]  
[id number]  
[id number]  
   
                                       Unknown      HCAP/HFA/FAP Active [id number]   
[id number]  
[id number]  
  
  
  
Social History  
  
  
                          Date         Type         Detail       Facility  
   
                                                    Start:   
2021  
End: 2023                         Tobacco smoking status   
[facility]                      Smokes tobacco daily      [facility]  
Work Phone:   
[phone]  
   
                                                    Start:   
1963          History of tobacco use Cigarette Smoker    [facility]  
Work Phone:   
[phone]  
   
                                                    Start:   
2021  
End: 2024                         Cigarettes smoked   
current (pack per day)   
- Reported                1                         [facility]  
   
                                                    Start:   
2021  
End: 2023                         Tobacco use and   
exposure                                Smokeless tobacco   
non-user                                [facility]  
Work Phone:   
[phone]  
   
                                                    Start:   
1950          Sex Assigned At Birth Not on file         [facility]  
   
                                                    Start:   
2022  
End: 2022                         Exposure to SARS-CoV-2   
(event)                   Not sure                  [facility]  
   
                                                    Start:   
2022  
End: 2023                         Tobacco smoking status   
[facility]                      Ex-smoker (finding)       [facility]  
   
                                                    Start:   
1950          Sex Assigned At Birth Male                [facility]  
   
                                                    Start:   
2022  
End: 2024                         Tobacco smoking status   
[facility]                      Smoker (finding)          [facility]  
   
                                                    Start:   
2024  
End: 10-     Sex Assigned At Birth                     [facility]   
[employer]  
Other Phone:   
[phone]  
   
                                                    Start:   
10-  
End: 2025                         Alcoholic beverage   
intake                                  Lifetime non-drinker   
(finding)                               [facility]  
   
                                                    Start:   
2023          Tobacco Comment     Quit 3-6 months ago [facility]  
   
                                                    Start:   
2023                Alcohol Comment           Caffeine: 2-3 cups/day   
coffee                                  [facility]  
   
                                                    Start:   
2024  
End: 2024                         Alcoholic beverage   
intake                    Ex-drinker (finding)      [facility]  
   
                                                            Has the electric, ga  
s,   
oil, or water company   
threatened to shut off   
services in your home   
in past 12Mo              No                        [facility]  
   
                                                            Are you now ,  
   
, ,   
, never   
 or living with   
a partner?                                 [facility]  
   
                                                            How often to you hav  
e a   
drink containing   
alcohol?                  Never                     [facility]  
   
                                                            How many standard   
drinks containing   
alcohol do you have on   
a typical day?            Patient does not drink    [facility]  
   
                                                            Do you feel stress -  
   
tense, restless,   
nervous, or anxious, or   
unable to sleep at   
night because your mind   
is troubled all the   
time - these days [OSQ]   Not at all                [facility]  
   
                                                    Start:   
2023          Tobacco Comment     6 cigarettes daily  [facility]  
tem  
   
                                                    Start:   
10-          Alcohol Comment     occ                 [facility]  
tem  
   
                                                    Start:   
2023                Gender identity           Identifies as male   
gender (finding)                        [facility]  
   
                                                    Start:   
2023          Sexual orientation  Heterosexual (finding) [facility]  
   
                                       History of tobacco use Passive smoker [facility]  
   
                                                    Start:   
2024          Alcohol Comment     very rare           [facility]  
  
  
  
Medical Equipment  
  
  
                                Procedure Code  Equipment Code  Equipment Origin  
al   
Text                                    Equipment   
Identifier                              Dates  
   
                                                    Insertion, catheter,   
dialysis, peritoneal,   
laparoscopic                                        Peritoneal   
dialysis catheter,   
chronic                                 [id number]  
[id number] FDA                              Start:   
2023  
   
                                                                Pen Needle,   
Diabetic (Bd Lizzy   
2nd Gen Pen   
Needle) 32 gauge x   
5/32  needle                                        Start:   
2024  
   
                                                                 [id number]   
[id number]                               Start:   
2022  
End:   
2025  
  
  
  
Goals  
  
  
                                Date            Patient Goal    Desired Activity  
/State  
   
                                                Personal health goal   
   
                                        Comment on above:   Formatting of this n  
ote might be different from the original.  
Evaluation of progress towards goal: Pt will return home with his   
daughter who is his caregiver.   
   
                                                                  
  
  
  
Clinical Notes 2022 to 2025  
   Nancy Torrez DPM - 2025 1:00 PM ESTPatient InstructionsTelephone   
Encounter - Pepper Mendoza NP - 2024 7:35 PM ESTTelephone Encounter - Pepper Mendoza NP - 2024 7:35 PM EST  
  
                                Note Date & Type Note            Facility  
   
                                                    2025 History of Presen  
t   
illness Narrative                       Formatting of this note is different   
from the original.  
Images from the original note were   
not included.  
  
  
Subjective  
Patient ID: Keith Moon is a 74   
y.o. male who presents for Nail care   
( Keith Moon is a 74 y.o. male   
who presents for DM Foot Care (PCP:   
Dr. Carlo GARZA 24, A1C: 7.8   
() BS: ).  
  
HPI  
HPI  
Constitutional:  
patient returns to clinic requesting   
nail care and diabetic foot   
assessment.  
Onychomycosis/Toenail Fungus:  
Location: multiple digits with   
thickened, deformed and discolored   
toenails. Multiple digits are   
problematic/symptomatic.  
Duration: chronic toenail deformity,   
multiple years duration.  
Severity of symptoms: mild;   
acknowledging diabetic neuropathy.  
Onset: gradual, without known injury   
or trauma.  
Status: problematic/mildly   
symptomatic over the past several   
weeks or so.  
Context: hard to trim, hard to   
reach; self-care is difficult,   
ineffective and not practical;   
particularly in light of diabetic   
retinopathy (legally blind);   
significantly increasing risk   
exposure. Family members unable to   
provide effective care.  
Characteristics: discolored,   
thickened, pain , pressure ,   
elongated , /lifting ,   
ingrowing , crusty; without bleeding   
or drainage.  
Relieved by: patient along with   
family members are well satisfied   
with conservative and palliative   
care measures.  
Previous Treatment: palliative care   
as noted.  
Risk factors: type II diabetes.   
Peripheral neuropathy (Q9). Medical   
comorbidities. Retinopathy (legally   
blind). Polypharmacy. Aspirin   
therapy. Toenail deformity. Digital   
and/or shoe trauma and related   
complications.  
Aggravated by: shoe gear , pressure   
, walking; catching and snagging on   
clothing etc..  
Medications  
  
Current Outpatient Medications:  
albuterol HFA 90 mcg/act inhaler,   
Inhale 2 puffs every 4 (four) hours   
if needed for shortness of breath or   
wheezing, Disp: , Rfl:  
amLODIPine (Norvasc) 5 MG tablet,   
Take 5 mg by mouth in the morning.,   
Disp: , Rfl:  
B Complex-C-Zn-Folic Acid   
(Dialyvite/Zinc) tablet, Take 1   
tablet by mouth at bedtime, Disp: ,   
Rfl:  
bumetanide (Bumex) 2 MG tablet, Take   
2 mg by mouth Daily, Disp: , Rfl:  
cholecalciferol (Vitamin D-3) 125   
MCG (5000 UT) capsule, Take 5,000   
Units by mouth in the morning.,   
Disp: , Rfl:  
cinacalcet (Sensipar) 30 MG tablet,   
Take 30 mg by mouth Daily (Patient   
taking differently: Take 30 mg by   
mouth 3 (three) times a week), Disp:   
, Rfl:  
Continuous Blood Gluc    
(FreeStyle Yudi 2 [place]) device, 1   
Device Daily as needed, Disp: , Rfl:  
Continuous Glucose Sensor (FreeStyle   
Yudi 2 Sensor) misc, 1 each by   
Other route every 14 (fourteen)   
days, Disp: 6 each, Rfl: 3  
cyclobenzaprine (Flexeril) 10 MG   
tablet, Take 1 tablet (10 mg) by   
mouth every 12 (twelve) hours, Disp:   
60 tablet, Rfl: 5  
epoetin joseph (Epogen,Procrit) 3000   
UNIT/ML injection, Inject as   
directed as needed, Disp: , Rfl:  
gentamicin (Garamycin) 0.1 %   
ointment, Apply 1 Application   
topically in the morning., Disp: ,   
Rfl:  
heparin 1000 units/mL injection,   
Infuse 1,000 Units/hr into a venous   
catheter every other day, Disp: ,   
Rfl:  
hydrOXYzine pamoate (Vistaril) 25 MG   
capsule, Take 1 capsule (25 mg) by   
mouth every 8 (eight) hours if   
needed for anxiety or itching, Disp:   
30 capsule, Rfl: 2  
insulin aspart, with niacinamide,   
(Fiasp FlexTouch) 100 UNIT/ML   
injection, 5 units before meals and   
8-10 units at bedtime. For total max   
dose of 25 units daily (Patient   
taking differently: 8u breakfast, 6u   
lunch, 8u dinner and HS plus sliding   
scale: 151-200=1, 201-250=2,   
251-300=3, 301-350=4, 351-400=5,   
>400 call), Disp: 23 mL, Rfl: 3  
insulin glargine (Basaglar KwikPen)   
100 UNIT/ML pen, 25 units daily   
(Patient taking differently: 9 units   
in the am, 12 units in the pm),   
Disp: 27 mL, Rfl: 1  
loratadine (Claritin) 10 MG tablet,   
Take 10 mg by mouth if needed for   
allergies, Disp: , Rfl:  
magnesium oxide (Mag-Ox) 400 (240   
Mg) MG tablet, Take 400 mg by   
mouth., Disp: , Rfl:  
melatonin 10 MG tablet, Take 10 mg   
by mouth at bedtime, Disp: , Rfl:  
omeprazole (PriLOSEC) 20 MG DR   
capsule, Take 20 mg by mouth in the   
morning., Disp: , Rfl:  
OneTouch Verio test strip, 1 each by   
Other route if needed, Disp: , Rfl:  
pen needle 32G x 4 mm misc, Uses up   
to 4 needles per day. Use as   
instructed, Disp: 400 each, Rfl: 3  
sevelamer carbonate (Renvela) 800 MG   
tablet, Take 1,600 mg by mouth in   
the morning and 1,600 mg at noon and   
1,600 mg in the evening. Take with   
meals., Disp: , Rfl:  
  
Allergies  
Thiopental  
  
Past Surgical History  
Past Surgical History:  
Procedure Laterality Date  
AV FISTULA PLACEMENT 10/2022  
Fistula  
CATHETERIZATION URETHRAL  
CYSTOSCOPY 2024  
GALLBLADDER SURGERY  
HEART CATH  
IR FISTULAGRAM 03/15/2023  
KNEE CARTILAGE SURGERY  
RT knee cartilage repair  
LIVER BIOPSY 2024  
NASAL SEPTUM SURGERY  
deviated septum  
VASECTOMY  
WISDOM TOOTH EXTRACTION  
wisdom teeth  
  
Family History  
Family History  
Adopted: Yes  
Problem Relation Name Age of Onset  
No Known Problems Mother  
No Known Problems Father  
  
Objective  
General Examination:  
GENERAL EXAMINATION: alert and   
oriented. Pleasant disposition.   
Accompanied by his daughter.  
FOOT EXAM:  
Date of Last Foot Exam 2025  
Sensory testing performed:   
sensations diminished  
Sensory and motor testing performed:   
strength diminished  
Pedal pulse taking performed: 1+  
Vascular:  
DORSALIS PEDIS PULSE: bilaterally,   
2/4.  
POSTERIOR TIBIAL PULSE: bilaterally,   
1/4.  
TEMPERATURE GRADIENT: warm to   
slightly cool.  
EDEMA: unremarkable for ankle edema.  
CAPILLARY FILLING TIME(sec):   
capillary fill intact bilateral   
digits less than 3 secs.  
Neurologic:  
VIBRATORY: absent at the MTP joints.  
SHARP SENSATION: tactile and light   
touch sensation intact.  
SEMMES-VANDANA 5.07 MONOFILAMENT:   
unable to localize multiple points   
plantarly.  
Dermatologic:  
SKIN FINDINGS: intact. Skin turgor   
is good.  
HYPERKERATOSIS: no forefoot or   
digital keratotic pressure lesions   
are noted.  
NAIL PATHOLOGY: Right great toe:   
Stable DSO deformity: Toenail   
dystrophy, thickening, elongation,   
discoloration, residual pincer   
deformity, periungual hyperkeratotic   
debris, without drainage.  
All remaining digits: Varying   
degrees of toenail dystrophy,   
thickening, elongation,   
discoloration, subtotal detachment,   
periungual hyperkeratotic and   
mycotic debris; without drainage.  
INGROWN NAIL PATHOLOGY: Satisfactory   
outcome lateral matricectomy/phenol   
technique right great toe.  
INTERDIGITAL MACERATION: clean, dry,   
non-inflamed.  
ULCER: no sign of ulceration or open   
wound.  
Ankle / Foot:  
RANGE OF MOTION: functional but   
limited ankle, subtalar and first   
MTP joint range of motion.  
  
Radiology:  
  
Assessment/Plan  
1. Onychodystrophy/mycosis multiple   
digits.  
2. Satisfactory outcome lateral   
marginal matricectomy right great   
toe (2024).  
3. Type II diabetes/IDDM.  
4. Diabetic peripheral neuropathy   
(Q9).  
5. Diabetic retinopathy (legally   
blind).  
6. CKD requiring dialysis  
  
Plan: Clinical Notes: conservative   
and palliative care measures are   
preferred, understood and again   
indicated; expressing no interest in   
oral or topical therapy.  
Continue B complex daily (Metanx   
cost prohibitive).  
Diabetic education and assessment   
relative to the high risk condition.  
  
Procedure:  
Toenail Debridement: Aseptic   
technique: power/manual   
instrumentation: onychodebridement   
length and thickness, curretage of   
offending crypotic margins,   
corby-ungual debris, providing   
effective pressure relief, reducing   
shoe and digital trauma; reducing   
potential risks associated with   
diabetic neuropathic foot condition   
and related complications.  
  
  
This note was created with the   
assistance of a speech recognition   
program. While intending to generate   
a timely document that accurately   
reflects the content of the visit,   
no guarantee can be provided that   
every grammatical or spelling   
mistake has been or will be   
identified or corrected. Thank you   
for your understanding.  
  
Nancy Torrez DPM  
Electronically signed by Nancy Torrez DPM at 2025 1:22 PM   
EST  
documented in this encounter            [facility]  
   
                                        2025 Instructions   
  
  
Nancy Torrez DPM - 2025   
1:00 PM ESTFormatting of this note   
might be different from the   
original.  
As noted  
Electronically signed by Nancy Torrez DPM at 2025 1:21 PM   
EST  
  
documented in this encounter            [facility]  
   
                                                    2024 Telephone encount  
er   
Note                                    Formatting of this note might be   
different from the original.  
May call daughter, let her know I   
have reviewed all [facility] EKG's   
dating back to  NONE of them   
demonstrate any a fib  
[place]  
Electronically signed by Pepper Mendoza NP at 2024 7:36 PM   
EST  
                                        [facility]  
   
                                        2024 Miscellaneous Notes Formattin  
g of this note might be   
different from the original.  
May call daughter, let her know I   
have reviewed all [facility] EKG's   
dating back to  NONE of them   
demonstrate any a fib  
[place]  
Electronically signed by Pepper Mendoza NP at 2024 7:36 PM   
EST  
documented in this encounter            [facility]  
   
                                                    2024 History of Presen  
t   
illness Narrative                       Associated Problem(s): Muscle spasm  
Formatting of this note might be   
different from the original.  
Cont flexeril  
Electronically signed by Pepper Mendoza NP at 2024 2:13 PM   
EST  
Associated Problem(s): Unspecified   
atrial fibrillation (CMS/HCC)  
Formatting of this note might be   
different from the original.  
Not on anti coagulation  
Never has been  
So wonder about if true a fib  
Will see if we can get notes from   
[facility]  
Old EKG : no a fib, it was NSR  
Electronically signed by Pepper Mendoza NP at 2024 2:13 PM   
EST  
Electronically signed by Pepper Mendoza NP at 2024 2:16 PM   
EST  
Electronically signed by Pepper Mendoza NP at 2024 2:16 PM   
EST  
Formatting of this note is different   
from the original.  
Images from the original note were   
not included.  
  
Arun Moon is a 74 y.o. male   
presents with chief complaint of   
Diabetes  
  
HPI:  
  
Here for recheck:  
Diabetes: is under the care of   
endocrinology now for this, is   
having low blood sugars in the   
middle of night, down to 66. Does   
not eat snack prior to going to bed  
  
Hypertension  
This is a chronic problem. The   
current episode started more than 1   
year ago. The problem is unchanged.   
The problem is controlled.   
Associated symptoms include anxiety.   
Pertinent negatives include no chest   
pain, malaise/fatigue, neck pain,   
palpitations, peripheral edema or   
shortness of breath. There are no   
associated agents to hypertension.   
Past treatments include calcium   
channel blockers. The current   
treatment provides significant   
improvement. There are no compliance   
problems. Hypertensive end-organ   
damage includes kidney disease,   
CAD/MI and CVA. Identifiable causes   
of hypertension include chronic   
renal disease.  
Anxiety  
Presents for follow-up visit.   
Symptoms include nervous/anxious   
behavior. Patient reports no chest   
pain, decreased concentration,   
depressed mood, dizziness, excessive   
worry, insomnia, irritability,   
muscle tension, palpitations,   
shortness of breath or suicidal   
ideas. Symptoms occur most days. The   
severity of symptoms is mild. The   
quality of sleep is fair.  
  
Compliance with medications is   
%.  
  
  
SUBJECTIVE:  
  
MEDICATIONS:  
Current Outpatient Medications  
Medication Instructions  
albuterol HFA 90 mcg/act inhaler 2   
puffs, Every 4 hours PRN  
amLODIPine (NORVASC) 5 mg, Daily  
B Complex-C-Zn-Folic Acid   
(Dialyvite/Zinc) tablet 1 tablet,   
Nightly  
bumetanide (BUMEX) 2 mg, Daily  
cholecalciferol (VITAMIN D-3) 5,000   
Units, Daily  
cinacalcet (SENSIPAR) 30 mg, Daily  
Continuous Blood Gluc    
(FreeStyle Yudi 2 [place]point) device 1   
Device, Daily PRN  
Continuous Glucose Sensor (FreeStyle   
Yudi 2 Sensor) misc 1 each, Other,   
Every 14 days  
cyclobenzaprine (FLEXERIL) 10 mg,   
Oral, Every 12 hours  
epoetin joseph (Epogen,Procrit) 3000   
UNIT/ML injection Inject as directed   
as needed  
gentamicin (Garamycin) 0.1 %   
ointment 1 Application, Daily RT  
heparin 1,000 Units/hr, Every other   
day  
hydrOXYzine pamoate (VISTARIL) 25   
mg, Oral, Every 8 hours PRN  
insulin aspart, with niacinamide,   
(Fiasp FlexTouch) 100 UNIT/ML   
injection 5 units before meals and   
8-10 units at bedtime. For total max   
dose of 25 units daily  
insulin glargine (Basaglar KwikPen)   
100 UNIT/ML pen 25 units daily  
loratadine (CLARITIN) 10 mg, As   
needed  
magnesium oxide (MAG-OX) 400 mg  
melatonin 10 mg, Nightly  
omeprazole (PRILOSEC) 20 mg, Daily   
RT  
OneTouch Verio test strip 1 each, As   
needed  
pen needle 32G x 4 mm misc Uses up   
to 4 needles per day. Use as   
instructed  
sevelamer carbonate (RENVELA) 1,600   
mg, Oral, 3 times daily with meals  
  
  
ALLERGIES:  
Allergies  
Allergen Reactions  
Thiopental  
Other Reaction(s): anger/rage  
  
  
REVIEW OF SYMPTOMS:  
  
Review of Systems  
Constitutional: Negative for   
activity change, appetite change,   
irritability, malaise/fatigue and   
unexpected weight change.  
HENT: Negative for ear pain,   
nosebleeds, sneezing, trouble   
swallowing and voice change.  
Eyes: Negative for pain, discharge   
and visual disturbance.  
Respiratory: Negative for apnea,   
chest tightness, shortness of breath   
and wheezing.  
Cardiovascular: Negative for chest   
pain, palpitations and leg swelling.  
Gastrointestinal: Negative for   
abdominal distention, blood in   
stool, constipation and diarrhea.  
Genitourinary: Negative for   
decreased urine volume, difficulty   
urinating, dysuria and hematuria.  
Musculoskeletal: Negative for neck   
pain.  
Skin: Negative for color change.  
Neurological: Negative for   
dizziness, tremors and seizures.  
Neuropathy  
Psychiatric/Behavioral: Negative for   
agitation, decreased concentration,   
hallucinations, self-injury and   
suicidal ideas. The patient is   
nervous/anxious. The patient does   
not have insomnia.  
Hematological: Negative for   
adenopathy. Does not bruise/bleed   
easily.  
Endocrine: Negative for cold   
intolerance, heat intolerance,   
polydipsia and polyuria.  
Allergic/Immunologic: Negative for   
environmental allergies and food   
allergies.  
  
  
PAST MEDICAL HISTORY  
  
Past Medical History:  
Diagnosis Date  
Anxiety, generalized (CMS/[facility])   
3/19/2024  
Arthritis 2024  
Asthma (CMS/[facility]) 2024  
Chronic back pain, unspecified back   
location, unspecified back pain   
laterality 2024  
COPD (chronic obstructive pulmonary   
disease) (CMS/[facility]) 2024  
Diverticulosis 2024  
Elevated liver function tests   
2024  
ESRD (end stage renal disease)   
(CMS/[facility]) 2024  
Gall stones 2024  
Heart disease 2024  
HLD (hyperlipidemia) (CMS/[facility])   
2024  
HTN (hypertension) (CMS/[facility])   
2024  
Legally blind 2024  
Migraines (CMS/[facility]) 2024  
Muscle spasm 2023  
Myocardial infarct (CMS/[facility])   
2024  
Neuropathy  
Tobacco user 2024  
Type 2 diabetes mellitus with ESRD   
(end-stage renal disease) (CMS/[facility])   
2024  
  
  
Past Surgical History:  
Procedure Laterality Date  
AV FISTULA PLACEMENT 10/2022  
Fistula  
CATHETERIZATION URETHRAL  
CYSTOSCOPY 2024  
GALLBLADDER SURGERY  
HEART CATH  
IR FISTULAGRAM 03/15/2023  
KNEE CARTILAGE SURGERY  
RT knee cartilage repair  
LIVER BIOPSY 2024  
NASAL SEPTUM SURGERY  
deviated septum  
VASECTOMY  
WISDOM TOOTH EXTRACTION  
wisdom teeth  
  
family history includes No Known   
Problems in his father and mother.   
He was adopted.  
  
OBJECTIVE:  
  
Visit Vitals  
/62 (BP Location: Left arm,   
Patient Position: Sitting, BP Cuff   
Size: Adult long)  
Pulse 88  
Temp 98.8 F (Temporal)  
Resp 18  
Ht 5' 11   
Wt 228 lb 9.6 oz  
SpO2 98%  
BMI 31.88 kg/m  
Smoking Status Former  
BSA 2.28 m  
  
  
Physical Exam  
Vitals and nursing note reviewed.  
Constitutional:  
Appearance: Normal appearance.  
HENT:  
Head: Normocephalic.  
Right Ear: External ear normal.  
Left Ear: External ear normal.  
Nose: Nose normal.  
Mouth/Throat:  
Mouth: Mucous membranes are moist.  
Pharynx: Oropharynx is clear.  
Eyes:  
Extraocular Movements: Extraocular   
movements intact.  
Conjunctiva/sclera: Conjunctivae   
normal.  
Neck:  
Vascular: No carotid bruit.  
Cardiovascular:  
Rate and Rhythm: Normal rate and   
regular rhythm.  
Pulses: Normal pulses.  
Heart sounds: Normal heart sounds.  
Pulmonary:  
Effort: Pulmonary effort is normal.  
Breath sounds: Normal breath sounds.   
No wheezing or rales.  
Abdominal:  
General: Bowel sounds are normal.  
Palpations: Abdomen is soft.  
Musculoskeletal:  
Cervical back: Neck supple.  
Right lower leg: No edema.  
Left lower leg: No edema.  
Lymphadenopathy:  
Cervical: No cervical adenopathy.  
Skin:  
General: Skin is warm and dry.  
Capillary Refill: Capillary refill   
takes 2 to 3 seconds.  
Neurological:  
General: No focal deficit present.  
Mental Status: He is alert.  
Psychiatric:  
Mood and Affect: Mood normal.  
Behavior: Behavior normal.  
Thought Content: Thought content   
normal.  
Judgment: Judgment normal.  
  
  
  
ASSESSMENT AND PLAN:  
  
No follow-ups on file.  
Problem List Items Addressed This   
Visit  
  
Muscle spasm  
Cont flexeril  
  
  
Relevant Medications  
cyclobenzaprine (Flexeril) 10 MG   
tablet  
HTN (hypertension) (CMS/[facility]) -   
Primary  
Please check blood pressure daily   
and record  
DASH diet  
Limit caffeine  
Take medication as directed  
Contact office if chest pain,   
pressure, dizziness, shortness of   
breath, swelling legs  
Recommend slow position changes  
Current meds: amlodipine  
  
  
BMI 29.0-29.9,adult  
Type 2 diabetes mellitus without   
complication, with long-term current   
use of insulin (CMS/[facility])  
Check blood sugars daily, notify if   
<70 or >200. Take medications (pills   
or insulin) as directed. Monitor for   
s/s of hypoglycemia (sweaty,   
dizziness, nausea, vomiting, or   
shakiness). Watch for increase in   
thirst, urination, or appetite.   
Inspect feet frequently monitoring   
for open wounds , and also recommend   
yearly eye exam.  
Pt should attempt to remain as   
physically active as chronic   
conditions allow, as well as trying   
to follow a diet low in   
carbohydrates, and simple sugars.  
Continues with endo  
Current meds: insulin  
A1c 7.8% on 24  
  
  
Relevant Medications  
Continuous Glucose Sensor (FreeStyle   
Yudi 2 Sensor) misc  
Tobacco user  
The patient has been advised of the   
risks of continued smoking: stroke,   
MI, all forms of cancer, lung   
disease, and death.  
Options for quitting smoking   
include: cold turkey, hypnosis,   
acupuncture, nicotine replacement   
meds (gum, lozenges, and patches),   
Buproprion, and Varenicline.  
At this time pt is encouraged to   
evaluate their goals for wanting to   
quit smoking, and reach out to   
provider when ready to start this   
process  
  
  
Anxiety, generalized (CMS/[facility])  
Continues with vistaril prn  
  
  
  
Polyneuropathy due to type 2   
diabetes mellitus (CMS/[facility])  
Does not taking anything for this  
Recommend glucose control within   
goal for his chronic health   
conditions  
  
  
Dependence on renal dialysis   
(CMS/[facility])  
Continue with nephrology  
  
  
Hepatic fibrosis  
Follows with GI  
  
  
  
Spinal stenosis of lumbar region   
with neurogenic claudication  
Relevant Medications  
cyclobenzaprine (Flexeril) 10 MG   
tablet  
Unspecified atrial fibrillation   
(CMS/[facility])  
Not on anti coagulation  
Never has been  
So wonder about if true a fib  
Will see if we can get notes from   
[facility]  
Old EKG : no a fib, it was NSR  
  
  
Other thrombophilia (CMS/[facility])  
  
  
Electronically signed by Pepper Mendoza NP at 2024 2:17 PM   
EST  
Associated Problem(s): Tobacco user  
Formatting of this note might be   
different from the original.  
The patient has been advised of the   
risks of continued smoking: stroke,   
MI, all forms of cancer, lung   
disease, and death.  
Options for quitting smoking   
include: cold turkey, hypnosis,   
acupuncture, nicotine replacement   
meds (gum, lozenges, and patches),   
Buproprion, and Varenicline.  
At this time pt is encouraged to   
evaluate their goals for wanting to   
quit smoking, and reach out to   
provider when ready to start this   
process  
Electronically signed by Pepper Mendoza NP at 2024 7:12 AM   
EST  
Associated Problem(s): Dependence on   
renal dialysis (CMS/[facility])  
Formatting of this note might be   
different from the original.  
Continue with nephrology  
Electronically signed by Pepper Mendoza NP at 2024 7:11 AM   
EST  
Associated Problem(s): Anxiety,   
generalized (CMS/[facility])  
Formatting of this note might be   
different from the original.  
Continues with vistaril prn  
  
Electronically signed by Pepper Mendoza NP at 2024 7:11 AM   
EST  
Associated Problem(s): Type 2   
diabetes mellitus without   
complication, with long-term current   
use of insulin (CMS/[facility])  
Formatting of this note might be   
different from the original.  
Check blood sugars daily, notify if   
<70 or >200. Take medications (pills   
or insulin) as directed. Monitor for   
s/s of hypoglycemia (sweaty,   
dizziness, nausea, vomiting, or   
shakiness). Watch for increase in   
thirst, urination, or appetite.   
Inspect feet frequently monitoring   
for open wounds , and also recommend   
yearly eye exam.  
Pt should attempt to remain as   
physically active as chronic   
conditions allow, as well as trying   
to follow a diet low in   
carbohydrates, and simple sugars.  
Continues with endo  
Current meds: insulin  
A1c 7.8% on 24  
Electronically signed by Pepper Mendoza NP at 2024 7:10 AM   
EST  
Electronically signed by Pepper Mendoza NP at 2024 7:12 AM   
EST  
Associated Problem(s): Hepatic   
fibrosis  
Formatting of this note might be   
different from the original.  
Follows with GI  
  
Electronically signed by Pepper Mendoza NP at 2024 7:10 AM   
EST  
Associated Problem(s): HTN   
(hypertension) (CMS/[facility])  
Formatting of this note might be   
different from the original.  
Please check blood pressure daily   
and record  
DASH diet  
Limit caffeine  
Take medication as directed  
Contact office if chest pain,   
pressure, dizziness, shortness of   
breath, swelling legs  
Recommend slow position changes  
Current meds: amlodipine  
Electronically signed by Pepper Mendoza NP at 2024 7:09 AM   
EST  
Associated Problem(s):   
Polyneuropathy due to type 2   
diabetes mellitus (CMS/[facility])  
Formatting of this note might be   
different from the original.  
Does not taking anything for this  
Recommend glucose control within   
goal for his chronic health   
conditions  
Electronically signed by Pepper Mendoza NP at 2024 7:08 AM   
EST  
documented in this encounter            [facility]  
   
                                                    10- History of Presen  
t   
illness Narrative                       Formatting of this note is different   
from the original.  
Images from the original note were   
not included.  
  
  
Subjective  
Patient ID: Keith Moon is a 74   
y.o. male who presents for DM Foot   
Care (PCP: Dr. Carlo GARZA 10/09/24,   
A1C: 9.1, BS: 239).  
  
HPI  
HPI  
Constitutional:  
patient returns to clinic requesting   
nail care and diabetic foot   
assessment.  
Onychomycosis/Toenail Fungus:  
Location: multiple digits with   
thickened, deformed and discolored   
toenails. Multiple digits are   
problematic/symptomatic.  
Duration: chronic toenail deformity,   
multiple years duration.  
Severity of symptoms: mild;   
acknowledging diabetic neuropathy.  
Onset: gradual, without known injury   
or trauma.  
Status: problematic/mildly   
symptomatic over the past several   
weeks or so.  
Context: hard to trim, hard to   
reach; self-care is difficult,   
ineffective and not practical;   
particularly in light of diabetic   
retinopathy (legally blind);   
significantly increasing risk   
exposure. Family members unable to   
provide effective care.  
Characteristics: discolored,   
thickened, pain , pressure ,   
elongated , /lifting ,   
ingrowing , crusty; without bleeding   
or drainage.  
Relieved by: patient along with   
family members are well satisfied   
with conservative and palliative   
care measures.  
Previous Treatment: palliative care   
as noted.  
Risk factors: type II diabetes.   
Peripheral neuropathy. Medical   
comorbidities. Retinopathy (legally   
blind). Polypharmacy. Aspirin   
therapy. Toenail deformity. Digital   
and/or shoe trauma and related   
complications.  
Aggravated by: shoe gear , pressure   
, walking; catching and snagging on   
clothing etc..  
Medications  
  
Current Outpatient Medications:  
albuterol HFA 90 mcg/act inhaler,   
Inhale 2 puffs every 4 (four) hours   
if needed for shortness of breath or   
wheezing, Disp: , Rfl:  
amLODIPine (Norvasc) 5 MG tablet,   
Take 5 mg by mouth in the morning.,   
Disp: , Rfl:  
atorvastatin (Lipitor) 40 MG tablet,   
Take 40 mg by mouth in the morning.,   
Disp: , Rfl:  
B Complex-C-Zn-Folic Acid   
(Dialyvite/Zinc) tablet, Take 1   
tablet by mouth at bedtime, Disp: ,   
Rfl:  
benzonatate (Tessalon) 200 MG   
capsule, Take 200 mg by mouth 3   
(three) times a day as needed, Disp:   
, Rfl:  
bumetanide (Bumex) 2 MG tablet, Take   
2 mg by mouth Daily, Disp: , Rfl:  
cefdinir (Omnicef) 300 MG capsule,   
Take 300 mg by mouth, Disp: , Rfl:  
cetirizine (ZyrTEC) 10 MG tablet,   
Take 10 mg by mouth in the morning.,   
Disp: , Rfl:  
cholecalciferol (Vitamin D-3) 125   
MCG (5000 UT) capsule, Take 5,000   
Units by mouth in the morning.,   
Disp: , Rfl:  
Continuous Blood Gluc    
(FreeStyle Yudi 2 Fairpoint) device, 1   
Device Daily as needed, Disp: , Rfl:  
Continuous Glucose Sensor (FreeStyle   
Yudi 2 Sensor) misc, 1 each by   
Other route Every 10 (ten) days,   
Disp: , Rfl:  
cyclobenzaprine (Flexeril) 10 MG   
tablet, Take 1 tablet (10 mg) by   
mouth every 12 (twelve) hours, Disp:   
60 tablet, Rfl: 2  
furosemide (Lasix) 40 MG tablet,   
Take 1 tablet by mouth Daily, Disp:   
, Rfl:  
gentamicin (Garamycin) 0.1 %   
ointment, Apply 1 Application   
topically in the morning., Disp: ,   
Rfl:  
heparin 1000 units/mL injection,   
Infuse 1,000 Units/hr into a venous   
catheter every other day, Disp: ,   
Rfl:  
hydrOXYzine pamoate (Vistaril) 25 MG   
capsule, Take 25 mg by mouth every 8   
(eight) hours if needed for anxiety   
or itching, Disp: , Rfl:  
insulin aspart, with niacinamide,   
(Fiasp FlexTouch) 100 UNIT/ML   
injection, 5 units before meals and   
8-10 units at bedtime. For total max   
dose of 25 units daily, Disp: 23 mL,   
Rfl: 3  
insulin glargine (Basaglar KwikPen)   
100 UNIT/ML pen, 25 units daily,   
Disp: 27 mL, Rfl: 1  
Lokelma 5 g packet, 5 g, Disp: ,   
Rfl:  
loratadine (Claritin) 10 MG tablet,   
Take 10 mg by mouth if needed for   
allergies, Disp: , Rfl:  
magnesium oxide (Mag-Ox) 400 (240   
Mg) MG tablet, Take 400 mg by   
mouth., Disp: , Rfl:  
Melatonin 1 MG sublingual tablet,   
Place 1 mg under the tongue at   
bedtime, Disp: , Rfl:  
omeprazole (PriLOSEC) 20 MG DR   
capsule, Take 20 mg by mouth in the   
morning., Disp: , Rfl:  
OneTouch Verio test strip, 1 each by   
Other route if needed, Disp: , Rfl:  
pen needle 32G x 4 mm misc, Uses up   
to 4 needles per day. Use as   
instructed, Disp: 400 each, Rfl: 3  
sevelamer carbonate (Renvela) 800 MG   
tablet, Take 800 mg by mouth in the   
morning and 800 mg at noon and 800   
mg in the evening. Take with meals.,   
Disp: , Rfl:  
  
Allergies  
Thiopental  
  
Past Surgical History  
Past Surgical History:  
Procedure Laterality Date  
AV FISTULA PLACEMENT 10/2022  
Fistula  
CATHETERIZATION URETHRAL  
CYSTOSCOPY 2024  
GALLBLADDER SURGERY  
HEART CATH  
IR FISTULAGRAM 03/15/2023  
KNEE CARTILAGE SURGERY  
RT knee cartilage repair  
LIVER BIOPSY 2024  
NASAL SEPTUM SURGERY  
deviated septum  
VASECTOMY  
WISDOM TOOTH EXTRACTION  
wisdom teeth  
  
Family History  
Family History  
Adopted: Yes  
Problem Relation Name Age of Onset  
No Known Problems Mother  
No Known Problems Father  
  
Objective  
General Examination:  
GENERAL EXAMINATION: alert and   
oriented. Pleasant disposition.   
Accompanied by his daughter.  
FOOT EXAM:  
Date of Last Foot Exam 10/16/2024  
Sensory testing performed:   
sensations diminished  
Sensory and motor testing performed:   
strength diminished  
Pedal pulse taking performed: 1+  
Vascular:  
DORSALIS PEDIS PULSE: bilaterally,   
2/4.  
POSTERIOR TIBIAL PULSE: bilaterally,   
1/4.  
TEMPERATURE GRADIENT: warm to   
slightly cool.  
EDEMA: unremarkable for ankle edema.  
CAPILLARY FILLING TIME(sec):   
capillary fill intact bilateral   
digits less than 3 secs.  
Neurologic:  
VIBRATORY: absent at the MTP joints.  
SHARP SENSATION: tactile and light   
touch sensation intact.  
SEMMES-VANDANA 5.07 MONOFILAMENT:   
unable to localize multiple points   
plantarly.  
Dermatologic:  
SKIN FINDINGS: intact. Skin turgor   
is good.  
HYPERKERATOSIS: no forefoot or   
digital keratotic pressure lesions   
are noted.  
NAIL PATHOLOGY: Right great toe:   
Stable DSO deformity: Toenail   
dystrophy, thickening, elongation,   
discoloration, residual pincer   
deformity, periungual hyperkeratotic   
debris, without drainage.  
All remaining digits: Varying   
degrees of toenail dystrophy,   
thickening, elongation,   
discoloration, subtotal detachment,   
periungual hyperkeratotic and   
mycotic debris; without drainage.  
INGROWN NAIL PATHOLOGY: Satisfactory   
outcome lateral matricectomy/phenol   
technique right great toe.  
INTERDIGITAL MACERATION: clean, dry,   
non-inflamed.  
ULCER: no sign of ulceration or open   
wound.  
Ankle / Foot:  
RANGE OF MOTION: functional but   
limited ankle, subtalar and first   
MTP joint range of motion.  
  
Radiology:  
  
Assessment/Plan  
1. Onychodystrophy/mycosis multiple   
digits.  
2. Satisfactory outcome lateral   
marginal matricectomy right great   
toe (2024).  
3. Type II diabetes/IDDM.  
4. Diabetic peripheral neuropathy   
(Q9).  
5. Diabetic retinopathy (legally   
blind).  
6. CKD requiring dialysis  
  
Plan: Clinical Notes: conservative   
and palliative care measures are   
preferred, understood and again   
indicated; expressing no interest in   
oral or topical therapy.  
Continue B complex daily (Metanx   
cost prohibitive).  
Diabetic education and assessment   
relative to the high risk condition.  
  
Procedure:  
Toenail Debridement: Aseptic   
technique: power/manual   
instrumentation: onychodebridement   
length and thickness, curretage of   
offending crypotic margins,   
corby-ungual debris, providing   
effective pressure relief, reducing   
shoe and digital trauma; reducing   
potential risks associated with   
diabetic neuropathic foot condition   
and related complications.  
  
  
This note was created with the   
assistance of a speech recognition   
program. While intending to generate   
a timely document that accurately   
reflects the content of the visit,   
no guarantee can be provided that   
every grammatical or spelling   
mistake has been or will be   
identified or corrected. Thank you   
for your understanding.  
  
Nancy Torrez DPM  
Electronically signed by Nancy Torrez DPM at 10/16/2024 1:37 PM   
EDT  
documented in this encounter            [facility]  
   
                                        10- Instructions   
  
  
Nancy Torrez DPM - 10/16/2024   
1:00 PM EDTFormatting of this note   
might be different from the   
original.  
As noted  
Electronically signed by Nancy Torrez DPM at 10/16/2024 1:36 PM   
EDT  
  
documented in this encounter            [facility]  
   
                                                    10- History of Presen  
t   
illness Narrative                       Associated Problem(s): HLD   
(hyperlipidemia) (CMS/HCC)  
Formatting of this note might be   
different from the original.  
Please ask GI when OK to start   
atorvastatin  
Electronically signed by Pepper Mendoza NP at 10/09/2024 11:00 AM   
EDT  
Associated Problem(s): Type 2   
diabetes mellitus without   
complication, with long-term current   
use of insulin (CMS/[facility]CC)  
Formatting of this note might be   
different from the original.  
Continue with Endo  
Electronically signed by Pepper Mendoza NP at 10/09/2024 11:00 AM   
EDT  
Associated Problem(s): DDD   
(degenerative disc disease), lumbar  
Formatting of this note might be   
different from the original.  
Will need to get copy of xrays from   
[facility]  
Requests referral to Pain Mgmt at   
[place]  
Electronically signed by Pepper Mendoza NP at 10/09/2024 10:59 AM   
EDT  
Associated Problem(s): End stage   
renal disease (CMS/HCC)  
Formatting of this note might be   
different from the original.  
Continue with nephrology  
And PD  
Electronically signed by Pepper Mendoza NP at 10/09/2024 10:58 AM   
EDT  
Associated Problem(s): Hepatic   
fibrosis  
Formatting of this note might be   
different from the original.  
Follow with GI next week  
  
Electronically signed by Pepper Mendoza NP at 10/09/2024 10:58 AM   
EDT  
Associated Problem(s): HTN   
(hypertension) (CMS/[facility])  
Formatting of this note might be   
different from the original.  
Stable no med dose changes  
Electronically signed by Pepper Mendoza NP at 10/09/2024 10:58 AM   
EDT  
Formatting of this note might be   
different from the original.  
241  
Right hip  
Pt would like a referral to [place]   
pain management  
Long acting 15u in the morning 10 at   
night  
Short acting is 6 un and sliding   
scale  
4x daily, add 1 un per every 50  
  
7day average-180  
14day average-196  
30 day average-198  
90 day average-209  
Electronically signed by TAMI RAMIREZ at 10/09/2024 11:01 AM EDT  
Formatting of this note is different   
from the original.  
Images from the original note were   
not included.  
  
Arun Moon is a 74 y.o. male   
presents with chief complaint of No   
chief complaint on file.  
  
HPI:  
  
Had a fall about 2 weeks ago, in the   
shower, slipped. Twisting his back   
and leg went out. Was evaluated at   
[facility], had xray negative.   
Would like to see Pain Mgmt at   
[place].  
Low back pain: 5/10 tooth ache type,   
constant, burning in the right knee,   
getting some charley horse type   
symptoms. No cauda equina symptoms.  
Blood sugar: 10 units BID, 6 units   
each meal and HS, plus sliding scale   
1 unit for every 50 units over 150.  
  
Still holding statin , had a liver   
biopsy, still waiting on results for   
this, Jeanie on 10/22/24.  
  
  
  
SUBJECTIVE:  
  
MEDICATIONS:  
Current Outpatient Medications  
Medication Instructions  
albuterol HFA 90 mcg/act inhaler 2   
puffs, Inhalation, Every 4 hours PRN  
amLODIPine (NORVASC) 5 mg, Oral,   
Daily  
atorvastatin (LIPITOR) 40 mg, Oral,   
Daily  
B Complex-C-Zn-Folic Acid   
(Dialyvite/Zinc) tablet 1 tablet,   
Oral, Nightly  
benzonatate (TESSALON) 200 mg, Oral,   
3 times daily PRN  
bumetanide (BUMEX) 2 mg, Oral, Daily  
cefdinir (OMNICEF) 300 mg, Oral  
cetirizine (ZYRTEC) 10 mg, Oral,   
Daily RT  
cholecalciferol (VITAMIN D-3) 5,000   
Units, Oral, Daily  
Continuous Blood Gluc    
(FreeStyle Yudi 2 Fairpoint) device 1   
Device, Does not apply, Daily PRN  
Continuous Glucose Sensor (FreeStyle   
Yudi 2 Sensor) misc 1 each, Other,   
Every 10 days  
cyclobenzaprine (FLEXERIL) 10 mg,   
Oral, Every 12 hours  
furosemide (Lasix) 40 MG tablet 1   
tablet, Oral, Daily  
gentamicin (Garamycin) 0.1 %   
ointment 1 Application, Topical,   
Daily RT  
heparin 1,000 Units/hr, Intravenous,   
Every other day  
hydrOXYzine pamoate (VISTARIL) 25   
mg, Oral, Every 8 hours PRN  
insulin aspart, with niacinamide,   
(Fiasp FlexTouch) 100 UNIT/ML   
injection 5 units before meals and   
8-10 units at bedtime. For total max   
dose of 25 units daily  
insulin glargine (Basaglar KwikPen)   
100 UNIT/ML pen 25 units daily  
Lokelma 5 g  
loratadine (CLARITIN) 10 mg, Oral,   
As needed  
magnesium oxide (MAG-OX) 400 mg,   
Oral  
Melatonin 1 mg, Sublingual, Nightly  
omeprazole (PRILOSEC) 20 mg, Oral,   
Daily RT  
OneTouch Verio test strip 1 each,   
Other, As needed  
pen needle 32G x 4 mm misc Uses up   
to 4 needles per day. Use as   
instructed  
sevelamer carbonate (RENVELA) 800   
mg, Oral, 3 times daily with meals  
  
  
ALLERGIES:  
Allergies  
Allergen Reactions  
Thiopental  
Other Reaction(s): anger/rage  
  
  
REVIEW OF SYMPTOMS:  
  
Review of Systems  
Constitutional: Negative for   
activity change, appetite change and   
unexpected weight change.  
HENT: Negative for ear pain,   
nosebleeds, sneezing, trouble   
swallowing and voice change.  
Eyes: Negative for pain, discharge   
and visual disturbance.  
Respiratory: Negative for apnea,   
chest tightness and wheezing.  
Cardiovascular: Negative for leg   
swelling.  
Gastrointestinal: Negative for   
abdominal distention, blood in   
stool, constipation and diarrhea.  
Genitourinary: Negative for   
decreased urine volume, difficulty   
urinating, dysuria and hematuria.  
Musculoskeletal: Positive for   
arthralgias and back pain.  
Skin: Negative for color change.  
Neurological: Negative for   
dizziness, tremors and seizures.  
Psychiatric/Behavioral: Negative for   
agitation, decreased concentration,   
hallucinations, self-injury and   
suicidal ideas. The patient is not   
nervous/anxious.  
Hematological: Negative for   
adenopathy. Does not bruise/bleed   
easily.  
Endocrine: Negative for cold   
intolerance, heat intolerance,   
polydipsia and polyuria.  
Allergic/Immunologic: Negative for   
environmental allergies and food   
allergies.  
  
  
PAST MEDICAL HISTORY  
  
Past Medical History:  
Diagnosis Date  
Anxiety, generalized (CMS/[facility])   
3/19/2024  
Arthritis 2024  
Asthma (CMS/[facility]) 2024  
Chronic back pain, unspecified back   
location, unspecified back pain   
laterality 2024  
COPD (chronic obstructive pulmonary   
disease) (CMS/[facility]) 2024  
Diverticulosis 2024  
Elevated liver function tests   
2024  
ESRD (end stage renal disease)   
(CMS/[facility]) 2024  
Gall stones 2024  
Heart disease 2024  
HLD (hyperlipidemia) (CMS/[facility])   
2024  
HTN (hypertension) (CMS/[facility])   
2024  
Legally blind 2024  
Migraines (CMS/[facility]) 2024  
Muscle spasm 2023  
Myocardial infarct (CMS/[facility])   
2024  
Neuropathy  
Tobacco user 2024  
Type 2 diabetes mellitus with ESRD   
(end-stage renal disease) (CMS/[facility])   
2024  
  
  
Past Surgical History:  
Procedure Laterality Date  
AV FISTULA PLACEMENT 10/2022  
Fistula  
CATHETERIZATION URETHRAL  
CYSTOSCOPY 2024  
GALLBLADDER SURGERY  
HEART CATH  
IR FISTULAGRAM 03/15/2023  
KNEE CARTILAGE SURGERY  
RT knee cartilage repair  
NASAL SEPTUM SURGERY  
deviated septum  
VASECTOMY  
WISDOM TOOTH EXTRACTION  
wisdom teeth  
  
family history includes No Known   
Problems in his father and mother.   
He was adopted.  
  
OBJECTIVE:  
  
Visit Vitals  
/68 (BP Location: Left arm,   
Patient Position: Sitting, BP Cuff   
Size: Adult long)  
Pulse 98  
Temp 98.5 F (Temporal)  
Resp 19  
Ht 5' 11   
Wt 221 lb 12.8 oz  
SpO2 98%  
BMI 30.93 kg/m  
Smoking Status Former  
BSA 2.25 m  
  
  
Physical Exam  
Vitals and nursing note reviewed.  
Constitutional:  
Appearance: Normal appearance.  
HENT:  
Head: Normocephalic.  
Right Ear: External ear normal.  
Left Ear: External ear normal.  
Nose: Nose normal.  
Mouth/Throat:  
Mouth: Mucous membranes are moist.  
Pharynx: Oropharynx is clear.  
Eyes:  
Extraocular Movements: Extraocular   
movements intact.  
Conjunctiva/sclera: Conjunctivae   
normal.  
Cardiovascular:  
Rate and Rhythm: Normal rate and   
regular rhythm.  
Pulses: Normal pulses.  
Heart sounds: Normal heart sounds.  
Pulmonary:  
Effort: Pulmonary effort is normal.  
Breath sounds: Normal breath sounds.  
Abdominal:  
General: Bowel sounds are normal.  
Palpations: Abdomen is soft.  
Musculoskeletal:  
General: Tenderness (right knee)   
present.  
Cervical back: Neck supple.  
Right lower leg: No edema.  
Left lower leg: No edema.  
Lymphadenopathy:  
Cervical: No cervical adenopathy.  
Skin:  
General: Skin is warm and dry.  
Capillary Refill: Capillary refill   
takes 2 to 3 seconds.  
Neurological:  
General: No focal deficit present.  
Mental Status: He is alert.  
Psychiatric:  
Mood and Affect: Mood normal.  
Behavior: Behavior normal.  
Thought Content: Thought content   
normal.  
Judgment: Judgment normal.  
  
  
  
ASSESSMENT AND PLAN:  
  
Follow up in about 2 months (around   
2024) for Recheck.  
Problem List Items Addressed This   
Visit  
  
HTN (hypertension) (CMS/HCC)  
Stable no med dose changes  
  
  
End stage renal disease (CMS/[facility]CC)  
Continue with nephrology  
And PD  
  
  
HLD (hyperlipidemia) (CMS/[facility])  
Please ask GI when OK to start   
atorvastatin  
  
  
Type 2 diabetes mellitus without   
complication, with long-term current   
use of insulin (CMS/[facility])  
Continue with Endo  
  
  
Tobacco user  
Hepatic fibrosis  
Follow with GI next week  
  
  
  
DDD (degenerative disc disease),   
lumbar - Primary  
Will need to get copy of xrays from   
[facility]  
Requests referral to Pain Mgmt at   
[place]  
  
  
Relevant Orders  
Ambulatory referral to Pain Medicine  
Acute right hip pain  
Relevant Orders  
Ambulatory referral to Pain Medicine  
  
  
Electronically signed by Pepper Mendoza NP at 10/09/2024 11:01 AM   
EDT  
documented in this encounter            [facility]  
   
                                                    2024 History of Presen  
t   
illness Narrative                       Formatting of this note is different   
from the original.  
Physical Therapy  
  
Seating and Mobility Evaluation  
  
Patient Name: Keith Moon  
MRN: [id number]  
Today's Date: 2024  
  
Subjective  
  
  
Medical History Pertinent to the   
Evaluation  
Arun is a 74-year-old male who   
presents with polynueropalthy, UE/LE   
weakness, imparied mobility,   
arthritis, asthma, muscle spasms,   
decreased endurance and fatigue and   
is in need of a complex power   
wheelchair in order to complete   
MRADLs.  
  
Home Environment  
Arun lives with his daughter in a   
one story home with ramp to enter.   
Pt is currently using a 4wheeled   
walker and he is minimal to CGA when   
attempting to ambulation.  
Transportation  
Car  
  
Subjective Data (i.e., interview,   
impairments, observations, etc)  
  
Functional Impairment  
Pt presents with generalized   
weakness, decrease endurance,   
decrease balance resulting in   
patient being a very high fall risk.  
Functional Assessment  
He has demonstrated B UE and LE   
weakness throughout. He is unable to   
propel an optimally configured   
manual wheelchair () because of   
BUE weakness, decrease ROM and   
impaired activity tolerance, &amp;   
pain, that make self-propelling   
unrealistic. A motorized scooter   
will not meet his medical   
necessities because he is unsafe   
with transfers on and off, the   
scooter doesn't offer seating   
options that he requires, and his   
decreased strength with poor trunk   
control will not allow him to   
control the tiller mechanism.  
Transfer Status  
Patient requires assistance  
Ambulation Status  
Ambulates with 4WW with short   
distance, very unsteady due to   
weakness; need one person assist for   
fall prevention.  
Objective Findings (i.e., physical   
exam, imaging, etc)  
  
Physical Exam  
Posture and flexibility of pelvis,   
trunk and neck:  
Pelvis: WFL and neurtral.  
Neck: Neutral/flexible  
Coordination and motor control:  
Left Upper Extremity fine motor   
control: poor  
Left Upper Extremity gross motor   
control:fair  
Left Lower Extremity motor control:   
impaired  
Right Upper Extremity fine motor   
control: Good  
Right Upper Extremity gross motor   
control: impaired  
Right Lower Extremity motor control:   
impaired  
Balance  
Sitting Balance: Good  
Static Standing Balance: Fair-  
Dynamic Standing Balance: Poor  
  
  
Muscle Strength and Range of Motion:  
  
Body Part Range of Motion  
PROM =PASSIVE  
AROM = ACTIVE Strength  
Comments  
Right Left Right Left  
Shoulder AROM: fWFL PROM:WFL 3-/5   
3-/5 Decreased motor control  
Elbow AROM: WFL WFL 3+/5 3-/5   
Supination/pronation: 2+ on left  
Hand WFL WFL 3/5 3-/5 Right thumb   
and second digit are numb  
Hip WFL WFL 3/5 3/5  
Knee 0 to 90 degrees 0 to 90 degrees   
3-/5 3-/5  
Feet/Ankle WFL WFL 3/5 3/5  
  
  
Muscle Tone--Normal  
Skin Integrity  
  
Currently skin is no intact. Pt is   
incontinent and sits a lot all day.   
Power tilt/recline will allow him to   
be independent with pressure relief.   
He is at risk due to immobility,   
amount of time spent in wheelchair,   
weakness, and impaired sensation.  
  
Cardio-pulmonary Status  
Decreased activity tolerance due to   
co-morbidities.  
i. Gastrointestinal, Bowel and   
Bladder Status  
Requires a positioning and skin   
protective seating system and   
powered mobility device to sit   
upright.  
Bowel and Bladder Status  
Pt is incontinent  
Assessment  
Therapeutic Problem List  
High fall risk with history of falls   
when ambulating.  
Unable to propel any type of manual   
wheelchair due to severely decreased   
strength, movement, ROM and fatigue   
requiring power mobility for   
independence.  
He is completely dependent on a   
power w/c for the completion of safe   
mobility and ADLS due to medical   
history.  
His current WC does not meet his   
positioning needs, inadequate   
seating system, and repairs needed.  
  
Electronically signed by Jim Grigsby PT at 2024 7:57 AM   
EDT  
documented in this encounter            [facility]  
  
  
  
                                                    2024 Evaluation note   
   
                                           
   
                                        Authored            2024 1:5  
6pm  
   
                                                    74-year-old man with end-sta  
ge renal disease referred   
to the liver clinic for evaluation of elevated  
liver enzymes  
  
JAMIE and SMA are positive Otherwise laboratory work up   
for infectious, autoimmune and metabolic  
etiologies of liver diseases were unremarkable.  
Patient has obesity, HTN, DM which are risk factors   
associated with MASLD. Patient does not have  
secondary causes of hepatic fat accumulation such as   
significant alcohol consumption, viral  
hepatitis, steatogenic medications (e.g., tamoxifen,   
amiodarone, methotrexate), or lipodystrophy.  
Ultrasound liver showed hepatic steatosis  
Fibroscan on 2024 showed LSM: 10.8 and   
- Will arrange for liver biopsy to confirm etiology   
AIH vs MASH and to confirm staging  
- Will check [facility]  
Work Phone: [phone]- Miscellaneous Notes* Telephone Encounter - 
  Lucho Ortega - 2024 11:38 AM EDTFormatting of this note might be 
  different from the original.  
PT had CYSTOSCOPY RETROGRADE PYELOGRAM yesterday in [place]. Daughter calls 
today asking when PT isto come back for post-op. Op Notes state to follow up as 
needed. Please advise, thank you!  
Electronically signed by Lucho Ortega at 2024 11:42 AM EDT  
  
* Telephone Encounter - Shabana Hutchins MD - 2024 11:38 AM EDT
  Formatting of this note might be different from the original.  
Correct. If he is doing ok doesn't need to come back.  
Electronically signed by Shabana Hutchins MD at 2024 11:46 AM EDT  
  
* Telephone Encounter - Lucho Ortega - 2024 11:38 AM EDTFormatting of 
  this note might be different from the original.  
LVM with Daughter Lora; advised PT does not need to be seen in office for 
post-op.  
Electronically signed by Lucho Ortega at 2024 2:34 PM EDT  
  
[facility] 
encounter Note* Telephone Encounter - Lucho Ortega - 2024 11:38 AM EDT
  Formatting of this note might be different from the original.  
PT had CYSTOSCOPY RETROGRADE PYELOGRAM yesterday in [place]. Daughter calls 
today asking when PT isto come back for post-op. Op Notes state to follow up as 
needed. Please advise, thank you!  
Electronically signed by Lucho Ortega at 2024 11:42 AM EDT  
  
[id number]- Telephone encounter Note* Telephone Encounter 
  - Shabana Hutchins MD - 2024 11:38 AM EDTFormatting of this note 
  might be different from the original.  
Correct. If he is doing ok doesn't need to come back.  
Electronically signed by Shabana Hutchins MD at 2024 11:46 AM EDT  
  
[facility]- Telephone encounter Note* Telephone Encounter 
  - Lucho Ortega - 2024 11:38 AM EDTFormatting of this note might be 
  different from the original.  
LVM with Daughter Lora; advised PT does not need to be seen in office for 
post-op.  
Electronically signed by Lucho Ortega at 2024 2:34 PM EDT  
  
[facility]- Instructions* Patient Instructions*   
  
Heidi Valle RN - 2024 9:45 AM EDT  
  
Formatting of this note is different from the original.  
Preoperative Education Checklist- General  
  
Surgery date: 24  
Surgery time: 0830 a.m.  
Arrival time: 0630 a.m.  
  
1. Bring a photo ID and your insurance card with you the day of surgery. You 
will check in at the main lobby of the [facility]- registration 
desk is straight ahead  
as soon as you walk in. Tell them you are here for surgery.  
  
2. If you have a Living Will/Durable Power of  for Health Care that is 
not on file here, please bring a copy the day of surgery.  
  
3. Please shower/bathe the night before surgery with the provided soap or wipes.
 Do not shower the morning of surgery- you will do use wipes when you arrive 
here at the hospital before getting into your surgical gown. Do not shave the 
area of your procedure for 2 days prior to your surgery.  
  
4. NO powder, lotion, perfume/cologne, aftershave, make-up, deodorant, or hair 
products after you have bathed.  
  
5. NO nail polish/acrylic on at least one finger. If you are having a hand, 
wrist or foot surgery then all nail polish and artificial/acrylic nails must be 
removed from that hand or foot.  
  
6. Avoid ALL Aspirin and non-steroidal anti-inflammatory drugs and certain 
vitamins (Ibuprofen, Advil, Aleve, Excedrin, Meloxicam, Celebrex, fish/krill 
oil, etc.) for 7 days prior to surgery as instructed by your surgeon and/or your
 prescribing doctor. Tylenol IS ALLOWED. If you are on Ticlid, Xarelto, Eliquis,
 Pradaxa, Plavix or Coumadin, please check with your prescribing doctor for 
instructions for when to stop them.  
  
7. If you use an inhaler, continue to use it routinely.  
  
8. Nothing to eat or drink (not even water, gum, mints, or hard candy!) AFTER 
midnight prior to your surgery.  
  
9. Take only medications that you are instructed to on the morning of surgery 
with a TINY SIP OF WATER.  
  
10. Choose a responsible adult that will be able to drive you home when you are 
discharged from your hospital stay for your surgery and can stay with you in 
your home for 24 hours after your procedure. You must NOT drive any vehicle or 
operate any machinery for 24 hours after surgery.  
  
11. When you dress for your appointment, please wear loose fitting clothing that
 is appropriate to accommodate your surgical area procedure. BRING WITH YOU ANY 
DEVICES YOU MAY NEED: RAFAELA hose, ice machine, sling/swath, brace or special shoe,
 oversized zip-up or button up shirt, CPAP machine if staying overnight.  
  
12. Do NOT wear jewelry, watches, or any piercings or metal for surgery- leave 
these valuables and money at home.  
  
13. Do NOT wear contact lenses for surgery- glasses are okay if needed.  
  
14. The anesthesiologist will talk with you the day of surgery and will ask you 
to sign a Consent Form.  
  
15. Refrain from smoking or any type of tobacco use for at least 8 hours and 
marijuana for 24 hoursprior to arrival for your surgery.  
  
16. If a GREEN BLOOD band is given to you, please bring it with you for the day 
of surgery.  
  
17. Notify your surgeon if you develop any illness before your surgery.  
  
18. If you are staying overnight, please DO NOT BRING your home medications with
 you.  
  
19. If you have any questions prior to surgery, please call the Preadmission 
Testing office at [phone], Mon.-Fri. 7 a.m.-3 p.m. Leave a voicemail if 
needed.  
  
Pre-Surgery Instructions:  
Medication Instructions  
acetaminophen (TYLENOL ARTHRITIS) 650 mg 8 hr tablet Stop taking 0 days prior to
 procedure  
albuterol (PROVENTIL HFA;VENTOLIN HFA) 90 mcg/actuation inhaler Stop taking 0 
days prior to procedure  
amLODIPine (NORVASC) 10 mg tablet Take morning of procedure  
aspirin 81 mg Stop taking 1 week prior to procedure  
atorvastatin (LIPITOR) 40 mg tablet Stop taking 0 days prior to procedure  
B complex-vitamin C-folic acid (NEPHROCAP) 1 mg capsule Stop taking 0 days prior
 to procedure  
benzonatate (TESSALON PERLES) 200 mg capsule Stop taking 0 days prior to 
procedure  
bumetanide (BUMEX) 2 mg tablet Stop taking 0 days prior to procedure  
cholecalciferol, vitamin D3, 10 mcg/drop (400 unit/drop) drops Stop taking 0 
days prior to procedure  
cyclobenzaprine (FLEXERIL) 10 mg tablet Stop taking 0 days prior to procedure  
FIASP FLEXTOUCH U-100 INSULIN 100 unit/mL (3 mL) insulin pen Stop taking 0 days 
prior to procedure  
FREESTYLE YUDI 2 SENSOR kit Stop taking 0 days prior to procedure  
furosemide (LASIX) 40 mg tablet Stop taking 0 days prior to procedure  
gentamicin (GARAMYCIN) 0.1 % ointment Stop taking 0 days prior to procedure  
heparin sodium,porcine (heparin, porcine,) 1,000 unit/mL infusion for dialysis 
Stop taking 0 days prior to procedure  
hydrOXYzine (ATARAX) 25 mg tablet Stop taking 0 days prior to procedure  
hydrOXYzine (VISTARIL) 25 mg capsule Stop taking 0 days prior to procedure  
insulin aspart, niacinamide, (FIASP FLEXTOUCH U-100 INSULIN) 100 unit/mL (3 mL) 
insulin pen Stop taking 0 days prior to procedure  
insulin glargine,hum.rec.anlog (BASAGLAR KWIKPEN U-100 INSULIN SUBQ) Take 
morning of procedure- only take half of regular dose, 12 units  
loratadine (CLARITIN) 10 mg tablet Stop taking 0 days prior to procedure  
magnesium oxide (MAGOX) 400 mg tablet Stop taking 0 days prior to procedure  
omeprazole (PriLOSEC) 20 mg capsule Take morning of procedure  
ONETOUCH ULTRA2 METER misc Stop taking 0 days prior to procedure  
ONETOUCH VERIO TEST STRIPS strip Stop taking 0 days prior to procedure  
  
sevelamer (RENVELA) 800 mg tablet Stop taking 0 days prior to procedure  
sodium zirconium cyclosilicate (LOKELMA) 5 gram packet Stop taking 0 days prior 
to procedure  
tiZANidine (ZANAFLEX) 4 mg tablet Stop taking 0 days prior to procedure  
UNABLE TO FIND Stop taking 0 days prior to procedure  
  
How to Avoid an Infection after Your Surgery  
  
Your doctor will give you specific instructions, but remember:  
  
-ALWAYS wash hands before caring for your catheter and/or after using the 
restroom.  
  
-ALWAYS wipe from front to back.  
  
-No make creams, lotion, powder, rubbing alcohol or hydrogen peroxide on 
surgical area (can harm the tissue and slow healing).  
  
-Your doctor will give you specific instructions for what type of dressing or 
equipment you will need and how often it will need changed for infection 
purposes.  
  
-Do not allow anyone to touch your surgical area unless they are cleaning, 
checking, or redressing it (be sure they wash their hands first).  
  
-No contact of your surgical area with pets or pet hair; avoid sleeping with 
pets.  
  
-Take full course of antibiotic if prescribed for you after surgery- do not stop
 unless directed dane your physician. You may also be given an antibiotic prior 
to your surgery to help prevent surgical site infections.  
  
-Eat a healthy and varied diet including proteins, fruits, and vegetables to 
help promote wound healing and keep blood sugars under control if you are 
diabetic.  
  
-Smoking slows the healing process by decreasing the amount of oxygen in your 
blood that is needed for tissue healing. Try to avoid or stop smoking if 
possible.  
  
CALL your doctor if you notice any of the following:  
  
-Increased redness or hardening around the surgical area.  
  
-Increased pain or increased blood in your urine.  
  
-If urine becomes increasingly cloudy, you notice sediment or particles in your 
urine, or you notice a foul odor or yellow or green discharge.  
  
-Fever higher than 101 degrees Fahrenheit for more than 4 hours.  
  
If you have a question, call your doctor s office. Go to the follow-up 
appointment with your doctor.  
Electronically signed by Heidi Valle RN at 2024 10:11 AM EDT  
  
  
  
documented in this encounterSy[facility]- Miscellaneous 
Notes* Perioperative Nursing Note - Heidi Valle RN - 2024 9:45 AM EDT
  Formatting of this note is different from the original.  
Preoperative Education Checklist- General  
  
Surgery date: 24  
Surgery time: 0830 a.m.  
Arrival time: 0630 a.m.  
  
1. Bring a photo ID and your insurance card with you the day of surgery. You 
will check in at the main lobby of the [facility]- registration 
desk is straight ahead  
as soon as you walk in. Tell them you are here for surgery.  
  
2. If you have a Living Will/Durable Power of  for Health Care that is 
not on file here, please bring a copy the day of surgery.  
  
3. Please shower/bathe the night before surgery with the provided soap or wipes.
 Do not shower the morning of surgery- you will do use wipes when you arrive 
here at the hospital before getting into your surgical gown. Do not shave the 
area of your procedure for 2 days prior to your surgery.  
  
4. NO powder, lotion, perfume/cologne, aftershave, make-up, deodorant, or hair 
products after you have bathed.  
  
5. NO nail polish/acrylic on at least one finger. If you are having a hand, 
wrist or foot surgery then all nail polish and artificial/acrylic nails must be 
removed from that hand or foot.  
  
6. Avoid ALL Aspirin and non-steroidal anti-inflammatory drugs and certain 
vitamins (Ibuprofen, Advil, Aleve, Excedrin, Meloxicam, Celebrex, fish/krill 
oil, etc.) for 7 days prior to surgery as instructed by your surgeon and/or your
 prescribing doctor. Tylenol IS ALLOWED. If you are on Ticlid, Xarelto, Eliquis,
 Pradaxa, Plavix or Coumadin, please check with your prescribing doctor for 
instructions for when to stop them.  
  
7. If you use an inhaler, continue to use it routinely.  
  
8. Nothing to eat or drink (not even water, gum, mints, or hard candy!) AFTER 
midnight prior to your surgery.  
  
9. Take only medications that you are instructed to on the morning of surgery 
with a TINY SIP OF WATER.  
  
10. Choose a responsible adult that will be able to drive you home when you are 
discharged from your hospital stay for your surgery and can stay with you in 
your home for 24 hours after your procedure. You must NOT drive any vehicle or 
operate any machinery for 24 hours after surgery.  
  
11. When you dress for your appointment, please wear loose fitting clothing that
 is appropriate to accommodate your surgical area procedure. BRING WITH YOU ANY 
DEVICES YOU MAY NEED: RAFAELA hose, ice machine, sling/swath, brace or special shoe,
 oversized zip-up or button up shirt, CPAP machine if staying overnight.  
  
12. Do NOT wear jewelry, watches, or any piercings or metal for surgery- leave 
these valuables and money at home.  
  
13. Do NOT wear contact lenses for surgery- glasses are okay if needed.  
  
14. The anesthesiologist will talk with you the day of surgery and will ask you 
to sign a Consent Form.  
  
15. Refrain from smoking or any type of tobacco use for at least 8 hours and 
marijuana for 24 hoursprior to arrival for your surgery.  
  
16. If a GREEN BLOOD band is given to you, please bring it with you for the day 
of surgery.  
  
17. Notify your surgeon if you develop any illness before your surgery.  
  
18. If you are staying overnight, please DO NOT BRING your home medications with
 you.  
  
19. If you have any questions prior to surgery, please call the Preadmission 
Testing office at [phone], Mon.-Fri. 7 a.m.-3 p.m. Leave a voicemail if 
needed.  
  
Pre-Surgery Instructions:  
Medication Instructions  
acetaminophen (TYLENOL ARTHRITIS) 650 mg 8 hr tablet Stop taking 0 days prior to
 procedure  
albuterol (PROVENTIL HFA;VENTOLIN HFA) 90 mcg/actuation inhaler Stop taking 0 
days prior to procedure  
amLODIPine (NORVASC) 10 mg tablet Take morning of procedure  
aspirin 81 mg Stop taking 1 week prior to procedure  
atorvastatin (LIPITOR) 40 mg tablet Stop taking 0 days prior to procedure  
B complex-vitamin C-folic acid (NEPHROCAP) 1 mg capsule Stop taking 0 days prior
 to procedure  
benzonatate (TESSALON PERLES) 200 mg capsule Stop taking 0 days prior to 
procedure  
bumetanide (BUMEX) 2 mg tablet Stop taking 0 days prior to procedure  
cholecalciferol, vitamin D3, 10 mcg/drop (400 unit/drop) drops Stop taking 0 
days prior to procedure  
cyclobenzaprine (FLEXERIL) 10 mg tablet Stop taking 0 days prior to procedure  
FIASP FLEXTOUCH U-100 INSULIN 100 unit/mL (3 mL) insulin pen Stop taking 0 days 
prior to procedure  
FREESTYLE YUDI 2 SENSOR kit Stop taking 0 days prior to procedure  
furosemide (LASIX) 40 mg tablet Stop taking 0 days prior to procedure  
gentamicin (GARAMYCIN) 0.1 % ointment Stop taking 0 days prior to procedure  
heparin sodium,porcine (heparin, porcine,) 1,000 unit/mL infusion for dialysis 
Stop taking 0 days prior to procedure  
hydrOXYzine (ATARAX) 25 mg tablet Stop taking 0 days prior to procedure  
hydrOXYzine (VISTARIL) 25 mg capsule Stop taking 0 days prior to procedure  
insulin aspart, niacinamide, (FIASP FLEXTOUCH U-100 INSULIN) 100 unit/mL (3 mL) 
insulin pen Stop taking 0 days prior to procedure  
insulin glargine,hum.rec.anlog (BASAGLAR KWIKPEN U-100 INSULIN SUBQ) Take 
morning of procedure- only take half of regular dose, 12 units  
loratadine (CLARITIN) 10 mg tablet Stop taking 0 days prior to procedure  
magnesium oxide (MAGOX) 400 mg tablet Stop taking 0 days prior to procedure  
omeprazole (PriLOSEC) 20 mg capsule Take morning of procedure  
ONETOUCH ULTRA2 METER misc Stop taking 0 days prior to procedure  
ONETOUCH VERIO TEST STRIPS strip Stop taking 0 days prior to procedure  
  
sevelamer (RENVELA) 800 mg tablet Stop taking 0 days prior to procedure  
sodium zirconium cyclosilicate (LOKELMA) 5 gram packet Stop taking 0 days prior 
to procedure  
tiZANidine (ZANAFLEX) 4 mg tablet Stop taking 0 days prior to procedure  
UNABLE TO FIND Stop taking 0 days prior to procedure  
  
How to Avoid an Infection after Your Surgery  
  
Your doctor will give you specific instructions, but remember:  
  
-ALWAYS wash hands before caring for your catheter and/or after using the 
restroom.  
  
-ALWAYS wipe from front to back.  
  
-No make creams, lotion, powder, rubbing alcohol or hydrogen peroxide on 
surgical area (can harm the tissue and slow healing).  
  
-Your doctor will give you specific instructions for what type of dressing or 
equipment you will need and how often it will need changed for infection 
purposes.  
  
-Do not allow anyone to touch your surgical area unless they are cleaning, 
checking, or redressing it (be sure they wash their hands first).  
  
-No contact of your surgical area with pets or pet hair; avoid sleeping with 
pets.  
  
-Take full course of antibiotic if prescribed for you after surgery- do not stop
 unless directed dane your physician. You may also be given an antibiotic prior 
to your surgery to help prevent surgical site infections.  
  
-Eat a healthy and varied diet including proteins, fruits, and vegetables to 
help promote wound healing and keep blood sugars under control if you are 
diabetic.  
  
-Smoking slows the healing process by decreasing the amount of oxygen in your 
blood that is needed for tissue healing. Try to avoid or stop smoking if 
possible.  
  
CALL your doctor if you notice any of the following:  
  
-Increased redness or hardening around the surgical area.  
  
-Increased pain or increased blood in your urine.  
  
-If urine becomes increasingly cloudy, you notice sediment or particles in your 
urine, or you notice a foul odor or yellow or green discharge.  
  
-Fever higher than 101 degrees Fahrenheit for more than 4 hours.  
  
If you have a question, call your doctor s office. Go to the follow-up 
appointment with your doctor.  
Electronically signed by Heidi Valle RN at 2024 10:10 AM EDT  
  
* Perioperative Nursing Note - Heidi Valle RN - 2024 9:45 AM EDT
  Formatting of this note might be different from the original.  
Surgical instructions reviewed. Patient verbalized understanding.  
Electronically signed by Heidi Valle RN at 2024 10:29 AM EDT  
  
[facility]* 
  Perioperative Nursing Note - Heidi Valle RN - 2024 9:45 AM EDT
  Formatting of this note is different from the original.  
Preoperative Education Checklist- General  
  
Surgery date: 24  
Surgery time: 0830 a.m.  
Arrival time: 0630 a.m.  
  
1. Bring a photo ID and your insurance card with you the day of surgery. You 
will check in at the main lobby of the [facility]- registration 
desk is straight ahead  
as soon as you walk in. Tell them you are here for surgery.  
  
2. If you have a Living Will/Durable Power of  for Health Care that is 
not on file here, please bring a copy the day of surgery.  
  
3. Please shower/bathe the night before surgery with the provided soap or wipes.
 Do not shower the morning of surgery- you will do use wipes when you arrive 
here at the hospital before getting into your surgical gown. Do not shave the 
area of your procedure for 2 days prior to your surgery.  
  
4. NO powder, lotion, perfume/cologne, aftershave, make-up, deodorant, or hair 
products after you have bathed.  
  
5. NO nail polish/acrylic on at least one finger. If you are having a hand, 
wrist or foot surgery then all nail polish and artificial/acrylic nails must be 
removed from that hand or foot.  
  
6. Avoid ALL Aspirin and non-steroidal anti-inflammatory drugs and certain 
vitamins (Ibuprofen, Advil, Aleve, Excedrin, Meloxicam, Celebrex, fish/krill 
oil, etc.) for 7 days prior to surgery as instructed by your surgeon and/or your
 prescribing doctor. Tylenol IS ALLOWED. If you are on Ticlid, Xarelto, Eliquis,
 Pradaxa, Plavix or Coumadin, please check with your prescribing doctor for 
instructions for when to stop them.  
  
7. If you use an inhaler, continue to use it routinely.  
  
8. Nothing to eat or drink (not even water, gum, mints, or hard candy!) AFTER 
midnight prior to your surgery.  
  
9. Take only medications that you are instructed to on the morning of surgery 
with a TINY SIP OF WATER.  
  
10. Choose a responsible adult that will be able to drive you home when you are 
discharged from your hospital stay for your surgery and can stay with you in 
your home for 24 hours after your procedure. You must NOT drive any vehicle or 
operate any machinery for 24 hours after surgery.  
  
11. When you dress for your appointment, please wear loose fitting clothing that
 is appropriate to accommodate your surgical area procedure. BRING WITH YOU ANY 
DEVICES YOU MAY NEED: RAFAELA hose, ice machine, sling/swath, brace or special shoe,
 oversized zip-up or button up shirt, CPAP machine if staying overnight.  
  
12. Do NOT wear jewelry, watches, or any piercings or metal for surgery- leave 
these valuables and money at home.  
  
13. Do NOT wear contact lenses for surgery- glasses are okay if needed.  
  
14. The anesthesiologist will talk with you the day of surgery and will ask you 
to sign a Consent Form.  
  
15. Refrain from smoking or any type of tobacco use for at least 8 hours and 
marijuana for 24 hoursprior to arrival for your surgery.  
  
16. If a GREEN BLOOD band is given to you, please bring it with you for the day 
of surgery.  
  
17. Notify your surgeon if you develop any illness before your surgery.  
  
18. If you are staying overnight, please DO NOT BRING your home medications with
 you.  
  
19. If you have any questions prior to surgery, please call the Preadmission 
Testing office at [phone], Mon.-Fri. 7 a.m.-3 p.m. Leave a voicemail if 
needed.  
  
Pre-Surgery Instructions:  
Medication Instructions  
acetaminophen (TYLENOL ARTHRITIS) 650 mg 8 hr tablet Stop taking 0 days prior to
 procedure  
albuterol (PROVENTIL HFA;VENTOLIN HFA) 90 mcg/actuation inhaler Stop taking 0 
days prior to procedure  
amLODIPine (NORVASC) 10 mg tablet Take morning of procedure  
aspirin 81 mg Stop taking 1 week prior to procedure  
atorvastatin (LIPITOR) 40 mg tablet Stop taking 0 days prior to procedure  
B complex-vitamin C-folic acid (NEPHROCAP) 1 mg capsule Stop taking 0 days prior
 to procedure  
benzonatate (TESSALON PERLES) 200 mg capsule Stop taking 0 days prior to 
procedure  
bumetanide (BUMEX) 2 mg tablet Stop taking 0 days prior to procedure  
cholecalciferol, vitamin D3, 10 mcg/drop (400 unit/drop) drops Stop taking 0 
days prior to procedure  
cyclobenzaprine (FLEXERIL) 10 mg tablet Stop taking 0 days prior to procedure  
FIASP FLEXTOUCH U-100 INSULIN 100 unit/mL (3 mL) insulin pen Stop taking 0 days 
prior to procedure  
FREESTYLE YUDI 2 SENSOR kit Stop taking 0 days prior to procedure  
furosemide (LASIX) 40 mg tablet Stop taking 0 days prior to procedure  
gentamicin (GARAMYCIN) 0.1 % ointment Stop taking 0 days prior to procedure  
heparin sodium,porcine (heparin, porcine,) 1,000 unit/mL infusion for dialysis 
Stop taking 0 days prior to procedure  
hydrOXYzine (ATARAX) 25 mg tablet Stop taking 0 days prior to procedure  
hydrOXYzine (VISTARIL) 25 mg capsule Stop taking 0 days prior to procedure  
insulin aspart, niacinamide, (FIASP FLEXTOUCH U-100 INSULIN) 100 unit/mL (3 mL) 
insulin pen Stop taking 0 days prior to procedure  
insulin glargine,hum.rec.anlog (BASAGLAR KWIKPEN U-100 INSULIN SUBQ) Take 
morning of procedure- only take half of regular dose, 12 units  
loratadine (CLARITIN) 10 mg tablet Stop taking 0 days prior to procedure  
magnesium oxide (MAGOX) 400 mg tablet Stop taking 0 days prior to procedure  
omeprazole (PriLOSEC) 20 mg capsule Take morning of procedure  
ONETOUCH ULTRA2 METER misc Stop taking 0 days prior to procedure  
ONETOUCH VERIO TEST STRIPS strip Stop taking 0 days prior to procedure  
  
sevelamer (RENVELA) 800 mg tablet Stop taking 0 days prior to procedure  
sodium zirconium cyclosilicate (LOKELMA) 5 gram packet Stop taking 0 days prior 
to procedure  
tiZANidine (ZANAFLEX) 4 mg tablet Stop taking 0 days prior to procedure  
UNABLE TO FIND Stop taking 0 days prior to procedure  
  
How to Avoid an Infection after Your Surgery  
  
Your doctor will give you specific instructions, but remember:  
  
-ALWAYS wash hands before caring for your catheter and/or after using the 
restroom.  
  
-ALWAYS wipe from front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   
  
                      Encounter Date Diagnosis  Assessment Notes Treatment Notes  
 Treatment   
Clinical Notes  
   
                                                AV fistula (ICD-10   
- I77.0)                                            This patient has a   
well-functioning   
right upper   
extremity, end to   
side, distal,   
Lonnie AV fistula.   
I think is ready   
for use. I have   
asked the patient   
to call me with   
any issues or   
concerns with the   
fistula. Otherwise   
we will see him as   
needed in the   
future.  
                                          
  
  
[facility]  
Other Phone: [phone]- [facility]- [facility]*   
  
                      Encounter Date Diagnosis  Assessment Notes Treatment Notes  
 Treatment   
Clinical Notes  
   
                                        29 Dec, 2022        AV fistula   
(ICD-10 - I77.0)                                    We reviewed today's   
graft flow scan of   
his right arm AV   
fistula which shows   
area of narrowing in   
the proximal region   
of the fistula. The   
fistula measures   
0.91 cm at the   
origin and decreases   
to 0.22 cm and   
returns to 0.43 cm   
at the mid region.   
Flows at the origin   
are 561 mL/min   
decreasing to 137   
mL/min after the   
area of stenoses.   
This patient will   
require fistulogram   
with balloon   
angioplasty to   
enable this fistula   
to mature further.   
Procedure, risk,   
benefits were   
discussed with him   
at length and all of   
his questions were   
addressed. Time is   
on our side as this   
patient does not yet   
require   
hemodialysis. He   
will continue   
following closely   
with his nephrology   
specialist and we   
will get this on the   
schedule in the near   
future. He   
verbalizes   
understanding of all   
discussion, agrees   
with this plan, and   
denies any   
questions.  
                                          
   
                                        29 Dec, 2022        CKD (chronic   
kidney disease)   
(ICD-10 - N18.9)                                              
  
  
[facility]  
Other Phone: [phone]- Evaluation note*   
  
                      Encounter Date Diagnosis  Assessment Notes Treatment Notes  
 Treatment   
Clinical Notes  
   
                                                AV fistula   
(ICD-10 - I77.0)                                    This patient's   
surgical sites are   
healing nicely and   
his fistula has a   
good thrill and   
bruit today. He is   
not currently in   
need of hemodialysis   
and follows closely   
with his nephrology   
specialist for   
routine monitoring   
of his kidney   
function. Patient   
tells me his most   
recent creatinine   
clearance was around   
14. We will continue   
to follow him along   
closely and have him   
back in 6 or 8 weeks   
time with a graft   
flow scan of the   
right arm AV fistula   
to monitor for   
maturation of his   
access. If he were   
to require   
hemodialysis prior   
to this point, he   
knows to call us   
prior to any   
attempts at access   
of the fistula. He   
verbalizes   
understanding of all   
discussion today,   
agrees with this   
plan, denies any   
questions.  
                                          
   
                                                Chronic kidney   
disease, stage IV   
(severe) (ICD-10   
- N18.4)                                                      
  
  
[facility]  
Other Phone: [phone]- Evaluation note*   
  
                      Encounter Date Diagnosis  Assessment Notes Treatment Notes  
 Treatment Clinical   
Notes  
   
                                        03 Oct, 2022        Preop testing   
(ICD-10 - Z01.818)                                            
  
  
[facility]  
Other Phone: [phone]- [facility]Reg[facility]- Evaluation note*   
  
                      Encounter Date Diagnosis  Assessment Notes Treatment Notes  
 Treatment   
Clinical Notes  
   
                                        07 Sep, 2022        Malfunction of   
arteriovenous   
dialysis fistula,   
initial encounter   
(ICD-10 - T82.590A)                                 I did review the   
fistula duplex   
with the patient   
today in the   
office. He does   
have low   
velocities that   
are less than 300.   
Therefore we will   
need to perform a   
fistulogram to try   
and increase the   
flow. Looks like   
he might have a   
proximal stenoses.   
This is explained   
to the patient. He   
understands the   
risks and benefits   
and wishes to   
proceed consent   
was obtained we   
will schedule this   
in the near   
future.  
                                          
  
  
[facility]  
Other Phone: [phone]- Evaluation note*   
  
                      Encounter Date Diagnosis  Assessment Notes Treatment Notes  
 Treatment   
Clinical Notes  
   
                                                AV fistula   
(ICD-10 - I77.0)                                    I am pleased with   
appearance of this   
fistula. We will   
see him back in   
about 8 weeks with   
a duplex to   
evaluate the   
maturation process.   
The patient   
understands agrees   
the plan all his   
questions were   
answered.  
                                          
  
  
[facility]  
Other Phone: [phone]- Procedure [facility]06- Evaluation note*   
  
                      Encounter Date Diagnosis  Assessment Notes Treatment Notes  
 Treatment   
Clinical Notes  
   
                                                Thrombosis of   
arteriovenous   
fistula, initial   
encounter (ICD-10 -   
T82.868A)                                           This patient will   
need a more proximal   
AV fistula in the   
right arm. I did   
review the vein   
mapping today in my   
office. It appears   
that he has adequate   
cephalic vein in the   
right arm. We will   
proceed with a   
brachiocephalic AV   
fistula right upper   
extremity. We will   
schedule this in the   
near future. I did   
explain the risks and   
benefits as well as   
medical surgical   
tenderness. The   
patient or stands   
wishes to proceed.  
                                          
  
  
[facility]  
Other Phone: [phone]- Evaluation note*   
  
                      Encounter Date Diagnosis  Assessment Notes Treatment Notes  
 Treatment   
Clinical Notes  
   
                                        19 May, 2022        CKD (chronic   
kidney disease)   
(ICD-10 - N18.9)                                    We reviewed today's   
graft flow scan which   
shows velocities   
within the right arm   
fistula ranging from   
213 mL/min to as low   
as 69 mL/min. Fistula   
diameter is 3.1 to   
4.8 mm. This fistula   
is not maturing quite   
like it should be at   
this point. This   
patient is not   
currently requiring   
hemodialysis. Time is   
on her side.   
Recommendation for   
fistulogram was   
discussed with the   
patient. Procedure,   
risk, benefits were   
discussed at length   
and all of his   
questions were   
addressed. Dr. Sánchez in room to   
discuss further with   
him. All of his   
questions were   
addressed. We will   
plan for right arm   
fistulogram using   
retrograde approach.   
We will get this on   
the schedule in the   
near future. Patient   
verbalizes   
understanding of all   
discussion, agrees   
with this plan,   
denies any further   
questions.  
                                          
  
  
[facility]  
Other Phone: [phone]- Evaluation note*   
  
                      Encounter Date Diagnosis  Assessment Notes Treatment Notes  
 Treatment   
Clinical Notes  
   
                                                Chronic kidney   
disease, stage IV   
(severe) (ICD-10 -   
N18.4)                                              The wound looks   
fine today. I am   
not concerned   
about any   
infection. We   
dressed the wound   
and we will see   
him back next   
month.  
                                          
  
  
[facility]  
Other Phone: [phone]- Evaluation note*   
  
                      Encounter Date Diagnosis  Assessment Notes Treatment Notes  
 Treatment   
Clinical Notes  
   
                                        24 Mar, 2022        Chronic kidney   
disease, stage IV   
(severe) (ICD-10 -   
N18.4)                                                
  
  
This patient is   
doing well after   
creation of a   
right upper   
extremity snuffbox   
AV fistula. We   
will see him back   
in 8 weeks for a   
ultrasound of the   
fistula to   
evaluate the   
maturation   
process. Currently   
the patient is not   
on dialysis he has   
CKD 4.                                    
  
  
  
  
[facility]  
Other Phone: [phone]- Evaluation note*   
  
                      Encounter Date Diagnosis  Assessment Notes Treatment Notes  
 Treatment   
Clinical Notes  
   
                                                CKD (chronic   
kidney disease)   
(ICD-10 - N18.9)                                      
  
  
I did review the   
patient's vein   
mapping with him   
today. Although he   
is right-handed the   
right cephalic vein   
in the forearm looks   
like a good   
possibility for AV   
fistula creation.   
This will be better   
than a more proximal   
fistula in the   
nondominant arm.   
Therefore I am   
recommending right   
upper extremity   
distal, and decide   
radiocephalic AV   
fistula creation.   
The risks and   
benefits were   
explained as well as   
medical surgical   
alternatives we also   
discussed potential   
complications as   
well as their   
management. He   
understands wished   
to proceed. We also   
reviewed the   
hemodialysis access   
handout together   
each page   
individually. All   
other questions were   
addressed we will   
schedule as the near   
future and I do   
prefer a regional   
block. Thank you                          
  
  
  
  
[facility]  
Other Phone: [phone] note*   
  
                                                    Diagnosis  
   
                                                      
  
  
Pre-transplant evaluation for kidney transplant- Primary  
  
  
Other specified pre-operative examination  
  
documented in this encounter  
[facility] HospitalEvaluation noteNo assessment information availableProtestant Hospital Ctr  
Work Phone: [phone] noteNo InformationNortSouth County Hospital Professional 
[facility]por[facility]  
Other Phone: [phone] note*   
  
                                                    Diagnosis  
   
                                                      
  
  
Degeneration of intervertebral disc of lumbar region, unspecified whether pain   
present- Primary  
   
                                                      
  
  
Acute right hip pain  
   
                                                      
  
  
Primary hypertension (CMS/HCC)  
  
  
Unspecified essential hypertension  
   
                                                      
  
  
Hepatic fibrosis  
  
  
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  
   
                                                      
  
  
End stage renal disease (CMS/HCC)  
  
  
End stage renal disease  
   
                                                      
  
  
Type 2 diabetes mellitus without complication, with long-term current use of 
insulin   
(CMS/HCC)  
   
                                                      
  
  
Tobacco user  
  
  
Tobacco use disorder  
   
                                                      
  
  
Mixed hyperlipidemia (CMS/HCC)  
  
  
Mixed hyperlipidemia  
  
documented in this encounter  
[facility]   
  
                                                    Diagnosis  
   
                                                      
  
  
Muscle spasm  
  
  
Spasm of muscle  
   
                                                      
  
  
Spinal stenosis of lumbar region with neurogenic claudication  
  
documented in this encounter  
[facility]*   
  
                                                    Diagnosis  
   
                                                      
  
  
Type 2 diabetes mellitus with diabetic polyneuropathy, without long-term current
 use   
of insulin (CMS/HCC)- Primary  
   
                                                      
  
  
Primary hypertension (CMS/HCC)  
  
  
Unspecified essential hypertension  
   
                                                      
  
  
ESRD (end stage renal disease) (CMS/HCC)  
  
  
End stage renal disease  
   
                                                      
  
  
Mixed hyperlipidemia (CMS/HCC)  
  
  
Mixed hyperlipidemia  
   
                                                      
  
  
Legally blind  
   
                                                      
  
  
Chronic obstructive pulmonary disease, unspecified COPD type (CMS/HCC)  
   
                                                      
  
  
Screening for prostate cancer  
  
  
Special screening for malignant neoplasm of prostate  
   
                                                      
  
  
Polyp of colon, unspecified part of colon, unspecified type  
   
                                                      
  
  
BMI 29.0-29.9,adult  
   
                                                      
  
  
Pneumonia due to infectious organism, unspecified laterality, unspecified part 
of   
lung- Primary  
   
                                                      
  
  
Primary hypertension (CMS/HCC)  
  
  
Unspecified essential hypertension  
   
                                                      
  
  
Elevated liver function tests  
  
  
Other abnormal blood chemistry  
   
                                                      
  
  
ESRD (end stage renal disease) (CMS/HCC)  
  
  
End stage renal disease  
   
                                                      
  
  
Type 2 diabetes mellitus without complication, with long-term current use of 
insulin   
(CMS/HCC)  
   
                                                      
  
  
ESRD (end stage renal disease) (CMS/HCC)- Primary  
  
  
End stage renal disease  
   
                                                      
  
  
Elevated liver function tests  
  
  
Other abnormal blood chemistry  
   
                                                      
  
  
Type 2 diabetes mellitus without complication, with long-term current use of 
insulin   
(CMS/HCC)  
   
                                                      
  
  
Tobacco user  
  
  
Tobacco use disorder  
   
                                                      
  
  
BMI 29.0-29.9,adult  
   
                                                      
  
  
Primary hypertension (CMS/[facility])  
  
  
Unspecified essential hypertension  
   
                                                      
  
  
Encounter for subsequent annual wellness visit (AWV) in [employer] patient- 
Primary  
   
                                                      
  
  
ESRD (end stage renal disease) (CMS/[facility])  
  
  
End stage renal disease  
   
                                                      
  
  
Type 2 diabetes mellitus without complication, with long-term current use of 
insulin   
(CMS/[facility])  
   
                                                      
  
  
Primary hypertension (CMS/[facility])  
  
  
Unspecified essential hypertension  
   
                                                      
  
  
Elevated liver function tests  
  
  
Other abnormal blood chemistry  
   
                                                      
  
  
Chronic back pain, unspecified back location, unspecified back pain laterality  
   
                                                      
  
  
Routine general medical examination at health care facility  
  
  
Routine general medical examination at a health care facility  
   
                                                      
  
  
Type 2 diabetes mellitus without complication, with long-term current use of 
insulin   
(CMS/[facility])- Primary  
   
                                                      
  
  
Type 2 diabetes mellitus with diabetic chronic kidney disease (CMS/[facility])  
   
                                                      
  
  
Dependence on renal dialysis (CMS/[facility])  
  
  
Renal dialysis status  
   
                                                      
  
  
End stage renal disease (CMS/[facility])  
  
  
End stage renal disease  
   
                                                      
  
  
Immunodeficiency due to conditions classified elsewhere (CMS/[facility])  
   
                                                      
  
  
Atherosclerosis of aorta (CMS/[facility])  
  
  
Atherosclerosis of aorta  
   
                                                      
  
  
Polyneuropathy due to type 2 diabetes mellitus (CMS/[facility])  
   
                                                      
  
  
Primary hypertension (CMS/[facility])  
  
  
Unspecified essential hypertension  
   
                                                      
  
  
Elevated liver function tests  
  
  
Other abnormal blood chemistry  
   
                                                      
  
  
Elevated alkaline phosphatase level  
   
                                                      
  
  
Tobacco user  
  
  
Tobacco use disorder  
   
                                                      
  
  
Impaired mobility and activities of daily living  
   
                                                      
  
  
Legally blind  
   
                                                      
  
  
Degeneration of intervertebral disc of lumbar region, unspecified whether pain   
present- Primary  
   
                                                      
  
  
Acute right hip pain  
   
                                                      
  
  
Primary hypertension (CMS/[facility])  
  
  
Unspecified essential hypertension  
   
                                                      
  
  
Hepatic fibrosis  
  
  
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  
   
                                                      
  
  
End stage renal disease (CMS/[facility])  
  
  
End stage renal disease  
   
                                                      
  
  
Type 2 diabetes mellitus without complication, with long-term current use of 
insulin   
(CMS/[facility])  
   
                                                      
  
  
Tobacco user  
  
  
Tobacco use disorder  
   
                                                      
  
  
Mixed hyperlipidemia (CMS/[facility])  
  
  
Mixed hyperlipidemia  
   
                                                      
  
  
Dermatophytosis of nail- Primary  
   
                                                      
  
  
Dystrophic nail  
  
  
Other specified disease of nail  
   
                                                      
  
  
Diabetic polyneuropathy associated with type 2 diabetes mellitus (CMS/[facility])  
   
                                                      
  
  
Encounter for long-term (current) use of insulin (CMS/[facility])  
  
  
Encounter for long-term (current) use of insulin  
  
documented in this encounter  
[facility]   
  
                                                    Diagnosis  
   
                                                      
  
  
Primary hypertension (CMS/[facility])- Primary  
  
  
Unspecified essential hypertension  
   
                                                      
  
  
End stage renal disease (CMS/[facility])  
  
  
End stage renal disease  
   
                                                      
  
  
Legally blind  
   
                                                      
  
  
Polyneuropathy due to type 2 diabetes mellitus (CMS/[facility])  
   
                                                      
  
  
Dependence on renal dialysis (CMS/[facility])  
  
  
Renal dialysis status  
   
                                                      
  
  
Impaired mobility and activities of daily living  
  
documented in this encounter  
[facility]*   
  
                                                    Diagnosis  
   
                                                      
  
  
Type 2 diabetes mellitus with diabetic polyneuropathy, without long-term current
 use   
of insulin (CMS/[facility])- Primary  
   
                                                      
  
  
Primary hypertension (CMS/)  
  
  
Unspecified essential hypertension  
   
                                                      
  
  
ESRD (end stage renal disease) (CMS/)  
  
  
End stage renal disease  
   
                                                      
  
  
Mixed hyperlipidemia (CMS/[facility])  
  
  
Mixed hyperlipidemia  
   
                                                      
  
  
Legally blind  
   
                                                      
  
  
Chronic obstructive pulmonary disease, unspecified COPD type (CMS/[facility])  
   
                                                      
  
  
Screening for prostate cancer  
  
  
Special screening for malignant neoplasm of prostate  
   
                                                      
  
  
Polyp of colon, unspecified part of colon, unspecified type  
   
                                                      
  
  
BMI 29.0-29.9,adult  
   
                                                      
  
  
Pneumonia due to infectious organism, unspecified laterality, unspecified part 
of   
lung- Primary  
   
                                                      
  
  
Primary hypertension (CMS/)  
  
  
Unspecified essential hypertension  
   
                                                      
  
  
Elevated liver function tests  
  
  
Other abnormal blood chemistry  
   
                                                      
  
  
ESRD (end stage renal disease) (CMS/)  
  
  
End stage renal disease  
   
                                                      
  
  
Type 2 diabetes mellitus without complication, with long-term current use of 
insulin   
(CMS/)  
   
                                                      
  
  
ESRD (end stage renal disease) (CMS/)- Primary  
  
  
End stage renal disease  
   
                                                      
  
  
Elevated liver function tests  
  
  
Other abnormal blood chemistry  
   
                                                      
  
  
Type 2 diabetes mellitus without complication, with long-term current use of 
insulin   
(CMS/)  
   
                                                      
  
  
Tobacco user  
  
  
Tobacco use disorder  
   
                                                      
  
  
BMI 29.0-29.9,adult  
   
                                                      
  
  
Primary hypertension (CMS/)  
  
  
Unspecified essential hypertension  
   
                                                      
  
  
Encounter for subsequent annual wellness visit (AWV) in [employer] patient- 
Primary  
   
                                                      
  
  
ESRD (end stage renal disease) (CMS/)  
  
  
End stage renal disease  
   
                                                      
  
  
Type 2 diabetes mellitus without complication, with long-term current use of 
insulin   
(CMS/)  
   
                                                      
  
  
Primary hypertension (CMS/)  
  
  
Unspecified essential hypertension  
   
                                                      
  
  
Elevated liver function tests  
  
  
Other abnormal blood chemistry  
   
                                                      
  
  
Chronic back pain, unspecified back location, unspecified back pain laterality  
   
                                                      
  
  
Routine general medical examination at health care facility  
  
  
Routine general medical examination at a health care facility  
   
                                                      
  
  
Type 2 diabetes mellitus without complication, with long-term current use of 
insulin   
(CMS/[facility])- Primary  
   
                                                      
  
  
Type 2 diabetes mellitus with diabetic chronic kidney disease (CMS/)  
   
                                                      
  
  
Dependence on renal dialysis (CMS/)  
  
  
Renal dialysis status  
   
                                                      
  
  
End stage renal disease (CMS/[facility])  
  
  
End stage renal disease  
   
                                                      
  
  
Immunodeficiency due to conditions classified elsewhere (CMS/[facility])  
   
                                                      
  
  
Atherosclerosis of aorta (CMS/[facility])  
  
  
Atherosclerosis of aorta  
   
                                                      
  
  
Polyneuropathy due to type 2 diabetes mellitus (CMS/[facility])  
   
                                                      
  
  
Primary hypertension (CMS/HCC)  
  
  
Unspecified essential hypertension  
   
                                                      
  
  
Elevated liver function tests  
  
  
Other abnormal blood chemistry  
   
                                                      
  
  
Elevated alkaline phosphatase level  
   
                                                      
  
  
Tobacco user  
  
  
Tobacco use disorder  
   
                                                      
  
  
Impaired mobility and activities of daily living  
   
                                                      
  
  
Legally blind  
   
                                                      
  
  
Degeneration of intervertebral disc of lumbar region, unspecified whether pain   
present- Primary  
   
                                                      
  
  
Acute right hip pain  
   
                                                      
  
  
Primary hypertension (CMS/HCC)  
  
  
Unspecified essential hypertension  
   
                                                      
  
  
Hepatic fibrosis  
  
  
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  
   
                                                      
  
  
End stage renal disease (CMS/HCC)  
  
  
End stage renal disease  
   
                                                      
  
  
Type 2 diabetes mellitus without complication, with long-term current use of 
insulin   
(CMS/HCC)  
   
                                                      
  
  
Tobacco user  
  
  
Tobacco use disorder  
   
                                                      
  
  
Mixed hyperlipidemia (CMS/HCC)  
  
  
Mixed hyperlipidemia  
   
                                                      
  
  
Anxiety, generalized (CMS/HCC)- Primary  
  
documented in this encounter  
[facility]*   
  
                                                    Diagnosis  
   
                                                      
  
  
Type 2 diabetes mellitus with diabetic polyneuropathy, without long-term current
 use   
of insulin (CMS/HCC)- Primary  
   
                                                      
  
  
Primary hypertension (CMS/HCC)  
  
  
Unspecified essential hypertension  
   
                                                      
  
  
ESRD (end stage renal disease) (CMS/HCC)  
  
  
End stage renal disease  
   
                                                      
  
  
Mixed hyperlipidemia (CMS/HCC)  
  
  
Mixed hyperlipidemia  
   
                                                      
  
  
Legally blind  
   
                                                      
  
  
Chronic obstructive pulmonary disease, unspecified COPD type (CMS/HCC)  
   
                                                      
  
  
Screening for prostate cancer  
  
  
Special screening for malignant neoplasm of prostate  
   
                                                      
  
  
Polyp of colon, unspecified part of colon, unspecified type  
   
                                                      
  
  
BMI 29.0-29.9,adult  
   
                                                      
  
  
Pneumonia due to infectious organism, unspecified laterality, unspecified part 
of   
lung- Primary  
   
                                                      
  
  
Primary hypertension (CMS/)  
  
  
Unspecified essential hypertension  
   
                                                      
  
  
Elevated liver function tests  
  
  
Other abnormal blood chemistry  
   
                                                      
  
  
ESRD (end stage renal disease) (CMS/HCC)  
  
  
End stage renal disease  
   
                                                      
  
  
Type 2 diabetes mellitus without complication, with long-term current use of 
insulin   
(CMS/HCC)  
   
                                                      
  
  
ESRD (end stage renal disease) (CMS/HCC)- Primary  
  
  
End stage renal disease  
   
                                                      
  
  
Elevated liver function tests  
  
  
Other abnormal blood chemistry  
   
                                                      
  
  
Type 2 diabetes mellitus without complication, with long-term current use of 
insulin   
(CMS/HCC)  
   
                                                      
  
  
Tobacco user  
  
  
Tobacco use disorder  
   
                                                      
  
  
BMI 29.0-29.9,adult  
   
                                                      
  
  
Primary hypertension (CMS/)  
  
  
Unspecified essential hypertension  
   
                                                      
  
  
Encounter for subsequent annual wellness visit (AWV) in [employer] patient- 
Primary  
   
                                                      
  
  
ESRD (end stage renal disease) (CMS/HCC)  
  
  
End stage renal disease  
   
                                                      
  
  
Type 2 diabetes mellitus without complication, with long-term current use of 
insulin   
(CMS/HCC)  
   
                                                      
  
  
Primary hypertension (CMS/HCC)  
  
  
Unspecified essential hypertension  
   
                                                      
  
  
Elevated liver function tests  
  
  
Other abnormal blood chemistry  
   
                                                      
  
  
Chronic back pain, unspecified back location, unspecified back pain laterality  
   
                                                      
  
  
Routine general medical examination at health care facility  
  
  
Routine general medical examination at a health care facility  
   
                                                      
  
  
Type 2 diabetes mellitus without complication, with long-term current use of 
insulin   
(CMS/HCC)- Primary  
   
                                                      
  
  
Type 2 diabetes mellitus with diabetic chronic kidney disease (CMS/HCC)  
   
                                                      
  
  
Dependence on renal dialysis (CMS/HCC)  
  
  
Renal dialysis status  
   
                                                      
  
  
End stage renal disease (CMS/HCC)  
  
  
End stage renal disease  
   
                                                      
  
  
Immunodeficiency due to conditions classified elsewhere (CMS/HCC)  
   
                                                      
  
  
Atherosclerosis of aorta (CMS/HCC)  
  
  
Atherosclerosis of aorta  
   
                                                      
  
  
Polyneuropathy due to type 2 diabetes mellitus (CMS/HCC)  
   
                                                      
  
  
Primary hypertension (CMS/HCC)  
  
  
Unspecified essential hypertension  
   
                                                      
  
  
Elevated liver function tests  
  
  
Other abnormal blood chemistry  
   
                                                      
  
  
Elevated alkaline phosphatase level  
   
                                                      
  
  
Tobacco user  
  
  
Tobacco use disorder  
   
                                                      
  
  
Impaired mobility and activities of daily living  
   
                                                      
  
  
Legally blind  
   
                                                      
  
  
Degeneration of intervertebral disc of lumbar region, unspecified whether pain   
present- Primary  
   
                                                      
  
  
Acute right hip pain  
   
                                                      
  
  
Primary hypertension (CMS/HCC)  
  
  
Unspecified essential hypertension  
   
                                                      
  
  
Hepatic fibrosis  
  
  
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  
   
                                                      
  
  
End stage renal disease (CMS/HCC)  
  
  
End stage renal disease  
   
                                                      
  
  
Type 2 diabetes mellitus without complication, with long-term current use of 
insulin   
(CMS/HCC)  
   
                                                      
  
  
Tobacco user  
  
  
Tobacco use disorder  
   
                                                      
  
  
Mixed hyperlipidemia (CMS/HCC)  
  
  
Mixed hyperlipidemia  
   
                                                      
  
  
Type 2 diabetes mellitus without complication, with long-term current use of 
insulin   
(CMS/HCC)- Primary  
  
documented in this encounter  
[facility]*   
  
                                                    Diagnosis  
   
                                                      
  
  
Type 2 diabetes mellitus with diabetic polyneuropathy, without long-term current
 use   
of insulin (CMS/HCC)- Primary  
   
                                                      
  
  
Primary hypertension (CMS/HCC)  
  
  
Unspecified essential hypertension  
   
                                                      
  
  
ESRD (end stage renal disease) (CMS/HCC)  
  
  
End stage renal disease  
   
                                                      
  
  
Mixed hyperlipidemia (CMS/HCC)  
  
  
Mixed hyperlipidemia  
   
                                                      
  
  
Legally blind  
   
                                                      
  
  
Chronic obstructive pulmonary disease, unspecified COPD type (CMS/[facility])  
   
                                                      
  
  
Screening for prostate cancer  
  
  
Special screening for malignant neoplasm of prostate  
   
                                                      
  
  
Polyp of colon, unspecified part of colon, unspecified type  
   
                                                      
  
  
BMI 29.0-29.9,adult  
   
                                                      
  
  
Pneumonia due to infectious organism, unspecified laterality, unspecified part 
of   
lung- Primary  
   
                                                      
  
  
Primary hypertension (CMS/HCC)  
  
  
Unspecified essential hypertension  
   
                                                      
  
  
Elevated liver function tests  
  
  
Other abnormal blood chemistry  
   
                                                      
  
  
ESRD (end stage renal disease) (CMS/HCC)  
  
  
End stage renal disease  
   
                                                      
  
  
Type 2 diabetes mellitus without complication, with long-term current use of 
insulin   
(CMS/HCC)  
   
                                                      
  
  
ESRD (end stage renal disease) (CMS/HCC)- Primary  
  
  
End stage renal disease  
   
                                                      
  
  
Elevated liver function tests  
  
  
Other abnormal blood chemistry  
   
                                                      
  
  
Type 2 diabetes mellitus without complication, with long-term current use of 
insulin   
(CMS/HCC)  
   
                                                      
  
  
Tobacco user  
  
  
Tobacco use disorder  
   
                                                      
  
  
BMI 29.0-29.9,adult  
   
                                                      
  
  
Primary hypertension (CMS/HCC)  
  
  
Unspecified essential hypertension  
   
                                                      
  
  
Encounter for subsequent annual wellness visit (AWV) in Medicare patient- 
Primary  
   
                                                      
  
  
ESRD (end stage renal disease) (CMS/HCC)  
  
  
End stage renal disease  
   
                                                      
  
  
Type 2 diabetes mellitus without complication, with long-term current use of 
insulin   
(CMS/HCC)  
   
                                                      
  
  
Primary hypertension (CMS/HCC)  
  
  
Unspecified essential hypertension  
   
                                                      
  
  
Elevated liver function tests  
  
  
Other abnormal blood chemistry  
   
                                                      
  
  
Chronic back pain, unspecified back location, unspecified back pain laterality  
   
                                                      
  
  
Routine general medical examination at health care facility  
  
  
Routine general medical examination at a health care facility  
   
                                                      
  
  
Type 2 diabetes mellitus without complication, with long-term current use of 
insulin   
(CMS/HCC)- Primary  
   
                                                      
  
  
Type 2 diabetes mellitus with diabetic chronic kidney disease (CMS/HCC)  
   
                                                      
  
  
Dependence on renal dialysis (CMS/HCC)  
  
  
Renal dialysis status  
   
                                                      
  
  
End stage renal disease (CMS/HCC)  
  
  
End stage renal disease  
   
                                                      
  
  
Immunodeficiency due to conditions classified elsewhere (CMS/HCC)  
   
                                                      
  
  
Atherosclerosis of aorta (CMS/HCC)  
  
  
Atherosclerosis of aorta  
   
                                                      
  
  
Polyneuropathy due to type 2 diabetes mellitus (CMS/HCC)  
   
                                                      
  
  
Primary hypertension (CMS/HCC)  
  
  
Unspecified essential hypertension  
   
                                                      
  
  
Elevated liver function tests  
  
  
Other abnormal blood chemistry  
   
                                                      
  
  
Elevated alkaline phosphatase level  
   
                                                      
  
  
Tobacco user  
  
  
Tobacco use disorder  
   
                                                      
  
  
Impaired mobility and activities of daily living  
   
                                                      
  
  
Legally blind  
   
                                                      
  
  
Degeneration of intervertebral disc of lumbar region, unspecified whether pain   
present- Primary  
   
                                                      
  
  
Acute right hip pain  
   
                                                      
  
  
Primary hypertension (CMS/HCC)  
  
  
Unspecified essential hypertension  
   
                                                      
  
  
Hepatic fibrosis  
  
  
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  
   
                                                      
  
  
End stage renal disease (CMS/HCC)  
  
  
End stage renal disease  
   
                                                      
  
  
Type 2 diabetes mellitus without complication, with long-term current use of 
insulin   
(CMS/HCC)  
   
                                                      
  
  
Tobacco user  
  
  
Tobacco use disorder  
   
                                                      
  
  
Mixed hyperlipidemia (CMS/HCC)  
  
  
Mixed hyperlipidemia  
   
                                                      
  
  
Primary hypertension (CMS/HCC)- Primary  
  
  
Unspecified essential hypertension  
   
                                                      
  
  
Unspecified atrial fibrillation (CMS/HCC)  
   
                                                      
  
  
Other thrombophilia (CMS/HCC)  
   
                                                      
  
  
Polyneuropathy due to type 2 diabetes mellitus (CMS/HCC)  
   
                                                      
  
  
Hepatic fibrosis  
  
  
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  
   
                                                      
  
  
BMI 29.0-29.9,adult  
   
                                                      
  
  
Type 2 diabetes mellitus without complication, with long-term current use of 
insulin   
(CMS/HCC)  
   
                                                      
  
  
Anxiety, generalized (CMS/HCC)  
   
                                                      
  
  
Dependence on renal dialysis (CMS/HCC)  
  
  
Renal dialysis status  
   
                                                      
  
  
Tobacco user  
  
  
Tobacco use disorder  
   
                                                      
  
  
Muscle spasm  
  
  
Spasm of muscle  
   
                                                      
  
  
Spinal stenosis of lumbar region with neurogenic claudication  
  
documented in this encounter  
[facility]   
  
                                                    Diagnosis  
   
                                                      
  
  
Type 2 diabetes mellitus with diabetic polyneuropathy, without long-term current
 use   
of insulin (CMS/HCC)- Primary  
   
                                                      
  
  
Primary hypertension (CMS/HCC)  
  
  
Unspecified essential hypertension  
   
                                                      
  
  
ESRD (end stage renal disease) (CMS/HCC)  
  
  
End stage renal disease  
   
                                                      
  
  
Mixed hyperlipidemia (CMS/HCC)  
  
  
Mixed hyperlipidemia  
   
                                                      
  
  
Legally blind  
   
                                                      
  
  
Chronic obstructive pulmonary disease, unspecified COPD type (CMS/HCC)  
   
                                                      
  
  
Screening for prostate cancer  
  
  
Special screening for malignant neoplasm of prostate  
   
                                                      
  
  
Polyp of colon, unspecified part of colon, unspecified type  
   
                                                      
  
  
BMI 29.0-29.9,adult  
   
                                                      
  
  
Pneumonia due to infectious organism, unspecified laterality, unspecified part 
of   
lung- Primary  
   
                                                      
  
  
Primary hypertension (CMS/HCC)  
  
  
Unspecified essential hypertension  
   
                                                      
  
  
Elevated liver function tests  
  
  
Other abnormal blood chemistry  
   
                                                      
  
  
ESRD (end stage renal disease) (CMS/HCC)  
  
  
End stage renal disease  
   
                                                      
  
  
Type 2 diabetes mellitus without complication, with long-term current use of 
insulin   
(CMS/HCC)  
   
                                                      
  
  
ESRD (end stage renal disease) (CMS/[facility])- Primary  
  
  
End stage renal disease  
   
                                                      
  
  
Elevated liver function tests  
  
  
Other abnormal blood chemistry  
   
                                                      
  
  
Type 2 diabetes mellitus without complication, with long-term current use of 
insulin   
(CMS/[facility])  
   
                                                      
  
  
Tobacco user  
  
  
Tobacco use disorder  
   
                                                      
  
  
BMI 29.0-29.9,adult  
   
                                                      
  
  
Primary hypertension (CMS/[facility])  
  
  
Unspecified essential hypertension  
   
                                                      
  
  
Encounter for subsequent annual wellness visit (AWV) in Medicare patient- 
Primary  
   
                                                      
  
  
ESRD (end stage renal disease) (CMS/)  
  
  
End stage renal disease  
   
                                                      
  
  
Type 2 diabetes mellitus without complication, with long-term current use of 
insulin   
(CMS/[facility])  
   
                                                      
  
  
Primary hypertension (CMS/[facility])  
  
  
Unspecified essential hypertension  
   
                                                      
  
  
Elevated liver function tests  
  
  
Other abnormal blood chemistry  
   
                                                      
  
  
Chronic back pain, unspecified back location, unspecified back pain laterality  
   
                                                      
  
  
Routine general medical examination at health care facility  
  
  
Routine general medical examination at a health care facility  
   
                                                      
  
  
Type 2 diabetes mellitus without complication, with long-term current use of 
insulin   
(CMS/)- Primary  
   
                                                      
  
  
Type 2 diabetes mellitus with diabetic chronic kidney disease (CMS/[facility])  
   
                                                      
  
  
Dependence on renal dialysis (CMS/[facility])  
  
  
Renal dialysis status  
   
                                                      
  
  
End stage renal disease (CMS/[facility])  
  
  
End stage renal disease  
   
                                                      
  
  
Immunodeficiency due to conditions classified elsewhere (CMS/[facility])  
   
                                                      
  
  
Atherosclerosis of aorta (CMS/[facility])  
  
  
Atherosclerosis of aorta  
   
                                                      
  
  
Polyneuropathy due to type 2 diabetes mellitus (CMS/[facility])  
   
                                                      
  
  
Primary hypertension (CMS/[facility])  
  
  
Unspecified essential hypertension  
   
                                                      
  
  
Elevated liver function tests  
  
  
Other abnormal blood chemistry  
   
                                                      
  
  
Elevated alkaline phosphatase level  
   
                                                      
  
  
Tobacco user  
  
  
Tobacco use disorder  
   
                                                      
  
  
Impaired mobility and activities of daily living  
   
                                                      
  
  
Legally blind  
   
                                                      
  
  
Degeneration of intervertebral disc of lumbar region, unspecified whether pain   
present- Primary  
   
                                                      
  
  
Acute right hip pain  
   
                                                      
  
  
Primary hypertension (CMS/[facility])  
  
  
Unspecified essential hypertension  
   
                                                      
  
  
Hepatic fibrosis  
  
  
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  
   
                                                      
  
  
End stage renal disease (CMS/[facility])  
  
  
End stage renal disease  
   
                                                      
  
  
Type 2 diabetes mellitus without complication, with long-term current use of 
insulin   
(CMS/[facility])  
   
                                                      
  
  
Tobacco user  
  
  
Tobacco use disorder  
   
                                                      
  
  
Mixed hyperlipidemia (CMS/[facility])  
  
  
Mixed hyperlipidemia  
   
                                                      
  
  
Primary hypertension (CMS/HCC)- Primary  
  
  
Unspecified essential hypertension  
   
                                                      
  
  
Unspecified atrial fibrillation (CMS/HCC)  
   
                                                      
  
  
Other thrombophilia (CMS/HCC)  
   
                                                      
  
  
Polyneuropathy due to type 2 diabetes mellitus (CMS/[facility])  
   
                                                      
  
  
Hepatic fibrosis  
  
  
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  
   
                                                      
  
  
BMI 29.0-29.9,adult  
   
                                                      
  
  
Type 2 diabetes mellitus without complication, with long-term current use of 
insulin   
(CMS/HCC)  
   
                                                      
  
  
Anxiety, generalized (CMS/HCC)  
   
                                                      
  
  
Dependence on renal dialysis (CMS/[facility])  
  
  
Renal dialysis status  
   
                                                      
  
  
Tobacco user  
  
  
Tobacco use disorder  
   
                                                      
  
  
Muscle spasm  
  
  
Spasm of muscle  
   
                                                      
  
  
Spinal stenosis of lumbar region with neurogenic claudication  
   
                                                      
  
  
Dermatophytosis of nail- Primary  
   
                                                      
  
  
Dystrophic nail  
  
  
Other specified disease of nail  
   
                                                      
  
  
Diabetic polyneuropathy associated with type 2 diabetes mellitus (CMS/[facility])  
   
                                                      
  
  
Encounter for long-term (current) use of insulin (CMS/[facility])  
  
  
Encounter for long-term (current) use of insulin  
  
documented in this encounter  
[facility]*   
  
                                                    Diagnosis  
   
                                                      
  
  
Kidney calculi- Primary  
  
  
Calculus of kidney  
   
                                                      
  
  
Microscopic hematuria  
  
documented in this encounter  
[facility]   
  
                                                    Diagnosis  
   
                                                      
  
  
Thoracic neuritis- Primary  
  
  
Thoracic or lumbosacral neuritis or radiculitis, unspecified  
   
                                                      
  
  
Lumbar radiculopathy  
  
  
Thoracic or lumbosacral neuritis or radiculitis, unspecified  
   
                                                      
  
  
Chronic bilateral low back pain with bilateral sciatica  
  
documented in this encounter  
[facility]   
  
                                                    Diagnosis  
   
                                                      
  
  
History of urethral stricture- Primary  
  
  
Personal history of other disorder of urinary system  
   
                                                      
  
  
Microscopic hematuria  
   
                                                      
  
  
Preop examination- Primary  
  
  
Unspecified pre-operative examination  
   
                                                      
  
  
Hypertension, unspecified type  
   
                                                      
  
  
Type 2 diabetes mellitus with other specified complication, with long-term 
current   
use of insulin (CMS-[facility])  
   
                                                      
  
  
Personal history of MI (myocardial infarction)  
  
  
Old myocardial infarction  
   
                                                      
  
  
Stage 4 chronic kidney disease (CMS-[facility])  
   
                                                      
  
  
Preop examination  
  
  
Unspecified pre-operative examination  
   
                                                      
  
  
Hypertension, unspecified type  
   
                                                      
  
  
Type 2 diabetes mellitus with other specified complication, with long-term 
current   
use of insulin (CMS-[facility])  
   
                                                      
  
  
Personal history of MI (myocardial infarction)  
  
  
Old myocardial infarction  
   
                                                      
  
  
Stage 4 chronic kidney disease (CMS-[facility])  
   
                                                      
  
  
Microscopic hematuria  
   
                                                      
  
  
History of urethral stricture  
  
  
Personal history of other disorder of urinary system  
  
documented in this encounter  
[facility]   
  
                                                    Diagnosis  
   
                                                      
  
  
Hospital discharge follow-up- Primary  
  
  
Other follow-up examination  
   
                                                      
  
  
End-stage renal disease on peritoneal dialysis (CMS-[facility])  
   
                                                      
  
  
History of community acquired pneumonia  
   
                                                      
  
  
Light cigarette smoker  
   
                                                      
  
  
Insulin dependent type 2 diabetes mellitus (CMS-[facility])  
   
                                                      
  
  
Legally blind  
   
                                                      
  
  
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  
  
documented in this encounter  
[facility]   
  
                                Type            Description     Date  
   
                                Medical History ESRD              
   
                                Medical History hypertension      
   
                                Medical History hypercholesterolemia   
   
                                Medical History heart attack      
   
                                Surgical History oral surgery      
   
                                Surgical History vasectomy         
   
                                Surgical History knee arthroscopy   
   
                                Surgical History deviated septum repair   
   
                                Surgical History heart catheterization   
  
  
[facility]  
Other Phone: [phone] general Narrative - Reported*   
  
                                Type            Description     Date  
   
                                Medical History ESRD              
   
                                Medical History hypertension      
   
                                Medical History hypercholesterolemia   
   
                                Medical History heart attack      
   
                                Surgical History oral surgery      
   
                                Surgical History vasectomy         
   
                                Surgical History knee arthroscopy   
   
                                Surgical History deviated septum repair   
   
                                Surgical History heart catheterization   
   
                                Hospitalization History see above         
  
  
[facility]  
Other Phone: [phone] general Narrative - Reported*   
  
                                Type            Description     Date  
   
                                Medical History ESRD              
   
                                Medical History hypertension      
   
                                Medical History hypercholesterolemia   
   
                                Medical History heart attack      
   
                                Surgical History oral surgery      
   
                                Surgical History vasectomy         
   
                                Surgical History knee arthroscopy   
   
                                Surgical History deviated septum repair   
   
                                Surgical History heart catheterization   
   
                                Surgical History RIGHT AVF         
   
                                Hospitalization History see above         
  
  
[facility]  
Other Phone: [phone] Discharge instructions  
Additional Instructions  
1. No driving if taking narcotic pain medication.  
  
2. No lifting more than 20 pounds for 2 weeks.[facility]  
Work Phone: [phone] on filedocumented in this encounter
[facility]ructionsNot on filedocumented in this encounter
[facility] SystemInstructionsNot on filedocumented in this encounter
[facility] System[facility]ructionsNot on filedocumented in this encounter
[facility] System[facility]ructions* Attachments  
  
The following attachments cannot be sent through Care Everywhere.  
  
* Cystoscopy (English)  
* Retrograde Pyelogram (English)  
  
documented in this encounterProWay[facility] HospitalOYO Sportstoys SystemInstructionsNot on file
documented in this encounterProMedica Health SystemInstructionsNot on file
documented in this encounterProMedica Health SystemInstructionsNot on file
documented in this encounterProWayne HospitalOYO Sportstoys SystemInstructionsNot on file
documented in this encounterProHale Infirm[facility] Atmospheir SystemInstructions* Attachments  
  
The following attachments cannot be sent through Care Everywhere.  
  
* How to Wash Your Hands Properly (English)  
  
documented in this encounterCleveland Clinic Hill[facility] HospitalOY[facility] Sportstoys SystemReason for referral 
(narrative)* Consultation (Routine) - Pending Review  
  
                          Specialty    Diagnoses / Procedures Referred By Contac  
t Referred To Contact  
   
                                        Pain Medicine         
  
  
Diagnoses  
  
  
Degeneration of   
intervertebral disc of   
lumbar region, unspecified   
whether pain present  
  
  
Acute right hip pain  
  
  
  
Procedures  
  
  
VT OFFICE/OUTPATIENT NEW   
HIGH MDM 60 MINUTES                       
  
  
Pepper Mendoza NP  
  
  
[address] [place] [address]  
  
  
Phone: [phone]  
  
  
Fax: [phone]                         
  
  
Janette Borja MD  
  
  
[address] [place] [address]  
  
  
Phone: [phone]  
  
  
Fax: [phone]  
  
  
  
                          Referral ID  Status       Reason       Start   
Date                                    Expiration   
Date                                    Visits   
Requested                               Visits   
Authorized  
   
                                        [id number]              Pending   
Review                                    
  
  
Specialty   
Services   
Required        10/9/2024       2025        1               1  
  
  
  
                                                    Scheduling Instructions  
   
                                                      
  
  
ASAP if possible  
  
  
  
  
Electronically signed by Pepper Mendoza NP at 10/09/2024 10:40 AM EDT  
  
  
[facility]  
  
Chief Complaint and Reason for Visit  
  
  
                                        Chief Complaint     ESRD  
end stage renal disease  
  
  
  
                                        Chief Complaint     ESRD  
end stage renal disease  
ESRD  
  
  
  
                                        Chief Complaint     end stage renal dise  
ase  
ESRD  
ESRD  
  
  
  
                                        Chief Complaint     ESRD  
ESRD  
  
  
  
                                        Chief Complaint     N18.6  
ESRD  
  
  
  
                                        Chief Complaint     ELEVATED LIVER ENZYM  
ES  
elevated liver enzymes  
   
                                        Reason for Visit    Elevated liver enzym  
es  
  
  
  
                                        Chief Complaint     follow up  
Radiculopathy, lumbar region  
K74.00 R74.8  
   
                                        Reason for Visit    Elevated liver enzym  
es  
Hepatic fibrosis  
Hepatic steatosis  
Arthropathy of left hip  
Arthropathy of right knee  
Low back pain  
Spinal stenosis, lumbar region with neurogenic claudication  
Thoracic stenosis  
Hepatic steatosis  
  
  
  
Family History  
  
  
                          Relationship Condition    Age at Onset Recorded Date/T  
van  
   
                          Not Specified Congestive heart failure Unknown        
   
                                       Diabetes mellitus Unknown        
   
                          brother      Myocardial infarction Unknown        
   
                                       Hypertension Unknown        
   
                          sister       Diabetes mellitus Unknown        
   
                                       Malignant neoplasm of breast Unknown       
   
  
  
  
                          Relationship Condition    Age at Onset Recorded Date/T  
van  
   
                          Not Specified Congestive heart failure Unknown        
   
                                       Diabetes mellitus Unknown        
   
                          brother      Myocardial infarction Unknown        
   
                                       Hypertension Unknown        
   
                          sister       Diabetes mellitus Unknown        
   
                                       Malignant neoplasm of breast Unknown       
   
   
                          brother      Diabetes mellitus Unknown        
   
                          daughter     Family history of mental disorder Unknown  
        
   
                          father            Unknown        
   
                          Not Specified      Unknown        
  
  
  
                          Relationship Condition    Age at Onset Recorded Date/T  
van  
   
                          mother       Congestive heart failure Unknown        
   
                                       Diabetes mellitus Unknown        
   
                          brother      Myocardial infarction Unknown        
   
                                       Hypertension Unknown        
   
                          sister       Diabetes mellitus Unknown        
   
                                       Malignant neoplasm of breast Unknown       
   
   
                          brother      Diabetes mellitus Unknown        
   
                          daughter     Family history of mental disorder Unknown  
        
   
                          father            Unknown        
   
                          mother            Unknown        
  
  
  
Advance Directives  
  
  
                                Advance Directive Response        Recorded Date/  
Time  
   
                                Advance Directives Yes              12:24pm  
  
  
  
                                Advance Directive Response        Recorded Date/  
Time  
   
                                Advance Directives Yes              11:24am  
  
                                Documents on File  
  
                          Type         Date Recorded Patient Representative Expl  
anation  
   
                                                    Advance Directives and   
Living Will         2024 5:18 PM                       my directives living  
   
will  
  
                                Documents on File  
  
                          Type         Date Recorded Patient Representative Expl  
anation  
   
                                                    Advance Directives and   
Living Will         2024 5:18 PM                       my directives living  
   
will  
  
                                Documents on File  
  
                          Type         Date Recorded Patient Representative Expl  
anation  
   
                          Advance Directive 2020 2:47 PM              Linda  
r of    
2020  
  
  
  
                                Date Activated  Date Inactivated Comments  
   
                                3/16/2024 2:09 AM 3/18/2024 6:44 PM   
  
                                Documents on File  
  
                          Type         Date Recorded Patient Representative Expl  
anation  
   
                          Advance Directive 2020 2:47 PM              Linda  
r of    
2020  
  
  
  
                                Date Activated  Date Inactivated Comments  
   
                                3/16/2024 2:09 AM 3/18/2024 6:44 PM   
  
                           Latest Code Status on File  
  
                          Code Status  Date Activated Date Inactivated Comments  
   
                          Full Code    3/16/2024 2:09 AM 3/18/2024 6:44 PM   
  
  
  
Summary Purpose  
  
  
                                                      
  
  
  
Reason for Referral  
  
  
                          Specialty    Diagnoses / Procedures Referred By Contac  
t Referred To Contact  
   
                                        Radiology             
  
  
Diagnoses  
  
  
Thoracic neuritis  
  
  
  
Procedures  
  
  
MR thoracic spine without   
contrast                                  
  
  
Criselda Baig,   
APRN-CNP  
  
  
[address] [place] [address]  
  
  
Phone: [phone]  
  
  
Fax: [phone]                         
  
  
  
  
  
                    Referral ID Status    Reason    Start Date Expiration Date V  
isits   
Requested                               Visits   
Authorized  
   
                                        [id number]            Pending   
Review                    2024    1            1  
  
  
  
                          Specialty    Diagnoses / Procedures Referred By Contac  
t Referred To Contact  
   
                                        Radiology             
  
  
Diagnoses  
  
  
Lumbar radiculopathy  
  
  
  
Procedures  
  
  
MR lumbar spine without   
contrast                                  
  
  
Criselda Baig,   
APRN-CNP  
  
  
[address] [place] [address]  
  
  
Phone: [phone]  
  
  
Fax: [phone]                         
  
  
  
  
  
                    Referral ID Status    Reason    Start Date Expiration Date V  
isits   
Requested                               Visits   
Authorized  
   
                                        [id number]            Pending   
Review                    2024    1            1  
  
  
  
Additional Source Comments  
  
  
  
                                                    Source Comments (unrecognize  
d section and content)  
   
                                                    In the event this informatio  
n is protected by the Federal Confidentiality of   
Alcohol   
and Drug Abuse Patient Records regulations: This information has been disclosed 
to   
you from records protected by Federal confidentiality rules (42 CFR Part 2). The
   
Federal rules prohibit you from making any further disclosure of this 
information   
unless further disclosure is expressly permitted by the written consent of the 
person   
to whom it pertains or as otherwise permitted by 42 CFR Part 2. A general   
authorization for the release of medical or other information is NOT sufficient 
for   
this purpose. The Federal rules restrict any use of the information to 
criminally   
investigate or prosecute any alcohol or drug abuse patient.[facility]  
  
  
  
  
                                                    Reason for Visit (unrecogniz  
ed section and content)  
   
                                          
  
  
  
                          Specialty    Diagnoses / Procedures Referred By Contac  
t Referred To Contact  
   
                                        TRANSPLANT            
  
  
Diagnoses  
  
  
Type 2 diabetes mellitus   
with ESRD (end-stage renal   
disease) (HCC)  
  
  
Hypertension, unspecified   
type  
  
  
Pre-transplant evaluation   
for CKD (chronic kidney   
disease)  
  
  
  
Procedures  
  
  
CONSULT TO TRANSPLANT   
CENTER  
  
  
NEW PATIENT VISIT LEVEL 5  
  
  
CHEST X-RAY, FRONT&LAT  
  
  
EKG TRACING(TC)  
  
  
CTA CHEST, W/WO CONTRAST  
  
  
CT ABDOMEN W & W/O   
CONTRAST                                  
  
  
Ranker, John O  
  
  
[address] [place] [address]  
  
  
Phone: [phone]  
  
  
Fax: [phone]                         
  
  
[facility]  
  
  
[address] [place], [place] [address]  
  
  
Phone: [phone]  
  
  
  
                          Referral ID  Status       Reason       Start   
Date                                    Expiration   
Date                                    Visits   
Requested                               Visits   
Authorized  
   
                                [id number]        Authorized        
  
  
Financial   
Clearance   
Required -   
OON Payor  
  
  
Patient   
Cleared   
INN/[facility]   
Payor Auth   
Obtained        2021       99              99  
  
  
  
                                        Reason              Comments  
   
                                        DM Foot Care        PCP: Dr. Carlo GARZA   
10/09/24, A1C: 9.1, BS: 239  
  
  
  
                                Reason          Onset Date      Comments  
   
                                Med Refill      2024        
  
  
  
                                        Reason              Comments  
   
                                        Med Refill            
  
  
  
                                        Reason              Comments  
   
                                        Diabetes              
  
  
  
                                        Reason              Comments  
   
                                        Nail care           Keith Moon is a   
74 y.o. male who presents for DM Foot Care (PCP:   
Dr. Carlo GARZA 24, A1C: 7.8 () BS: 84 SS11  
  
  
  
                                        Reason              Comments  
   
                                        Follow-up           Microscopic hematuri  
a  
  
  
  
                          Specialty    Diagnoses / Procedures Referred By Contac  
t Referred To Contact  
   
                                        Urology               
  
  
Diagnoses  
  
  
Microscopic hematuria  
                                          
  
  
Pepper Mendoza,   
APRN-CNP  
  
  
[address]  
  
  
[place] [address]  
  
  
Phone: [phone]  
  
  
Fax: [phone]                         
  
  
[facility]  
  
  
[address] [place] [address]  
  
  
Phone: [phone]  
  
  
Fax: [phone]  
  
  
  
                          Referral ID  Status       Reason       Start   
Date                                    Expiration   
Date                                    Visits   
Requested                               Visits   
Authorized  
   
                                        [id number]            Pending   
Review                                    
  
  
Specialty   
Services   
Required        2024        1               1  
  
  
  
                                        Reason              Comments  
   
                                        Back Pain             
  
  
  
                                        Reason              Comments  
   
                                        Follow-up             
  
  
  
  
  
                                                    Care Teams (unrecognized sec  
tion and content)  
   
                                          
  
  
  
                                                    Team Status: Inactive   
   
                          Member       Role         Status       Dates  
   
                          Shantell Castorena [facility] Primary Care Provider Active  
         
   
                          Charlie Matos MD Attending Provider Active         
  
  
  
                                                    Team Status: Inactive   
   
                          Member       Role         Status       Dates  
   
                          Shantell Castorena [facility] Primary Care Provider Active  
         
   
                          Ronald Sánchez MD Attending Provider Active     
      
  
  
  
                                                    Team Status: Active   
   
                          Member       Role         Status       Dates  
   
                          Shantell Castorena [facility] Primary Care Provider Active  
         
  
  
  
                                                    Team Status: Inactive   
   
                          Member       Role         Status       Dates  
   
                          Shantell Castorena [facility] Primary Care Provider Active  
         
   
                          Carolee Cuevas NP-C Attending Provider Active       
    
  
  
  
                                                    Team Status: Inactive   
   
                          Member       Role         Status       Dates  
   
                          Ronald Sánchez MD Attending Provider Active     
      
   
                          Pepper Mendoza Primary Care Provider Active         
  
  
  
                                                    Team Status: Active   
   
                          Member       Role         Status       Dates  
   
                          Pepper Mendoza Primary Care Provider Active         
  
  
  
                                                    Team Status: Inactive   
   
                          Member       Role         Status       Dates  
   
                          Pepper Mendoza Primary Care Provider Active         
   
                          Guicho Bronson MD Attending Provider Active         
  
  
  
                                                    Team Status: Inactive   
   
                          Member       Role         Status       Dates  
   
                          Pepper Mendoza Primary Care Provider Active       Sta  
rt: May 14th, 2024  
End: May 14th, 2024  
   
                          Yordy Garcia MD Attending Provider Active       Start:  
 May 14th, 2024  
End: May 14th, 2024  
  
  
  
                                                    Team Status: Inactive   
   
                          Member       Role         Status       Dates  
   
                          Pepper Mendoza Primary Care Provider Active       Sta  
rt: May 28th, 2024  
End: May 28th, 2024  
   
                          Yordy Garcia MD Attending Provider Active       Start:  
 May 28th, 2024  
End: May 28th, 2024  
  
  
  
                                                    Team Status: Inactive   
   
                          Member       Role         Status       Dates  
   
                          Pepper Mendoza Primary Care Provider Active       Sta  
rt: 2024  
End: 2024  
   
                          Yordy Garcia MD Attending Provider Active       Start:  
 2024  
End: 2024  
  
  
  
                                                    Team Status: Inactive   
   
                          Member       Role         Status       Dates  
   
                          Pepper Mendoza Primary Care Provider Active       Sta  
rt: 2024  
End: 2024  
   
                          Chapincito Mir MD Attending Provider Active       Star  
t: 2024  
End: 2024  
  
  
  
                                                    Team Status: Inactive   
   
                          Member       Role         Status       Dates  
   
                          Pepper Mendoza Primary Care Provider Active       Sta  
rt: 2024  
End: 2024  
   
                          Yordy Garcia MD Attending Provider Active       Start:  
 2024  
End: 2024  
  
  
  
                      Team Member Relationship Specialty  Start Date End Date  
   
                                                      
  
  
Shoaib Matos MD  
  
  
NPI: [id number]  
  
  
[address] [place] [address] [phone] (Work)  
  
  
[phone] (Fax) PCP - [facility]                   23            
   
                                                      
  
  
Shoaib Matos MD  
  
  
NPI: [id number]  
  
  
[address] [place] [address] [phone] (Work)  
  
  
[phone] (Fax) PCP - General   Family Medicine 3/19/24           
   
                                                      
  
  
Pepper Mendoza NP  
  
  
NPI: [id number]  
  
  
[address] [place] [address] [phone] (Work)  
  
  
[phone] (Fax) Nurse Practitioner Family Medicine 23           
   
                                                      
  
  
Pepper Mendoza NP  
  
  
NPI: [id number]  
  
  
[address] [place] [address] [phone] (Work)  
  
  
[phone] (Fax) Nurse Practitioner Family Medicine 3/19/24           
  
  
  
                      Team Member Relationship Specialty  Start Date End Date  
   
                                                      
  
  
Shoaib Matos MD  
  
  
NPI: [id number]  
  
  
[address] [place] [address] [phone] (Work)  
  
  
[phone] (Fax) PCP - [facility]                   23            
   
                                                      
  
  
Shoaib Matos MD  
  
  
NPI: [id number]  
  
  
[address] [place] [address] [phone] (Work)  
  
  
[phone] (Fax) PCP - General   Family Medicine 3/19/24           
   
                                                      
  
  
Pepper Mendoza NP  
  
  
NPI: [id number]  
  
  
[address] [place] [address] [phone] (Work)  
  
  
[phone] (Fax) Nurse Practitioner Family Medicine 23           
   
                                                      
  
  
Pepper Mendoza NP  
  
  
NPI: [id number]  
  
  
[address] [place] [address] [phone] (Work)  
  
  
[phone] (Fax) Nurse Practitioner Family Medicine 3/19/24           
  
  
  
                      Team Member Relationship Specialty  Start Date End Date  
   
                                                      
  
  
Shoaib Matos MD  
  
  
NPI: [id number]  
  
  
[address] [place] [address] [phone] (Work)  
  
  
[phone] (Fax) PCP - [facility]                   23            
   
                                                      
  
  
Shoaib Matos MD  
  
  
NPI: [id number]  
  
  
[address] [place] [address] [phone] (Work)  
  
  
[phone] (Fax) PCP - General   Family Medicine 3/19/24           
   
                                                      
  
  
Pepper Mendoza NP  
  
  
NPI: [id number]  
  
  
[address] [place] [address] [phone] (Work)  
  
  
[phone] (Fax) Nurse Practitioner Family Medicine 23           
   
                                                      
  
  
Pepper Mendoza NP  
  
  
NPI: [id number]  
  
  
[address] [place] [address] [phone] (Work)  
  
  
[phone] (Fax) Nurse Practitioner Family Medicine 3/19/24           
  
  
  
                      Team Member Relationship Specialty  Start Date End Date  
   
                                                      
  
  
Shoaib Matos MD  
  
  
NPI: [id number]  
  
  
[address] [place] [address] [phone] (Work)  
  
  
[phone] (Fax) PCP - [facility]                   23            
   
                                                      
  
  
Shoaib Matos MD  
  
  
NPI: [id number]  
  
  
[address] [place] [address] [phone] (Work)  
  
  
[phone] (Fax) PCP - General   Family Medicine 3/19/24           
   
                                                      
  
  
Pepper Mendoza NP  
  
  
NPI: [id number]  
  
  
[address] [place] [address] [phone] (Work)  
  
  
[phone] (Fax) Nurse Practitioner Family Medicine 23           
   
                                                      
  
  
Pepper Mendoza NP  
  
  
NPI: [id number]  
  
  
[address] [place] [address]  
  
  
[phone] (Work)  
  
  
[phone] (Fax) Nurse Practitioner Family Medicine 3/19/24           
  
  
  
                      Team Member Relationship Specialty  Start Date End Date  
   
                                                      
  
  
Shoaib Matos MD  
  
  
NPI: [id number]  
  
  
[address] [place] [address]  
  
  
[phone] (Work)  
  
  
[phone] (Fax) PCP - [facility]                   23            
   
                                                      
  
  
Shoaib Matos MD  
  
  
NPI: [id number]  
  
  
[address] [place] [address] [phone] (Work)  
  
  
[phone] (Fax) PCP - General   Family Medicine 3/19/24           
   
                                                      
  
  
Pepper Mendoza NP  
  
  
NPI: [id number]  
  
  
[address] [place] [address] [phone] (Work)  
  
  
[phone] (Fax) Nurse Practitioner Family Medicine 23           
   
                                                      
  
  
Pepper Mendoza NP  
  
  
NPI: [id number]  
  
  
[address] [place] [address] [phone] (Work)  
  
  
[phone] (Fax) Nurse Practitioner Family Medicine 3/19/24           
  
  
  
                      Team Member Relationship Specialty  Start Date End Date  
   
                                                      
  
  
Shoaib Matos MD  
  
  
NPI: [id number]  
  
  
[address] [place] [address] [phone] (Work)  
  
  
[phone] (Fax) PCP - [facility]                   23            
   
                                                      
  
  
Shoaib Matos MD  
  
  
NPI: [id number]  
  
  
[address] [place] [address] [phone] (Work)  
  
  
[phone] (Fax) PCP - General   Family Medicine 3/19/24           
   
                                                      
  
  
Pepper Mendoza NP  
  
  
NPI: [id number]  
  
  
[address] [place] [address] [phone] (Work)  
  
  
[phone] (Fax) Nurse Practitioner Family Medicine 23           
   
                                                      
  
  
Pepper Mendoza NP  
  
  
NPI: [id number]  
  
  
[address] [place] [address] [phone] (Work)  
  
  
[phone] (Fax) Nurse Practitioner Family Medicine 3/19/24           
  
  
  
                      Team Member Relationship Specialty  Start Date End Date  
   
                                                      
  
  
Shoaib Matos MD  
  
  
NPI: [id number]  
  
  
[address] [place] [address] [phone] (Work)  
  
  
[phone] (Fax) PCP - [facility]                   23            
   
                                                      
  
  
Shoaib Matos MD  
  
  
NPI: [id number]  
  
  
[address] [place] [address] [phone] (Work)  
  
  
[phone] (Fax) PCP - General   Family Medicine 3/19/24           
   
                                                      
  
  
Pepper Mendoza NP  
  
  
NPI: [id number]  
  
  
[address] [place] [address] [phone] (Work)  
  
  
[phone] (Fax) Nurse Practitioner Family Medicine 23           
   
                                                      
  
  
Pepper Mendoza NP  
  
  
NPI: [id number]  
  
  
[address] [place] [address] [phone] (Work)  
  
  
[phone] (Fax) Nurse Practitioner Family Medicine 3/19/24           
  
  
  
                      Team Member Relationship Specialty  Start Date End Date  
   
                                                      
  
  
Shoaib Matos MD  
  
  
NPI: [id number]  
  
  
[address] [place] [address] [phone] (Work)  
  
  
[phone] (Fax) PCP - [facility]                   23            
   
                                                      
  
  
Shoaib Matos MD  
  
  
NPI: [id number]  
  
  
[address] [place] [address] [phone] (Work)  
  
  
[phone] (Fax) PCP - General   Family Medicine 3/19/24           
   
                                                      
  
  
Pepper Mendoza NP  
  
  
NPI: [id number]  
  
  
[address] [place] [address] [phone] (Work)  
  
  
[phone] (Fax) Nurse Practitioner Family Medicine 23           
   
                                                      
  
  
Pepper Mendoza NP  
  
  
NPI: [id number]  
  
  
[address] [place] [address] [phone] (Work)  
  
  
[phone] (Fax) Nurse Practitioner Family Medicine 3/19/24           
  
  
  
                      Team Member Relationship Specialty  Start Date End Date  
   
                                                      
  
  
Shoaib Matos MD  
  
  
NPI: [id number]  
  
  
[address] [place] [address] [phone] (Work)  
  
  
[phone] (Fax) PCP - [facility]                   23            
   
                                                      
  
  
Shoaib Matos MD  
  
  
NPI: [id number]  
  
  
[address] [place] [address] [phone] (Work)  
  
  
[phone] (Fax) PCP - General   Family Medicine 3/19/24           
   
                                                      
  
  
Pepper Mendoza NP  
  
  
NPI: [id number]  
  
  
[address] [place] [address] [phone] (Work)  
  
  
[phone] (Fax) Nurse Practitioner Family Medicine 23           
   
                                                      
  
  
Pepper Mendoza NP  
  
  
NPI: [id number]  
  
  
[address] [place] [address] [phone] (Work)  
  
  
[phone] (Fax) Nurse Practitioner Family Medicine 3/19/24           
  
  
  
                      Team Member Relationship Specialty  Start Date End Date  
   
                                                      
  
  
Shoaib Matos MD  
  
  
NPI: [id number]  
  
  
[address] [place] [address] [phone] (Work)  
  
  
[phone] (Fax) PCP - [facility]                   23            
   
                                                      
  
  
Shoaib Matos MD  
  
  
NPI: [id number]  
  
  
[address] [place] [address] [phone] (Work)  
  
  
[phone] (Fax) PCP - General   Family Medicine 3/19/24           
   
                                                      
  
  
Pepper Mendoza NP  
  
  
NPI: [id number]  
  
  
[address] [place] [address] [phone] (Work)  
  
  
[phone] (Fax) Nurse Practitioner Family Medicine 23           
   
                                                      
  
  
Pepper Mendoza NP  
  
  
NPI: [id number]  
  
  
[address] [place] [address] [phone] (Work)  
  
  
[phone] (Fax) Nurse Practitioner Family Medicine 3/19/24           
  
  
  
                      Team Member Relationship Specialty  Start Date End Date  
   
                                                      
  
  
Shoaib Matos MD  
  
  
NPI: [id number]  
  
  
[address] [place] [address] [phone] (Work)  
  
  
[phone] (Fax) PCP - [facility]                   23            
   
                                                      
  
  
Shoaib Matos MD  
  
  
NPI: [id number]  
  
  
[address] [place] [address] [phone] (Work)  
  
  
[phone] (Fax) PCP - General   [facility] 3/19/24           
   
                                                      
  
  
Peppre Mendoza NP  
  
  
NPI: [id number]  
  
  
[address] [place] [address] [phone] (Work)  
  
  
[phone] (Fax) Nurse Practitioner Family Medicine 23           
   
                                                      
  
  
Pepper Mendoza NP  
  
  
NPI: [id number]  
  
  
[address] [place] [address] [phone] (Work)  
  
  
[phone] (Fax) Nurse Practitioner Family [facility] 3/19/24           
  
  
  
                      Team Member Relationship Specialty  Start Date End Date  
   
                                                      
  
  
Shoaib Matos MD  
  
  
NPI: [id number]  
  
  
[address] [place] [address] [phone] (Work)  
  
  
[phone] (Fax) PCP - [facility]                   23            
   
                                                      
  
  
Shoaib Matos MD  
  
  
NPI: [id number]  
  
  
[address] [place] [address] [phone] (Work)  
  
  
[phone] (Fax) PCP - General   Family [facility] 3/19/24           
   
                                                      
  
  
Pepper Mendoza NP  
  
  
NPI: [id number]  
  
  
[address]  
  
  
[place] [address] [phone] (Work)  
  
  
[phone] (Fax) Nurse Practitioner Family Medicine 23           
   
                                                      
  
  
Pepper Mendoza NP  
  
  
NPI: [id number]  
  
  
[address] [place] [address]  
  
  
[phone] (Work)  
  
  
[phone] (Fax) Nurse Practitioner Family Medicine 3/19/24           
  
  
  
                      Team Member Relationship Specialty  Start Date End Date  
   
                                                      
  
  
Pepper Mendoza APRN-CNP  
  
  
NPI: [id number]  
  
  
[address] [place] [address]  
  
  
[phone] (Work)  
  
  
[phone] (Fax) PCP - General   Nurse Practitioner 23            
  
  
  
                      Team Member Relationship Specialty  Start Date End Date  
   
                                                      
  
  
Pepper Mendoza APRN-CNP  
  
  
NPI: [id number]  
  
  
[address] [place] [address] [phone] (Work)  
  
  
[phone] (Fax) PCP - General   Nurse Practitioner 23            
  
  
  
                      Team Member Relationship Specialty  Start Date End Date  
   
                                                      
  
  
Pepper Mendoza APRN-CNP  
  
  
NPI: [id number]  
  
  
[address] [place] [address]  
  
  
[phone] (Work)  
  
  
[phone] (Fax) PCP - General   Nurse Practitioner 23            
  
  
  
                      Team Member Relationship Specialty  Start Date End Date  
   
                                                      
  
  
Pepper Mendoza APRN-CNP  
  
  
NPI: [id number]  
  
  
[address] [place] [address]  
  
  
[phone] (Work)  
  
  
[phone] (Fax) PCP - General   Nurse Practitioner 23            
  
  
  
                      Team Member Relationship Specialty  Start Date End Date  
   
                                                      
  
  
Pepper Mendoza APRN-CNP  
  
  
NPI: [id number]  
  
  
[address] [place] [address]  
  
  
[phone] (Work)  
  
  
[phone] (Fax) PCP - General   Nurse Practitioner 23            
  
  
  
                      Team Member Relationship Specialty  Start Date End Date  
   
                                                      
  
  
Pepper Mendoza APRN-CNP  
  
  
NPI: [id number]  
  
  
[address] [place] [address]  
  
  
[phone] (Work)  
  
  
[phone] (Fax) PCP - General   Nurse Practitioner 23            
  
  
  
                      Team Member Relationship Specialty  Start Date End Date  
   
                                                      
  
  
Pepper Mendoza APRN-CNP  
  
  
NPI: [id number]  
  
  
[address] [place] [address]  
  
  
[phone] (Work)  
  
  
[phone] (Fax) PCP - General   Nurse Practitioner 23            
  
  
  
                      Team Member Relationship Specialty  Start Date End Date  
   
                                                      
  
  
Pepper Mendoza APRN-CNP  
  
  
NPI: [id number]  
  
  
[address] [place] [address]  
  
  
[phone] (Work)  
  
  
[phone] (Fax) PCP - General   Nurse Practitioner 23            
  
  
  
                      Team Member Relationship Specialty  Start Date End Date  
   
                                                      
  
  
Pepper Mendoza APRN-CNP  
  
  
NPI: [id number]  
  
  
[address] [place] [address]  
  
  
[phone] (Work)  
  
  
[phone] (Fax) PCP - General   Nurse Practitioner 23            
  
  
  
                      Team Member Relationship Specialty  Start Date End Date  
   
                                                      
  
  
Pepper Mendoza APRN-CNP  
  
  
NPI: [id number]  
  
  
[address] [place] [address]  
  
  
[phone] (Work)  
  
  
[phone] (Fax) PCP - General   Nurse Practitioner 23            
  
  
  
                      Team Member Relationship Specialty  Start Date End Date  
   
                                                      
  
  
Pepper Mendoza APRN-CNP  
  
  
NPI: [id number]  
  
  
[address] [place] [address]  
  
  
[phone] (Work)  
  
  
[phone] (Fax) PCP - General   Nurse Practitioner 23            
  
  
  
  
  
                                                    Goals (unrecognized section   
and content)  
   
                                                    Goals may be documented in a  
n alternate section  
  
  
  
  
                                                    PREGNANCY (unrecognized sect  
ion and content)  
   
                                                    No Pregnancy Status Records   
FoundNo Pregnancy Status Records FoundNo Pregnancy   
Status   
Records FoundNo Pregnancy Status Records FoundNo Pregnancy Status Records 
FoundNo   
Pregnancy Status Records FoundNo Pregnancy Status Records FoundNo Pregnancy 
Status   
Records FoundNo Pregnancy Status Records Found  
  
  
  
  
                                                    INFORMATION SOURCE (unrecogn  
ized section and content)  
   
                                          
  
  
  
                                        DATE CREATED        AUTHOR  
   
                                2024                      [facility]  
  
  
  
                                DATE CREATED    AUTHOR          AUTHOR'S ORGANIZ  
ATION  
   
                                2024                      [facility]  
  
  
  
                                DATE CREATED    AUTHOR          AUTHOR'S ORGANIZ  
ATION  
   
                                2024                      [facility]  
[facility]  
  
  
  
                                DATE CREATED    AUTHOR          AUTHOR'S ORGANIZ  
ATION  
   
                                2024                      [facility]  
  
  
  
                                DATE CREATED    AUTHOR          AUTHOR'S ORGANIZ  
ATION  
   
                                11/10/2024                      [facility]  
[facility]  
  
  
  
                                DATE CREATED    AUTHOR          AUTHOR'S ORGANIZ  
ATION  
   
                                2024                      [facility]  
  
  
  
                                DATE CREATED    AUTHOR          AUTHOR'S ORGANIZ  
ATION  
   
                                12/10/2024                      [facility]  
  
  
  
                                DATE CREATED    AUTHOR          AUTHOR'S ORGANIZ  
ATION  
   
                                2024                      [facility]  
  
  
  
                                DATE CREATED    AUTHOR          AUTHOR'S ORGANIZ  
ATION  
   
                                2025                      [facility]  
[facility]  
  
  
FOR RECORDS PERTAINING TO PATIENTS WHO ARE OR HAVE BEEN ENROLLED IN A CHEMICAL 
DEPENDENCY/SUBSTANCEABUSE PROGRAM, SOME INFORMATION MAY BE OMITTED. This 
clinical summary was aggregated from multiple sources. Caution should be 
exercised in using it in the provision of clinical care. This summary normalizes
 information from multiple sources, and as a consequence, information in this 
document may materially change the coding, format and clinical context of 
patient data. In addition, data may be omitted in some cases. CLINICAL DECISIONS
 SHOULD BE BASED ON THE PRIMARY CLINICAL RECORDS. [employer] [facility] provides
 no warranty or guarantee of the accuracy or completeness of information in this
 document.

## 2025-02-23 NOTE — ED_ITS
HPI - Chest Pain    
General    
Chief Complaint: Chest Pain    
Stated Complaint: CHEST PAIN SOB    
Time Seen by Provider: 02/23/25 15:28    
Source: patient    
Mode of arrival: Wheelchair    
History of Present Illness    
HPI narrative:     
cc = chest pain    
Patient states that around 1 PM this afternoon he suddenly developed pain across  
the anterior chest, worse on the right side underneath the right clavicle, with   
associated shortness of breath.  No recent fall or injury.  No recent cough or   
cold  that is worse than I normally have  .  He reports chronic cough secondary   
to COPD.  He also undergoes peritoneal dialysis, has a fistula in his right   
forearm.    
He reported that he has experienced 3 previous acute myocardial infarction's,   
said that he had cardiac catheterization at the [facility] and in all 3   
instances was told  there was no scar so we did not need to put in a stent .    
He denies fever or chills.  He denies any nausea, vomiting or diarrhea.  Chest   
pain is nonradiating.  He denies prior history of DVT or PE    
Prior medical history since the knees, COPD, CAD with acute MI, GERD    
    
Related Data    
                                Home Medications    
    
    
    
?Medication ?Instructions ?Recorded ?Confirmed    
     
acetaminophen 650 mg 1,950 mg PO Q12H PRN fever or pain 10/21/24 02/23/25    
    
tablet,extended release (Tylenol       
    
Arthritis Pain)       
     
amlodipine 5 mg tablet 5 mg PO DAILY 10/21/24 02/23/25    
     
bumetanide 2 mg tablet 2 mg PO DAILY 10/21/24 02/23/25    
     
cyclobenzaprine 10 mg tablet 10 mg PO Q12H 10/21/24 02/23/25    
     
hydroxyzine pamoate 25 mg capsule 25 mg PO TID PRN anxiety 10/21/24 02/23/25    
     
insulin aspart 6 unit subcut QID 10/21/24 02/23/25    
    
(niacinamide)(U-100) 100 unit/mL(3       
    
mL) subcutaneous pen (Fiasp       
    
FlexTouch U-100 Insulin)       
     
insulin glargine 100 unit/mL (3 10 unit subcut QAM 10/21/24 11/11/24    
    
mL) subcutaneous pen (Basaglar       
    
KwikPen U-100 Insulin)       
     
magnesium oxide 400 mg PO DAILY 10/21/24 02/23/25    
     
melatonin 10 mg capsule 10 mg PO QPM 10/21/24 02/23/25    
     
omeprazole 20 mg capsule,delayed 20 mg PO BID 10/21/24 02/23/25    
    
release       
     
sevelamer carbonate 800 mg tablet 800 mg PO TID 10/21/24 02/23/25    
    
(Renvela)       
     
HEPARIN .QOD 10/28/24     
     
albuterol sulfate 90 mcg/actuation 2 inh inhalation Q4H PRN shortness 02/23/25 02/23/25    
    
aerosol inhaler of breath or wheezing      
     
cetirizine 10 mg capsule 10 mg PO DAILY PRN allergy symptoms 02/23/25 02/23/25    
     
docusate sodium 100 mg capsule 100 mg PO DAILY PRN constipation 02/23/25 02/23/25    
     
insulin glargine 100 unit/mL (3 10 unit subcut QAM 02/23/25 02/23/25    
    
mL) subcutaneous pen (Basaglar       
    
KwikPen U-100 Insulin)       
     
insulin glargine 100 unit/mL (3 15 unit subcut QPM 02/23/25 02/23/25    
    
mL) subcutaneous pen (Basaglar       
    
KwikPen U-100 Insulin)       
    
    
                                  Previous Rx's    
    
    
    
?Medication ?Instructions ?Recorded    
     
albuterol sulfate 90 mcg/actuation 2 inh inhalation Q6H PRN shortness 02/23/25    
    
aerosol inhaler of breath or wheezing #8.5 grams     
     
doxycycline hyclate 100 mg capsule 100 mg PO BID 7 days #14 caps 02/23/25    
    
    
    
                                    Allergies    
    
    
    
Allergy/AdvReac Type Severity Reaction Status Date / Time    
     
thiopental (From Pentothal) Allergy  Agitated Verified 11/11/24 07:40    
    
    
    
    
[facility]    
Medical History (Updated 02/23/25 @ 17:06 by Marquez Brizuela)    
    
Osteoarthritis    
   ?M19.90 - Unspecified osteoarthritis, unspecified site (ICD-10)    
Fistula    
   ?L98.8 - Other specified disorders of the skin and subcutaneous tissue (ICD-  
   10)    
Trigger finger    
   ?M65.30 - Trigger finger, unspecified finger (ICD-10)    
Blind    
   ?H54.7 - Unspecified visual loss (ICD-10)    
Anxiety    
   ?F41.9 - Anxiety disorder, unspecified (ICD-10)    
Acid reflux    
   ?K21.9 - Gastro-esophageal reflux disease without esophagitis (ICD-10)    
Diabetes    
   ?E11.9 - Type 2 diabetes mellitus without complications (ICD-10)    
Kidney stone    
   ?N20.0 - Calculus of kidney (ICD-10)    
Cirrhosis of liver    
   ?K74.60 - Unspecified cirrhosis of liver (ICD-10)    
Requires peritoneal dialysis    
   ?Z99.2 - Dependence on renal dialysis (ICD-10)    
Kidney failure    
   ?N19 - Unspecified kidney failure (ICD-10)    
COPD (chronic obstructive pulmonary disease)    
   ?J44.9 - Chronic obstructive pulmonary disease, unspecified (ICD-10)    
Asthma    
   ?J45.909 - Unspecified asthma, uncomplicated (ICD-10)    
HTN (hypertension)    
   ?I10 - Essential (primary) hypertension (ICD-10)    
Palpitations    
   ?R00.2 - Palpitations (ICD-10)    
Heart attack    
   ?I21.9 - Acute myocardial infarction, unspecified (ICD-10)    
High cholesterol    
   ?E78.00 - Pure hypercholesterolemia, unspecified (ICD-10)    
    
    
Surgical History (Reviewed 12/05/24 @ 11:43 by Lorna Nichols NP)    
    
Hx of cholecystectomy    
   ?Z90.49 - Acquired absence of other specified parts of digestive tract (ICD-  
   10)    
History of rhinoplasty    
   ?Z98.890 - Other specified postprocedural states (ICD-10)    
History of cardiac cath    
   ?Z98.890 - Other specified postprocedural states (ICD-10)    
History of arthroscopic knee surgery    
   ?Z98.890 - Other specified postprocedural states (ICD-10)    
History of vasectomy    
   ?Z98.52 - Vasectomy status (ICD-10)    
    
    
    
Exam    
Narrative    
Exam Narrative:     
Nurses notes and vital signs reviewed and patient is not hypoxic.    
afebrile    
General:  Well-appearing and in no apparent distress.      
Skin:  Warm, dry, no pallor noted.      
No rash.    
Head:  Normocephalic, atraumatic.    
Eye:  Pupils are equal, round and EOMI. No scleral icterus.    
Ears, Nose, Mouth, and Throat:  Oral mucosa is moist    
Cardiovascular:  Regular Rate and Rhythm without murmur, gallop or rub.    
Respiratory:  No accessory muscle use or respiratory distress.    
Lungs are clear to auscultation, no wheezing, rales or rhonchi    
Chest Wall: Focal anterior upper chest wall tenderness without crepitus or   
subcutaneous emphysema    
Musculoskeletal:  normal ROM, no calf or popliteal tenderness, no lower   
extremity edema/swelling    
GI:  Abdomen is soft, non-distended.  Normal bowel sounds.  No tenderness to   
palpation. No rebound, guarding, or rigidity noted.    
Neurological:  A&O x4.  No cranial nerve dysfunction observed.  No truncal   
ataxia. Moves all extremities. Sensation intact.    
Psychiatric:  Cooperative and interactive. Normal mood and affect.    
Constitutional    
Vital Signs, click to edit/add:     
    
                                Last Vital Signs    
    
    
    
Temp  98.1 F   02/23/25 15:16    
     
Pulse  99 H  02/23/25 17:30    
     
Resp  20   02/23/25 17:30    
     
BP  162/96 H  02/23/25 18:04    
     
Pulse Ox  100   02/23/25 17:30    
     
O2 Del Method  Room Air  02/23/25 15:16    
    
    
    
    
    
Course    
Vital Signs    
Vital signs:     
    
                                   Vital Signs    
    
    
    
Temperature  98.1 F   02/23/25 15:16    
     
Pulse Rate  120 H  02/23/25 15:16    
     
Respiratory Rate  24 H  02/23/25 15:16    
     
Blood Pressure  176/117 H  02/23/25 15:16    
     
Pulse Oximetry  99   02/23/25 15:16    
     
Oxygen Delivery Method  Room Air  02/23/25 15:16    
    
    
                                            
    
    
    
Temperature  98.1 F   02/23/25 15:16    
     
Pulse Rate  99 H  02/23/25 17:30    
     
Respiratory Rate  20   02/23/25 17:30    
     
Blood Pressure  162/96 H  02/23/25 18:04    
     
Pulse Oximetry  100   02/23/25 17:30    
     
Oxygen Delivery Method  Room Air  02/23/25 15:16    
    
    
    
    
    
MDM - Chest Pain    
MDM Narrative    
Medical decision making narrative:     
Patient was placed on cardiac monitor and EKG obtained. Blood drawn and sent for  
evaluation.  Portable chest x-ray obtained.    
White blood cell count elevated at 18.7.  BMP notable for elevated   
BUN/creatinine, consistent with the patient's end-stage renal disease and   
peritoneal dialysis requirement.  His sodium was minimally 1 and chloride was   
minimally decreased at 93 with a normal potassium at 3.7.  Troponin was negative  
at 29.2.  BNP was elevated at 1936.    
CXR with right basilar infiltrate - will start on antibiotics - doxycycline.    
    
Medical Records Data    
Attestation: I reviewed the patient's medical records.    
Lab Data    
Attestation: I reviewed the patient's lab results.    
Labs:     
    
                                   Lab Results    
    
    
    
  02/23/25 Range/Units    
    
  15:50     
     
WBC  18.7 H  (4.0-11.0)  10^3/uL    
     
RBC  3.58 L  (4.70-6.10)  10^6/uL    
     
Hgb  11.6 L  (14.0-18.0)  g/dL    
     
Hct  33.4 L  (42.0-54.0)  %    
     
MCV  93.3  (80.0-94.0)  fL    
     
MCH  32.4  (25.9-34.0)  pg    
     
MCHC  34.7  (29.9-35.2)  g/dL    
     
RDW  11.8  (11.0-15.0)  %    
     
Plt Count  279  (150-450)  10^3/uL    
     
MPV  10.7  (9.5-13.5)  fL    
     
Neut % (Auto)  78.8 H  (43.0-75.0)  %    
     
Lymph % (Auto)  7.0 L  (20.5-60.0)  %    
     
Mono % (Auto)  6.8  (1.7-12.0)  %    
     
Eos % (Auto)  6.1  (0.9-7.0)  %    
     
Baso % (Auto)  0.8  (0.2-2.0)  %    
     
Neut # (Auto)  14.7 H  (1.4-6.5)  10^3/uL    
     
Lymph # (Auto)  1.3  (1.2-3.8)  10^3/uL    
     
Mono # (Auto)  1.3 H  (0.3-0.8)  10^3/uL    
     
Eos # (Auto)  1.1 H  (0.0-0.7)  10^3/uL    
     
Baso # (Auto)  0.1  (0.0-0.1)  10^3/uL    
     
Abs Immat Gran (auto)  0.09 H  (0.00-0.03)  10^3/uL    
     
Imm/Tot Granulo (auto)  0.5  (0.0-0.5)  %    
     
Sodium  132 L  (136-145)  mmol/L    
     
Potassium  3.7  (3.5-5.1)  mmol/L    
     
Chloride  93 L  ()  mmol/L    
     
Carbon Dioxide  23.4  (21.0-32.0)  mmol/L    
     
Anion Gap  19.3      
     
BUN  64.0 H  (7.0-18.0)  mg/dL    
     
Creatinine  7.70 H*  (0.70-1.30)  mg/dL    
     
Est GFR ( Amer)  8 L  (>=60 mL/min/1.73m^2)      
     
Est GFR (Non-Af Amer)  7 L  (>=60 mL/min/1.73m^2)      
     
BUN/Creatinine Ratio  8.3      
     
Glucose  168 H  ()  mg/dL    
     
Calcium  9.2  (8.5-10.1)  mg/dL    
     
Troponin I High Sens  29.2  (4.0-76.1)  pg/mL    
     
NT-Pro-B Natriuret Pep  1936.0 H*  (<=900.0)  pg/mL    
    
    
    
    
Imaging Data    
Chest x-ray:     
      Attestation: I personally reviewed and interpreted this imaging study as   
follows:    
      My impression:     
Cephalization with right basilar infiltrate.  No pneumothorax or large area of   
consolidation    
ECG Data    
Attestation: I personally reviewed and interpreted this ECG as follows:    
Interpretation:     
EKG interpretation:  Emergency Department physician interpretation.  Sinus   
tachycardia rhythm at 110bpm with PVCs.  Normal axis, normal intervals and   
nonspecific T wave changes.  No ST segment elevation or depression.    
Heart Score    
History: Slightly/Non-Suspicious    
ECG: Normal    
Age: >65 years    
Risk Factors: >3 Risk Factors/ HX of CAD:2    
Troponin: <Normal Limit    
Total Heart Score Recommendations & Risks:: 4    
    
Discharge Plan    
Discharge    
Chief Complaint: Chest Pain    
    
Clinical Impression:    
 Community acquired pneumonia    
    
    
Patient Disposition: Home, Self-Care    
    
Time of Disposition Decision: 17:04    
    
Mode of Transportation: Private Vehicle    
    
Prescriptions / Home Meds:    
New    
  albuterol sulfate 90 mcg/actuation HFA aerosol inhaler     
   2 inh inhalation Q6H PRN (Reason: shortness of breath or wheezing) Qty: 8.5   
0RF    
  doxycycline hyclate 100 mg capsule     
   100 mg PO BID 7 Days Qty: 14 0RF    
    
No Action    
  cyclobenzaprine 10 mg tablet     
   10 mg PO Q12H     
  amlodipine 5 mg tablet     
   5 mg PO DAILY     
  bumetanide 2 mg tablet     
   2 mg PO DAILY     
  magnesium oxide 400 mg magnesium tablet     
   400 mg PO DAILY     
  omeprazole 20 mg capsule,delayed release(DR/EC)     
   20 mg PO BID     
  sevelamer carbonate [Renvela] 800 mg tablet     
   800 mg PO TID     
   Rx Instructions:    
   must administer with a meal/food    
  insulin glargine [Basaglar KwikPen U-100 Insulin] 100 unit/mL (3 mL) insulin   
pen     
   10 unit subcut QAM     
  Fiasp FlexTouch U-100 Insulin 100 unit/mL (3 mL) insulin pen     
   6 unit subcut QID     
   Patient Comments:    
   before every meal and at bedtime    
  melatonin 10 mg capsule     
   10 mg PO QPM     
  hydroxyzine pamoate 25 mg capsule     
   25 mg PO TID PRN (Reason: anxiety)     
  acetaminophen [Tylenol Arthritis Pain] 650 mg tablet extended release     
   1,950 mg PO Q12H PRN (Reason: fever or pain)     
  HEPARIN       
      .QOD     
   Rx Instructions:    
   6U  IN [id number];     
  albuterol sulfate 90 mcg/actuation HFA aerosol inhaler     
   2 inh inhalation Q4H PRN (Reason: shortness of breath or wheezing)     
  cetirizine 10 mg capsule     
   10 mg PO DAILY PRN (Reason: allergy symptoms)     
  docusate sodium 100 mg capsule     
   100 mg PO DAILY PRN (Reason: constipation)     
  insulin glargine [Basaglar KwikPen U-100 Insulin] 100 unit/mL (3 mL) insulin   
pen     
   10 unit subcut QAM     
  insulin glargine [Basaglar KwikPen U-100 Insulin] 100 unit/mL (3 mL) insulin   
pen     
   15 unit subcut QPM     
    
Print Language: English    
    
Instructions:  Pneumonia (ED)    
    
Referrals:    
Pepper Mendoza NP [Primary Care Provider] - 1 week    
    
Discharge Date/Time: 02/23/25 18:07

## 2025-02-23 NOTE — ECG_ITS
[facility] 
                                        
                                       Test Date:    2025 
Pat Name:     JEROD GIFFORD           Department:    
Patient ID:   [id number]               Room:         - 
Gender:       Male                     Technician:    
:          [id number]               Requested By: CHRISTINA LOZANO 
Order Number: [id number]              Reading MD:   ELBA GILMORE 
                                 Measurements 
Intervals                              Axis           
Rate:         110                      P:            46 
CT:           186                      QRS:          25 
QRSD:         96                       T:            82 
QT:           334                                     
QTc:          399                                     
                           Interpretive Statements 
1120 Sinus tachycardia 
1574 with frequent ventricular premature complexes 
4068 Nonspecific Twave abnormality 
9140 **  abnormal rhythm ECG  ** 
No previous ECG available for comparison 
Electronically Signed On 2025 6:50:33 EST by ELBA GILMORE

## 2025-03-06 ENCOUNTER — HOSPITAL ENCOUNTER
Age: 75
Discharge: HOME | End: 2025-03-06
Payer: COMMERCIAL

## 2025-03-06 DIAGNOSIS — M48.062: ICD-10-CM

## 2025-03-06 DIAGNOSIS — M47.816: ICD-10-CM

## 2025-03-06 DIAGNOSIS — M53.3: Primary | ICD-10-CM

## 2025-03-06 DIAGNOSIS — R53.1: ICD-10-CM

## 2025-03-06 PROCEDURE — G0463 HOSPITAL OUTPT CLINIC VISIT: HCPCS

## 2025-03-06 NOTE — PM.CN
Consult Note: HPI
Data of Consult
Patient: known to practice within the last 3 years
Requesting Physician: 
Lorna Nichols NP

Primary Care Provider: 
Pepper Mendoza NP

Consult Narrative
Reason for consult: low back, bilateral lower extremity pain
Narrative: 
74yom who presents for evaluation. longstanding low back pain history with radiation into bilateral lower extremities. imaging shows multilevel severe stenosis, worst at l3-4. also multilevel facet arthropathy noted. recently evaluated by spine 
surgeon, who did not recommend surgery due to myriad other health issues. has continued in a series of provider directed home exercises >6 weeks, without benefit. has used flexeril with some benefit. denies adverse med side effects. recently 
underwent bilateral L3/4 TFESI and bilateral L4/5 TFESI with less than 50% improvement for 3 months. 
cc:: 
CC: Lorna Nichols NP


Review of Systems
ROS  
 Status of ROS 10 or more systems reviewed and unremarkable except as noted in history and below   
 Musculoskeletal Reports: back pain, neck pain, extremity pain and joint pain   

[facility]
Medical History (Updated 03/06/25 @ 13:32 by Lorna Nichols NP)

Osteoarthritis
 ?M19.90 - Unspecified osteoarthritis, unspecified site (ICD-10)
Fistula
 ?L98.8 - Other specified disorders of the skin and subcutaneous tissue (ICD-10)
Trigger finger
 ?M65.30 - Trigger finger, unspecified finger (ICD-10)
Blind
 ?H54.7 - Unspecified visual loss (ICD-10)
Anxiety
 ?F41.9 - Anxiety disorder, unspecified (ICD-10)
Acid reflux
 ?K21.9 - Gastro-esophageal reflux disease without esophagitis (ICD-10)
Diabetes
 ?E11.9 - Type 2 diabetes mellitus without complications (ICD-10)
Kidney stone
 ?N20.0 - Calculus of kidney (ICD-10)
Cirrhosis of liver
 ?K74.60 - Unspecified cirrhosis of liver (ICD-10)
Requires peritoneal dialysis
 ?Z99.2 - Dependence on renal dialysis (ICD-10)
Kidney failure
 ?N19 - Unspecified kidney failure (ICD-10)
COPD (chronic obstructive pulmonary disease)
 ?J44.9 - Chronic obstructive pulmonary disease, unspecified (ICD-10)
Asthma
 ?J45.909 - Unspecified asthma, uncomplicated (ICD-10)
HTN (hypertension)
 ?I10 - Essential (primary) hypertension (ICD-10)
Palpitations
 ?R00.2 - Palpitations (ICD-10)
Heart attack
 ?I21.9 - Acute myocardial infarction, unspecified (ICD-10)
High cholesterol
 ?E78.00 - Pure hypercholesterolemia, unspecified (ICD-10)


Surgical History (Reviewed 12/05/24 @ 11:43 by Lorna Nichols NP)

Hx of cholecystectomy
 ?Z90.49 - Acquired absence of other specified parts of digestive tract (ICD-10)
History of rhinoplasty
 ?Z98.890 - Other specified postprocedural states (ICD-10)
History of cardiac cath
 ?Z98.890 - Other specified postprocedural states (ICD-10)
History of arthroscopic knee surgery
 ?Z98.890 - Other specified postprocedural states (ICD-10)
History of vasectomy
 ?Z98.52 - Vasectomy status (ICD-10)



Meds
Home Medications and Allergies
Home Medications

?Medication ?Instructions ?Recorded ?Confirmed ?Type
acetaminophen 650 mg 1,950 mg PO Q12H PRN fever or pain 10/21/24 02/23/25 History
tablet,extended release (Tylenol    
Arthritis Pain)    
amlodipine 5 mg tablet 5 mg PO DAILY 10/21/24 02/23/25 History
bumetanide 2 mg tablet 2 mg PO DAILY 10/21/24 02/23/25 History
cyclobenzaprine 10 mg tablet 10 mg PO Q12H 10/21/24 02/23/25 History
hydroxyzine pamoate 25 mg capsule 25 mg PO TID PRN anxiety 10/21/24 02/23/25 History
insulin aspart 6 unit subcut QID 10/21/24 02/23/25 History
(niacinamide)(U-100) 100 unit/mL(3    
mL) subcutaneous pen (Fiasp    
FlexTouch U-100 Insulin)    
insulin glargine 100 unit/mL (3 10 unit subcut QAM 10/21/24 11/11/24 History
mL) subcutaneous pen (Basaglar    
KwikPen U-100 Insulin)    
magnesium oxide 400 mg PO DAILY 10/21/24 02/23/25 History
melatonin 10 mg capsule 10 mg PO QPM 10/21/24 02/23/25 History
omeprazole 20 mg capsule,delayed 20 mg PO BID 10/21/24 02/23/25 History
release    
sevelamer carbonate 800 mg tablet 800 mg PO TID 10/21/24 02/23/25 History
(Renvela)    
HEPARIN .QOD 10/28/24  History
albuterol sulfate 90 mcg/actuation 2 inh inhalation Q4H PRN shortness 02/23/25 02/23/25 History
aerosol inhaler of breath or wheezing   
albuterol sulfate 90 mcg/actuation 2 inh inhalation Q6H PRN shortness 02/23/25  Rx
aerosol inhaler of breath or wheezing #8.5 grams   
cetirizine 10 mg capsule 10 mg PO DAILY PRN allergy symptoms 02/23/25 02/23/25 History
docusate sodium 100 mg capsule 100 mg PO DAILY PRN constipation 02/23/25 02/23/25 History
doxycycline hyclate 100 mg capsule 100 mg PO BID 7 days #14 caps 02/23/25  Rx
insulin glargine 100 unit/mL (3 10 unit subcut QAM 02/23/25 02/23/25 History
mL) subcutaneous pen (Basaglar    
KwikPen U-100 Insulin)    
insulin glargine 100 unit/mL (3 15 unit subcut QPM 02/23/25 02/23/25 History
mL) subcutaneous pen (Basaglar    
KwikPen U-100 Insulin)    


Allergies

Allergy/AdvReac Type Severity Reaction Status Date / Time
thiopental (From Pentothal) Allergy  Agitated Verified 11/11/24 07:40



Exam
Constitutional
Documenting provider has reviewed patient's vital signs: yes
Common normals: no apparent distress, oriented x3, healthy appearing, alert and well nourished
General appearance: cooperative
[facility]
Common normals: normocephalic, hearing grossly normal bilaterally and moist oral mucous membranes
Head and scalp: normocephalic
Eye
Common normals: PERRL
Pupil: PERRL
Neck & C-Spine
Common normals: full ROM
General: normal visual inspection
Chest
Common normals: inspection of chest normal
Respiratory
Common normals: normal respiratory effort, no retractions and no use of accessory muscles
Back & Pelvis
Lumbar spine/lower back: ROM limited, pain with ROM and lumbar spinal tenderness
Sacroiliac joints: SI joint(s) abnormal
Other: 
bilateral positive yoko(patricks), gaenslens, thigh thrust, compression test
Neuro
Common normals: oriented x3, CN's II-XII intact bilaterally, moves all extremities, no focal motor deficits, no sensory deficits noted and deep tendon reflexes 2+ bilaterally
Sensorium/orientation: alert
Motor exam: no movement abnormalities noted and strength abnormal
Psych
Common normals: mental status grossly normal, thought process normal, cooperative, affect normal, speech normal and activity/motor behavior normal
Speech: normal speech
Thought process: normal thought process

Results
Additional Findings
Additional findings: 
If on a controlled substance or opioids, I have checked an [place] report on this patient and there are no aberrancies noted in the prescribing history.??If on a controlled substance or opioid a drug screen was completed and reviewed within the last 
year, and if there has not been a drug screen completed we ordered one today to monitor higher risk, state monitored pain medication use. 

As part of providing excellent, safe, comprehensive care, the following was completed at our patient's visit:
1. A medication reconciliation and review to ensure accurate knowledge of current/active medications, including asking our patients to inform us about any over-the-counter medications or herbal remedies/nutritional supplements/alternative remedies.
2. A review to specifically ensure our patients have had annual screening for screening for depression, screening for tobacco use, and screening for unhealthy alcohol use. For concerning screenings had a discussion with the patient, provided patient 
education, and recommended follow-up with primary care provider when appropriate. If patient noted with a risk of falling, they received education on strength, gait, and balance training to prevent future risk of falling.

Assessment and Plan
Assessment and Plan
(1) Sacroiliac joint dysfunction of both sides: 
(2) Lumbar stenosis with neurogenic claudication: 
(3) Lumbar spondylosis: 
(4) Generalized weakness: 

Plan
bilateral SIJ injection under fluoroscopy
pt has failed tylenol, cannot take NSAIDs with ESRD, very high risk for medication management but continues to have severe debilitating pain. will trial butrans 5mcg/hr q7days, risks vs benefits reviewed. pt agreeable to complete avoidance of 
marijuana and alcohol while on this medication 
update UDS today
f/u 2 weeks after injection

## 2025-03-09 ENCOUNTER — HOSPITAL ENCOUNTER (EMERGENCY)
Age: 75
Discharge: HOME | End: 2025-03-09
Payer: COMMERCIAL

## 2025-03-09 VITALS
SYSTOLIC BLOOD PRESSURE: 141 MMHG | TEMPERATURE: 98.24 F | OXYGEN SATURATION: 99 % | DIASTOLIC BLOOD PRESSURE: 100 MMHG | HEART RATE: 112 BPM

## 2025-03-09 VITALS — BODY MASS INDEX: 31.8 KG/M2

## 2025-03-09 VITALS — SYSTOLIC BLOOD PRESSURE: 150 MMHG | DIASTOLIC BLOOD PRESSURE: 82 MMHG | HEART RATE: 102 BPM
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
## 2025-03-09 DIAGNOSIS — M50.30: ICD-10-CM

## 2025-03-09 DIAGNOSIS — Z98.52: ICD-10-CM

## 2025-03-09 DIAGNOSIS — Z90.49: ICD-10-CM

## 2025-03-09 DIAGNOSIS — S20.229A: Primary | ICD-10-CM

## 2025-03-09 PROCEDURE — 96372 THER/PROPH/DIAG INJ SC/IM: CPT

## 2025-03-09 PROCEDURE — 72128 CT CHEST SPINE W/O DYE: CPT

## 2025-03-09 PROCEDURE — 72125 CT NECK SPINE W/O DYE: CPT

## 2025-03-09 PROCEDURE — 99285 EMERGENCY DEPT VISIT HI MDM: CPT

## 2025-03-09 PROCEDURE — 71045 X-RAY EXAM CHEST 1 VIEW: CPT

## 2025-03-09 PROCEDURE — 93005 ELECTROCARDIOGRAM TRACING: CPT

## 2025-03-09 NOTE — ED.GENADUL1
HPI
HPI - General Adult
General
Chief complaint: Fall
Stated complaint: FALL
Time Seen by Provider: 03/09/25 15:02
Source: patient and family
Mode of arrival: Wheelchair
Limitations: physical limitation
History of Present Illness
HPI narrative: 
74-year-old male presents for pain to his upper chest and neck and upper thoracic region.  2 days ago he fell.  He did not hit his head and had no loss of consciousness.  No abdominal pain.
Related Data
Home Medications

?Medication ?Instructions ?Recorded ?Confirmed
acetaminophen 650 mg 1,950 mg PO Q12H PRN fever or pain 10/21/24 02/23/25
tablet,extended release (Tylenol   
Arthritis Pain)   
amlodipine 5 mg tablet 5 mg PO DAILY 10/21/24 02/23/25
bumetanide 2 mg tablet 2 mg PO DAILY 10/21/24 02/23/25
cyclobenzaprine 10 mg tablet 10 mg PO Q12H 10/21/24 02/23/25
hydroxyzine pamoate 25 mg capsule 25 mg PO TID PRN anxiety 10/21/24 02/23/25
insulin aspart 6 unit subcut QID 10/21/24 02/23/25
(niacinamide)(U-100) 100 unit/mL(3   
mL) subcutaneous pen (Fiasp   
FlexTouch U-100 Insulin)   
insulin glargine 100 unit/mL (3 10 unit subcut QAM 10/21/24 11/11/24
mL) subcutaneous pen (Basaglar   
KwikPen U-100 Insulin)   
magnesium oxide 400 mg PO DAILY 10/21/24 02/23/25
melatonin 10 mg capsule 10 mg PO QPM 10/21/24 02/23/25
omeprazole 20 mg capsule,delayed 20 mg PO BID 10/21/24 02/23/25
release   
sevelamer carbonate 800 mg tablet 800 mg PO TID 10/21/24 02/23/25
(Renvela)   
HEPARIN .QOD 10/28/24 
albuterol sulfate 90 mcg/actuation 2 inh inhalation Q4H PRN shortness 02/23/25 02/23/25
aerosol inhaler of breath or wheezing  
cetirizine 10 mg capsule 10 mg PO DAILY PRN allergy symptoms 02/23/25 02/23/25
docusate sodium 100 mg capsule 100 mg PO DAILY PRN constipation 02/23/25 02/23/25
insulin glargine 100 unit/mL (3 10 unit subcut QAM 02/23/25 02/23/25
mL) subcutaneous pen (Basaglar   
KwikPen U-100 Insulin)   
insulin glargine 100 unit/mL (3 15 unit subcut QPM 02/23/25 02/23/25
mL) subcutaneous pen (Basaglar   
KwikPen U-100 Insulin)   

Previous Rx's

?Medication ?Instructions ?Recorded
albuterol sulfate 90 mcg/actuation 2 inh inhalation Q6H PRN shortness 02/23/25
aerosol inhaler of breath or wheezing #8.5 grams 
doxycycline hyclate 100 mg capsule 100 mg PO BID 7 days #14 caps 02/23/25
buprenorphine 5 mcg/hour weekly 1 patch transdermal Q7D #4 ea 03/06/25
transdermal patch (Butrans)  
oxycodone-acetaminophen 5 mg-325 1 tab PO Q6H PRN pain 5 days #20 03/09/25
mg tablet (Percocet) tabs 


Allergies

Allergy/AdvReac Type Severity Reaction Status Date / Time
thiopental (From Pentothal) Allergy  Agitated Verified 03/09/25 14:51



Opioid HPI
Opioid Management
Most Recent Opioid Data: 
      Last Pain Scale 7 11/11/24 07:34 11/11/24


Review of Systems
ROS  
 Narrative A ten point review of systems is negative except as noted above.   

[facility]
Medical History (Updated 03/09/25 @ 17:13 by Elmer Cr MD)

Osteoarthritis
 ?M19.90 - Unspecified osteoarthritis, unspecified site (ICD-10)
Fistula
 ?L98.8 - Other specified disorders of the skin and subcutaneous tissue (ICD-10)
Trigger finger
 ?M65.30 - Trigger finger, unspecified finger (ICD-10)
Blind
 ?H54.7 - Unspecified visual loss (ICD-10)
Anxiety
 ?F41.9 - Anxiety disorder, unspecified (ICD-10)
Acid reflux
 ?K21.9 - Gastro-esophageal reflux disease without esophagitis (ICD-10)
Diabetes
 ?E11.9 - Type 2 diabetes mellitus without complications (ICD-10)
Kidney stone
 ?N20.0 - Calculus of kidney (ICD-10)
Cirrhosis of liver
 ?K74.60 - Unspecified cirrhosis of liver (ICD-10)
Requires peritoneal dialysis
 ?Z99.2 - Dependence on renal dialysis (ICD-10)
Kidney failure
 ?N19 - Unspecified kidney failure (ICD-10)
COPD (chronic obstructive pulmonary disease)
 ?J44.9 - Chronic obstructive pulmonary disease, unspecified (ICD-10)
Asthma
 ?J45.909 - Unspecified asthma, uncomplicated (ICD-10)
HTN (hypertension)
 ?I10 - Essential (primary) hypertension (ICD-10)
Palpitations
 ?R00.2 - Palpitations (ICD-10)
Heart attack
 ?I21.9 - Acute myocardial infarction, unspecified (ICD-10)
High cholesterol
 ?E78.00 - Pure hypercholesterolemia, unspecified (ICD-10)


Surgical History (Reviewed 12/05/24 @ 11:43 by Lorna Nichols NP)

Hx of cholecystectomy
 ?Z90.49 - Acquired absence of other specified parts of digestive tract (ICD-10)
History of rhinoplasty
 ?Z98.890 - Other specified postprocedural states (ICD-10)
History of cardiac cath
 ?Z98.890 - Other specified postprocedural states (ICD-10)
History of arthroscopic knee surgery
 ?Z98.890 - Other specified postprocedural states (ICD-10)
History of vasectomy
 ?Z98.52 - Vasectomy status (ICD-10)


Social History (Reviewed 12/05/24 @ 11:43 by Lorna Nichols NP)
Little interest or pleasure in doing things:  not at all 
Feeling down, depressed, or hopeless:  not at all 



Exam
Narrative
Exam Narrative: 
Nurses note and vital signs reviewed and patient is not hypoxic.

General:  The patient appears uncomfortable and is frequently moving around on the cart, rocking back and forth in the left to right.
Skin:  Warm, dry, no pallor noted.  There is no rash noted.
Head:  Normocephalic, atraumatic
Eye: Normal conjunctiva, no drainage
Ears, Nose, Mouth, and Throat: oral mucosa is moist. Nares patent. 
Cardiovascular:  Regular Rate and Rhythm
Respiratory:  Patient is in no distress, no accessory muscle use, lungs are clear to auscultation, no wheezing, rales or rhonchi
Back: No bruise or abrasion.  The patient has diffuse tenderness in the thoracic area and cervical spine area.  No abrasions.  Similarly on the chest wall there is diffuse tenderness but no focal area and no crepitus bruise or abrasion.
GI: Soft and nontender
Musculoskeletal: The patient has no evidence of calf tenderness, no pitting edema, symmetrical pulses noted bilaterally
Neurological:  A&O, normal speech
Psychiatric:  Cooperative
Constitutional
Vital Signs, click to edit/add: 

Last Vital Signs

Temp  98.2 F   03/09/25 14:51
Pulse  102 H  03/09/25 17:10
Resp  24 H  03/09/25 14:51
BP  150/82 H  03/09/25 17:10
Pulse Ox  99   03/09/25 14:51
O2 Del Method  Room Air  03/09/25 14:51




Course
Vital Signs
Vital signs: 

Vital Signs

Temperature  98.2 F   03/09/25 14:51
Pulse Rate  112 H  03/09/25 14:51
Respiratory Rate  24 H  03/09/25 14:51
Blood Pressure  141/100 H  03/09/25 14:51
Pulse Oximetry  99   03/09/25 14:51
Oxygen Delivery Method  Room Air  03/09/25 14:51



Temperature  98.2 F   03/09/25 14:51
Pulse Rate  102 H  03/09/25 17:10
Respiratory Rate  24 H  03/09/25 14:51
Blood Pressure  150/82 H  03/09/25 17:10
Pulse Oximetry  99   03/09/25 14:51
Oxygen Delivery Method  Room Air  03/09/25 14:51




Medical Decision Making
MDM Narrative
Medical decision making narrative: 
CAT scans and x-rays are negative and he was given IM Toradol and oral Percocet here.  He will be discharged home on Percocet and was offered muscle relaxer but he already has Flexeril at home.  Treatment diagnosis and follow-up were discussed with 
the patient and his wife.
Differential Diagnosis
Differential Diagnosis: Contusion, C-spine fracture, thoracic spine fracture, rib fracture
Imaging Data
CT C-spine, thoracic spine, chest x-ray: 
      Radiologist's impression: 
No acute cervical spine fracture or dislocation, no acute thoracic spine fracture or dislocation, chest x-ray shows hypoinflated lungs with no fracture or dislocation
ECG Data
Attestation: I personally reviewed and interpreted this ECG as follows: (EKG on my interpretation shows sinus tachycardia with a rate of 106 and 2 PVCs)

Discharge Plan
Discharge
Chief Complaint: Fall

Clinical Impression:
 Fall, Back contusion


Patient Disposition: Home, Self-Care

Time of Disposition Decision: 17:13

Condition: Good

Mode of Transportation: Private Vehicle

Prescriptions / Home Meds:
New
  oxycodone-acetaminophen [Percocet] 5-325 mg tablet 
   1 tab PO Q6H PRN (Reason: pain) 5 Days Qty: 20 0RF

No Action
  cyclobenzaprine 10 mg tablet 
   10 mg PO Q12H 
  amlodipine 5 mg tablet 
   5 mg PO DAILY 
  bumetanide 2 mg tablet 
   2 mg PO DAILY 
  magnesium oxide 400 mg magnesium tablet 
   400 mg PO DAILY 
  omeprazole 20 mg capsule,delayed release(DR/EC) 
   20 mg PO BID 
  sevelamer carbonate [Renvela] 800 mg tablet 
   800 mg PO TID 
   Rx Instructions:
   must administer with a meal/food
  insulin glargine [Basaglar KwikPen U-100 Insulin] 100 unit/mL (3 mL) insulin pen 
   10 unit subcut QAM 
  Fiasp FlexTouch U-100 Insulin 100 unit/mL (3 mL) insulin pen 
   6 unit subcut QID 
   Patient Comments:
   before every meal and at bedtime
  melatonin 10 mg capsule 
   10 mg PO QPM 
  hydroxyzine pamoate 25 mg capsule 
   25 mg PO TID PRN (Reason: anxiety) 
  acetaminophen [Tylenol Arthritis Pain] 650 mg tablet extended release 
   1,950 mg PO Q12H PRN (Reason: fever or pain) 
  HEPARIN   
      .QOD 
   Rx Instructions:
   6U  IN 6000CC D5W; 
  buprenorphine [Butrans] 5 mcg/hour patch weekly 
   1 patch transdermal Q7D Qty: 4 0RF
  albuterol sulfate 90 mcg/actuation HFA aerosol inhaler 
   2 inh inhalation Q4H PRN (Reason: shortness of breath or wheezing) 
  cetirizine 10 mg capsule 
   10 mg PO DAILY PRN (Reason: allergy symptoms) 
  docusate sodium 100 mg capsule 
   100 mg PO DAILY PRN (Reason: constipation) 
  insulin glargine [Basaglar KwikPen U-100 Insulin] 100 unit/mL (3 mL) insulin pen 
   10 unit subcut QAM 
  insulin glargine [Basaglar KwikPen U-100 Insulin] 100 unit/mL (3 mL) insulin pen 
   15 unit subcut QPM 
  albuterol sulfate 90 mcg/actuation HFA aerosol inhaler 
   2 inh inhalation Q6H PRN (Reason: shortness of breath or wheezing) Qty: 8.5 0RF
  doxycycline hyclate 100 mg capsule 
   100 mg PO BID 7 Days Qty: 14 0RF

Print Language: English

Instructions:  Fall Prevention for Older Adults (ED), Contusion in Adults (ED)

Referrals:
Pepper Mendoza NP [Primary Care Provider] - 1 week

## 2025-03-09 NOTE — ECG_ITS
[facility] 
                                        
                                       Test Date:    2025 
Pat Name:     JEROD GIFFORD           Department:    
Patient ID:   [id number]               Room:         - 
Gender:       Male                     Technician:    
:          1950               Requested By: 1030 
Order Number: [id number]              Reading MD:   REENA CAMARA M.D. 
                                 Measurements 
Intervals                              Axis           
Rate:         106                      P:            77 
HI:           184                      QRS:          65 
QRSD:         98                       T:            58 
QT:           356                                     
QTc:          418                                     
                           Interpretive Statements 
1120 Sinus tachycardia 
1574 with frequent ventricular premature complexes 
Nonspecific T wave changes 
9140 **  abnormal rhythm ECG  ** 
Compared to ECG 2025 15:23:07 
No significant changes 
Electronically Signed On 3-9-2025 16:08:02 EDT by REENA CAMARA M.D.

## 2025-03-24 ENCOUNTER — OFFICE VISIT (OUTPATIENT)
Age: 75
End: 2025-03-24

## 2025-03-24 VITALS
HEART RATE: 85 BPM | BODY MASS INDEX: 32.2 KG/M2 | HEIGHT: 71 IN | SYSTOLIC BLOOD PRESSURE: 132 MMHG | DIASTOLIC BLOOD PRESSURE: 78 MMHG | WEIGHT: 230 LBS

## 2025-03-24 DIAGNOSIS — Z79.4 TYPE 2 DIABETES MELLITUS WITH PROLIFERATIVE RETINOPATHY, WITH LONG-TERM CURRENT USE OF INSULIN, MACULAR EDEMA PRESENCE UNSPECIFIED, UNSPECIFIED LATERALITY, UNSPECIFIED PROLIFERATIVE RETINOPATHY* (HCC): Primary | ICD-10-CM

## 2025-03-24 DIAGNOSIS — E78.2 MIXED HYPERLIPIDEMIA: ICD-10-CM

## 2025-03-24 DIAGNOSIS — E11.3599 TYPE 2 DIABETES MELLITUS WITH PROLIFERATIVE RETINOPATHY, WITH LONG-TERM CURRENT USE OF INSULIN, MACULAR EDEMA PRESENCE UNSPECIFIED, UNSPECIFIED LATERALITY, UNSPECIFIED PROLIFERATIVE RETINOPATHY* (HCC): Primary | ICD-10-CM

## 2025-03-24 RX ORDER — BUPRENORPHINE 5 UG/H
PATCH TRANSDERMAL
COMMUNITY
Start: 2025-03-17

## 2025-03-24 NOTE — PROGRESS NOTES
Diabetes Checklist    Date: 3/24/25   Patient Name: Jefry Cummings   YOB: 1950     When were you Diagnosed with Diabetes? 2004   Current Treatment? Basaglar & fiasp   Prior Treatment? Trulicity & ozempic & metformin & glipizide   Diet Plan? Portion Control: carb counting  Exercise Plan? None   Diabetes Complications? 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   Symptoms Yes No   Tingling or Numbness in Toes  [x]   []    Burning in Feet  []   [x]    Frequent  Urination  [x]   []    Blurry Vision  [x]   []    Open Wounds on Feet  []   [x]      Do you have any other concerns today? none   
sweating or jitteriness.   He has been receiving laser treatments every 2 weeks for macular degeneration, which has resulted in significant vision loss. He is scheduled for surgery on 04/01/2025.  He was previously on cholesterol medication, but it was discontinued due to liver enzyme issues.            History reviewed. No pertinent past medical history.   History reviewed. No pertinent surgical history.    History reviewed. No pertinent family history.  Social History     Tobacco Use    Smoking status: Every Day     Current packs/day: 0.50     Types: Cigarettes    Smokeless tobacco: Never   Substance Use Topics    Alcohol use: Not on file      Current Outpatient Medications   Medication Sig Dispense Refill    buprenorphine (BUPRENEX) 5 MCG/HR PTWK       insulin glargine (BASAGLAR KWIKPEN) 100 UNIT/ML injection pen Inject 12 units the PM and 10 units in the am. 5 Adjustable Dose Pre-filled Pen Syringe 3    Insulin Aspart, w/Niacinamide, (FIASP FLEXTOUCH) 100 UNIT/ML SOPN Inject 8 units pre meal bkfst; dinner and with dialysis and 6 units with lunch with SSI FOR MAX OF 55 UNITS A DAY. 10 Adjustable Dose Pre-filled Pen Syringe 3    Continuous Glucose Sensor (FREESTYLE AMPARO 2 SENSOR) MISC Check blood sugars 4x/day 6 each 1    Multiple Vitamin (MULTIVITAMIN ADULT PO) Take by mouth Dailyvite 800mg with Zinc      docusate sodium (COLACE) 100 MG capsule Take 1 capsule by mouth 2 times daily as needed      Melatonin 10 MG TABS Take 10 mg by mouth at bedtime      hydrOXYzine pamoate (VISTARIL) 25 MG capsule Take 1 capsule by mouth every 8 hours as needed      gentamicin (GARAMYCIN) 0.1 % ointment Apply 1 Application topically daily      albuterol sulfate HFA (PROVENTIL;VENTOLIN;PROAIR) 108 (90 Base) MCG/ACT inhaler Inhale 2 puffs into the lungs every 6 hours as needed      Insulin Pen Needle (PEN NEEDLES 3/16\") 31G X 5 MM MISC 1 each 5 (five) times a day      Epoetin Leobardo (EPOGEN IJ) Inject as directed as needed

## 2025-05-02 ENCOUNTER — OFFICE VISIT (OUTPATIENT)
Age: 75
End: 2025-05-02

## 2025-05-02 VITALS
DIASTOLIC BLOOD PRESSURE: 62 MMHG | HEIGHT: 71 IN | SYSTOLIC BLOOD PRESSURE: 132 MMHG | BODY MASS INDEX: 32.08 KG/M2 | HEART RATE: 97 BPM

## 2025-05-02 DIAGNOSIS — E11.65 TYPE 2 DIABETES MELLITUS WITH HYPERGLYCEMIA, WITH LONG-TERM CURRENT USE OF INSULIN (HCC): Primary | ICD-10-CM

## 2025-05-02 DIAGNOSIS — Z79.4 TYPE 2 DIABETES MELLITUS WITH HYPERGLYCEMIA, WITH LONG-TERM CURRENT USE OF INSULIN (HCC): Primary | ICD-10-CM

## 2025-05-02 RX ORDER — INSULIN ASPART INJECTION 100 [IU]/ML
INJECTION, SOLUTION SUBCUTANEOUS
Qty: 10 ADJUSTABLE DOSE PRE-FILLED PEN SYRINGE | Refills: 3 | Status: SHIPPED | OUTPATIENT
Start: 2025-05-02

## 2025-05-02 RX ORDER — INSULIN GLARGINE 100 [IU]/ML
INJECTION, SOLUTION SUBCUTANEOUS
Qty: 5 ADJUSTABLE DOSE PRE-FILLED PEN SYRINGE | Refills: 3 | Status: SHIPPED | OUTPATIENT
Start: 2025-05-02

## 2025-05-02 NOTE — PROGRESS NOTES
CGMSINTERPRETATION    CGMS interpretation:  The patient is checking blood sugars 4 times a day using freestyle luann continuous glucose monitoring system.  His download includes data from 4/19/2025 through 5/2/2025 and is sufficient for interpretation.  The CGM was active 91% of the time.  Average blood glucose is 142 mg/dL with a coefficient of variation of 30.9% and glucose management indicator of 6.7%  The patient was within the target range 83 percent of the time.  Hypoglycemia:  Blood glucose range between 54 mg/dL and 70mg/dL: 0%  Blood glucose is below 54 mg/dL: 0%  Hyperglycemia:  Blood glucoses between 181 and 250 mg/dL: 14%  Blood glucoses above 250 mg/dL: 3%  In summary, this patient is having good glycemic control although this some nocturnal hyperglycemia and the risk of hypoglycemia during the day  Recommendations: Please refer to the assessment and plan section

## 2025-05-02 NOTE — PROGRESS NOTES
[facility] [place] [facility]  [facility] ENDOCRINOLOGY  [address]  [address]  [place] [address]  Dept: [phone]  Loc: [phone]     Visit Date: 5/2/2025    Jefry Cummings is a 75 y.o. male who presents today for:  Chief Complaint   Patient presents with    Follow-up     T2DM      The patient (or guardian, if applicable) and other individuals in attendance with the patient were advised that Artificial Intelligence will be utilized during this visit to record, process the conversation to generate a clinical note, and support improvement of the AI technology. The patient (or guardian, if applicable) and other individuals in attendance at the appointment consented to the use of AI, including the recording.         Subjective:      HPI   History of Present Illness  The patient is a 75-year-old male who presents for type 2 diabetes mellitus.    He was diagnosed with diabetes in 2004. His dietary intake remains consistent, consuming approximately 15 g at breakfast and lunch, and 30 g at dinner. His physical activity level has not changed, but a slight decrease in weight is noted. Blood glucose levels are monitored at least four times daily using the Honey system, with an average reading of 142 and glucose management indicating 6.7%. He reports no significant concerns about hypoglycemia, although episodes of low blood sugar in the evening have occurred, necessitating the use of glucose tablets. Persistent dry mouth is reported, which has not worsened. Dry skin is present on both heels.    He has seen an ophthalmologist a couple of times and underwent laser treatments, but he does not want to continue due to the pain. Current diabetes medications include Basaglar, administered as 9 units in the morning and 12 units at night, and Fiasp, administered as 8 units with breakfast, 6 units at lunch, 8 units at dinner, and an additional 8 units at bedtime. He is not currently on any cholesterol

## 2025-05-02 NOTE — PATIENT INSTRUCTIONS
- Basaglar: 8 units in the morning, 13 units at night  - Fiasp: 8 units at breakfast, 6 units at lunch, 8 units at dinner, 8 units at bedtime   -blood sugars in 1 month  -always be ready to treat low blood sugars

## 2025-05-06 ENCOUNTER — TELEPHONE (OUTPATIENT)
Age: 75
End: 2025-05-06

## 2025-05-06 NOTE — TELEPHONE ENCOUNTER
[facility] calling to request form for diabetic shoe order, please fax to [phone] or can call [phone]     Also needs OV notes with full signature, cannot be electronic.   
n/a